# Patient Record
Sex: FEMALE | Race: BLACK OR AFRICAN AMERICAN | Employment: FULL TIME | URBAN - METROPOLITAN AREA
[De-identification: names, ages, dates, MRNs, and addresses within clinical notes are randomized per-mention and may not be internally consistent; named-entity substitution may affect disease eponyms.]

---

## 2017-04-13 ENCOUNTER — TELEPHONE (OUTPATIENT)
Dept: PRIMARY CARE CLINIC | Age: 47
End: 2017-04-13

## 2017-04-13 DIAGNOSIS — I10 ESSENTIAL HYPERTENSION: ICD-10-CM

## 2017-04-13 RX ORDER — HYDROCHLOROTHIAZIDE 25 MG/1
TABLET ORAL
Qty: 90 TABLET | Refills: 0 | Status: SHIPPED | OUTPATIENT
Start: 2017-04-13 | End: 2017-06-30 | Stop reason: SDUPTHER

## 2017-04-13 RX ORDER — ACETAMINOPHEN 500 MG
500 TABLET ORAL EVERY 6 HOURS PRN
Qty: 120 TABLET | Refills: 0 | Status: SHIPPED | OUTPATIENT
Start: 2017-04-13 | End: 2017-06-30 | Stop reason: SDUPTHER

## 2017-04-13 RX ORDER — LOSARTAN POTASSIUM 50 MG/1
50 TABLET ORAL DAILY
Qty: 90 TABLET | Refills: 0 | Status: SHIPPED | OUTPATIENT
Start: 2017-04-13 | End: 2017-06-30

## 2017-06-30 ENCOUNTER — OFFICE VISIT (OUTPATIENT)
Dept: PRIMARY CARE CLINIC | Age: 47
End: 2017-06-30

## 2017-06-30 VITALS
WEIGHT: 249 LBS | HEIGHT: 61 IN | DIASTOLIC BLOOD PRESSURE: 85 MMHG | BODY MASS INDEX: 47.01 KG/M2 | OXYGEN SATURATION: 97 % | HEART RATE: 60 BPM | TEMPERATURE: 98.3 F | RESPIRATION RATE: 16 BRPM | SYSTOLIC BLOOD PRESSURE: 120 MMHG

## 2017-06-30 DIAGNOSIS — G40.409 MYOCLONIC SEIZURE DISORDER (HCC): ICD-10-CM

## 2017-06-30 DIAGNOSIS — M19.90 ARTHRITIS: ICD-10-CM

## 2017-06-30 DIAGNOSIS — Z01.818 PREOP EXAMINATION: Primary | ICD-10-CM

## 2017-06-30 DIAGNOSIS — I10 ESSENTIAL HYPERTENSION: ICD-10-CM

## 2017-06-30 PROCEDURE — 99243 OFF/OP CNSLTJ NEW/EST LOW 30: CPT | Performed by: INTERNAL MEDICINE

## 2017-06-30 PROCEDURE — 93000 ELECTROCARDIOGRAM COMPLETE: CPT | Performed by: INTERNAL MEDICINE

## 2017-06-30 RX ORDER — AMLODIPINE BESYLATE 10 MG/1
10 TABLET ORAL DAILY
Qty: 90 TABLET | Refills: 5 | Status: SHIPPED | OUTPATIENT
Start: 2017-06-30 | End: 2018-02-01 | Stop reason: SDUPTHER

## 2017-06-30 RX ORDER — LEVETIRACETAM 500 MG/1
500 TABLET ORAL 2 TIMES DAILY
Qty: 60 TABLET | Refills: 5 | Status: SHIPPED | OUTPATIENT
Start: 2017-06-30 | End: 2018-01-04 | Stop reason: SDUPTHER

## 2017-06-30 RX ORDER — HYDROCHLOROTHIAZIDE 25 MG/1
TABLET ORAL
Qty: 90 TABLET | Refills: 5 | Status: SHIPPED | OUTPATIENT
Start: 2017-06-30 | End: 2018-02-01 | Stop reason: SDUPTHER

## 2017-06-30 RX ORDER — ATENOLOL 50 MG/1
50 TABLET ORAL DAILY
Qty: 90 TABLET | Refills: 5 | Status: SHIPPED | OUTPATIENT
Start: 2017-06-30 | End: 2018-02-01 | Stop reason: SDUPTHER

## 2017-06-30 RX ORDER — ACETAMINOPHEN 500 MG
500 TABLET ORAL EVERY 6 HOURS PRN
Qty: 120 TABLET | Refills: 3 | Status: SHIPPED | OUTPATIENT
Start: 2017-06-30 | End: 2018-02-01 | Stop reason: SDUPTHER

## 2017-06-30 ASSESSMENT — PATIENT HEALTH QUESTIONNAIRE - PHQ9
1. LITTLE INTEREST OR PLEASURE IN DOING THINGS: 0
SUM OF ALL RESPONSES TO PHQ9 QUESTIONS 1 & 2: 0
2. FEELING DOWN, DEPRESSED OR HOPELESS: 0
SUM OF ALL RESPONSES TO PHQ QUESTIONS 1-9: 0

## 2017-06-30 ASSESSMENT — ENCOUNTER SYMPTOMS
BLOOD IN STOOL: 0
EYES NEGATIVE: 1
DIARRHEA: 0
ABDOMINAL DISTENTION: 0
SHORTNESS OF BREATH: 0
BACK PAIN: 1
WHEEZING: 0
CHEST TIGHTNESS: 0
ABDOMINAL PAIN: 0
GASTROINTESTINAL NEGATIVE: 1
COUGH: 0
CONSTIPATION: 0

## 2017-12-29 ENCOUNTER — HOSPITAL ENCOUNTER (OUTPATIENT)
Dept: MAMMOGRAPHY | Age: 47
Discharge: HOME | End: 2017-12-29
Attending: INTERNAL MEDICINE | Admitting: INTERNAL MEDICINE

## 2017-12-29 DIAGNOSIS — Z12.31 VISIT FOR SCREENING MAMMOGRAM: ICD-10-CM

## 2018-01-04 DIAGNOSIS — G40.409 MYOCLONIC SEIZURE DISORDER (HCC): ICD-10-CM

## 2018-01-04 RX ORDER — LEVETIRACETAM 500 MG/1
TABLET ORAL
Qty: 180 TABLET | Refills: 5 | Status: SHIPPED | OUTPATIENT
Start: 2018-01-04 | End: 2018-02-01 | Stop reason: SDUPTHER

## 2018-01-19 ENCOUNTER — OFFICE VISIT (OUTPATIENT)
Dept: PRIMARY CARE CLINIC | Age: 48
End: 2018-01-19

## 2018-01-19 VITALS
TEMPERATURE: 101 F | OXYGEN SATURATION: 96 % | HEIGHT: 61 IN | HEART RATE: 67 BPM | BODY MASS INDEX: 49.65 KG/M2 | SYSTOLIC BLOOD PRESSURE: 117 MMHG | DIASTOLIC BLOOD PRESSURE: 75 MMHG | WEIGHT: 263 LBS

## 2018-01-19 DIAGNOSIS — J06.9 URI, ACUTE: Primary | ICD-10-CM

## 2018-01-19 LAB
INFLUENZA A ANTIBODY: NORMAL
INFLUENZA B ANTIBODY: NORMAL
S PYO AG THROAT QL: NORMAL

## 2018-01-19 PROCEDURE — 87804 INFLUENZA ASSAY W/OPTIC: CPT | Performed by: INTERNAL MEDICINE

## 2018-01-19 PROCEDURE — 87880 STREP A ASSAY W/OPTIC: CPT | Performed by: INTERNAL MEDICINE

## 2018-01-19 PROCEDURE — 99213 OFFICE O/P EST LOW 20 MIN: CPT | Performed by: INTERNAL MEDICINE

## 2018-01-19 ASSESSMENT — ENCOUNTER SYMPTOMS
COUGH: 1
CONSTIPATION: 0
ABDOMINAL DISTENTION: 0
EYES NEGATIVE: 1
ABDOMINAL PAIN: 0
CHEST TIGHTNESS: 0
BLOOD IN STOOL: 0
GASTROINTESTINAL NEGATIVE: 1
SORE THROAT: 1
WHEEZING: 0
SHORTNESS OF BREATH: 0
DIARRHEA: 0

## 2018-01-19 NOTE — PROGRESS NOTES
current on medication refills  If taking OTC's check with MD/pharmacy first about interactions    Systolic BP < or equal to 666  Diastolic BP < or equal to 85    Set targets for weight loss 4 lbs per month  Exercise 3-5 times per week  Keep check of weight  Weighting machine    On a scale of 1 to 5 how confident are you in these goals?   4/5       Education materials given

## 2018-01-31 DIAGNOSIS — G40.409 MYOCLONIC SEIZURE DISORDER (HCC): ICD-10-CM

## 2018-01-31 DIAGNOSIS — I10 ESSENTIAL HYPERTENSION: ICD-10-CM

## 2018-01-31 DIAGNOSIS — M19.90 ARTHRITIS: ICD-10-CM

## 2018-02-01 DIAGNOSIS — I10 ESSENTIAL HYPERTENSION: ICD-10-CM

## 2018-02-01 DIAGNOSIS — G40.409 MYOCLONIC SEIZURE DISORDER (HCC): ICD-10-CM

## 2018-02-01 RX ORDER — HYDROCHLOROTHIAZIDE 25 MG/1
TABLET ORAL
Qty: 90 TABLET | Refills: 5 | Status: SHIPPED | OUTPATIENT
Start: 2018-02-01 | End: 2018-05-21 | Stop reason: SDUPTHER

## 2018-02-01 RX ORDER — ACETAMINOPHEN 500 MG
500 TABLET ORAL EVERY 6 HOURS PRN
Qty: 120 TABLET | Refills: 3 | Status: SHIPPED | OUTPATIENT
Start: 2018-02-01 | End: 2019-01-02

## 2018-02-01 RX ORDER — AMLODIPINE BESYLATE 10 MG/1
10 TABLET ORAL DAILY
Qty: 30 TABLET | Refills: 0 | Status: SHIPPED | OUTPATIENT
Start: 2018-02-01 | End: 2018-05-21 | Stop reason: SDUPTHER

## 2018-02-01 RX ORDER — ATENOLOL 50 MG/1
50 TABLET ORAL DAILY
Qty: 30 TABLET | Refills: 0 | Status: SHIPPED | OUTPATIENT
Start: 2018-02-01 | End: 2018-05-21 | Stop reason: SDUPTHER

## 2018-02-01 RX ORDER — AMLODIPINE BESYLATE 10 MG/1
10 TABLET ORAL DAILY
Qty: 90 TABLET | Refills: 5 | Status: SHIPPED | OUTPATIENT
Start: 2018-02-01 | End: 2018-02-01 | Stop reason: SDUPTHER

## 2018-02-01 RX ORDER — LEVETIRACETAM 500 MG/1
TABLET ORAL
Qty: 180 TABLET | Refills: 5 | Status: SHIPPED | OUTPATIENT
Start: 2018-02-01 | End: 2019-01-09 | Stop reason: SDUPTHER

## 2018-02-01 RX ORDER — ATENOLOL 50 MG/1
50 TABLET ORAL DAILY
Qty: 90 TABLET | Refills: 5 | Status: SHIPPED | OUTPATIENT
Start: 2018-02-01 | End: 2018-02-01 | Stop reason: SDUPTHER

## 2018-03-15 ENCOUNTER — HOSPITAL ENCOUNTER (OUTPATIENT)
Dept: OTHER | Age: 48
Discharge: HOME | End: 2018-03-15
Attending: INTERNAL MEDICINE | Admitting: INTERNAL MEDICINE

## 2018-03-15 ENCOUNTER — OFFICE VISIT (OUTPATIENT)
Dept: PRIMARY CARE CLINIC | Age: 48
End: 2018-03-15

## 2018-03-15 ENCOUNTER — TELEPHONE (OUTPATIENT)
Dept: ORTHOPEDIC SURGERY | Age: 48
End: 2018-03-15

## 2018-03-15 VITALS
HEIGHT: 61 IN | RESPIRATION RATE: 16 BRPM | BODY MASS INDEX: 47.39 KG/M2 | OXYGEN SATURATION: 96 % | TEMPERATURE: 98.7 F | SYSTOLIC BLOOD PRESSURE: 128 MMHG | DIASTOLIC BLOOD PRESSURE: 88 MMHG | WEIGHT: 251 LBS | HEART RATE: 60 BPM

## 2018-03-15 DIAGNOSIS — S60.512A INFECTED ABRASION OF LEFT HAND, INITIAL ENCOUNTER: ICD-10-CM

## 2018-03-15 DIAGNOSIS — S60.512A INFECTED ABRASION OF LEFT HAND, INITIAL ENCOUNTER: Primary | ICD-10-CM

## 2018-03-15 DIAGNOSIS — L08.9 INFECTED ABRASION OF LEFT HAND, INITIAL ENCOUNTER: Primary | ICD-10-CM

## 2018-03-15 DIAGNOSIS — L08.9 INFECTED ABRASION OF LEFT HAND, INITIAL ENCOUNTER: ICD-10-CM

## 2018-03-15 LAB
BASOPHILS ABSOLUTE: 0 K/UL (ref 0–0.2)
BASOPHILS RELATIVE PERCENT: 1 %
EOSINOPHILS ABSOLUTE: 0.1 K/UL (ref 0–0.6)
EOSINOPHILS RELATIVE PERCENT: 2.1 %
HCT VFR BLD CALC: 42.4 % (ref 36–48)
HEMOGLOBIN: 14.7 G/DL (ref 12–16)
LYMPHOCYTES ABSOLUTE: 2.1 K/UL (ref 1–5.1)
LYMPHOCYTES RELATIVE PERCENT: 53 %
MCH RBC QN AUTO: 26.5 PG (ref 26–34)
MCHC RBC AUTO-ENTMCNC: 34.6 G/DL (ref 31–36)
MCV RBC AUTO: 76.7 FL (ref 80–100)
MONOCYTES ABSOLUTE: 0.4 K/UL (ref 0–1.3)
MONOCYTES RELATIVE PERCENT: 9.2 %
NEUTROPHILS ABSOLUTE: 1.4 K/UL (ref 1.7–7.7)
NEUTROPHILS RELATIVE PERCENT: 34.7 %
PDW BLD-RTO: 15 % (ref 12.4–15.4)
PLATELET # BLD: 242 K/UL (ref 135–450)
PMV BLD AUTO: 8.2 FL (ref 5–10.5)
RBC # BLD: 5.53 M/UL (ref 4–5.2)
WBC # BLD: 4 K/UL (ref 4–11)

## 2018-03-15 PROCEDURE — 99213 OFFICE O/P EST LOW 20 MIN: CPT | Performed by: INTERNAL MEDICINE

## 2018-03-15 RX ORDER — CEPHALEXIN 500 MG/1
500 CAPSULE ORAL 4 TIMES DAILY
Qty: 40 CAPSULE | Refills: 0 | Status: SHIPPED | OUTPATIENT
Start: 2018-03-15 | End: 2018-03-25

## 2018-03-15 ASSESSMENT — ENCOUNTER SYMPTOMS
RESPIRATORY NEGATIVE: 1
EYE DISCHARGE: 0
EYES NEGATIVE: 1
GASTROINTESTINAL NEGATIVE: 1

## 2018-03-15 NOTE — ASSESSMENT & PLAN NOTE
Infected draining abrasion between the 4 and 5 finger webspace, left hand with early cellulitis at the proximal fourth and fifth fingers.

## 2018-03-15 NOTE — PROGRESS NOTES
Arjun Brantley  YOB: 1970    Date of Service:  3/15/2018    Chief Complaint   Patient presents with    Wound Infection     Lt between 3rd and 4th digit, began as a paper cut last week          Hand Pain    The incident occurred more than 1 week ago. The incident occurred at work. Injury mechanism: paper cut. The pain is present in the left hand. The quality of the pain is described as burning. The pain does not radiate. The pain is at a severity of 5/10. The pain is moderate. The pain has been constant since the incident. Pertinent negatives include no chest pain, muscle weakness, numbness or tingling. The symptoms are aggravated by movement and palpation. Treatments tried: neosporin ointment. The treatment provided no relief (much worse with an open wound and pus).        Allergies   Allergen Reactions    Ibuprofen     Nsaids      Outpatient Prescriptions Marked as Taking for the 3/15/18 encounter (Office Visit) with Francisco Cifuentes MD   Medication Sig Dispense Refill    cephALEXin (KEFLEX) 500 MG capsule Take 1 capsule by mouth 4 times daily for 10 days 40 capsule 0    levETIRAcetam (KEPPRA) 500 MG tablet TAKE 1 TABLET TWICE DAILY 180 tablet 5    hydrochlorothiazide (HYDRODIURIL) 25 MG tablet TAKE 1 TABLET EVERY DAY 90 tablet 5    atenolol (TENORMIN) 50 MG tablet Take 1 tablet by mouth daily 30 tablet 0    amLODIPine (NORVASC) 10 MG tablet Take 1 tablet by mouth daily 30 tablet 0       Immunization History   Administered Date(s) Administered    MMR 07/06/2011    Tdap (Boostrix, Adacel) 06/16/2011, 09/30/2013       Past Medical History:   Diagnosis Date    Carpal tunnel syndrome     Convulsions     Dermatophytosis     Diarrhea     Hypertension     Hypertensive kidney disease with chronic kidney disease stage V (HCC)     Medulloadrenal hyperfunc     Meningococcal encephalitis     Obesity     Other malaise and fatigue      Past Surgical History:   Procedure Laterality Date    APPENDECTOMY      CARPAL TUNNEL RELEASE Bilateral 2005     SECTION      ENDOMETRIAL ABLATION      OVARY REMOVAL      TONSILLECTOMY       Family History   Problem Relation Age of Onset    High Blood Pressure Other      maternal aunt    Hypertension Mother     Hypertension Maternal Grandmother     Diabetes Maternal Grandmother     Breast Cancer Maternal Grandmother        Review of Systems:  Review of Systems   Constitutional: Negative. Negative for activity change and appetite change. HENT: Negative. Eyes: Negative. Negative for discharge. Respiratory: Negative. Cardiovascular: Negative. Negative for chest pain. Gastrointestinal: Negative. Genitourinary: Negative. Negative for difficulty urinating. Musculoskeletal: Negative. Negative for arthralgias. Skin: Negative. Negative for rash. Neurological: Negative. Negative for tingling and numbness. Psychiatric/Behavioral: Negative. Negative for agitation and confusion. The patient is not nervous/anxious. All other systems reviewed and are negative. Vitals:    03/15/18 1455   BP: 128/88   Site: Right Arm   Position: Sitting   Cuff Size: Large Adult   Pulse: 60   Resp: 16   Temp: 98.7 °F (37.1 °C)   TempSrc: Oral   SpO2: 96%   Weight: 251 lb (113.9 kg)   Height: 5' 1\" (1.549 m)     Body mass index is 47.43 kg/m². Wt Readings from Last 3 Encounters:   03/15/18 251 lb (113.9 kg)   18 263 lb (119.3 kg)   17 249 lb (112.9 kg)     BP Readings from Last 3 Encounters:   03/15/18 128/88   18 117/75   17 120/85         Physical Exam   Constitutional: She is oriented to person, place, and time. She appears well-developed and well-nourished. HENT:   Head: Normocephalic and atraumatic. Eyes: Conjunctivae are normal. Pupils are equal, round, and reactive to light. Neck: Normal range of motion. Neck supple. Cardiovascular: Normal rate, regular rhythm and normal heart sounds.

## 2018-03-19 ENCOUNTER — OFFICE VISIT (OUTPATIENT)
Dept: ORTHOPEDIC SURGERY | Age: 48
End: 2018-03-19

## 2018-03-19 VITALS
SYSTOLIC BLOOD PRESSURE: 133 MMHG | DIASTOLIC BLOOD PRESSURE: 89 MMHG | BODY MASS INDEX: 47.2 KG/M2 | WEIGHT: 250 LBS | HEIGHT: 61 IN | HEART RATE: 58 BPM

## 2018-03-19 DIAGNOSIS — L30.9 HAND DERMATITIS: Primary | ICD-10-CM

## 2018-03-19 PROCEDURE — 99243 OFF/OP CNSLTJ NEW/EST LOW 30: CPT | Performed by: ORTHOPAEDIC SURGERY

## 2018-04-03 ENCOUNTER — TELEPHONE (OUTPATIENT)
Dept: PRIMARY CARE CLINIC | Age: 48
End: 2018-04-03

## 2018-04-04 ENCOUNTER — OFFICE VISIT (OUTPATIENT)
Dept: PRIMARY CARE CLINIC | Age: 48
End: 2018-04-04

## 2018-04-04 VITALS
TEMPERATURE: 98.4 F | HEIGHT: 61 IN | HEART RATE: 62 BPM | OXYGEN SATURATION: 97 % | WEIGHT: 258.6 LBS | DIASTOLIC BLOOD PRESSURE: 83 MMHG | SYSTOLIC BLOOD PRESSURE: 124 MMHG | BODY MASS INDEX: 48.82 KG/M2

## 2018-04-04 DIAGNOSIS — B37.2: ICD-10-CM

## 2018-04-04 DIAGNOSIS — B37.2: Primary | ICD-10-CM

## 2018-04-04 PROCEDURE — 99213 OFFICE O/P EST LOW 20 MIN: CPT | Performed by: INTERNAL MEDICINE

## 2018-04-04 RX ORDER — PRENATAL VIT 91/IRON/FOLIC/DHA 28-975-200
COMBINATION PACKAGE (EA) ORAL
COMMUNITY
Start: 2018-04-04 | End: 2019-01-02

## 2018-04-04 RX ORDER — FLUCONAZOLE 150 MG/1
TABLET ORAL
Qty: 6 TABLET | Refills: 0 | Status: SHIPPED | OUTPATIENT
Start: 2018-04-04 | End: 2019-01-09

## 2018-04-04 RX ORDER — FLUCONAZOLE 150 MG/1
TABLET ORAL
Qty: 6 TABLET | Refills: 0 | Status: SHIPPED | OUTPATIENT
Start: 2018-04-04 | End: 2018-04-04 | Stop reason: SDUPTHER

## 2018-04-04 ASSESSMENT — ENCOUNTER SYMPTOMS
GASTROINTESTINAL NEGATIVE: 1
RESPIRATORY NEGATIVE: 1
EYES NEGATIVE: 1

## 2018-05-15 DIAGNOSIS — I10 ESSENTIAL HYPERTENSION: ICD-10-CM

## 2018-05-21 DIAGNOSIS — I10 ESSENTIAL HYPERTENSION: ICD-10-CM

## 2018-05-21 RX ORDER — ATENOLOL 50 MG/1
50 TABLET ORAL DAILY
Qty: 30 TABLET | Refills: 0 | Status: SHIPPED | OUTPATIENT
Start: 2018-05-21 | End: 2018-07-18 | Stop reason: SDUPTHER

## 2018-05-21 RX ORDER — HYDROCHLOROTHIAZIDE 25 MG/1
TABLET ORAL
Qty: 90 TABLET | Refills: 5 | Status: SHIPPED | OUTPATIENT
Start: 2018-05-21 | End: 2018-10-05 | Stop reason: SDUPTHER

## 2018-05-21 RX ORDER — AMLODIPINE BESYLATE 10 MG/1
10 TABLET ORAL DAILY
Qty: 30 TABLET | Refills: 0 | Status: SHIPPED | OUTPATIENT
Start: 2018-05-21 | End: 2018-07-18 | Stop reason: SDUPTHER

## 2018-05-21 RX ORDER — AMLODIPINE BESYLATE 10 MG/1
10 TABLET ORAL DAILY
Qty: 30 TABLET | Refills: 0 | Status: SHIPPED | OUTPATIENT
Start: 2018-05-21 | End: 2018-10-05 | Stop reason: SDUPTHER

## 2018-05-21 RX ORDER — ATENOLOL 50 MG/1
50 TABLET ORAL DAILY
Qty: 30 TABLET | Refills: 0 | Status: SHIPPED | OUTPATIENT
Start: 2018-05-21 | End: 2018-10-05 | Stop reason: SDUPTHER

## 2018-07-18 DIAGNOSIS — I10 ESSENTIAL HYPERTENSION: ICD-10-CM

## 2018-07-18 RX ORDER — ATENOLOL 50 MG/1
TABLET ORAL
Qty: 30 TABLET | Refills: 0 | Status: SHIPPED | OUTPATIENT
Start: 2018-07-18 | End: 2018-08-26 | Stop reason: SDUPTHER

## 2018-07-18 RX ORDER — AMLODIPINE BESYLATE 10 MG/1
TABLET ORAL
Qty: 30 TABLET | Refills: 0 | Status: SHIPPED | OUTPATIENT
Start: 2018-07-18 | End: 2018-08-28 | Stop reason: SDUPTHER

## 2018-08-26 DIAGNOSIS — I10 ESSENTIAL HYPERTENSION: ICD-10-CM

## 2018-08-27 RX ORDER — ATENOLOL 50 MG/1
TABLET ORAL
Qty: 30 TABLET | Refills: 0 | Status: SHIPPED | OUTPATIENT
Start: 2018-08-27 | End: 2018-10-05 | Stop reason: SDUPTHER

## 2018-08-28 DIAGNOSIS — I10 ESSENTIAL HYPERTENSION: ICD-10-CM

## 2018-08-29 RX ORDER — AMLODIPINE BESYLATE 10 MG/1
TABLET ORAL
Qty: 30 TABLET | Refills: 0 | Status: SHIPPED | OUTPATIENT
Start: 2018-08-29 | End: 2018-10-05 | Stop reason: SDUPTHER

## 2018-10-05 DIAGNOSIS — I10 ESSENTIAL HYPERTENSION: ICD-10-CM

## 2018-10-05 RX ORDER — AMLODIPINE BESYLATE 10 MG/1
TABLET ORAL
Qty: 30 TABLET | Refills: 0 | Status: SHIPPED | OUTPATIENT
Start: 2018-10-05 | End: 2018-11-17 | Stop reason: SDUPTHER

## 2018-10-05 RX ORDER — HYDROCHLOROTHIAZIDE 25 MG/1
TABLET ORAL
Qty: 90 TABLET | Refills: 5 | Status: SHIPPED | OUTPATIENT
Start: 2018-10-05 | End: 2018-11-21 | Stop reason: SDUPTHER

## 2018-10-05 RX ORDER — ATENOLOL 50 MG/1
TABLET ORAL
Qty: 30 TABLET | Refills: 0 | Status: SHIPPED | OUTPATIENT
Start: 2018-10-05 | End: 2018-11-17 | Stop reason: SDUPTHER

## 2018-11-17 DIAGNOSIS — I10 ESSENTIAL HYPERTENSION: ICD-10-CM

## 2018-11-19 RX ORDER — AMLODIPINE BESYLATE 10 MG/1
TABLET ORAL
Qty: 30 TABLET | Refills: 0 | Status: SHIPPED | OUTPATIENT
Start: 2018-11-19 | End: 2019-01-02

## 2018-11-19 RX ORDER — ATENOLOL 50 MG/1
TABLET ORAL
Qty: 30 TABLET | Refills: 0 | Status: SHIPPED | OUTPATIENT
Start: 2018-11-19 | End: 2019-01-02

## 2018-11-21 DIAGNOSIS — I10 ESSENTIAL HYPERTENSION: ICD-10-CM

## 2018-11-21 RX ORDER — ATENOLOL 50 MG/1
50 TABLET ORAL DAILY
Qty: 30 TABLET | Refills: 0 | Status: SHIPPED | OUTPATIENT
Start: 2018-11-21 | End: 2019-01-02 | Stop reason: SDUPTHER

## 2018-11-21 RX ORDER — HYDROCHLOROTHIAZIDE 25 MG/1
TABLET ORAL
Qty: 90 TABLET | Refills: 5 | Status: SHIPPED | OUTPATIENT
Start: 2018-11-21 | End: 2019-01-02 | Stop reason: SDUPTHER

## 2018-11-21 RX ORDER — AMLODIPINE BESYLATE 10 MG/1
10 TABLET ORAL DAILY
Qty: 30 TABLET | Refills: 0 | Status: SHIPPED | OUTPATIENT
Start: 2018-11-21 | End: 2019-01-02 | Stop reason: SDUPTHER

## 2018-11-21 NOTE — TELEPHONE ENCOUNTER
From: Alicia Naylor  Sent: 11/17/2018 11:56 AM EST  Subject: Medication Renewal Request    Sunitha Sheridan would like a refill of the following medications:     atenolol (TENORMIN) 50 MG tablet [JAZ SHAH MD]     amLODIPine (NORVASC) 10 MG tablet [JAZ SHAH MD]     hydrochlorothiazide (HYDRODIURIL) 25 MG tablet Magdalene Apodaca MD]    Preferred pharmacy: [address] [place] [address] [phone] - F [phone]

## 2019-01-02 DIAGNOSIS — I10 ESSENTIAL HYPERTENSION: ICD-10-CM

## 2019-01-02 RX ORDER — HYDROCHLOROTHIAZIDE 25 MG/1
TABLET ORAL
Qty: 30 TABLET | Refills: 5 | Status: SHIPPED | OUTPATIENT
Start: 2019-01-02 | End: 2019-01-09 | Stop reason: SDUPTHER

## 2019-01-02 RX ORDER — AMLODIPINE BESYLATE 10 MG/1
10 TABLET ORAL DAILY
Qty: 30 TABLET | Refills: 0 | Status: SHIPPED | OUTPATIENT
Start: 2019-01-02 | End: 2019-01-09 | Stop reason: SDUPTHER

## 2019-01-02 RX ORDER — ATENOLOL 50 MG/1
50 TABLET ORAL DAILY
Qty: 30 TABLET | Refills: 5 | Status: SHIPPED | OUTPATIENT
Start: 2019-01-02 | End: 2019-01-09 | Stop reason: SDUPTHER

## 2019-01-09 ENCOUNTER — OFFICE VISIT (OUTPATIENT)
Dept: PRIMARY CARE CLINIC | Age: 49
End: 2019-01-09
Payer: COMMERCIAL

## 2019-01-09 VITALS
OXYGEN SATURATION: 98 % | WEIGHT: 248 LBS | BODY MASS INDEX: 46.82 KG/M2 | HEIGHT: 61 IN | DIASTOLIC BLOOD PRESSURE: 104 MMHG | TEMPERATURE: 98.8 F | HEART RATE: 68 BPM | SYSTOLIC BLOOD PRESSURE: 151 MMHG

## 2019-01-09 DIAGNOSIS — E55.9 VITAMIN D DEFICIENCY: ICD-10-CM

## 2019-01-09 DIAGNOSIS — R73.9 HYPERGLYCEMIA: ICD-10-CM

## 2019-01-09 DIAGNOSIS — B35.4 TINEA CORPORIS: ICD-10-CM

## 2019-01-09 DIAGNOSIS — R63.8 WEIGHT DISORDER: ICD-10-CM

## 2019-01-09 DIAGNOSIS — N28.9 RENAL INSUFFICIENCY: ICD-10-CM

## 2019-01-09 DIAGNOSIS — N91.2 AMENORRHEA: ICD-10-CM

## 2019-01-09 DIAGNOSIS — Z12.4 ENCOUNTER FOR SCREENING FOR CERVICAL CANCER: ICD-10-CM

## 2019-01-09 DIAGNOSIS — Z23 FLU VACCINE NEED: ICD-10-CM

## 2019-01-09 DIAGNOSIS — Z12.31 ENCOUNTER FOR SCREENING MAMMOGRAM FOR HIGH-RISK PATIENT: ICD-10-CM

## 2019-01-09 DIAGNOSIS — I10 ESSENTIAL HYPERTENSION: ICD-10-CM

## 2019-01-09 DIAGNOSIS — Z11.4 SCREENING FOR HIV (HUMAN IMMUNODEFICIENCY VIRUS): ICD-10-CM

## 2019-01-09 DIAGNOSIS — G40.409 MYOCLONIC SEIZURE DISORDER (HCC): ICD-10-CM

## 2019-01-09 LAB
A/G RATIO: 1.8 (ref 1.1–2.2)
ALBUMIN SERPL-MCNC: 4.6 G/DL (ref 3.4–5)
ALP BLD-CCNC: 71 U/L (ref 40–129)
ALT SERPL-CCNC: 9 U/L (ref 10–40)
ANION GAP SERPL CALCULATED.3IONS-SCNC: 15 MMOL/L (ref 3–16)
AST SERPL-CCNC: 15 U/L (ref 15–37)
BASOPHILS ABSOLUTE: 0 K/UL (ref 0–0.2)
BASOPHILS RELATIVE PERCENT: 0.5 %
BILIRUB SERPL-MCNC: 0.5 MG/DL (ref 0–1)
BILIRUBIN URINE: NEGATIVE
BLOOD, URINE: NEGATIVE
BUN BLDV-MCNC: 17 MG/DL (ref 7–20)
CALCIUM SERPL-MCNC: 9.9 MG/DL (ref 8.3–10.6)
CHLORIDE BLD-SCNC: 99 MMOL/L (ref 99–110)
CHOLESTEROL, TOTAL: 192 MG/DL (ref 0–199)
CLARITY: CLEAR
CO2: 30 MMOL/L (ref 21–32)
COLOR: YELLOW
CREAT SERPL-MCNC: 1.1 MG/DL (ref 0.6–1.1)
EOSINOPHILS ABSOLUTE: 0.1 K/UL (ref 0–0.6)
EOSINOPHILS RELATIVE PERCENT: 3.2 %
GFR AFRICAN AMERICAN: >60
GFR NON-AFRICAN AMERICAN: 53
GLOBULIN: 2.5 G/DL
GLUCOSE BLD-MCNC: 91 MG/DL (ref 70–99)
GLUCOSE URINE: NEGATIVE MG/DL
HCG(URINE) PREGNANCY TEST: NEGATIVE
HCT VFR BLD CALC: 43.3 % (ref 36–48)
HDLC SERPL-MCNC: 58 MG/DL (ref 40–60)
HEMOGLOBIN: 14.6 G/DL (ref 12–16)
KETONES, URINE: NEGATIVE MG/DL
LDL CHOLESTEROL CALCULATED: 118 MG/DL
LEUKOCYTE ESTERASE, URINE: NEGATIVE
LYMPHOCYTES ABSOLUTE: 1.4 K/UL (ref 1–5.1)
LYMPHOCYTES RELATIVE PERCENT: 49.3 %
MCH RBC QN AUTO: 26.6 PG (ref 26–34)
MCHC RBC AUTO-ENTMCNC: 33.8 G/DL (ref 31–36)
MCV RBC AUTO: 78.7 FL (ref 80–100)
MICROSCOPIC EXAMINATION: NORMAL
MONOCYTES ABSOLUTE: 0.2 K/UL (ref 0–1.3)
MONOCYTES RELATIVE PERCENT: 7.1 %
NEUTROPHILS ABSOLUTE: 1.2 K/UL (ref 1.7–7.7)
NEUTROPHILS RELATIVE PERCENT: 39.9 %
NITRITE, URINE: NEGATIVE
PDW BLD-RTO: 14.7 % (ref 12.4–15.4)
PH UA: 7
PLATELET # BLD: 242 K/UL (ref 135–450)
PMV BLD AUTO: 8.1 FL (ref 5–10.5)
POTASSIUM SERPL-SCNC: 3.6 MMOL/L (ref 3.5–5.1)
PROTEIN UA: NEGATIVE MG/DL
RBC # BLD: 5.51 M/UL (ref 4–5.2)
SODIUM BLD-SCNC: 144 MMOL/L (ref 136–145)
SPECIFIC GRAVITY UA: 1.02
TOTAL PROTEIN: 7.1 G/DL (ref 6.4–8.2)
TRIGL SERPL-MCNC: 80 MG/DL (ref 0–150)
TSH SERPL DL<=0.05 MIU/L-ACNC: 1.78 UIU/ML (ref 0.27–4.2)
URINE TYPE: NORMAL
UROBILINOGEN, URINE: 0.2 E.U./DL
VITAMIN D 25-HYDROXY: 35.4 NG/ML
VLDLC SERPL CALC-MCNC: 16 MG/DL
WBC # BLD: 2.9 K/UL (ref 4–11)

## 2019-01-09 PROCEDURE — 90686 IIV4 VACC NO PRSV 0.5 ML IM: CPT | Performed by: INTERNAL MEDICINE

## 2019-01-09 PROCEDURE — 99214 OFFICE O/P EST MOD 30 MIN: CPT | Performed by: INTERNAL MEDICINE

## 2019-01-09 PROCEDURE — 90471 IMMUNIZATION ADMIN: CPT | Performed by: INTERNAL MEDICINE

## 2019-01-09 RX ORDER — CLOTRIMAZOLE AND BETAMETHASONE DIPROPIONATE 10; .64 MG/G; MG/G
CREAM TOPICAL
Qty: 2 G | Refills: 5 | Status: SHIPPED | OUTPATIENT
Start: 2019-01-09 | End: 2019-01-09 | Stop reason: SDUPTHER

## 2019-01-09 RX ORDER — AMLODIPINE BESYLATE 10 MG/1
10 TABLET ORAL DAILY
Qty: 90 TABLET | Refills: 5 | Status: SHIPPED | OUTPATIENT
Start: 2019-01-09 | End: 2019-08-02 | Stop reason: SDUPTHER

## 2019-01-09 RX ORDER — HYDROCHLOROTHIAZIDE 25 MG/1
TABLET ORAL
Qty: 90 TABLET | Refills: 5 | Status: SHIPPED | OUTPATIENT
Start: 2019-01-09 | End: 2019-08-02 | Stop reason: SDUPTHER

## 2019-01-09 RX ORDER — ATENOLOL 50 MG/1
50 TABLET ORAL DAILY
Qty: 90 TABLET | Refills: 5 | Status: SHIPPED | OUTPATIENT
Start: 2019-01-09 | End: 2019-08-02 | Stop reason: SDUPTHER

## 2019-01-09 RX ORDER — AMLODIPINE BESYLATE 10 MG/1
10 TABLET ORAL DAILY
Qty: 90 TABLET | Refills: 5 | Status: SHIPPED | OUTPATIENT
Start: 2019-01-09 | End: 2019-01-09 | Stop reason: SDUPTHER

## 2019-01-09 RX ORDER — ATENOLOL 50 MG/1
50 TABLET ORAL DAILY
Qty: 90 TABLET | Refills: 5 | Status: SHIPPED | OUTPATIENT
Start: 2019-01-09 | End: 2019-01-09 | Stop reason: SDUPTHER

## 2019-01-09 RX ORDER — LEVETIRACETAM 500 MG/1
TABLET ORAL
Qty: 180 TABLET | Refills: 5 | Status: SHIPPED | OUTPATIENT
Start: 2019-01-09 | End: 2019-01-09 | Stop reason: SDUPTHER

## 2019-01-09 RX ORDER — CLOTRIMAZOLE AND BETAMETHASONE DIPROPIONATE 10; .64 MG/G; MG/G
CREAM TOPICAL
Qty: 2 G | Refills: 5 | Status: SHIPPED | OUTPATIENT
Start: 2019-01-09 | End: 2019-11-12

## 2019-01-09 RX ORDER — LEVETIRACETAM 500 MG/1
TABLET ORAL
Qty: 180 TABLET | Refills: 5 | Status: SHIPPED | OUTPATIENT
Start: 2019-01-09 | End: 2020-05-28 | Stop reason: SDUPTHER

## 2019-01-09 RX ORDER — HYDROCHLOROTHIAZIDE 25 MG/1
TABLET ORAL
Qty: 90 TABLET | Refills: 5 | Status: SHIPPED | OUTPATIENT
Start: 2019-01-09 | End: 2019-01-09 | Stop reason: SDUPTHER

## 2019-01-09 ASSESSMENT — ENCOUNTER SYMPTOMS
DIARRHEA: 0
ABDOMINAL DISTENTION: 0
EYES NEGATIVE: 1
ABDOMINAL PAIN: 0
BLOOD IN STOOL: 0
CONSTIPATION: 1
WHEEZING: 0
SHORTNESS OF BREATH: 0
CHEST TIGHTNESS: 0

## 2019-01-09 ASSESSMENT — PATIENT HEALTH QUESTIONNAIRE - PHQ9
1. LITTLE INTEREST OR PLEASURE IN DOING THINGS: 0
SUM OF ALL RESPONSES TO PHQ QUESTIONS 1-9: 0
SUM OF ALL RESPONSES TO PHQ9 QUESTIONS 1 & 2: 0
SUM OF ALL RESPONSES TO PHQ QUESTIONS 1-9: 0
2. FEELING DOWN, DEPRESSED OR HOPELESS: 0

## 2019-01-10 LAB
ESTIMATED AVERAGE GLUCOSE: 119.8 MG/DL
HBA1C MFR BLD: 5.8 %
HIV AG/AB: REACTIVE
HIV ANTIGEN: REACTIVE
HIV-1 ANTIBODY: REACTIVE
HIV-2 AB: ABNORMAL

## 2019-01-11 ENCOUNTER — TELEPHONE (OUTPATIENT)
Dept: PRIMARY CARE CLINIC | Age: 49
End: 2019-01-11

## 2019-01-12 LAB
HIV 1/2 AB DIFF IMMUNOASSAY: NORMAL
HIV INTERPRETATION: NORMAL
HIV-1 ANTIBODY, SUPPLEMENTAL: NEGATIVE
HIV-2 ANTIBODY, SUPPLEMENTAL: NEGATIVE

## 2019-01-21 ENCOUNTER — OFFICE VISIT (OUTPATIENT)
Dept: PRIMARY CARE CLINIC | Age: 49
End: 2019-01-21
Payer: COMMERCIAL

## 2019-01-21 VITALS
RESPIRATION RATE: 14 BRPM | WEIGHT: 245 LBS | DIASTOLIC BLOOD PRESSURE: 91 MMHG | SYSTOLIC BLOOD PRESSURE: 148 MMHG | HEIGHT: 61 IN | OXYGEN SATURATION: 96 % | HEART RATE: 70 BPM | BODY MASS INDEX: 46.26 KG/M2 | TEMPERATURE: 97.6 F

## 2019-01-21 DIAGNOSIS — Z01.419 ENCOUNTER FOR GYNECOLOGICAL EXAMINATION WITHOUT ABNORMAL FINDING: Primary | ICD-10-CM

## 2019-01-21 PROCEDURE — 99396 PREV VISIT EST AGE 40-64: CPT | Performed by: FAMILY MEDICINE

## 2019-01-24 ENCOUNTER — TELEPHONE (OUTPATIENT)
Dept: PRIMARY CARE CLINIC | Age: 49
End: 2019-01-24

## 2019-01-29 ENCOUNTER — OFFICE VISIT (OUTPATIENT)
Dept: PRIMARY CARE CLINIC | Age: 49
End: 2019-01-29
Payer: COMMERCIAL

## 2019-01-29 VITALS
DIASTOLIC BLOOD PRESSURE: 90 MMHG | OXYGEN SATURATION: 99 % | HEIGHT: 61 IN | BODY MASS INDEX: 47.01 KG/M2 | WEIGHT: 249 LBS | SYSTOLIC BLOOD PRESSURE: 140 MMHG | TEMPERATURE: 98.2 F | HEART RATE: 63 BPM

## 2019-01-29 DIAGNOSIS — Z11.4 ENCOUNTER FOR SCREENING FOR HIV: Primary | ICD-10-CM

## 2019-01-29 PROCEDURE — 99213 OFFICE O/P EST LOW 20 MIN: CPT | Performed by: INTERNAL MEDICINE

## 2019-01-29 ASSESSMENT — ENCOUNTER SYMPTOMS
BLOOD IN STOOL: 0
CONSTIPATION: 1
ABDOMINAL DISTENTION: 0
EYES NEGATIVE: 1
CHEST TIGHTNESS: 0
DIARRHEA: 0
SHORTNESS OF BREATH: 0
WHEEZING: 0
ABDOMINAL PAIN: 0

## 2019-03-27 ENCOUNTER — HOSPITAL ENCOUNTER (OUTPATIENT)
Dept: WOMENS IMAGING | Age: 49
Discharge: HOME | End: 2019-03-27
Payer: COMMERCIAL

## 2019-03-27 DIAGNOSIS — Z12.31 ENCOUNTER FOR SCREENING MAMMOGRAM FOR HIGH-RISK PATIENT: ICD-10-CM

## 2019-03-27 PROCEDURE — 77067 SCR MAMMO BI INCL CAD: CPT

## 2019-07-25 ENCOUNTER — HOSPITAL ENCOUNTER (OUTPATIENT)
Age: 49
Discharge: HOME | End: 2019-07-25
Payer: COMMERCIAL

## 2019-07-25 LAB
ALBUMIN SERPL-MCNC: 4.9 G/DL (ref 3.4–5)
ANION GAP SERPL CALCULATED.3IONS-SCNC: 12 MMOL/L (ref 3–16)
BILIRUBIN URINE: NEGATIVE
BLOOD, URINE: NEGATIVE
BUN BLDV-MCNC: 18 MG/DL (ref 7–20)
CALCIUM SERPL-MCNC: 10.3 MG/DL (ref 8.3–10.6)
CHLORIDE BLD-SCNC: 98 MMOL/L (ref 99–110)
CLARITY: CLEAR
CO2: 28 MMOL/L (ref 21–32)
COLOR: YELLOW
CREAT SERPL-MCNC: 1.3 MG/DL (ref 0.6–1.1)
CREATININE URINE: 65.9 MG/DL (ref 28–259)
FERRITIN: 261.9 NG/ML (ref 15–150)
GFR AFRICAN AMERICAN: 53
GFR NON-AFRICAN AMERICAN: 44
GLUCOSE BLD-MCNC: 98 MG/DL (ref 70–99)
GLUCOSE URINE: NEGATIVE MG/DL
HCT VFR BLD CALC: 44.5 % (ref 36–48)
HEMOGLOBIN: 15 G/DL (ref 12–16)
IRON SATURATION: 29 % (ref 15–50)
IRON: 74 UG/DL (ref 37–145)
KETONES, URINE: NEGATIVE MG/DL
LEUKOCYTE ESTERASE, URINE: NEGATIVE
MCH RBC QN AUTO: 26.3 PG (ref 26–34)
MCHC RBC AUTO-ENTMCNC: 33.7 G/DL (ref 31–36)
MCV RBC AUTO: 78 FL (ref 80–100)
MICROSCOPIC EXAMINATION: NORMAL
NITRITE, URINE: NEGATIVE
PARATHYROID HORMONE INTACT: 93.9 PG/ML (ref 14–72)
PDW BLD-RTO: 14.4 % (ref 12.4–15.4)
PH UA: 6 (ref 5–8)
PHOSPHORUS: 3.1 MG/DL (ref 2.5–4.9)
PLATELET # BLD: 248 K/UL (ref 135–450)
PMV BLD AUTO: 8.4 FL (ref 5–10.5)
POTASSIUM SERPL-SCNC: 3.9 MMOL/L (ref 3.5–5.1)
PROTEIN PROTEIN: <4 MG/DL
PROTEIN UA: NEGATIVE MG/DL
RBC # BLD: 5.71 M/UL (ref 4–5.2)
SODIUM BLD-SCNC: 138 MMOL/L (ref 136–145)
SPECIFIC GRAVITY UA: 1.01 (ref 1–1.03)
TOTAL IRON BINDING CAPACITY: 258 UG/DL (ref 260–445)
URINE TYPE: NORMAL
UROBILINOGEN, URINE: 0.2 E.U./DL
VITAMIN D 25-HYDROXY: 31.8 NG/ML
WBC # BLD: 3.7 K/UL (ref 4–11)

## 2019-07-25 PROCEDURE — 83970 ASSAY OF PARATHORMONE: CPT

## 2019-07-25 PROCEDURE — 80069 RENAL FUNCTION PANEL: CPT

## 2019-07-25 PROCEDURE — 83883 ASSAY NEPHELOMETRY NOT SPEC: CPT

## 2019-07-25 PROCEDURE — 83550 IRON BINDING TEST: CPT

## 2019-07-25 PROCEDURE — 84156 ASSAY OF PROTEIN URINE: CPT

## 2019-07-25 PROCEDURE — 82570 ASSAY OF URINE CREATININE: CPT

## 2019-07-25 PROCEDURE — 82728 ASSAY OF FERRITIN: CPT

## 2019-07-25 PROCEDURE — 85027 COMPLETE CBC AUTOMATED: CPT

## 2019-07-25 PROCEDURE — 82306 VITAMIN D 25 HYDROXY: CPT

## 2019-07-25 PROCEDURE — 36415 COLL VENOUS BLD VENIPUNCTURE: CPT

## 2019-07-25 PROCEDURE — 81003 URINALYSIS AUTO W/O SCOPE: CPT

## 2019-07-25 PROCEDURE — 83540 ASSAY OF IRON: CPT

## 2019-07-26 LAB
KAPPA, FREE LIGHT CHAINS, SERUM: 17.41 MG/L (ref 3.3–19.4)
KAPPA/LAMBDA RATIO: 1.21 (ref 0.26–1.65)
KAPPA/LAMBDA TEST COMMENT: NORMAL
LAMBDA, FREE LIGHT CHAINS, SERUM: 14.39 MG/L (ref 5.71–26.3)

## 2019-08-02 ENCOUNTER — HOSPITAL ENCOUNTER (OUTPATIENT)
Dept: ULTRASOUND IMAGING | Age: 49
Discharge: HOME | End: 2019-08-02
Payer: COMMERCIAL

## 2019-08-02 DIAGNOSIS — I10 ESSENTIAL HYPERTENSION: ICD-10-CM

## 2019-08-02 DIAGNOSIS — N18.30 CKD (CHRONIC KIDNEY DISEASE) STAGE 3, GFR 30-59 ML/MIN (HCC): ICD-10-CM

## 2019-08-02 PROCEDURE — 76770 US EXAM ABDO BACK WALL COMP: CPT

## 2019-08-02 RX ORDER — HYDROCHLOROTHIAZIDE 25 MG/1
TABLET ORAL
Qty: 90 TABLET | Refills: 5 | Status: SHIPPED | OUTPATIENT
Start: 2019-08-02 | End: 2019-10-22 | Stop reason: ALTCHOICE

## 2019-08-02 RX ORDER — AMLODIPINE BESYLATE 10 MG/1
10 TABLET ORAL DAILY
Qty: 90 TABLET | Refills: 5 | Status: SHIPPED | OUTPATIENT
Start: 2019-08-02 | End: 2019-10-22 | Stop reason: SDUPTHER

## 2019-08-02 RX ORDER — ATENOLOL 50 MG/1
50 TABLET ORAL DAILY
Qty: 90 TABLET | Refills: 5 | Status: SHIPPED | OUTPATIENT
Start: 2019-08-02 | End: 2019-10-22 | Stop reason: SDUPTHER

## 2019-10-14 ENCOUNTER — HOSPITAL ENCOUNTER (EMERGENCY)
Age: 49
Discharge: HOME | End: 2019-10-14
Attending: EMERGENCY MEDICINE
Payer: COMMERCIAL

## 2019-10-14 ENCOUNTER — NURSE TRIAGE (OUTPATIENT)
Dept: OTHER | Facility: CLINIC | Age: 49
End: 2019-10-14

## 2019-10-14 VITALS
RESPIRATION RATE: 22 BRPM | SYSTOLIC BLOOD PRESSURE: 139 MMHG | BODY MASS INDEX: 47.01 KG/M2 | HEART RATE: 52 BPM | HEIGHT: 61 IN | WEIGHT: 249 LBS | DIASTOLIC BLOOD PRESSURE: 72 MMHG | OXYGEN SATURATION: 98 % | TEMPERATURE: 98.5 F

## 2019-10-14 DIAGNOSIS — H81.11 BENIGN PAROXYSMAL POSITIONAL VERTIGO OF RIGHT EAR: Primary | ICD-10-CM

## 2019-10-14 LAB
ANION GAP SERPL CALCULATED.3IONS-SCNC: 11 MMOL/L (ref 3–16)
BASOPHILS ABSOLUTE: 0 K/UL (ref 0–0.2)
BASOPHILS RELATIVE PERCENT: 0.7 %
BUN BLDV-MCNC: 22 MG/DL (ref 7–20)
CALCIUM SERPL-MCNC: 10.5 MG/DL (ref 8.3–10.6)
CHLORIDE BLD-SCNC: 100 MMOL/L (ref 99–110)
CO2: 26 MMOL/L (ref 21–32)
CREAT SERPL-MCNC: 1.4 MG/DL (ref 0.6–1.1)
EOSINOPHILS ABSOLUTE: 0.1 K/UL (ref 0–0.6)
EOSINOPHILS RELATIVE PERCENT: 2.4 %
GFR AFRICAN AMERICAN: 48
GFR NON-AFRICAN AMERICAN: 40
GLUCOSE BLD-MCNC: 108 MG/DL (ref 70–99)
GONADOTROPIN, CHORIONIC (HCG) QUANT: <5 MIU/ML
HCG QUALITATIVE: NEGATIVE
HCT VFR BLD CALC: 48.5 % (ref 36–48)
HEMOGLOBIN: 15.9 G/DL (ref 12–16)
LYMPHOCYTES ABSOLUTE: 2.5 K/UL (ref 1–5.1)
LYMPHOCYTES RELATIVE PERCENT: 59.1 %
MCH RBC QN AUTO: 25.4 PG (ref 26–34)
MCHC RBC AUTO-ENTMCNC: 32.7 G/DL (ref 31–36)
MCV RBC AUTO: 77.8 FL (ref 80–100)
MONOCYTES ABSOLUTE: 0.3 K/UL (ref 0–1.3)
MONOCYTES RELATIVE PERCENT: 6.5 %
NEUTROPHILS ABSOLUTE: 1.3 K/UL (ref 1.7–7.7)
NEUTROPHILS RELATIVE PERCENT: 31.3 %
PDW BLD-RTO: 15 % (ref 12.4–15.4)
PLATELET # BLD: 238 K/UL (ref 135–450)
PMV BLD AUTO: 8.2 FL (ref 5–10.5)
POTASSIUM SERPL-SCNC: 3.5 MMOL/L (ref 3.5–5.1)
RBC # BLD: 6.23 M/UL (ref 4–5.2)
SODIUM BLD-SCNC: 137 MMOL/L (ref 136–145)
WBC # BLD: 4.3 K/UL (ref 4–11)

## 2019-10-14 PROCEDURE — 99284 EMERGENCY DEPT VISIT MOD MDM: CPT

## 2019-10-14 PROCEDURE — 85025 COMPLETE CBC W/AUTO DIFF WBC: CPT

## 2019-10-14 PROCEDURE — 84703 CHORIONIC GONADOTROPIN ASSAY: CPT

## 2019-10-14 PROCEDURE — 80048 BASIC METABOLIC PNL TOTAL CA: CPT

## 2019-10-14 PROCEDURE — 84702 CHORIONIC GONADOTROPIN TEST: CPT

## 2019-10-14 PROCEDURE — 93005 ELECTROCARDIOGRAM TRACING: CPT | Performed by: EMERGENCY MEDICINE

## 2019-10-14 PROCEDURE — 6370000000 HC RX 637 (ALT 250 FOR IP): Performed by: EMERGENCY MEDICINE

## 2019-10-14 RX ORDER — MECLIZINE HYDROCHLORIDE 25 MG/1
25 TABLET ORAL 3 TIMES DAILY PRN
Qty: 21 TABLET | Refills: 0 | Status: SHIPPED | OUTPATIENT
Start: 2019-10-14 | End: 2019-10-15 | Stop reason: SDUPTHER

## 2019-10-14 RX ORDER — MECLIZINE HCL 12.5 MG/1
25 TABLET ORAL ONCE
Status: COMPLETED | OUTPATIENT
Start: 2019-10-14 | End: 2019-10-14

## 2019-10-14 RX ADMIN — MECLIZINE 25 MG: 12.5 TABLET ORAL at 18:53

## 2019-10-14 ASSESSMENT — PAIN SCALES - GENERAL: PAINLEVEL_OUTOF10: 10

## 2019-10-15 ENCOUNTER — TELEPHONE (OUTPATIENT)
Dept: PRIMARY CARE CLINIC | Age: 49
End: 2019-10-15

## 2019-10-15 LAB
EKG ATRIAL RATE: 52 BPM
EKG DIAGNOSIS: NORMAL
EKG P AXIS: 8 DEGREES
EKG P-R INTERVAL: 164 MS
EKG Q-T INTERVAL: 438 MS
EKG QRS DURATION: 94 MS
EKG QTC CALCULATION (BAZETT): 407 MS
EKG R AXIS: 37 DEGREES
EKG T AXIS: -2 DEGREES
EKG VENTRICULAR RATE: 52 BPM

## 2019-10-15 PROCEDURE — 93010 ELECTROCARDIOGRAM REPORT: CPT | Performed by: INTERNAL MEDICINE

## 2019-10-15 RX ORDER — MECLIZINE HYDROCHLORIDE 25 MG/1
25 TABLET ORAL 3 TIMES DAILY PRN
Qty: 21 TABLET | Refills: 0 | Status: CANCELLED | OUTPATIENT
Start: 2019-10-15 | End: 2019-10-22

## 2019-10-15 RX ORDER — MECLIZINE HYDROCHLORIDE 25 MG/1
25 TABLET ORAL 3 TIMES DAILY PRN
Qty: 21 TABLET | Refills: 3 | Status: SHIPPED | OUTPATIENT
Start: 2019-10-15 | End: 2019-10-22 | Stop reason: SDUPTHER

## 2019-10-17 ENCOUNTER — TELEPHONE (OUTPATIENT)
Dept: PRIMARY CARE CLINIC | Age: 49
End: 2019-10-17

## 2019-10-22 ENCOUNTER — OFFICE VISIT (OUTPATIENT)
Dept: PRIMARY CARE CLINIC | Age: 49
End: 2019-10-22
Payer: COMMERCIAL

## 2019-10-22 VITALS
HEIGHT: 61 IN | SYSTOLIC BLOOD PRESSURE: 115 MMHG | DIASTOLIC BLOOD PRESSURE: 60 MMHG | HEART RATE: 60 BPM | WEIGHT: 249 LBS | BODY MASS INDEX: 47.01 KG/M2

## 2019-10-22 DIAGNOSIS — H53.9 VISION ABNORMALITIES: ICD-10-CM

## 2019-10-22 DIAGNOSIS — I10 ESSENTIAL HYPERTENSION: ICD-10-CM

## 2019-10-22 DIAGNOSIS — Z09 FOLLOW-UP EXAM: Primary | ICD-10-CM

## 2019-10-22 DIAGNOSIS — N28.9 RENAL INSUFFICIENCY: ICD-10-CM

## 2019-10-22 PROCEDURE — 1111F DSCHRG MED/CURRENT MED MERGE: CPT | Performed by: INTERNAL MEDICINE

## 2019-10-22 PROCEDURE — 99214 OFFICE O/P EST MOD 30 MIN: CPT | Performed by: INTERNAL MEDICINE

## 2019-10-22 RX ORDER — AMLODIPINE BESYLATE 10 MG/1
10 TABLET ORAL DAILY
Qty: 90 TABLET | Refills: 5 | Status: SHIPPED | OUTPATIENT
Start: 2019-10-22 | End: 2020-05-28 | Stop reason: SDUPTHER

## 2019-10-22 RX ORDER — MECLIZINE HYDROCHLORIDE 25 MG/1
25 TABLET ORAL 3 TIMES DAILY PRN
Qty: 21 TABLET | Refills: 3 | Status: SHIPPED | OUTPATIENT
Start: 2019-10-22 | End: 2019-10-29

## 2019-10-22 RX ORDER — ATENOLOL 50 MG/1
50 TABLET ORAL DAILY
Qty: 90 TABLET | Refills: 5 | Status: SHIPPED | OUTPATIENT
Start: 2019-10-22 | End: 2020-05-28 | Stop reason: SDUPTHER

## 2019-10-22 ASSESSMENT — ENCOUNTER SYMPTOMS
EYES NEGATIVE: 1
DIARRHEA: 0
SHORTNESS OF BREATH: 0
BLOOD IN STOOL: 0
CHEST TIGHTNESS: 0
ABDOMINAL DISTENTION: 0
WHEEZING: 0
ABDOMINAL PAIN: 0

## 2019-10-23 ENCOUNTER — HOSPITAL ENCOUNTER (OUTPATIENT)
Dept: ULTRASOUND IMAGING | Age: 49
Discharge: HOME | End: 2019-10-23
Payer: COMMERCIAL

## 2019-10-23 DIAGNOSIS — E83.52 HYPERCALCEMIA: ICD-10-CM

## 2019-10-23 DIAGNOSIS — E34.9 ELEVATED PARATHYROID HORMONE: ICD-10-CM

## 2019-10-23 PROCEDURE — 76536 US EXAM OF HEAD AND NECK: CPT

## 2019-11-12 ENCOUNTER — OFFICE VISIT (OUTPATIENT)
Dept: FAMILY MEDICINE CLINIC | Age: 49
End: 2019-11-12
Payer: COMMERCIAL

## 2019-11-12 VITALS
DIASTOLIC BLOOD PRESSURE: 88 MMHG | BODY MASS INDEX: 45.45 KG/M2 | HEIGHT: 62 IN | SYSTOLIC BLOOD PRESSURE: 132 MMHG | OXYGEN SATURATION: 98 % | WEIGHT: 247 LBS | HEART RATE: 60 BPM

## 2019-11-12 DIAGNOSIS — Z00.00 ANNUAL PHYSICAL EXAM: Primary | ICD-10-CM

## 2019-11-12 DIAGNOSIS — R21 RASH: ICD-10-CM

## 2019-11-12 PROCEDURE — 99386 PREV VISIT NEW AGE 40-64: CPT | Performed by: FAMILY MEDICINE

## 2019-11-12 RX ORDER — TRIAMCINOLONE ACETONIDE 5 MG/G
CREAM TOPICAL
Qty: 15 G | Refills: 1 | Status: SHIPPED | OUTPATIENT
Start: 2019-11-12 | End: 2019-12-18

## 2019-11-12 RX ORDER — MECLIZINE HYDROCHLORIDE 25 MG/1
25 TABLET ORAL 3 TIMES DAILY PRN
COMMUNITY
End: 2022-04-16 | Stop reason: SDUPTHER

## 2019-12-17 DIAGNOSIS — R21 RASH: ICD-10-CM

## 2019-12-18 RX ORDER — TRIAMCINOLONE ACETONIDE 5 MG/G
CREAM TOPICAL
Qty: 15 G | Refills: 1 | Status: SHIPPED | OUTPATIENT
Start: 2019-12-18 | End: 2020-08-18

## 2020-02-25 ENCOUNTER — OFFICE VISIT (OUTPATIENT)
Dept: FAMILY MEDICINE CLINIC | Age: 50
End: 2020-02-25
Payer: COMMERCIAL

## 2020-02-25 VITALS
TEMPERATURE: 98.2 F | HEIGHT: 62 IN | OXYGEN SATURATION: 98 % | DIASTOLIC BLOOD PRESSURE: 86 MMHG | HEART RATE: 55 BPM | SYSTOLIC BLOOD PRESSURE: 132 MMHG | RESPIRATION RATE: 16 BRPM | BODY MASS INDEX: 47.11 KG/M2 | WEIGHT: 256 LBS

## 2020-02-25 PROCEDURE — 99243 OFF/OP CNSLTJ NEW/EST LOW 30: CPT | Performed by: FAMILY MEDICINE

## 2020-02-25 ASSESSMENT — PATIENT HEALTH QUESTIONNAIRE - PHQ9
SUM OF ALL RESPONSES TO PHQ QUESTIONS 1-9: 0
SUM OF ALL RESPONSES TO PHQ QUESTIONS 1-9: 0
1. LITTLE INTEREST OR PLEASURE IN DOING THINGS: 0
2. FEELING DOWN, DEPRESSED OR HOPELESS: 0
SUM OF ALL RESPONSES TO PHQ9 QUESTIONS 1 & 2: 0

## 2020-02-25 NOTE — PROGRESS NOTES
[address] Note    Date: 2/25/2020                                               Subjective/Objective:     Chief Complaint   Patient presents with    Pre-op Exam     knee surgery       HPI   Patient is here for preop exam.  Is having R knee surgery on 2/28/2020 with Dr. Hiral Quintero. Patient can walk a good distance without chest pain or shortness of breath. Can climb stairs and do moderate housework. Denies any personal or family history of blood clots or reactions to anesthesia. Patient Active Problem List    Diagnosis Date Noted    Candidiasis of finger web 04/04/2018    Hand dermatitis 03/19/2018    Infected abrasion of left hand 03/15/2018    Contusion of right knee 06/29/2016    Renal insufficiency 06/13/2012    Vitamin D deficiency 06/13/2012    Carpal tunnel syndrome     Hypertension     Obesity        Past Medical History:   Diagnosis Date    Carpal tunnel syndrome     Convulsions     Dermatophytosis     Diarrhea     Hypertension     Hypertensive kidney disease with chronic kidney disease stage V (HCC)     Medulloadrenal hyperfunc     Meningococcal encephalitis     Obesity     Other malaise and fatigue        Current Outpatient Medications   Medication Sig Dispense Refill    triamcinolone (ARISTOCORT) 0.5 % cream APPLY EXTERNALLY TO THE AFFECTED AREA THREE TIMES DAILY 15 g 1    meclizine (ANTIVERT) 25 MG tablet Take 25 mg by mouth 2 times daily      atenolol (TENORMIN) 50 MG tablet Take 1 tablet by mouth daily 90 tablet 5    amLODIPine (NORVASC) 10 MG tablet Take 1 tablet by mouth daily 90 tablet 5    levETIRAcetam (KEPPRA) 500 MG tablet TAKE 1 TABLET TWICE DAILY 180 tablet 5     No current facility-administered medications for this visit.         Allergies   Allergen Reactions    Ibuprofen     Nsaids        Review of Systems   No vomiting, no cough, no dysuria, no headache, no ankle swelling, no LAD, no rash, no seizure    Vitals:  /86 Pulse 55   Temp 98.2 °F (36.8 °C)   Resp 16   Ht 5' 2\" (1.575 m)   Wt 256 lb (116.1 kg)   SpO2 98%   BMI 46.82 kg/m²     Physical Exam   General:  Well-appearing, NAD, alert, non-toxic  HEENT:  Normocephalic, atraumatic. Pupils equal and round. TMs pearly with good landmarks. Moist mucous membranes. Normal dentition  NECK:  Supple, normal range of motion, no LAD, no meningeal signs, no JVD, nontender  CHEST/LUNGS: CTAB, no crackles, no wheeze, no rhonchi. Symmetric rise  CARDIOVASCULAR: RRR,  no murmur, no rub  ABDOMEN: Soft, non-tender, non-distended. No masses  EXTREMETIES: Normal movement of all extremities. No edema. No joint swelling. SKIN:  No rash, no cellulitis, no bruising, no petechiae/purpura/vesicles/pustules/abscess  PSYCH:  A+O x 3; normal affect  NEURO:  GCS 15, CN2-12 grossly intact, no focal motor/sensory deficits, no cerebellar deficits, normal gait, normal speech      Assessment/Plan     80-year-old female here for preop exam.  Vital signs are stable. No sign of heart failure. There is no sign of active infection. She is cleared for the procedure. Discharged in stable condition at 2:20 PM.    1. Pre-op exam         No orders of the defined types were placed in this encounter. No follow-ups on file.     Marizol Klein MD    2/25/2020  2:10 PM

## 2020-05-27 RX ORDER — LEVETIRACETAM 500 MG/1
TABLET ORAL
Qty: 180 TABLET | Refills: 5 | OUTPATIENT
Start: 2020-05-27

## 2020-05-27 RX ORDER — AMLODIPINE BESYLATE 10 MG/1
10 TABLET ORAL DAILY
Qty: 90 TABLET | Refills: 5 | OUTPATIENT
Start: 2020-05-27

## 2020-05-27 RX ORDER — ATENOLOL 50 MG/1
50 TABLET ORAL DAILY
Qty: 90 TABLET | Refills: 5 | OUTPATIENT
Start: 2020-05-27

## 2020-05-28 RX ORDER — AMLODIPINE BESYLATE 10 MG/1
10 TABLET ORAL DAILY
Qty: 90 TABLET | Refills: 1 | Status: SHIPPED | OUTPATIENT
Start: 2020-05-28 | End: 2020-05-29 | Stop reason: SDUPTHER

## 2020-05-28 RX ORDER — ATENOLOL 50 MG/1
50 TABLET ORAL DAILY
Qty: 90 TABLET | Refills: 1 | Status: SHIPPED | OUTPATIENT
Start: 2020-05-28 | End: 2020-05-29 | Stop reason: SDUPTHER

## 2020-05-28 RX ORDER — LEVETIRACETAM 500 MG/1
TABLET ORAL
Qty: 180 TABLET | Refills: 5 | Status: SHIPPED | OUTPATIENT
Start: 2020-05-28 | End: 2020-05-29 | Stop reason: SDUPTHER

## 2020-05-28 NOTE — TELEPHONE ENCOUNTER
[facility] received a call from:    Name of Caller: Ashmedardoana laura Milena    Relationship to patient:self    Organization name: n/a    Best contact number: [phone]    Reason for call: Patient called in stating that her prescriptions were denied so she was trying to figure out why. She said it is because it wasn't sent to her home delivery so she needs the atenolol (TENORMIN) 50 MG tablet, amLODIPine (NORVASC) 10 MG tablet, levETIRAcetam (KEPPRA) 500 MG tablet need to be sent to [facility], [address] [phone] - F [phone]. Please advise.

## 2020-05-29 RX ORDER — AMLODIPINE BESYLATE 10 MG/1
10 TABLET ORAL DAILY
Qty: 90 TABLET | Refills: 1 | Status: SHIPPED | OUTPATIENT
Start: 2020-05-29 | End: 2021-01-22 | Stop reason: SDUPTHER

## 2020-05-29 RX ORDER — ATENOLOL 50 MG/1
50 TABLET ORAL DAILY
Qty: 90 TABLET | Refills: 1 | Status: SHIPPED | OUTPATIENT
Start: 2020-05-29 | End: 2021-01-21 | Stop reason: SDUPTHER

## 2020-05-29 RX ORDER — LEVETIRACETAM 500 MG/1
TABLET ORAL
Qty: 180 TABLET | Refills: 5 | Status: SHIPPED | OUTPATIENT
Start: 2020-05-29 | End: 2021-04-12 | Stop reason: SDUPTHER

## 2020-08-10 ENCOUNTER — NURSE ONLY (OUTPATIENT)
Dept: PRIMARY CARE CLINIC | Age: 50
End: 2020-08-10
Payer: COMMERCIAL

## 2020-08-10 PROCEDURE — 99211 OFF/OP EST MAY X REQ PHY/QHP: CPT | Performed by: NURSE PRACTITIONER

## 2020-08-11 LAB — SARS-COV-2, NAA: NOT DETECTED

## 2020-08-18 ENCOUNTER — OFFICE VISIT (OUTPATIENT)
Dept: FAMILY MEDICINE CLINIC | Age: 50
End: 2020-08-18
Payer: COMMERCIAL

## 2020-08-18 VITALS
BODY MASS INDEX: 48.1 KG/M2 | OXYGEN SATURATION: 98 % | WEIGHT: 263 LBS | TEMPERATURE: 97.3 F | SYSTOLIC BLOOD PRESSURE: 130 MMHG | HEART RATE: 66 BPM | DIASTOLIC BLOOD PRESSURE: 80 MMHG

## 2020-08-18 PROCEDURE — 99213 OFFICE O/P EST LOW 20 MIN: CPT | Performed by: FAMILY MEDICINE

## 2020-08-18 NOTE — PROGRESS NOTES
[address] Note    Date: 8/18/2020                                               Subjective/Objective:     Chief Complaint   Patient presents with    Check-Up     colonoscopy options        HPI   Patient is here for blood pressure check. Currently takes atenolol and amlodipine. Denies side effects of medication. Denies chest pain or shortness of breath. Patient has history of seizure disorder. Takes Keppra. Last seizure was 2015. Sees neurology. Pt is due for colonoscopy, wants to try c-scope. Patient Active Problem List    Diagnosis Date Noted    Candidiasis of finger web 04/04/2018    Hand dermatitis 03/19/2018    Infected abrasion of left hand 03/15/2018    Contusion of right knee 06/29/2016    Renal insufficiency 06/13/2012    Vitamin D deficiency 06/13/2012    Carpal tunnel syndrome     Hypertension     Obesity        Past Medical History:   Diagnosis Date    Carpal tunnel syndrome     Convulsions     Dermatophytosis     Diarrhea     Hypertension     Hypertensive kidney disease with chronic kidney disease stage V (HCC)     Medulloadrenal hyperfunc     Meningococcal encephalitis     Obesity     Other malaise and fatigue        Current Outpatient Medications   Medication Sig Dispense Refill    atenolol (TENORMIN) 50 MG tablet Take 1 tablet by mouth daily 90 tablet 1    amLODIPine (NORVASC) 10 MG tablet Take 1 tablet by mouth daily 90 tablet 1    levETIRAcetam (KEPPRA) 500 MG tablet TAKE 1 TABLET TWICE DAILY 180 tablet 5    meclizine (ANTIVERT) 25 MG tablet Take 25 mg by mouth 2 times daily       No current facility-administered medications for this visit.         Allergies   Allergen Reactions    Ibuprofen     Nsaids        Review of Systems   No vomiting, no HA    Vitals:  /80   Pulse 66   Temp 97.3 °F (36.3 °C)   Wt 263 lb (119.3 kg)   SpO2 98%   BMI 48.10 kg/m²     Physical Exam   General:  Well-appearing, NAD, alert, non-toxic  HEENT: Normocephalic, atraumatic. Pupils equal and round. CHEST/LUNGS: CTAB, no crackles, no wheeze, no rhonchi. Symmetric rise  CARDIOVASCULAR: RRR,  no murmur, no rub  EXTREMETIES: Normal movement of all extremities  SKIN:  No rash, no cellulitis, no bruising, no petechiae/purpura/vesicles/pustules/abscess  PSYCH:  A+O x 3; normal affect  NEURO:  GCS 15, CN2-12 grossly intact, no focal motor/sensory deficits, no cerebellar deficits, normal gait, normal speech      Assessment/Plan     44-year-old female with hypertension. Blood pressure is at goal.  Continue current medications. Follow-up in 6 months for blood pressure check. Patient has seizure disorder. Is currently seizure-free. Continue Keppra. Patient is due for colon cancer screening. Will refer to GI for colonoscopy. Follow-up in 3 to 6 months for annual physical.    Discharged in stable condition at 10:25 AM.    1. Colon cancer screening    - [facility] - Simon Alejandre MD, Gastroenterology, [facility]    2. Hypertension, essential      3. Myoclonic seizure disorder ([facility] [address]           Orders Placed This Encounter   Procedures   Mana Javier MD, Gastroenterology, [facility]     Referral Priority:   Routine     Referral Type:   Eval and Treat     Referral Reason:   Specialty Services Required     Referred to Provider:   Jodi Roberts MD     Requested Specialty:   Gastroenterology     Number of Visits Requested:   1       No follow-ups on file.     Willem Simms MD    8/18/2020  10:50 AM

## 2020-11-11 ENCOUNTER — HOSPITAL ENCOUNTER (OUTPATIENT)
Age: 50
Discharge: HOME | End: 2020-11-11
Payer: COMMERCIAL

## 2020-11-11 ENCOUNTER — TELEPHONE (OUTPATIENT)
Dept: FAMILY MEDICINE CLINIC | Age: 50
End: 2020-11-11

## 2020-11-11 LAB
A/G RATIO: 1.6 (ref 1.1–2.2)
ALBUMIN SERPL-MCNC: 4.2 G/DL (ref 3.4–5)
ALP BLD-CCNC: 78 U/L (ref 40–129)
ALT SERPL-CCNC: 15 U/L (ref 10–40)
ANION GAP SERPL CALCULATED.3IONS-SCNC: 13 MMOL/L (ref 3–16)
AST SERPL-CCNC: 16 U/L (ref 15–37)
BASOPHILS ABSOLUTE: 0 K/UL (ref 0–0.2)
BASOPHILS RELATIVE PERCENT: 0.6 %
BILIRUB SERPL-MCNC: 0.5 MG/DL (ref 0–1)
BILIRUBIN URINE: NEGATIVE
BLOOD, URINE: NEGATIVE
BUN BLDV-MCNC: 25 MG/DL (ref 7–20)
CALCIUM SERPL-MCNC: 9.5 MG/DL (ref 8.3–10.6)
CHLORIDE BLD-SCNC: 103 MMOL/L (ref 99–110)
CHOLESTEROL, FASTING: 199 MG/DL (ref 0–199)
CLARITY: CLEAR
CO2: 27 MMOL/L (ref 21–32)
COLOR: YELLOW
CREAT SERPL-MCNC: 1.1 MG/DL (ref 0.6–1.1)
CREATININE URINE: 64.7 MG/DL (ref 28–259)
EOSINOPHILS ABSOLUTE: 0.1 K/UL (ref 0–0.6)
EOSINOPHILS RELATIVE PERCENT: 2.9 %
GFR AFRICAN AMERICAN: >60
GFR NON-AFRICAN AMERICAN: 53
GLOBULIN: 2.7 G/DL
GLUCOSE BLD-MCNC: 94 MG/DL (ref 70–99)
GLUCOSE URINE: NEGATIVE MG/DL
HCT VFR BLD CALC: 42.5 % (ref 36–48)
HDLC SERPL-MCNC: 66 MG/DL (ref 40–60)
HEMOGLOBIN: 14.6 G/DL (ref 12–16)
KETONES, URINE: NEGATIVE MG/DL
LDL CHOLESTEROL CALCULATED: 115 MG/DL
LEUKOCYTE ESTERASE, URINE: NEGATIVE
LYMPHOCYTES ABSOLUTE: 1.6 K/UL (ref 1–5.1)
LYMPHOCYTES RELATIVE PERCENT: 51.9 %
MCH RBC QN AUTO: 26.8 PG (ref 26–34)
MCHC RBC AUTO-ENTMCNC: 34.3 G/DL (ref 31–36)
MCV RBC AUTO: 78.1 FL (ref 80–100)
MICROSCOPIC EXAMINATION: NORMAL
MONOCYTES ABSOLUTE: 0.3 K/UL (ref 0–1.3)
MONOCYTES RELATIVE PERCENT: 8.1 %
NEUTROPHILS ABSOLUTE: 1.1 K/UL (ref 1.7–7.7)
NEUTROPHILS RELATIVE PERCENT: 36.5 %
NITRITE, URINE: NEGATIVE
PARATHYROID HORMONE INTACT: 103.2 PG/ML (ref 14–72)
PDW BLD-RTO: 15.3 % (ref 12.4–15.4)
PH UA: 6.5 (ref 5–8)
PHOSPHORUS: 3 MG/DL (ref 2.5–4.9)
PLATELET # BLD: 205 K/UL (ref 135–450)
PMV BLD AUTO: 8.1 FL (ref 5–10.5)
POTASSIUM SERPL-SCNC: 4.3 MMOL/L (ref 3.5–5.1)
PROTEIN PROTEIN: <4 MG/DL
PROTEIN UA: NEGATIVE MG/DL
RBC # BLD: 5.44 M/UL (ref 4–5.2)
SODIUM BLD-SCNC: 143 MMOL/L (ref 136–145)
SPECIFIC GRAVITY UA: 1.01 (ref 1–1.03)
TOTAL PROTEIN: 6.9 G/DL (ref 6.4–8.2)
TRIGLYCERIDE, FASTING: 88 MG/DL (ref 0–150)
TSH REFLEX: 1.16 UIU/ML (ref 0.27–4.2)
URINE TYPE: NORMAL
UROBILINOGEN, URINE: 0.2 E.U./DL
VITAMIN D 25-HYDROXY: 31.7 NG/ML
VLDLC SERPL CALC-MCNC: 18 MG/DL
WBC # BLD: 3.1 K/UL (ref 4–11)

## 2020-11-11 PROCEDURE — 80061 LIPID PANEL: CPT

## 2020-11-11 PROCEDURE — 82570 ASSAY OF URINE CREATININE: CPT

## 2020-11-11 PROCEDURE — 80053 COMPREHEN METABOLIC PANEL: CPT

## 2020-11-11 PROCEDURE — 85025 COMPLETE CBC W/AUTO DIFF WBC: CPT

## 2020-11-11 PROCEDURE — 81003 URINALYSIS AUTO W/O SCOPE: CPT

## 2020-11-11 PROCEDURE — 83970 ASSAY OF PARATHORMONE: CPT

## 2020-11-11 PROCEDURE — 82306 VITAMIN D 25 HYDROXY: CPT

## 2020-11-11 PROCEDURE — 84100 ASSAY OF PHOSPHORUS: CPT

## 2020-11-11 PROCEDURE — 84156 ASSAY OF PROTEIN URINE: CPT

## 2020-11-11 PROCEDURE — 84443 ASSAY THYROID STIM HORMONE: CPT

## 2020-11-11 PROCEDURE — 83036 HEMOGLOBIN GLYCOSYLATED A1C: CPT

## 2020-11-11 NOTE — TELEPHONE ENCOUNTER
Pt is concern about results for PTH, Inact she received via [facility].  Pt is asking to be contacted to have result explained to her

## 2020-11-12 LAB
ESTIMATED AVERAGE GLUCOSE: 114 MG/DL
HBA1C MFR BLD: 5.6 %

## 2020-11-17 PROBLEM — M25.561 CHRONIC PAIN OF RIGHT KNEE: Status: ACTIVE | Noted: 2019-03-26

## 2020-11-17 PROBLEM — G89.29 CHRONIC PAIN OF RIGHT KNEE: Status: ACTIVE | Noted: 2019-03-26

## 2020-11-17 PROBLEM — D63.1 ANEMIA IN CHRONIC RENAL DISEASE: Status: ACTIVE | Noted: 2020-01-09

## 2020-11-17 PROBLEM — R80.9 PROTEINURIA: Status: ACTIVE | Noted: 2020-01-09

## 2020-11-17 PROBLEM — N18.9 ANEMIA IN CHRONIC RENAL DISEASE: Status: ACTIVE | Noted: 2020-01-09

## 2020-11-17 NOTE — PROGRESS NOTES
Patient ID: Jim Szymanski is a 48 y.o. female    Chief Complaint: Hyperparathyroidism   Referred by Dr. Delfino Brody MD    HPI:   Jim Szymanski is here for initial evaluation of hyperparathyroidism. PTH 93.9, GFR 53 July 2019   Ca: 9.5 .2, Cr 1.1, GFR > 60, Vit D 31 Nov 2020     Follows with nephrologist for about 5 years. No visit this year since her nephrologist.     She has polyuria, polydipsia,   No issues with memory, myalgias. Never had a fracture. She has OA of both knees, need replacement. Ct scan in 2010 showed 3 mm stone. Mother, maternal aunt and maternal GM had kidney stones   Maternal GM had osteoporosis     Her great GM and her sister had adrenal tumors. No family history of thyroid, parathyroid, pituitary tumors.      Use of tums/HCTZ/lithium/ radiation exposure     The following portions of the patient's history were reviewed and updated as appropriate:     Family History   Problem Relation Age of Onset    High Blood Pressure Other         maternal aunt    Hypertension Mother     Hypertension Maternal Grandmother     Diabetes Maternal Grandmother     Breast Cancer Maternal Grandmother          Social History     Socioeconomic History    Marital status:      Spouse name: Not on file    Number of children: Not on file    Years of education: Not on file    Highest education level: Not on file   Occupational History    Not on file   Social Needs    Financial resource strain: Not on file    Food insecurity     Worry: Not on file     Inability: Not on file    Transportation needs     Medical: Not on file     Non-medical: Not on file   Tobacco Use    Smoking status: Never Smoker    Smokeless tobacco: Never Used   Substance and Sexual Activity    Alcohol use: No    Drug use: No    Sexual activity: Never   Lifestyle    Physical activity     Days per week: Not on file     Minutes per session: Not on file    Stress: Not on file   Relationships    Social connections     Talks on phone: Not on file     Gets together: Not on file     Attends Holiness service: Not on file     Active member of club or organization: Not on file     Attends meetings of clubs or organizations: Not on file     Relationship status: Not on file    Intimate partner violence     Fear of current or ex partner: Not on file     Emotionally abused: Not on file     Physically abused: Not on file     Forced sexual activity: Not on file   Other Topics Concern    Not on file   Social History Narrative    Not on file         Past Medical History:   Diagnosis Date    Carpal tunnel syndrome     Convulsions     Dermatophytosis     Diarrhea     Hypertension     Hypertensive kidney disease with chronic kidney disease stage V ([facility] [address]     Medulloadrenal hyperfunc     Meningococcal encephalitis     Obesity     Other malaise and fatigue          Past Surgical History:   Procedure Laterality Date    APPENDECTOMY      CARPAL TUNNEL RELEASE Bilateral 2005     SECTION      ENDOMETRIAL ABLATION      OVARY REMOVAL      TONSILLECTOMY           Allergies   Allergen Reactions    Ibuprofen     Nsaids          Current Outpatient Medications:     diclofenac sodium (VOLTAREN) 1 % GEL, Apply 4 g topically 2 times daily, Disp: , Rfl:     GLUCOSAMINE-CHONDROITIN PO, Take by mouth, Disp: , Rfl:     atenolol (TENORMIN) 50 MG tablet, Take 1 tablet by mouth daily, Disp: 90 tablet, Rfl: 1    amLODIPine (NORVASC) 10 MG tablet, Take 1 tablet by mouth daily, Disp: 90 tablet, Rfl: 1    levETIRAcetam (KEPPRA) 500 MG tablet, TAKE 1 TABLET TWICE DAILY, Disp: 180 tablet, Rfl: 5    meclizine (ANTIVERT) 25 MG tablet, Take 25 mg by mouth 2 times daily, Disp: , Rfl:     Review of Systems:  Constitutional: Negative for fever, chills, and unexpected weight change. HENT: Negative for congestion, ear pain, rhinorrhea,  sore throat and trouble swallowing.    Eyes: Negative for photophobia, redness, itching. Respiratory: Negative for cough, shortness of breath and sputum. Cardiovascular: Negative for chest pain, palpitations and leg swelling. Gastrointestinal: Negative for nausea, vomiting, abdominal pain, diarrhea, constipation. Endocrine: Negative for cold intolerance, heat intolerance, polydipsia, polyphagia and polyuria. Genitourinary: Negative for dysuria, urgency, frequency, hematuria and flank pain. Musculoskeletal: Negative for myalgias, back pain, arthralgias and neck pain. Skin/Nail: Negative for rash, itching. Normal nails. Neurological: Negative for seizures, weakness, light-headedness, numbness and headaches. Hematological/ Lymph nodes: Negative for adenopathy. Does not bruise/bleed easily. Psychiatric/Behavioral: Negative for suicidal ideas, depression, anxiety, sleep disturbance and decreased concentration. Physical Exam:  BP (!) 167/93 (Site: Right Upper Arm, Position: Sitting, Cuff Size: Large Adult)   Pulse (!) 48   Temp 97.3 °F (36.3 °C) (Temporal)   Ht 5' 1\" (1.549 m)   Wt 270 lb 6.4 oz (122.7 kg)   SpO2 96%   BMI 51.09 kg/m²   Constitutional: Patient is oriented to person, place, and time. Patient appears well-developed and well-nourished. HENT:    Head: Normocephalic and atraumatic. Eyes: Conjunctivae and EOM are normal.     Neck: Normal range of motion. Thyroid palpable. Cardiovascular: Bradycardia,  regular rhythm and normal heart sounds. Pulmonary/Chest: Effort normal and breath sounds normal.   Abdominal: Soft. Bowel sounds are normal.   Musculoskeletal: Normal range of motion. 1 plus LE edema. Neurological: Patient is alert and oriented to person, place, and time. Patient has normal reflexes. Skin: Skin is warm and dry. Psychiatric: Patient has a normal mood and affect.  Patient behavior is normal.     Lab Review:    Hospital Outpatient Visit on 11/11/2020   Component Date Value Ref Range Status    Sodium 11/11/2020 143  136 - 145 Cholesterol, Fasting 11/11/2020 199  0 - 199 mg/dL Final    Triglyceride, Fasting 11/11/2020 88  0 - 150 mg/dL Final    HDL 11/11/2020 66* 40 - 60 mg/dL Final    LDL Calculated 11/11/2020 115* <100 mg/dL Final    VLDL Cholesterol Calculated 11/11/2020 18  Not Established mg/dL Final    Hemoglobin A1C 11/11/2020 5.6  See comment % Final    eAG 11/11/2020 114.0  mg/dL Final    Phosphorus 11/11/2020 3.0  2.5 - 4.9 mg/dL Final    Vit D, 25-Hydroxy 11/11/2020 31.7  >=30 ng/mL Final    Creatinine, Ur 11/11/2020 64.7  28.0 - 259.0 mg/dL Final    Color, UA 11/11/2020 YELLOW  Straw/Yellow Final    Clarity, UA 11/11/2020 Clear  Clear Final    Glucose, Ur 11/11/2020 Negative  Negative mg/dL Final    Bilirubin Urine 11/11/2020 Negative  Negative Final    Ketones, Urine 11/11/2020 Negative  Negative mg/dL Final    Specific [place], UA 11/11/2020 1.015  1.005 - 1.030 Final    Blood, Urine 11/11/2020 Negative  Negative Final    pH, UA 11/11/2020 6.5  5.0 - 8.0 Final    Protein, UA 11/11/2020 Negative  Negative mg/dL Final    Urobilinogen, Urine 11/11/2020 0.2  <2.0 E.U./dL Final    Nitrite, Urine 11/11/2020 Negative  Negative Final    Leukocyte Esterase, Urine 11/11/2020 Negative  Negative Final    Microscopic Examination 11/11/2020 Not Indicated   Final    Urine Type 11/11/2020 Voided   Final    PTH 11/11/2020 103.2* 14.0 - 72.0 pg/mL Final    Protein, Ur 11/11/2020 <4.00  <12 mg/dL Final   Nurse Only on 08/10/2020   Component Date Value Ref Range Status    SARS-CoV-2, ARMIDA 08/10/2020 NOT DETECTED  NOT DETECTED Final          Assessment and plan:     Cecile Melendrez was seen today for consultation. Diagnoses and all orders for this visit:    Hyperparathyroidism ([address]  -     US HEAD NECK SOFT TISSUE THYROID; Future  -     DEXA BONE DENSITY AXIAL SKELETON; Future  -     DEXA bone density peripheral; Future  -     Calcium, 24 HR Urine; Future  -     Creatinine, 24 HR Urine;  Future    Postmenopausal  -     US HEAD NECK SOFT TISSUE THYROID; Future  -     DEXA BONE DENSITY AXIAL SKELETON; Future  -     DEXA bone density peripheral; Future  -     Calcium, 24 HR Urine; Future  -     Creatinine, 24 HR Urine; Future    Left thyroid nodule  -     US HEAD NECK SOFT TISSUE THYROID; Future    Vitamin D deficiency        1: Normocalcemic hyparathyroidism     Likely due to chronic kidney disease     To make sure there is no primary parathyroid disease will do 24 hour urine Calcium and DXA scan     Postmenopausal: Will do DXA scan       2: Vit D def   31.7 Nov 2020   Add OTC Vit D 1,000 units a day     3: Left thyroid nodule   Us done in Oct 2019 : images reviewed   7x 5 x 7 mm  Left inferior hypoechoic nodule with echogenic foi   Right inferior aspect of thyroid shoed cystic / hypoechoic nodule     Will repeat US neck     4: HTN and bradycardia   As per pcp   She has CKD and HTN. Parathyroid disease can cause both although unlikely in her case.      RTC in 9 months if all labs are normal          Electronically signed by Jessi Muñiz MD on 11/18/2020 at 8:54 AM

## 2020-11-18 ENCOUNTER — OFFICE VISIT (OUTPATIENT)
Dept: ENDOCRINOLOGY | Age: 50
End: 2020-11-18
Payer: COMMERCIAL

## 2020-11-18 VITALS
TEMPERATURE: 97.3 F | HEIGHT: 61 IN | WEIGHT: 270.4 LBS | DIASTOLIC BLOOD PRESSURE: 95 MMHG | OXYGEN SATURATION: 96 % | BODY MASS INDEX: 51.05 KG/M2 | SYSTOLIC BLOOD PRESSURE: 154 MMHG | HEART RATE: 48 BPM

## 2020-11-18 PROCEDURE — 99244 OFF/OP CNSLTJ NEW/EST MOD 40: CPT | Performed by: INTERNAL MEDICINE

## 2020-11-23 ENCOUNTER — HOSPITAL ENCOUNTER (OUTPATIENT)
Dept: GENERAL RADIOLOGY | Age: 50
Discharge: HOME | End: 2020-11-23
Payer: COMMERCIAL

## 2020-11-23 ENCOUNTER — HOSPITAL ENCOUNTER (OUTPATIENT)
Dept: ULTRASOUND IMAGING | Age: 50
Discharge: HOME | End: 2020-11-23
Payer: COMMERCIAL

## 2020-11-23 PROCEDURE — 77080 DXA BONE DENSITY AXIAL: CPT

## 2020-11-23 PROCEDURE — 76536 US EXAM OF HEAD AND NECK: CPT

## 2020-11-23 NOTE — RESULT ENCOUNTER NOTE
Please inform DZA scan shows mild osteopenia   No meds needed at this time   Thanks,  Aureliano Warner MD

## 2020-11-25 ENCOUNTER — APPOINTMENT (OUTPATIENT)
Dept: GENERAL RADIOLOGY | Age: 50
End: 2020-11-25
Payer: COMMERCIAL

## 2020-11-25 ENCOUNTER — APPOINTMENT (OUTPATIENT)
Dept: CT IMAGING | Age: 50
End: 2020-11-25
Payer: COMMERCIAL

## 2020-11-25 ENCOUNTER — HOSPITAL ENCOUNTER (EMERGENCY)
Age: 50
Discharge: HOME | End: 2020-11-25
Attending: EMERGENCY MEDICINE
Payer: COMMERCIAL

## 2020-11-25 ENCOUNTER — NURSE TRIAGE (OUTPATIENT)
Dept: OTHER | Facility: CLINIC | Age: 50
End: 2020-11-25

## 2020-11-25 VITALS
DIASTOLIC BLOOD PRESSURE: 66 MMHG | BODY MASS INDEX: 49.28 KG/M2 | SYSTOLIC BLOOD PRESSURE: 122 MMHG | RESPIRATION RATE: 18 BRPM | OXYGEN SATURATION: 94 % | HEART RATE: 76 BPM | HEIGHT: 61 IN | TEMPERATURE: 100.2 F | WEIGHT: 261 LBS

## 2020-11-25 LAB
ANION GAP SERPL CALCULATED.3IONS-SCNC: 11 MMOL/L (ref 3–16)
BASOPHILS ABSOLUTE: 0 K/UL (ref 0–0.2)
BASOPHILS RELATIVE PERCENT: 1 %
BUN BLDV-MCNC: 14 MG/DL (ref 7–20)
CALCIUM SERPL-MCNC: 9.1 MG/DL (ref 8.3–10.6)
CHLORIDE BLD-SCNC: 101 MMOL/L (ref 99–110)
CO2: 25 MMOL/L (ref 21–32)
CREAT SERPL-MCNC: 1.4 MG/DL (ref 0.6–1.1)
EKG ATRIAL RATE: 85 BPM
EKG DIAGNOSIS: NORMAL
EKG P AXIS: 38 DEGREES
EKG P-R INTERVAL: 130 MS
EKG Q-T INTERVAL: 370 MS
EKG QRS DURATION: 92 MS
EKG QTC CALCULATION (BAZETT): 440 MS
EKG R AXIS: 44 DEGREES
EKG T AXIS: 4 DEGREES
EKG VENTRICULAR RATE: 85 BPM
EOSINOPHILS ABSOLUTE: 0 K/UL (ref 0–0.6)
EOSINOPHILS RELATIVE PERCENT: 0.1 %
GFR AFRICAN AMERICAN: 48
GFR NON-AFRICAN AMERICAN: 40
GLUCOSE BLD-MCNC: 103 MG/DL (ref 70–99)
HCG QUALITATIVE: NEGATIVE
HCT VFR BLD CALC: 43.7 % (ref 36–48)
HEMOGLOBIN: 14.9 G/DL (ref 12–16)
LACTIC ACID, SEPSIS: 1.1 MMOL/L (ref 0.4–1.9)
LYMPHOCYTES ABSOLUTE: 0.9 K/UL (ref 1–5.1)
LYMPHOCYTES RELATIVE PERCENT: 41.3 %
MCH RBC QN AUTO: 26.6 PG (ref 26–34)
MCHC RBC AUTO-ENTMCNC: 34.1 G/DL (ref 31–36)
MCV RBC AUTO: 78.1 FL (ref 80–100)
MONOCYTES ABSOLUTE: 0.2 K/UL (ref 0–1.3)
MONOCYTES RELATIVE PERCENT: 8.9 %
NEUTROPHILS ABSOLUTE: 1 K/UL (ref 1.7–7.7)
NEUTROPHILS RELATIVE PERCENT: 48.7 %
PDW BLD-RTO: 14.6 % (ref 12.4–15.4)
PLATELET # BLD: 154 K/UL (ref 135–450)
PMV BLD AUTO: 7.9 FL (ref 5–10.5)
POTASSIUM SERPL-SCNC: 4.4 MMOL/L (ref 3.5–5.1)
RBC # BLD: 5.6 M/UL (ref 4–5.2)
SODIUM BLD-SCNC: 137 MMOL/L (ref 136–145)
TROPONIN: <0.01 NG/ML
WBC # BLD: 2.1 K/UL (ref 4–11)

## 2020-11-25 PROCEDURE — 80048 BASIC METABOLIC PNL TOTAL CA: CPT

## 2020-11-25 PROCEDURE — 84484 ASSAY OF TROPONIN QUANT: CPT

## 2020-11-25 PROCEDURE — 85025 COMPLETE CBC W/AUTO DIFF WBC: CPT

## 2020-11-25 PROCEDURE — U0003 INFECTIOUS AGENT DETECTION BY NUCLEIC ACID (DNA OR RNA); SEVERE ACUTE RESPIRATORY SYNDROME CORONAVIRUS 2 (SARS-COV-2) (CORONAVIRUS DISEASE [COVID-19]), AMPLIFIED PROBE TECHNIQUE, MAKING USE OF HIGH THROUGHPUT TECHNOLOGIES AS DESCRIBED BY CMS-2020-01-R: HCPCS

## 2020-11-25 PROCEDURE — 6370000000 HC RX 637 (ALT 250 FOR IP): Performed by: EMERGENCY MEDICINE

## 2020-11-25 PROCEDURE — 93010 ELECTROCARDIOGRAM REPORT: CPT | Performed by: INTERNAL MEDICINE

## 2020-11-25 PROCEDURE — 6360000004 HC RX CONTRAST MEDICATION: Performed by: PHYSICIAN ASSISTANT

## 2020-11-25 PROCEDURE — 93005 ELECTROCARDIOGRAM TRACING: CPT | Performed by: EMERGENCY MEDICINE

## 2020-11-25 PROCEDURE — U0002 COVID-19 LAB TEST NON-CDC: HCPCS

## 2020-11-25 PROCEDURE — 84703 CHORIONIC GONADOTROPIN ASSAY: CPT

## 2020-11-25 PROCEDURE — 71260 CT THORAX DX C+: CPT

## 2020-11-25 PROCEDURE — 99283 EMERGENCY DEPT VISIT LOW MDM: CPT

## 2020-11-25 PROCEDURE — 87040 BLOOD CULTURE FOR BACTERIA: CPT

## 2020-11-25 PROCEDURE — 2580000003 HC RX 258: Performed by: PHYSICIAN ASSISTANT

## 2020-11-25 PROCEDURE — 83605 ASSAY OF LACTIC ACID: CPT

## 2020-11-25 PROCEDURE — 71045 X-RAY EXAM CHEST 1 VIEW: CPT

## 2020-11-25 RX ORDER — ASPIRIN 81 MG/1
81 TABLET ORAL DAILY
Qty: 14 TABLET | Refills: 0 | Status: SHIPPED | OUTPATIENT
Start: 2020-11-25 | End: 2021-02-12 | Stop reason: ALTCHOICE

## 2020-11-25 RX ORDER — 0.9 % SODIUM CHLORIDE 0.9 %
500 INTRAVENOUS SOLUTION INTRAVENOUS ONCE
Status: COMPLETED | OUTPATIENT
Start: 2020-11-25 | End: 2020-11-25

## 2020-11-25 RX ORDER — AZITHROMYCIN 250 MG/1
TABLET, FILM COATED ORAL
Qty: 6 TABLET | Refills: 0 | Status: SHIPPED | OUTPATIENT
Start: 2020-11-25 | End: 2020-12-05

## 2020-11-25 RX ORDER — ACETAMINOPHEN 500 MG
1000 TABLET ORAL ONCE
Status: COMPLETED | OUTPATIENT
Start: 2020-11-25 | End: 2020-11-25

## 2020-11-25 RX ADMIN — SODIUM CHLORIDE 500 ML: 9 INJECTION, SOLUTION INTRAVENOUS at 16:12

## 2020-11-25 RX ADMIN — ACETAMINOPHEN 1000 MG: 500 TABLET, FILM COATED ORAL at 14:34

## 2020-11-25 RX ADMIN — IOPAMIDOL 75 ML: 755 INJECTION, SOLUTION INTRAVENOUS at 16:37

## 2020-11-25 ASSESSMENT — PAIN DESCRIPTION - DESCRIPTORS: DESCRIPTORS: ACHING

## 2020-11-25 ASSESSMENT — PAIN DESCRIPTION - PAIN TYPE: TYPE: ACUTE PAIN

## 2020-11-25 ASSESSMENT — PAIN SCALES - GENERAL
PAINLEVEL_OUTOF10: 8
PAINLEVEL_OUTOF10: 8

## 2020-11-25 NOTE — TELEPHONE ENCOUNTER
Call received through Covid-19 hot line. [facility] with concern for [facility], onset of symptoms 11/19/20, see triage assessment below. Care Advice provided; patient acknowledged understanding of care advice and is in agreement with plan. Patient has no further questions; instructed to call back for any new or worsening symptoms. Patient states she will be going to [facility] ER. Reason for Disposition   SEVERE or constant chest pain or pressure (Exception: mild central chest pain, present only when coughing)    Answer Assessment - Initial Assessment Questions  1. COVID-19 DIAGNOSIS: \"Who made your Coronavirus (COVID-19) diagnosis? \" \"Was it confirmed by a positive lab test?\" If not diagnosed by a HCP, ask \"Are there lots of cases (community spread) where you live? \" (See public health department website, if unsure)      Patient has not been tested    2. COVID-19 EXPOSURE: \"Was there any known exposure to COVID before the symptoms began? \" CDC Definition of close contact: within 6 feet (2 meters) for a total of 15 minutes or more over a 24-hour period. NA    3. ONSET: \"When did the COVID-19 symptoms start?\"       11/19/20    4. WORST SYMPTOM: \"What is your worst symptom? \" (e.g., cough, fever, shortness of breath, muscle aches)      Chest tightness and headache 7/10    5. COUGH: \"Do you have a cough? \" If so, ask: \"How bad is the cough? \"       Denies    6. FEVER: \"Do you have a fever? \" If so, ask: \"What is your temperature, how was it measured, and when did it start? \"      No recorded temp but reports chills    7. RESPIRATORY STATUS: \"Describe your breathing? \" (e.g., shortness of breath, wheezing, unable to speak)      Shallow, quick, and states feels SOB    8. BETTER-SAME-WORSE: [place] you getting better, staying the same or getting worse compared to yesterday? \"  If getting worse, ask, \"In what way? \"     Worse - body aches, chest tightness and headache    9.  HIGH RISK DISEASE: \"Do you have any chronic medical problems? \" (e.g., asthma, heart or lung disease, weak immune system, obesity, etc.)     Denies high risk conditions    10. PREGNANCY: \"Is there any chance you are pregnant? \" \"When was your last menstrual period? \"        NA    11. OTHER SYMPTOMS: \"Do you have any other symptoms? \"  (e.g., chills, fatigue, headache, loss of smell or taste, muscle pain, sore throat; new loss of smell or taste especially support the diagnosis of COVID-19)       Chills, fatigue, headache, muscle pain 6//10, chest tightness    Protocols used: CORONAVIRUS (COVID-19) [facility]    Attention Provider: Thank you for allowing me to participate in the care of your patient. Please do not respond through this encounter as the response is not directed to a shared pool.

## 2020-11-25 NOTE — ED PROVIDER NOTES
eye: No discharge. Left eye: No discharge. Neck:      Musculoskeletal: Normal range of motion. Cardiovascular:      Rate and Rhythm: Normal rate and regular rhythm. Heart sounds: No murmur. No friction rub. No gallop. Pulmonary:      Effort: Pulmonary effort is normal. No respiratory distress. Breath sounds: No stridor. Rales present. No wheezing or rhonchi. Comments: Crackles in the left midlung field. No retractions or accessory muscle use. Mild conversational dyspnea noted. Abdominal:      General: Bowel sounds are normal. There is no distension. Palpations: Abdomen is soft. There is no mass. Tenderness: There is no abdominal tenderness. There is no guarding or rebound. Hernia: No hernia is present. Musculoskeletal: Normal range of motion. General: No swelling. Skin:     General: Skin is warm and dry. Findings: No erythema or rash. Neurological:      Mental Status: She is alert and oriented to person, place, and time. Cranial Nerves: No cranial nerve deficit.    Psychiatric:         Behavior: Behavior normal.         DIAGNOSTIC RESULTS   LABS:    Labs Reviewed   BASIC METABOLIC PANEL - Abnormal; Notable for the following components:       Result Value    Glucose 103 (*)     CREATININE 1.4 (*)     GFR Non- 40 (*)     GFR  48 (*)     All other components within normal limits    Narrative:     Performed at:  [facility] [address], [place], [address]   Phone [phone]   CBC WITH AUTO DIFFERENTIAL - Abnormal; Notable for the following components:    WBC 2.1 (*)     RBC 5.60 (*)     MCV 78.1 (*)     Neutrophils Absolute 1.0 (*)     Lymphocytes Absolute 0.9 (*)     All other components within normal limits    Narrative:     Performed at:  [facility] [address], [place], [address]   Phone [phone]   TROPONIN    Narrative: Performed at:  [facility]  [address] [facility],  [place], [address]   Phone [phone]   HCG, SERUM, QUALITATIVE    Narrative:     Performed at:  [facility]  [address],  [place], [address]   Phone [phone]   LACTATE, SEPSIS    Narrative:     Performed at:  [facility]  [address] [facility],  [place], [address]   Phone [phone]   LACTATE, SEPSIS   COVID-19       All other labs were within normal range or not returned as of this dictation. EKG: All EKG's are interpreted by the Emergency Department Physician in the absence of a cardiologist.  Please see their note for interpretation of EKG. RADIOLOGY:   Non-plain film images such as CT, Ultrasound and MRI are read by the radiologist. Plain radiographic images are visualized and preliminarily interpreted by the ED Provider with the below findings:        Interpretation per the Radiologist below, if available at the time of this note:    CT CHEST PULMONARY EMBOLISM W CONTRAST   Final Result   1. Nonspecific patchy nodular ground-glass opacities concerning for an   atypical viral pneumonia. XR CHEST PORTABLE   Final Result   Apparent faint, ill-defined ground-glass opacities overlying the lower lungs   bilateral, which may simply be related to overlying breast shadows. I cannot   exclude atypical/viral pneumonia lower lungs. Us Head Neck Soft Tissue Thyroid    Result Date: 11/23/2020  EXAMINATION: THYROID ULTRASOUND 11/23/2020 COMPARISON: Thyroid ultrasound October 23, 2019 HISTORY: ORDERING SYSTEM PROVIDED HISTORY: Hyperparathyroidism [facility]) TECHNOLOGIST PROVIDED HISTORY: Reason for Exam: f/u nodule Acuity: Unknown Type of Exam: Subsequent/Follow-up FINDINGS: Right thyroid lobe:  4 x 2.6 x 1.4 cm Left thyroid lobe:  4 x 1.4 x 1.3 cm Isthmus:  5 mm Thyroid Gland:  Thyroid gland demonstrates normal echotexture and vascularity.   Single nodule is again seen within the left thyroid, described in detail below, unchanged compared to prior. A 1.5 cm nodular heterogeneous hypoechoic area is again seen inferior to the left thyroid which is unchanged in size in appearance compared to prior. NODULE: Left 1 Size: 9 x 7 x 5 mm Location: Inferior left thyroid 1. Composition:  Solid (2) 2. Echogenicity:  Hypoechoic (2) 3. Shape: Wider-than-tall (0) 4. Margins:  Smooth (0) 5. Echogenic foci:  Punctate echogenic foci (3) ACR TI-RADS total points:  7 ACR TI-RADS risk category: TR4 Prior biopsy: No. Significant growth:  No. Change in features:  No. Change in ACR TI-RADS risk category:  No. Cervical lymphadenopathy: No abnormal lymph nodes in the imaged portions of the neck. Stable appearance of the 1.5 cm nodule inferior to the left thyroid which may represent parathyroid adenoma. Stable appearance of the single nodule within the inferior left thyroid. No new or enlarging nodules identified. NODULE 1:  ACR TI-RADS TR4: Recommend: Follow-up ultrasound in 1 year. ACR TI-RADS recommendations: TR5 (>= 7 points):  FNA if >= 1 cm; follow-up if 0.5-0.9 cm in 1, 2, 3, 4, and 5 years TR4 (4-6 points):  FNA if >= 1.5 cm; follow-up if 1.0-1.4 cm in 1, 2, 3, and 5 years TR3 (3 points):  FNA if >= 2.5 cm; follow-up if 1.5-2.4 cm in 1, 3, and 5 years TR2 (2 points):  No FNA or follow-up TR1 (0 points):  No FNA or follow-up ACR TI-RADS recommends that no more than two nodules with the highest ACR TI-RADS point total should be biopsied and no more than four nodules should be followed. Dexa Bone Density Axial Skeleton    Result Date: 11/23/2020  EXAMINATION: BONE DENSITOMETRY 11/23/2020 11:05 am TECHNIQUE: A bone density DEXA scan was performed of the lumbar spine, bilateral hips and left forearm on a [employer] system.  COMPARISON: None HISTORY: ORDERING SYSTEM PROVIDED HISTORY: Hyperparathyroidism [facility]) TECHNOLOGIST PROVIDED HISTORY: Is the patient pregnant?->No FINDINGS: LEFT FOREARM: The BMD of the middle third of the radius of the distal forearm equals 0.72 g/cm2. The T- and Z-scores are 0.5 and 1.2 standard deviations from the mean. This is within the normal range by WHO criteria. LUMBAR SPINE: The bone mineral density in the lumbar spine including the L1-L4 levels is measured at 0.95 g/cm2, which corresponds to a T-score of -0.9 and a Z-score of -1.0. This is within the normal range by WHO criteria. LEFT HIP: The bone mineral density in the total hip is measured at 0.99 g/cm2 corresponding to a T-score of 0.4 and a Z-score of 0.1. This is within the normal range by WHO criteria. The bone mineral density of the femoral neck is measured at 0.87 g/cm2 corresponding to a T-score of 0.3 and a Z-score of 0.1. This is within the normal range by WHO criteria. RIGHT HIP: The bone mineral density in the total hip is measured at 0.93 g/cm2 corresponding to a T-score of -0.1 and a Z-score of 0.3. This is within the normal range by WHO criteria. The bone mineral density of the femoral neck is measured at 0.74 g/cm2 corresponding to a T-score of -1.0 and a Z-score of 1.0. This is within the normal range by WHO criteria. Normal by WHO criteria. Xr Chest Portable    Result Date: 11/25/2020  EXAMINATION: ONE XRAY VIEW OF THE CHEST 11/25/2020 2:33 pm COMPARISON: Chest 07/17/2007 HISTORY: From EPIC:  \"Mild central chest pain. ..coughing\" FINDINGS: The cardiomediastinal and hilar silhouettes appear unremarkable. Apparent faint, ill-defined ground-glass opacities overlying the lower lungs bilateral, which may simply be related to overlying breast shadows. The lungs appear otherwise clear. No pleural effusion evident. No pneumothorax is seen. No acute osseous abnormality is identified. Apparent faint, ill-defined ground-glass opacities overlying the lower lungs bilateral, which may simply be related to overlying breast shadows.   I cannot exclude atypical/viral pneumonia lower lungs. PROCEDURES   Unless otherwise noted below, none     Procedures    CRITICAL CARE TIME   N/A    CONSULTS:  None      EMERGENCY DEPARTMENT COURSE and DIFFERENTIAL DIAGNOSIS/MDM:   Vitals:    Vitals:    11/25/20 1515 11/25/20 1530 11/25/20 1600 11/25/20 1615   BP:  122/73 122/66    Pulse: 76 76 73 76   Resp: 17 19 18 18   Temp:       TempSrc:       SpO2: 98% 97% 90% 94%   Weight:       Height:           Patient was given the following medications:  Medications   acetaminophen (TYLENOL) tablet 1,000 mg (1,000 mg Oral Given 11/25/20 1434)   0.9 % sodium chloride bolus (0 mLs Intravenous Stopped 11/25/20 1817)   iopamidol (ISOVUE-370) 76 % injection 75 mL (75 mLs Intravenous Given 11/25/20 1637)         Patient presented with a 6-day history of chest tightness, shortness of breath, tactile fevers and body aches. She has history of renal insufficiency and her creatinine is only 1.4. She has evidence of groundglass opacities concerning for atypical viral pneumonia. No pulmonary embolus. Ambulating she maintains an oxygen saturation of 94% or greater. She is nontoxic in appearance. She states she has NSAIDs listed as a allergy due to her renal insufficiency. She is educated to take 81 mg aspirin for the next 2 weeks. Believe benefits outweigh risk as she has no true contraindication in her allergy and only minimal renal insufficiency. Also educated to buy a pulse oximeter to observe this at home and make sure her pulse ox remains greater than 90% at rest and does not drop below 88% on ambulation. She will be placed on antibiotics given that we do not have COVID-19 testing back at this time but she is educated this most likely is not bacterial most likely related to COVID-19. Do not believe she requires admission at this time. She will return here for any worsening of symptoms or problems at home. FINAL IMPRESSION      1. Pneumonia due to organism    2.  Suspected COVID-19 virus infection DISPOSITION/PLAN   DISPOSITION Decision To Discharge 11/25/2020 05:25:55 PM      PATIENT REFERREDTO:  [facility] Emergency Department  [address]  [phone]    As needed      DISCHARGE MEDICATIONS:  Discharge Medication List as of 11/25/2020  6:07 PM      START taking these medications    Details   azithromycin (ZITHROMAX) 250 MG tablet 2 TABS DAY 1 THEN 1 TAB DAYS 2-5, Disp-6 tablet,R-0Print      aspirin EC 81 MG EC tablet Take 1 tablet by mouth daily, Disp-14 tablet,R-0Print             DISCONTINUED MEDICATIONS:  Discharge Medication List as of 11/25/2020  6:07 PM                 (Please note that portions of this note were completed with a voice recognition program.  Efforts were made to edit the dictations but occasionally words are mis-transcribed.)    Rosemary Perez PA-C (electronically signed)           Rosemary Perez PA-C  11/25/20 [id number]

## 2020-11-25 NOTE — ED NOTES
PIV removed. Pt verbalizes understanding of discharge instructions, denies need for wheelchair, ambulated to exit with steady gait and belongings in tow.       Juany Bravo RN  24/31/62 9853

## 2020-11-26 LAB — SARS-COV-2, PCR: DETECTED

## 2020-11-26 NOTE — ED PROVIDER NOTES
I independently performed a history and physical on Zoey Huston. All diagnostic, treatment, and disposition decisions were made by myself in conjunction with the advanced practice provider. Briefly, this is a 48 y.o. female here for cough and shortness of breath. Associated with central achy and heavy chest pain. Onset about 6 days ago. Patient works as a . .    On exam, Patient afebrile and nontoxic. No distress. Heart RRR. Lungs with crackled left mid lung, otherwise clear. No increased WOB or hypoxia. Abdomen soft, nondistended, nontender to palpation in all quadrants. EKG  EKG was reviewed by emergency department physician in the absence of a cardiologist    Narrow complex sinus rhythm, rate 85, normal axis, normal MO and QRS intervals, normal Qtc, no ST elevations or depressions, TWI III and aVF, impression NSR with nonspecific t wave morphology, no STEMI, no significant morphology change from comparison 10/14/2019      Screenings            MDM    Patient with low grade temp however afebrile and nontoxic. No distress, respirations unlabored and she is without hypoxia. EKG no STEMI, troponin normal in setting of 6 days of symptoms and low suspicion for ACS. Imaging without pulmonary embolism, however findings concerning for atypical/viral pneumonia. Lab workup is reassuring, patient not septic. My clinical concern for COVID19 is high and testing obtained. Patient able to ambulate in the ED without hypoxia or distress, felt safe for discharge to self care with close PCP follow up and strict return precautions. Patient is agreeable with plan. Please note at time of completion of this documentation patient's COVID swab has returned positive.      Patient Referrals:  [facility] Emergency Department  [address] [facility]  [phone]    As needed      Discharge Medications:  Discharge Medication List as of 11/25/2020  6:07 PM      START taking these

## 2020-11-27 ENCOUNTER — CARE COORDINATION (OUTPATIENT)
Dept: CARE COORDINATION | Age: 50
End: 2020-11-27

## 2020-11-27 NOTE — CARE COORDINATION
Patient contacted regarding [id number] diagnosis\". Discussed COVID-19 related testing which was available at this time. Test results were positive. Patient informed of results, if available? Yes    Care Transition Nurse/ Ambulatory Care Manager contacted the patient by telephone to perform post discharge assessment. Call within 2 business days of discharge: Yes. Verified name and  with patient as identifiers. Provided introduction to self, and explanation of the CTN/ACM role, and reason for call due to risk factors for infection and/or exposure to COVID-19. Symptoms reviewed with patient who verbalized the following symptoms: no new symptoms and no worsening symptoms. Due to no new or worsening symptoms encounter was not routed to provider for escalation. Discussed follow-up appointments. If no appointment was previously scheduled, appointment scheduling offered: [facility] follow up appointment(s):   Future Appointments   Date Time Provider Raj Jenkins   2021  8:00 AM Severiano Jaeger, MD [facility] follow up appointment(s):     Non-face-to-face services provided:  Obtained and reviewed discharge summary and/or continuity of care documents     Advance Care Planning:   Does patient have an Advance Directive:  decision maker updated. Patient has following risk factors of: pneumonia. CTN/ACM reviewed discharge instructions, medical action plan and red flags such as increased shortness of breath, increasing fever and signs of decompensation with patient who verbalized understanding. Discussed exposure protocols and quarantine with CDC Guidelines What to do if you are sick with coronavirus disease .  Patient was given an opportunity for questions and concerns. The patient agrees to contact the [facility] exposure line [phone], local health department  and PCP office for questions related to their healthcare.  CTN/ACM provided contact information for future needs.    Reviewed and educated patient on any new and changed medications related to discharge diagnosis     Patient/family/caregiver given information for GetWell Loop and agrees to enroll yes  Patient's preferred e-mail:    Patient's preferred phone number:   Based on Loop alert triggers, patient will be contacted by nurse care manager for worsening symptoms. Pt will be further monitored by COVID Loop Team based on severity of symptoms and risk factors. [email or web address]

## 2020-11-29 LAB — BLOOD CULTURE, ROUTINE: NORMAL

## 2020-11-30 ENCOUNTER — VIRTUAL VISIT (OUTPATIENT)
Dept: FAMILY MEDICINE CLINIC | Age: 50
End: 2020-11-30
Payer: COMMERCIAL

## 2020-11-30 ENCOUNTER — TELEPHONE (OUTPATIENT)
Dept: FAMILY MEDICINE CLINIC | Age: 50
End: 2020-11-30

## 2020-11-30 PROCEDURE — 99213 OFFICE O/P EST LOW 20 MIN: CPT | Performed by: FAMILY MEDICINE

## 2020-11-30 NOTE — PROGRESS NOTES
[address] Note    Date: 11/30/2020                                               Subjective/Objective:     Chief Complaint   Patient presents with    Chest Pain       HPI   Pt here for ED f/u. Was seen in ED on 11/25/2020 after having chest heaviness for 6 days. Troponin was negative but she had PNA on CT scan, no PE. Was treated with Azithromycin. Pt ultimately tested positive for COVID. Since leaving ED, pt is fatigued and continues to cough. Denies fever. No SOB, but has trouble going up stairs due to fatigue.          Patient Active Problem List    Diagnosis Date Noted    Anemia in chronic renal disease 01/09/2020    Proteinuria 01/09/2020    Chronic pain of right knee 03/26/2019    Candidiasis of finger web 04/04/2018    Hand dermatitis 03/19/2018    Infected abrasion of left hand 03/15/2018    Primary localized osteoarthrosis of the knee 10/04/2016    Tear of PCL (posterior cruciate ligament) of knee 10/04/2016    Contusion of right knee 06/29/2016    Family history of cancer 06/21/2013    Renal insufficiency 06/13/2012    Vitamin D deficiency 06/13/2012    Carpal tunnel syndrome     Hypertension     Obesity     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[address] 04/19/2007    Anemia, iron deficiency 04/19/2007    Chronic kidney disease, stage 3, mod decreased GFR 04/19/2007    Hypertension, essential, benign 04/19/2007    Overweight and obesity 04/19/2007    Renal osteodystrophy 04/19/2007       Past Medical History:   Diagnosis Date    Carpal tunnel syndrome     Convulsions     Dermatophytosis     Diarrhea     Hypertension     Hypertensive kidney disease with chronic kidney disease stage V (HCC)     Medulloadrenal hyperfunc     Meningococcal encephalitis     Obesity     Other malaise and fatigue        Current Outpatient Medications   Medication Sig Dispense Refill    azithromycin (ZITHROMAX) 250 MG tablet 2 TABS DAY 1 THEN 1 TAB DAYS 2-5 6 tablet 0    aspirin EC 81 MG EC 11/30/2020  6:31 PM

## 2020-12-02 DIAGNOSIS — E21.3 HYPERPARATHYROIDISM (HCC): ICD-10-CM

## 2020-12-02 DIAGNOSIS — Z78.0 POSTMENOPAUSAL: ICD-10-CM

## 2020-12-02 LAB
24HR URINE VOLUME (ML): 1000 ML
CALCIUM 24 HOUR URINE: 111 MG/24 HR (ref 42–353)
CREATININE 24 HOUR URINE: 2.1 G/24HR (ref 0.6–1.5)

## 2021-01-21 ENCOUNTER — TELEPHONE (OUTPATIENT)
Dept: FAMILY MEDICINE CLINIC | Age: 51
End: 2021-01-21

## 2021-01-21 DIAGNOSIS — I10 ESSENTIAL HYPERTENSION: ICD-10-CM

## 2021-01-21 RX ORDER — ATENOLOL 50 MG/1
50 TABLET ORAL DAILY
Qty: 90 TABLET | Refills: 1 | Status: SHIPPED | OUTPATIENT
Start: 2021-01-21 | End: 2021-01-22 | Stop reason: SDUPTHER

## 2021-01-21 NOTE — TELEPHONE ENCOUNTER
----- Message from [address] [facility] sent at 1/21/2021 11:31 AM EST -----  Subject: Refill Request    QUESTIONS  Name of Medication? atenolol (TENORMIN) 50 MG tablet  Patient-reported dosage and instructions? 50MG. Once a day  How many days do you have left? 0  Preferred Pharmacy? [id number]  Pharmacy phone number (if available)? [phone]  ---------------------------------------------------------------------------  --------------    Name of Medication? amLODIPine (NORVASC) 10 MG tablet  Patient-reported dosage and instructions? 10MG. Once a day  How many days do you have left? 0  Preferred Pharmacy? [id number]  Pharmacy phone number (if available)? [phone]  ---------------------------------------------------------------------------  --------------  Dominic REYNOLDS  What is the best way for the office to contact you? OK to leave message on   voicemail  Preferred Call Back Phone Number?  [phone]

## 2021-01-21 NOTE — TELEPHONE ENCOUNTER
lov 11/30/20  lrf 90 1 5/29/20  Lab Results   Component Value Date     11/25/2020    K 4.4 11/25/2020     11/25/2020    CO2 25 11/25/2020    BUN 14 11/25/2020    CREATININE 1.4 (H) 11/25/2020    GLUCOSE 103 (H) 11/25/2020    CALCIUM 9.1 11/25/2020    PROT 6.9 11/11/2020    LABALBU 4.2 11/11/2020    BILITOT 0.5 11/11/2020    ALKPHOS 78 11/11/2020    AST 16 11/11/2020    ALT 15 11/11/2020    LABGLOM 40 (A) 11/25/2020    GFRAA 48 (A) 11/25/2020    AGRATIO 1.6 11/11/2020    GLOB 2.7 11/11/2020

## 2021-01-21 NOTE — TELEPHONE ENCOUNTER
Medication and Quantity requested:     amLODIPine (NORVASC) 10 MG tablet  #90     Last Visit  11/30/20    Pharmacy and phone number updated in EPIC:  [facility]    2nd request for medication today

## 2021-01-21 NOTE — TELEPHONE ENCOUNTER
Medication:   Requested Prescriptions     Pending Prescriptions Disp Refills    atenolol (TENORMIN) 50 MG tablet 90 tablet 1     Sig: Take 1 tablet by mouth daily       Last Filled:      Patient Phone Number: [phone] (home)     Last appt: 11/30/2020   Next appt: 1/21/2021    Lab Results   Component Value Date     11/25/2020    K 4.4 11/25/2020     11/25/2020    CO2 25 11/25/2020    BUN 14 11/25/2020    CREATININE 1.4 (H) 11/25/2020    GLUCOSE 103 (H) 11/25/2020    CALCIUM 9.1 11/25/2020    PROT 6.9 11/11/2020    LABALBU 4.2 11/11/2020    BILITOT 0.5 11/11/2020    ALKPHOS 78 11/11/2020    AST 16 11/11/2020    ALT 15 11/11/2020    LABGLOM 40 (A) 11/25/2020    GFRAA 48 (A) 11/25/2020    AGRATIO 1.6 11/11/2020    GLOB 2.7 11/11/2020

## 2021-01-21 NOTE — TELEPHONE ENCOUNTER
----- Message from [address] [facility] sent at 1/21/2021 11:33 AM EST -----  Subject: Message to Provider    QUESTIONS  Information for Provider? Patient requesting a return to work certificate   from covid. She has a letter from the Health Department but her work needs   a certificate so she can return to work. She would like a call back   regarding this.   ---------------------------------------------------------------------------  --------------  CALL BACK INFO  What is the best way for the office to contact you? OK to leave message on   voicemail  Preferred Call Back Phone Number? [phone]  ---------------------------------------------------------------------------  --------------  SCRIPT ANSWERS  Relationship to Patient?  Self

## 2021-01-21 NOTE — TELEPHONE ENCOUNTER
----- Message from [facility] sent at 1/21/2021 11:33 AM EST -----  Subject: Message to Provider    QUESTIONS  Information for Provider? Patient requesting a return to work certificate   from covid. She has a letter from the Health Department but her work needs   a certificate so she can return to work. She would like a call back   regarding this.   ---------------------------------------------------------------------------  --------------  CALL BACK INFO  What is the best way for the office to contact you? OK to leave message on   voicemail  Preferred Call Back Phone Number? [phone]  ---------------------------------------------------------------------------  --------------  SCRIPT ANSWERS  Relationship to Patient?  Self

## 2021-01-21 NOTE — TELEPHONE ENCOUNTER
Medication:   Requested Prescriptions     Pending Prescriptions Disp Refills    amLODIPine (NORVASC) 10 MG tablet 90 tablet 1     Sig: Take 1 tablet by mouth daily       Last Filled:      Patient Phone Number: [phone] (home)     Last appt: 11/30/2020   Next appt: Visit date not found    Lab Results   Component Value Date     11/25/2020    K 4.4 11/25/2020     11/25/2020    CO2 25 11/25/2020    BUN 14 11/25/2020    CREATININE 1.4 (H) 11/25/2020    GLUCOSE 103 (H) 11/25/2020    CALCIUM 9.1 11/25/2020    PROT 6.9 11/11/2020    LABALBU 4.2 11/11/2020    BILITOT 0.5 11/11/2020    ALKPHOS 78 11/11/2020    AST 16 11/11/2020    ALT 15 11/11/2020    LABGLOM 40 (A) 11/25/2020    GFRAA 48 (A) 11/25/2020    AGRATIO 1.6 11/11/2020    GLOB 2.7 11/11/2020

## 2021-01-22 ENCOUNTER — TELEPHONE (OUTPATIENT)
Dept: FAMILY MEDICINE CLINIC | Age: 51
End: 2021-01-22

## 2021-01-22 ENCOUNTER — OFFICE VISIT (OUTPATIENT)
Dept: FAMILY MEDICINE CLINIC | Age: 51
End: 2021-01-22
Payer: COMMERCIAL

## 2021-01-22 VITALS
HEART RATE: 84 BPM | OXYGEN SATURATION: 98 % | SYSTOLIC BLOOD PRESSURE: 120 MMHG | DIASTOLIC BLOOD PRESSURE: 84 MMHG | WEIGHT: 255 LBS | TEMPERATURE: 97 F | BODY MASS INDEX: 48.18 KG/M2

## 2021-01-22 DIAGNOSIS — U07.1 COVID-19: Primary | ICD-10-CM

## 2021-01-22 PROCEDURE — 99213 OFFICE O/P EST LOW 20 MIN: CPT | Performed by: FAMILY MEDICINE

## 2021-01-22 RX ORDER — AMLODIPINE BESYLATE 10 MG/1
10 TABLET ORAL DAILY
Qty: 90 TABLET | Refills: 1 | Status: SHIPPED | OUTPATIENT
Start: 2021-01-22 | End: 2021-02-01 | Stop reason: SDUPTHER

## 2021-01-22 RX ORDER — ATENOLOL 50 MG/1
50 TABLET ORAL DAILY
Qty: 90 TABLET | Refills: 1 | Status: SHIPPED | OUTPATIENT
Start: 2021-01-22 | End: 2021-02-01 | Stop reason: SDUPTHER

## 2021-01-22 ASSESSMENT — PATIENT HEALTH QUESTIONNAIRE - PHQ9
SUM OF ALL RESPONSES TO PHQ QUESTIONS 1-9: 2
2. FEELING DOWN, DEPRESSED OR HOPELESS: 1
SUM OF ALL RESPONSES TO PHQ QUESTIONS 1-9: 2
SUM OF ALL RESPONSES TO PHQ QUESTIONS 1-9: 2

## 2021-01-22 NOTE — PATIENT INSTRUCTIONS
Patient Education        Learning About COVID-19 and Flu Symptoms  How can you tell COVID-19 from the flu? COVID-19 and the flu have similar symptoms. The two can be hard to tell apart. The only way to know for sure which illness you have is to be tested. Since the symptoms are so alike, it makes sense to act as if you have COVID-19 until your test results come back. This means staying home and limiting contact with people in your home. You'll need to wash your hands often and disinfect surfaces that you touch. And be sure to wear a mask or face covering when you're around other people. This is also good advice if you think you have the flu. COVID-19 and the flu have these symptoms in common:  · Fever or chills  · Cough  · Shortness of breath  · Fatigue (tiredness)  · Sore throat  · Runny or stuffy nose  · Muscle pain or body aches  · Headache  · Vomiting and diarrhea (more common in children than adults)  COVID-19 has another symptom that also may occur:  · New loss of taste or smell  COVID-19 symptoms may appear from 2 to 14 days after infection. Flu symptoms usually appear 1 to 4 days after infection. Why should you get a flu shot during the COVID-19 pandemic? It's important to get your yearly flu vaccine. Both the flu and COVID-19 are expected to be active during flu season. You can get sick with both infections at once. And having both may make you more sick than getting just one. The flu vaccine won't protect you from COVID-19. But it can help prevent the flu or reduce its symptoms. If fewer people get very ill with the flu, this will help free up medical resources that are needed for COVID-19 patients. Where can you learn more? Go to https://seedtagalicia.[employer]. org and sign in to your [employer] account. Enter C123 in the Stopango box to learn more about \"Learning About COVID-19 and Flu Symptoms. \"     If you do not have an account, please click on the \"Sign Up Now\" link. Current as of: December 18, 2020               Content Version: 12.7  © 1859-4170 Healthwise, Incorporated. Care instructions adapted under license by [address] If you have questions about a medical condition or this instruction, always ask your healthcare professional. [address] any warranty or liability for your use of this information.

## 2021-01-22 NOTE — PROGRESS NOTES
[address] Note    Date: 1/22/2021                                               Subjective/Objective:     Chief Complaint   Patient presents with    Forms       HPI   Patient is here for follow-up on COVID-19. Was diagnosed in November, also had Covid pneumonia. Is currently symptom-free. Needs forms completed to return to work.          Patient Active Problem List    Diagnosis Date Noted    Anemia in chronic renal disease 01/09/2020    Proteinuria 01/09/2020    Chronic pain of right knee 03/26/2019    Candidiasis of finger web 04/04/2018    Hand dermatitis 03/19/2018    Infected abrasion of left hand 03/15/2018    Primary localized osteoarthrosis of the knee 10/04/2016    Tear of PCL (posterior cruciate ligament) of knee 10/04/2016    Contusion of right knee 06/29/2016    Family history of cancer 06/21/2013    Renal insufficiency 06/13/2012    Vitamin D deficiency 06/13/2012    Carpal tunnel syndrome     Hypertension     Obesity     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[facility] [address] 04/19/2007    Anemia, iron deficiency 04/19/2007    Chronic kidney disease, stage 3, mod decreased GFR 04/19/2007    Hypertension, essential, benign 04/19/2007    Overweight and obesity 04/19/2007    Renal osteodystrophy 04/19/2007       Past Medical History:   Diagnosis Date    Carpal tunnel syndrome     Convulsions     Dermatophytosis     Diarrhea     Hypertension     Hypertensive kidney disease with chronic kidney disease stage V (HCC)     Medulloadrenal hyperfunc     Meningococcal encephalitis     Obesity     Other malaise and fatigue        Current Outpatient Medications   Medication Sig Dispense Refill    atenolol (TENORMIN) 50 MG tablet Take 1 tablet by mouth daily 90 tablet 1    aspirin EC 81 MG EC tablet Take 1 tablet by mouth daily 14 tablet 0    diclofenac sodium (VOLTAREN) 1 % GEL Apply 4 g topically 2 times daily      GLUCOSAMINE-CHONDROITIN PO Take by mouth  amLODIPine (NORVASC) 10 MG tablet Take 1 tablet by mouth daily 90 tablet 1    levETIRAcetam (KEPPRA) 500 MG tablet TAKE 1 TABLET TWICE DAILY 180 tablet 5    meclizine (ANTIVERT) 25 MG tablet Take 25 mg by mouth 2 times daily       No current facility-administered medications for this visit. Allergies   Allergen Reactions    Ibuprofen     Nsaids        Review of Systems   No fever, no cough, sore throat, no headache, no loss of taste or smell, no diarrhea    Vitals:  /84   Pulse 84   Temp 97 °F (36.1 °C)   Wt 255 lb (115.7 kg)   SpO2 98%   BMI 48.18 kg/m²     Physical Exam   General:  Well-appearing, NAD, alert, non-toxic  HEENT:  Normocephalic, atraumatic. CHEST/LUNGS: No dyspnea  EXTREMETIES: Normal movement of all extremities. No edema. No joint swelling. SKIN:  No rash, no cellulitis, no bruising, no petechiae/purpura/vesicles/pustules/abscess  PSYCH:  A+O x 3; normal affect  NEURO:  GCS 15, CN2-12 grossly intact, no focal motor/sensory deficits, normal gait, normal speech      Assessment/Plan     51-year-old female with recent COVID-19. Is currently asymptomatic for over a month. Is cleared to return to work. Forms were completed in the office. Follow-up in 1 to 2 months for annual physical.    Discharged in stable condition at 1:25 PM.    1. COVID-19         No orders of the defined types were placed in this encounter. No follow-ups on file.     Ludy Bellamy MD    1/22/2021  2:40 PM

## 2021-01-22 NOTE — TELEPHONE ENCOUNTER
----- Message from [facility] sent at 1/21/2021 11:33 AM EST -----  Subject: Message to Provider    QUESTIONS  Information for Provider? Patient requesting a return to work certificate   from covid. She has a letter from the Health Department but her work needs   a certificate so she can return to work. She would like a call back   regarding this.   ---------------------------------------------------------------------------  --------------  CALL BACK INFO  What is the best way for the office to contact you? OK to leave message on   voicemail  Preferred Call Back Phone Number? [phone]  ---------------------------------------------------------------------------  --------------  SCRIPT ANSWERS  Relationship to Patient?  Self

## 2021-01-22 NOTE — TELEPHONE ENCOUNTER
Pt has a form that needs to be filled out to return to work from when she had covid. . Can she give this to you to fill out with out an apt?

## 2021-02-01 DIAGNOSIS — I10 ESSENTIAL HYPERTENSION: ICD-10-CM

## 2021-02-01 RX ORDER — ATENOLOL 50 MG/1
50 TABLET ORAL DAILY
Qty: 90 TABLET | Refills: 1 | Status: SHIPPED | OUTPATIENT
Start: 2021-02-01 | End: 2021-10-26

## 2021-02-01 RX ORDER — AMLODIPINE BESYLATE 10 MG/1
10 TABLET ORAL DAILY
Qty: 90 TABLET | Refills: 1 | Status: SHIPPED | OUTPATIENT
Start: 2021-02-01 | End: 2021-08-02

## 2021-02-01 NOTE — TELEPHONE ENCOUNTER
Medication and Quantity requested: atenolol (TENORMIN) 50 MG tablet    And    amLODIPine (NORVASC) 10 MG tablet        Last Visit  1/22/21    [facility]arm[facility] and phone number updated in EPIC:  [facility]    [facility]       [facility] states [facility] not [facility] request [facility]

## 2021-02-08 ENCOUNTER — TELEPHONE (OUTPATIENT)
Dept: BARIATRICS/WEIGHT MGMT | Age: 51
End: 2021-02-08

## 2021-02-12 ENCOUNTER — OFFICE VISIT (OUTPATIENT)
Dept: BARIATRICS/WEIGHT MGMT | Age: 51
End: 2021-02-12

## 2021-02-12 VITALS
SYSTOLIC BLOOD PRESSURE: 138 MMHG | BODY MASS INDEX: 45 KG/M2 | HEART RATE: 60 BPM | WEIGHT: 254 LBS | TEMPERATURE: 96.5 F | HEIGHT: 63 IN | DIASTOLIC BLOOD PRESSURE: 89 MMHG

## 2021-02-12 DIAGNOSIS — E66.01 MORBID OBESITY WITH BMI OF 45.0-49.9, ADULT (HCC): ICD-10-CM

## 2021-02-12 PROCEDURE — 99999 PR OFFICE/OUTPT VISIT,PROCEDURE ONLY: CPT

## 2021-02-12 NOTE — PROGRESS NOTES
Deven Aviles is a 48 y.o. female with a date of birth of 1970. Vitals:    02/12/21 0856   BP: 138/89   Pulse: 60   Temp: 96.5 °F (35.8 °C)   Weight: 254 lb (115.2 kg)   Height: 5' 2.5\" (1.588 m)   BMI: Body mass index is 45.72 kg/m². Obesity Classification: Class III    Weight History: Wt Readings from Last 3 Encounters:   02/12/21 254 lb (115.2 kg)   01/22/21 255 lb (115.7 kg)   11/25/20 261 lb (118.4 kg)       Patient's lowest adult weight was 180 lb at age 44. Patient's highest adult weight was 271 lb at age 55. Patient has participated in the following weight loss programs: LF, Keto, Cabbage Soup and IF. Patient has not participated in meal replacement/liquid diets. Patient has not participated in weight loss medications. Patient is not lactose intolerant. Patient does not have Advent/cultural food concerns. Patient does not have food allergies. 24 hour recall/food frequency chart:  Gets up ~530a (fast 6p-10a OR 7p-11a)  Breakfast: no.  Snack: no.   Lunch: yes. 10-11a - steak or chicken fajita OR stuffed chicken breast w/ mozzarella & spinach OR porkloin  Snack: no.   Dinner: yes. oikos or carbmaster yogurt w/ fruit  Snack: no.   Drinks throughout the day: water   Do you drink alcohol? no.     Patient does meet the criteria for binge eating disorder. Patient does not have grazing. Patient does not have night eating. Patient does have a history of emotional eating or eating out of boredom. Goals  Weight: 175 lb  Health Improvement: knee replacement / BP     Assessment  Nutritional Needs: RMR=(9.99 x 115) + (6.25 x 159) - (4.92 x 50 y.o.) -161 = 1736 kcal x 1.4 (sedentary activity factor)= 2430 kcal - 1000 (for 2 lb weight loss/week)= 1430 kcal.    Plan  Plan/Recommendations: Start 1200 barbra LCMP  Optifast:  possbily  Diet Medications:  not interested  Diet & Exercise: 1st choice  *Pt needs a knee replacement, goal for BMI <40. PES Statement:  Overweight/Obesity related to unbalanced intake vs energy expenditure as evidenced by BMI. Body mass index is 45.72 kg/m². Will follow up as necessary.     Felicia Melgar

## 2021-04-09 ENCOUNTER — TELEPHONE (OUTPATIENT)
Dept: ORTHOPEDIC SURGERY | Age: 51
End: 2021-04-09

## 2021-04-09 NOTE — TELEPHONE ENCOUNTER
General Question     Subject: Patient advised she missed her appointment due to being advised that records were not received. Stated that she has been waiting since march. She would like to know if these can be requested by the office from Dr. Juliann Wren at [facility].  Please advise  Patient : Randell Agudelo  Contact Number: [phone]

## 2021-04-12 ENCOUNTER — TELEPHONE (OUTPATIENT)
Dept: ORTHOPEDIC SURGERY | Age: 51
End: 2021-04-12

## 2021-04-12 DIAGNOSIS — G40.409 MYOCLONIC SEIZURE DISORDER (HCC): ICD-10-CM

## 2021-04-12 RX ORDER — LEVETIRACETAM 500 MG/1
TABLET ORAL
Qty: 180 TABLET | Refills: 3 | Status: SHIPPED | OUTPATIENT
Start: 2021-04-12 | End: 2022-04-30 | Stop reason: SDUPTHER

## 2021-04-15 ENCOUNTER — OFFICE VISIT (OUTPATIENT)
Dept: ORTHOPEDIC SURGERY | Age: 51
End: 2021-04-15
Payer: COMMERCIAL

## 2021-04-15 VITALS — HEIGHT: 61 IN | BODY MASS INDEX: 49.09 KG/M2 | WEIGHT: 260 LBS

## 2021-04-15 DIAGNOSIS — M17.0 PRIMARY OSTEOARTHRITIS OF BOTH KNEES: Primary | ICD-10-CM

## 2021-04-15 DIAGNOSIS — M17.12 PRIMARY OSTEOARTHRITIS OF LEFT KNEE: ICD-10-CM

## 2021-04-15 PROCEDURE — 99203 OFFICE O/P NEW LOW 30 MIN: CPT | Performed by: ORTHOPAEDIC SURGERY

## 2021-04-15 PROCEDURE — 20610 DRAIN/INJ JOINT/BURSA W/O US: CPT | Performed by: ORTHOPAEDIC SURGERY

## 2021-04-15 NOTE — LETTER
[address] [facility]  Surgery Scheduling Form:  Critical access hospital    DEMOGRAPHICS:                                                                                                                Patient Name:  Mary Cook  Patient :  1970   Patient SS#:      Patient Phone:  [phone] (home)                            Patient Address:  [place] [address]    PCP:  Fabi Tom MD  Insurance:  Payor: Dereck Fountain / Plan: Claudeen January HMO / Product Type: *No Product type* /   Insurance ID Number:    DIAGNOSIS & PROCEDURE:                                                                                              Diagnosis:     Right arthritis knee     Operation:  Right Total Knee Replacement robotic assisted  Location:  [facility]  Surgeon:  Amy Ortiz MD    SCHEDULING INFORMATION:                                                                                         .  Surgeon's Scheduling Instruction:  After     Requested Date:  21 OR Time:  10:00am Patient Arrival Time:  8:00am  OR TIME REQUIRED  :  90 MIN  Anesthesia:  Choice  Equipment: gianna Roque                COVID:  Rapid Test  Mini C-Arm:  No   Standard C-Arm:  No  Status:  same day admit  PAT Required:  Yes  Comments: NO femoral nerve block   ALLERGIES:Ibuprofen and Nsaids                    Gretchen Waldron MD      21 4:41 PM  BILLING INFORMATION:                                                                                                     Procedure:       CPT Code Modifier    With Jenny Parra, [address]                                                H&P AT:       PCP  XX                      [facility]   1970     PHYSICIANS ORDERS    HEIGHT:  Ht Readings from Last 1 Encounters:   04/15/21 5' 1\" (1.549 m)               WEIGHT:  Wt Readings from Last 1 Encounters:   04/15/21 260 lb (117.9 kg) ALLERGIES:Ibuprofen and Nsaids                           SURG                                      __________________________________________________________________  PRE-OP ORDERS:  ? CBC WITH DIFFERENTIAL                                                ? TYPE AND SCREEN                                                            ? HgB A1C                                                                               ? EKG                                                                                        ? NASAL CULUTRE MRSA  ? UAR/if positive repeat UAR on admission  ? BMP           ALBUMIN AND PREALBUMIN           VITAMIN D LEVELS  ? COAG PROFILE  ? SED RATE  ? PT/OT EVAL AND TEACHING  ? INSTRUCT PT TO STOP ALL NSAIDS, ASPIRIN, BLOOD THINNERS 7 DAYS PRIOR SURGERY  DAY OF SURGERY  ? CEFAZOLIN 2 GM IVPB; IF PATIENT WEIGHS > 80 KG AND SERUM CREATININE <2.5 mg/dl, GIVE 2 GM DOSE WITHIN 1 HOUR OF INCISION. ? IF THE PRE-OP NASAL CULTURE FOR MRSA WAS POSITIVE:   REPEAT NASAL SWAB ON ADMISSION AND ADMINISTER VANCOMYCIN 15 mg x kg, REDUCE THE DOSE OF VANCOMYCIN  MG IVPB IF PT < 55 KG OR SERUM CREATININE > 2mg/dl; also to get Cefazolin 2 GM or wt based  ? All patients will receive preop Cefazolin 2 GM or wt based   ? APPLY KNEE HIGH ANTI-EMBOLIC AND PNEUMO-BOOTS TO UNOPERATIVE  LEG  ? CELEBREX  200 MG  ORALLY  DAY OF SURGERY  ?  ROXICODONE 10MG  ORALLY DAY OF SURGERY  OTHER ORDERS:_______________________________________________________PHYSICIAN SIGNATURE: __   6/1/21                                                                                                       4:41 PM  ________________________DATE:                                                                                                        Supplemental Confidentiality & Payment Agreement & Supplemental HIPAA Notice for Shared Medical Appointments        During shared medical appointments, you will hear about other participants health issues and personal information. As a matter of trust, it is your duty to keep everything you hear confidential.  Nothing that identifies a participant in any way (including job, ethnicity, Jew, etc.) can be shared outside of this group setting. I have read and agree to the following statements:        · I agree to meet with a group of patients and my doctor. I understand that I have the choice to be seen by my physician in this group or individually and that I may leave the group visit anytime I feel uncomfortable. · I understand that discussions will occur regarding individual identifiable health information during the shared medical appointment and I will maintain the confidentiality of [facility] health information or other information heard during the appointment. · I am committed to maintaining this confidentiality even if I am no longer participating in shared medical appointments. · I understand that the information I share with the physician and staff will be heard by the other participants and there is a possibility that the information disclosed in the shared medical appointment may be re-disclosed by other participants. I have been notified of this potential disclosure and I voluntarily agree to participate in the shared medical appointment. · I know that I dont have to share any personal information with the group or health care providers unless, I choose to do so. · Like any doctors appointment, I agree to be responsible for the bill and / or co-payment associated with this physician appointment. Shared Medical Appointment              THIS SUPPLEMENTAL NOTICE SUPPLEMENTS AND DOES NOT REPLACE THE [facility] CONSENT, PATIENT RESPONSIBILITY AND NOTICE OF PRIVACY PRACTICE.         Mary Cook    1970        Patients Signature: ____________________________________________________         DATE: ____/____/___      Uziel Fonseca / Support Persons Signature (if applicable) _________________________     DATE: __/__/__    **Each person will be asked to sign this commitment before each Shared Medical Appointment. Thank You ! SURGERY SCHEDULING TIMES                                                                                                                                                      Bridger Hansen                                                                                       1970                                                                           SURGERY DATE: ___/___/___                                                                      SURGERY TIME:  ___:___ AM/PM                                                                      ARRIVAL TIME:   ___:___  AM/PM                                                                                                                        **PLEASE REPORT TO THE                                                       INFORMATION DESK IN THE MAIN LOBBY                                                          OF THE HOSPITAL.         [facility]                           JET INFO     PT NAME:  Bridger Hansen              Patient Phone:  [phone] ([place])                                           1970    PCP:  PCP:  Leonel Boeck, MD                APPT DATE:  ___/___/___      DATE:  21        H&P __________    LABS: _________                      NEEDED:  __________    EKG:   _________                     NEEDED:  __________      JET DATE:   _______/_______      SURG DATE:   ________/________

## 2021-04-15 NOTE — PROGRESS NOTES
[facility]  [facility]    Chief Complaint: Bilateral knee pain, worse on the left     HPI:  Kaushal Louise is a 48 y.o. who is here for evaluation of bilateral knee pain. She has had worsening knee pain for least 4 years. Her right knee has been problematic knee until recently. She does have a history of 2 arthroscopic right knee procedures last 1 done in February 2020. She has also had steroid injections and viscosupplementation in the right knee. She also wears an  brace for her right knee. Pain is currently 1/10 on the right. She does report 10/10 pain on the left knee. Pain occurs on a daily basis and is worse with any type of activity. She has not had any treatment today for the left knee. She is not able to take NSAIDs due to chronic kidney disease. She occasionally takes Tylenol and also uses Voltaren gel. She denies any hip or groin pain or back pain. There is no history of injury or surgery.     Patient Active Problem List   Diagnosis    Carpal tunnel syndrome    Hypertension    Obesity    Renal insufficiency    Vitamin D deficiency    Contusion of right knee    Infected abrasion of left hand    Hand dermatitis    Candidiasis of finger web    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[facility] [address]    Anemia in chronic renal disease    Anemia, iron deficiency    Chronic kidney disease, stage 3, mod decreased GFR    Chronic pain of right knee    Family history of cancer    Hypertension, essential, benign    Overweight and obesity    Primary localized osteoarthrosis of the knee    Proteinuria    Renal osteodystrophy    Tear of PCL (posterior cruciate ligament) of knee    Hypertension, essential    Morbid obesity with BMI of 45.0-49.9, adult (HCC)       ROS:  Constitutional: denies fever, chills, weight loss  MSK: denies pain in other joints, muscle aches  Neurological: denies numbness, tingling, weakness    Exam:  Height 5' 1\" (1.549 m), weight 260 lb (117.9 kg).    Appearance: sitting in exam room chair, appears to be in no acute distress, awake and alert  Resp: unlabored breathing on room air  Skin: warm, dry and intact with out erythema or significant increased temperature  Neuro: grossly intact both lower extremities. Intact sensation to light touch. Motor exam 4+ to 5/5 in all major motor groups. Right knee: Examination reveals that knee range of motion is 0 to 120 degrees. There is varus deformity, positive crepitus, positive joint line tenderness, positive antalgic gait. Neurologically, plantar flexion and dorsiflexion is intact. Pulses palpable distally. 5/5 strength. Left knee: Examination reveals that knee range of motion is 0 to 120 degrees. There is varus deformity, positive crepitus, positive joint line tenderness, positive antalgic gait. Neurologically, plantar flexion and dorsiflexion is intact. Pulses palpable distally. 5/5 strength. Imaging:  3 views of bilateral knees were performed and interpreted today. Both knees are significant for tricompartmental degenerative changes with near bone-on-bone osteoarthritis of the medial compartment. Assessment:  Bilateral knee osteoarthritis, more symptomatic on the left    Plan:  We discussed the diagnosis and treatment options. She is unable to take NSAIDs. Her right knee pain is currently controlled. She has no treatment to date for the left knee. I recommended a steroid injection and this was performed as described below. I also recommended viscosupplementation shortly after the steroid injection to also help with pain. We will send for prior authorization to her insurance company. She will also continue use of the Voltaren gel. She will follow-up for viscosupplementation if approved. PROCEDURE NOTE:  After verbal consent was obtained, the patient's left knee was prepped with betadine. Skin was anesthetized with ethyl chloride.   The knee was then injected under sterile technique with 2mL of 0.25% marcaine and 1 mL of 40 mg/mL Kenalog. A bandage was applied. The patient tolerated the procedure well and there were no complications. Total time spent on today's encounter was at least 32 minutes. This time included reviewing prior notes, radiographs, and lab results when available, reviewing history obtained by medical assistant, performing history and physical exam, reviewing tests/radiographs with the patient, counseling the patient, ordering medications or tests, documentation in the electronic health record, and coordination of care. This dictation was done with [employer] dictation and may contain mechanical errors related to translation.

## 2021-05-25 ENCOUNTER — TELEPHONE (OUTPATIENT)
Dept: ORTHOPEDIC SURGERY | Age: 51
End: 2021-05-25

## 2021-05-25 DIAGNOSIS — M17.0 PRIMARY OSTEOARTHRITIS OF BOTH KNEES: Primary | ICD-10-CM

## 2021-05-25 NOTE — TELEPHONE ENCOUNTER
General Question     Subject: PATIENT CALLING REGARDING INJECTIONS  Patient and /or Facility Request: OTHER  Contact Number: [phone]

## 2021-05-25 NOTE — TELEPHONE ENCOUNTER
Called and informed patient as soon as we hear from insurance we will call her with the authorization.

## 2021-06-01 ENCOUNTER — PREP FOR PROCEDURE (OUTPATIENT)
Dept: ORTHOPEDIC SURGERY | Age: 51
End: 2021-06-01

## 2021-06-01 ENCOUNTER — TELEPHONE (OUTPATIENT)
Dept: ORTHOPEDIC SURGERY | Age: 51
End: 2021-06-01

## 2021-06-01 DIAGNOSIS — M17.11 PRIMARY OSTEOARTHRITIS OF RIGHT KNEE: Primary | ICD-10-CM

## 2021-06-01 DIAGNOSIS — M17.0 PRIMARY OSTEOARTHRITIS OF BOTH KNEES: Primary | ICD-10-CM

## 2021-06-01 NOTE — TELEPHONE ENCOUNTER
06/01/2021  EUFLEXXA  (SERIES OF 3)   BILATERAL KNEES. DENIED    NO COVERAGE - CASH BASED PROGRAM. PER LEADERSHIP.  NOT A COVERED BENEFIT FOR THIS SELF FUNDED BCBS OF [facility], PER NOTES FOR THIS GROUP .  AP

## 2021-06-01 NOTE — TELEPHONE ENCOUNTER
Steroid injections every 3 months (next shot possible in mid July) and NSAIDs since cannot do visco. If those don't help, then we can talk about TKA.

## 2021-06-07 ENCOUNTER — TELEPHONE (OUTPATIENT)
Dept: ORTHOPEDIC SURGERY | Age: 51
End: 2021-06-07

## 2021-06-07 NOTE — TELEPHONE ENCOUNTER
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      Patient Cinthya Reno

## 2021-06-07 NOTE — TELEPHONE ENCOUNTER
PATIENT CALLED STATES THAT SHE IS SCHEDULED TO ATTEND SOME CLASS BEFORE SURGERY. SHE IS UNSURE WHEN.  PLEASE CONTACT TO [facility] [phone]

## 2021-06-14 ENCOUNTER — HOSPITAL ENCOUNTER (OUTPATIENT)
Dept: CT IMAGING | Age: 51
Discharge: HOME | End: 2021-06-14
Payer: COMMERCIAL

## 2021-06-14 DIAGNOSIS — M17.11 PRIMARY OSTEOARTHRITIS OF RIGHT KNEE: ICD-10-CM

## 2021-06-14 PROCEDURE — 73700 CT LOWER EXTREMITY W/O DYE: CPT

## 2021-06-15 ENCOUNTER — TELEPHONE (OUTPATIENT)
Dept: ORTHOPEDIC SURGERY | Age: 51
End: 2021-06-15

## 2021-06-15 NOTE — TELEPHONE ENCOUNTER
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

## 2021-06-18 ENCOUNTER — HOSPITAL ENCOUNTER (OUTPATIENT)
Dept: PHYSICAL THERAPY | Age: 51
Setting detail: THERAPIES SERIES
Discharge: HOME | End: 2021-06-18
Payer: COMMERCIAL

## 2021-06-18 PROCEDURE — 97110 THERAPEUTIC EXERCISES: CPT

## 2021-06-18 PROCEDURE — 97161 PT EVAL LOW COMPLEX 20 MIN: CPT

## 2021-06-18 NOTE — FLOWSHEET NOTE
[address] [facility]  Phone: [phone]   Fax: [phone]    Physical Therapy Daily Treatment Note  Date:  2021    Patient Name:  Bridger Hansen    :  1970  MRN: [id number]  Medical/Treatment Diagnosis Information:  Diagnosis: M17.11 (ICD-10-CM) - Primary osteoarthritis of right knee / PREHAB - sx: 21  Treatment Diagnosis: decreased RLE strength, knee pain, antalgic gait, knee instability  Insurance/Certification information:  PT Insurance Information: [address] [place] [address] - $25 copay - visits PMN  Physician Information:  Referring Practitioner: Tracy Tierney  Plan of care signed (Y/N): []  Yes [x]  No     Date of Patient follow up with Physician: TBD      Progress Report: [x]  Yes  []  No     Date Range for reporting period:  Beginnin21  Ending: TBD    Progress report due (10 Rx/or 30 days whichever is less): visit #2 POST-OP RE-EVAL (09)    Recertification due (POC duration/ or 90 days whichever is less): visit #2 POST-OP RE-EVAL (21)    Visit # Insurance Allowable Auth required?  Date Range   1 tbd [x]  Yes  []  No tbd     Latex Allergy:  [x]NO      []YES  Preferred Language for Healthcare:   [x]English       []other:    Functional Scale:        Date assessed:  LEFS: raw score = 20/80; dysfunction = 75%  21    Pain level:  4-10/10     SUBJECTIVE:  See eval    OBJECTIVE: See eval     Functional Testing  Sx Date 21 Prehab Date 21 Post-op Re-Eval Date 21 4 week F/U date TBD 8 week F/U date TBD D/C Date TBD       TUG (sec) 8              30 second sit to stand (reps) 10           6 minute walk (m) 449.58                Balance:     Narrowed SOFIA (sec) 10           Semi Tandem (sec) 10                       Tandem (sec) 10                          SLS (sec) 3                  Knee AROM L R L R L R L R L R   Flexion 119 122                   Extension hyper 1 0                       Knee Ext: L R L R L R L R L R MMT (of 5) 4+ 4                   Knee Ext (#) 31.8 29.6                       Hip Abd:  L R L R L R L R L R   MMT (of 5) 4+ 4                   Hip Abd (#) 32.9 24.3                       LEFS (raw) 20                 RESTRICTIONS/PRECAUTIONS: HX OF FALLS     Exercises/Interventions:     Therapeutic Exercises (76972) Resistance / level Sets/sec Reps Notes   QS   Discussed in HEP    GS   Discussed in HEP    Heel Slides   Discussed in HEP    Ankle pUmps   Discussed in HEP    SLR flex   Discussed in HEP    Heel prop   Discussed in HEP                         Therapeutic Activities (21863)                                          Neuromuscular Re-ed (01272)                                                 Manual Intervention (25570)                                                   Modalities:     Pt. Education:  6/18 - Patient was thoroughly educated on this date regarding prehabilitation goals, importance of PT sessions in improving overall ROM, strength and stability prior to surgery, and how prehabilitation will facilitate improved post-operative outcomes. The patient was educated on and instructed in [facility] as listed. The patient was given a detailed handout for exercises to initiate in the hospital post-operatively as well as at home. The discharge plan from the hospital was reviewed with the patient; specifically, to reduce length of hospital stay and to minimize time before reinitiating outpatient physical therapy after surgery. Education regarding early mobility post-operatively in the hospital and emphasis on working with both physical therapy and nursing staff to achieve ambulation goal of 150 feet was provided. The patient was highly encouraged to attend joint class in hospital prior to surgery for further instructions on pre and post-surgical care. Also, the patient was educated on precautions after hip replacement to avoid, specifically, hip extension and repetitive hip flexion.  It is in my medical opinion that this patient is clear from all physical barriers prior to consideration for surgery, activity modifications prior to and post operatively have been discussed with this patient as well as discharge planning and is cleared for surgery from physical therapy perspective.  -patient educated on diagnosis, prognosis and expectations for rehab  -all patient questions were answered    Home Exercise Program:  6/18 - The following exercises were discussed and added to the patient's home exercise program. (glute set, quad set, ankle pumps, heel prop, heel slide, SLR flexion). Additionally, the patient was educated on appropriate frequency, intensity and duration. Handout provided. Therapeutic Exercise and NMR EXR  [x] (53709) Provided verbal/tactile cueing for activities related to strengthening, flexibility, endurance, ROM for improvements in  [x] LE / Lumbar: LE, proximal hip, and core control with self care, mobility, lifting, ambulation. [] UE / Cervical: cervical, postural, scapular, scapulothoracic and UE control with self care, reaching, carrying, lifting, house/yardwork, driving, computer work.  [] (65540) Provided verbal/tactile cueing for activities related to improving balance, coordination, kinesthetic sense, posture, motor skill, proprioception to assist with   [] LE / lumbar: LE, proximal hip, and core control in self care, mobility, lifting, ambulation and eccentric single leg control. [] UE / cervical: cervical, scapular, scapulothoracic and UE control with self care, reaching, carrying, lifting, house/yardwork, driving, computer work.   [] (26544) Therapist is in constant attendance of 2 or more patients providing skilled therapy interventions, but not providing any significant amount of measurable one-on-one time to either patient, for improvements in  [] LE / lumbar: LE, proximal hip, and core control in self care, mobility, lifting, ambulation and eccentric single leg control.    [] UE / cervical: cervical, scapular, scapulothoracic and UE control with self care, reaching, carrying, lifting, house/yardwork, driving, computer work. NMR and Therapeutic Activities:    [] (27907 or 65665) Provided verbal/tactile cueing for activities related to improving balance, coordination, kinesthetic sense, posture, motor skill, proprioception and motor activation to allow for proper function of   [] LE: / Lumbar core, proximal hip and LE with self care and ADLs  [] UE / Cervical: cervical, postural, scapular, scapulothoracic and UE control with self care, carrying, lifting, driving, computer work.   [] (83961) Gait Re-education- Provided training and instruction to the patient for proper LE, core and proximal hip recruitment and positioning and eccentric body weight control with ambulation re-education including up and down stairs     Home Management Training / Self Care:  [] (25381) Provided self-care/home management training related to activities of daily living and compensatory training, and/or use of adaptive equipment for improvement with: ADLs and compensatory training, meal preparation, safety procedures and instruction in use of adaptive equipment, including bathing, grooming, dressing, personal hygiene, basic household cleaning and chores.      Home Exercise Program:    [x] (20036) Reviewed/Progressed HEP activities related to strengthening, flexibility, endurance, ROM of   [x] LE / Lumbar: core, proximal hip and LE for functional self-care, mobility, lifting and ambulation/stair navigation   [] UE / Cervical: cervical, postural, scapular, scapulothoracic and UE control with self care, reaching, carrying, lifting, house/yardwork, driving, computer work  [] (81505)Reviewed/Progressed HEP activities related to improving balance, coordination, kinesthetic sense, posture, motor skill, proprioception of   [] LE: core, proximal hip and LE for self care, mobility, lifting, and ambulation/stair navigation    [] UE / Cervical: cervical, postural,  scapular, scapulothoracic and UE control with self care, reaching, carrying, lifting, house/yardwork, driving, computer work    Manual Treatments:  PROM / STM / Oscillations-Mobs:  G-I, II, III, IV (PA's, Inf., Post.)  [] (76770) Provided manual therapy to mobilize LE, proximal hip and/or LS spine soft tissue/joints for the purpose of modulating pain, promoting relaxation,  increasing ROM, reducing/eliminating soft tissue swelling/inflammation/restriction, improving soft tissue extensibility and allowing for proper ROM for normal function with   [] LE / lumbar: self care, mobility, lifting and ambulation. [] UE / Cervical: self care, reaching, carrying, lifting, house/yardwork, driving, computer work. Modalities:  [] (85778) Vasopneumatic compression: Utilized vasopneumatic compression to decrease edema / swelling for the purpose of improving mobility and quad tone / recruitment which will allow for increased overall function including but not limited to self-care, transfers, ambulation, and ascending / descending stairs. Charges:  Timed Code Treatment Minutes: 8   Total Treatment Minutes: 50     [x] EVAL - LOW (56521)   [] EVAL - MOD (44761)  [] EVAL - HIGH (23525)  [] RE-EVAL (66181)  [x] VB(64221) x 1       [] Ionto  [] NMR (02728) x       [] Vaso  [] Manual (19973) x       [] Ultrasound  [] TA x        [] Mech Traction (52177)  [] Aquatic Therapy x      [] ES (un) (78661):   [] Home Management Training x  [] ES(attended) (46300)   [] Dry Needling 1-2 muscles (12527):  [] Dry Needling 3+ muscles (322380)  [] Group:      [] Other:     GOALS:  Patient stated goal: The patient will be able to walk >500ft without increase in symptoms. []? Progressing: []? Met: []? Not Met: []? Adjusted     Therapist goals for Patient:   Short Term Goals: To be achieved in: 2 weeks post-op  1. Independent in HEP and progression per patient tolerance without increase in symptoms.    []? Progressing: []? Met: []? Not Met: []? Adjusted  2. Patient will have a decrease in pain to <5/10 to facilitate improvement in movement of the BLE, and ADLs as indicated by Functional Deficits. []? Progressing: []? Met: []? Not Met: []? Adjusted     Long Term Goals: To be achieved in: 12 weeks post-op  1. Disability index score of 35% or less for the LEFS to assist with reaching prior level of function. []? Progressing: []? Met: []? Not Met: []? Adjusted  2. Patient will demonstrate increased knee AROM from 0 -120 allow for proper joint functioning as indicated by patients Functional Deficits. []? Progressing: []? Met: []? Not Met: []? Adjusted  3. Patient will demonstrate an increase in Strength to at least 4+/5 with R knee flexion and extension in order perform proper functional activities. []? Progressing: []? Met: []? Not Met: []? Adjusted  4. Patient will return to functional activities including walking in grocery strore without increased symptoms or restriction. []? Progressing: []? Met: []? Not Met: []? Adjusted  5. Patient will be able to walk >2 blocks without increase in pain <3/10. []? Progressing: []? Met: []? Not Met: []? Adjusted           Overall Progression Towards Functional goals/ Treatment Progress Update:  [] Patient is progressing as expected towards functional goals listed. [] Progression is slowed due to complexities/Impairments listed. [] Progression has been slowed due to co-morbidities.   [x] Plan just implemented, too soon to assess goals progression <30days   [] Goals require adjustment due to lack of progress  [] Patient is not progressing as expected and requires additional follow up with physician  [] Other    Persisting Functional Limitations/Impairments:  [x]Sleeping [x]Sitting               [x]Standing [x]Transfers        [x]Walking [x]Kneeling               [x]Stairs [x]Squatting / bending   [x]ADLs []Reaching  []Lifting  [x]Housework  [x]Driving []Job related tasks  []Sports/Recreation []Other:      ASSESSMENT:  See eval  Treatment/Activity Tolerance:  [x] Patient able to complete tx  [] Patient limited by fatigue  [] Patient limited by pain  [] Patient limited by other medical complications  [] Other:     Prognosis: [x] Good [] Fair  [] Poor    Patient Requires Follow-up: [x] Yes  [] No    Plan for next treatment session: RE-EVAL POST-OP    PLAN: See eval. PT 2-3x / week for 12 weeks. [] Continue per plan of care [] Alter current plan (see comments)  [x] Plan of care initiated [] Hold pending MD visit [] Discharge    Electronically signed by: Dee Uribe, PT, dpt, omt-c    Therapist was present, directed the patient's care, made skilled judgement, and was responsible for assessment and treatment of the patient. Edison Oliver, SPT    Note: If patient does not return for scheduled/ recommended follow up visits, this note will serve as a discharge from care along with most recent update on progress.

## 2021-06-18 NOTE — PLAN OF CARE
Dajuanjenniferjosephinevianey, [facility], [address]  Phone: [phone]   Fax: [phone]                                                       Physical Therapy Certification    Dear Referring Practitioner: Suzanne Moyer,    We had the pleasure of evaluating the following patient for physical therapy services at [address] [place] [address]. A summary of our findings can be found in the initial assessment below. This includes our plan of care. If you have any questions or concerns regarding these findings, please do not hesitate to contact me at the office phone number checked above. Thank you for the referral.       Physician Signature:_______________________________Date:__________________  By signing above (or electronic signature), therapists plan is approved by physician      Patient: Suad Earl   : 1970   MRN: [id number]  Referring Physician: Referring Practitioner: Suzanne Moyer      Evaluation Date: 2021      Medical Diagnosis Information:  Diagnosis: M17.11 (ICD-10-CM) - Primary osteoarthritis of right knee / PREHAB - sx: 21   Treatment Diagnosis: decreased RLE strength, knee pain, antalgic gait, knee instability                                         Insurance information: PT Insurance Information: [employer] [place] - $25 copay - visits PMN     Precautions/ Contra-indications: HX OF FALLS   Latex Allergy:  [x]NO      []YES  Preferred Language for Healthcare:   [x]English       []Other:    C-SSRS Triggered by Intake questionnaire (Past 2 wk assessment ):   [x] No, Questionnaire did not trigger screening.   [] Yes, Patient intake triggered C-SSRS Screening     [] Completed, no further action required. [] Completed, PCP notified via Epic    SUBJECTIVE: Patient stated complaint: Patient will be undergoing a TKA on 21.  The patient tried to get euflexxa injection but it was denied. The patient has been dealing with knee pain for several months, and reports repeated falls as her knee is weak and painful. 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 The patient lives with her , who works third shift. She has few steps to enter her home with bathroom on first floor and bedroom on 2nd. The patient reports her pain on average is a 4/10 and can increase to 10/10 at times, leaving her mostly house bound at this time. She will take trips into the community, but they are typically planned. At the store she uses cart to lean on to assist with ambulation. The patient comments difficulty with most ADLs and iADLs because of pain. The patient is very limited in participation. Her goal for therapy is to walk and exercise again (hopefully without pain). The patient has access to aquatic program at [facility], and hopes to use aquatics to prepare her for surgery, and will then continue with PT at home after surgery, then resume outpatient therapy. Relevant Medical History: OA, HTN, stare seizures, renal insufficiency  Functional Outcome: LEFS: raw score = 20; dysfunction = 75%    Pain Scale: 3-4/10  Easing factors: NWB    Provocative factors: stairs, walking far distances, squatting    Type: [x]Constant   []Intermittent  []Radiating []Localized []other:     Numbness/Tingling: n/a    Occupation/School:      Living Status/Prior Level of Function:Prior to this injury / incident, pt was and is independent with ADLs and IADLs    Patient will be at home with  after surgery,  works 3rd shift, but they are currently setting up arrangements. The patient has 1 step to enter house an a restroom on 1st floor and bedroom is the 2nd, but patient will remain on the 1st floor. The patient has fallen in the past at the grocery store and going up steps. Patient does not use ambulatory aids for ADLs, but does lean on cart when at the grocery. Patient is mainly housebound, but is up moving around and not completely sedentary. Ht: 61 inches  Weight: 265 lb    OBJECTIVE:  Functional Testing  Sx Date 7/20/21 Prehab Date 6/18/21 Post-op Re-Eval Date 8/5/21 4 week F/U date TBD 8 week F/U date TBD D/C Date TBD      TUG (sec) 8         30 second sit to stand (reps) 10       6 minute walk (m) 449.58           Balance:    Narrowed SOFIA (sec) 10       Semi Tandem (sec) 10               Tandem (sec) 10                 SLS (sec) 3            Knee AROM L R L R L R L R L R   Flexion 119 122           Extension hyper 1 0              Knee Ext: L R L R L R L R L R   MMT (of 5) 4+ 4           Knee Ext (#) 31.8 29.6              Hip Abd:  L R L R L R L R L R   MMT (of 5) 4+ 4           Hip Abd (#) 32.9 24.3              LEFS (raw) 20         Gait: (include devices/WB status): decreased stride B, lack of hip extension B; gait antalgic, compensated trendelenburg       PROM   - SEE KNEE ROM ABOVE AROM  - SEE KNEE ROM ABOVE    L R L R   Hip Flexion       Hip Abduction       Hip ER       Hip IR       Knee Flexion       Knee Extension       Dorsiflexion        Plantarflexion        Inversion        Eversion            Strength (0-5) / Myotomes Left Right   Hip Flexion - supine     Hip Flexion - seated (L1-2)     Hip Abduction     Hip Adduction     Hip ER     Hip IR     Quads (L2-4)     Hamstrings 4+ 4   Ankle Dorsiflexion (L4-5)     Ankle Plantarflexion (S1-2)     Ankle Inversion     Ankle Eversion (S1-2)     Great Toe Extension (L5)            Joint mobility: Knee    [x]Normal    []Hypo   []Hyper    Balance: See Functional Chart above                       [x] Patient history, allergies, meds reviewed. Medical chart reviewed. See intake form. Review Of Systems (ROS):  [x]Performed Review of systems (Integumentary, CardioPulmonary, Neurological) by intake and observation. Intake form has been scanned into medical record.  Patient has been instructed to contact their primary care physician regarding ROS issues if not already being addressed at this time.       Co-morbidities/Complexities (which will affect course of rehabilitation):   []None        []Hx of COVID   Arthritic conditions   []Rheumatoid arthritis (M05.9)  [x]Osteoarthritis (M19.91)  []Gout   Cardiovascular conditions   [x]Hypertension (I10)  []Hyperlipidemia (E78.5)  []Angina pectoris (I20)  []Atherosclerosis (I70)  []Pacemaker  []Hx of CABG/stent/  cardiac surgeries   Musculoskeletal conditions   []Disc pathology   []Congenital spine pathologies   []Osteoporosis (M81.8)  []Osteopenia (M85.8)  []Scoliosis       Endocrine conditions   []Hypothyroid (E03.9)  []Hyperthyroid Gastrointestinal conditions   []Constipation (W33.24)   Metabolic conditions   []Morbid obesity (E66.01)  []Diabetes type 1(E10.65) or 2 (E11.65)   []Neuropathy (G60.9)     Cardio/Pulmonary conditions   []Asthma (J45)  []Coughing   []COPD (J44.9)  []CHF  []A-fib   Psychological Disorders  []Anxiety (F41.9)  []Depression (F32.9)   []Other:   Developmental Disorders  []Autism (F84.0)  []CP (G80)  []Down Syndrome (Q90.9)  []Developmental delay     Neurological conditions  []Prior Stroke (I69.30)  []Parkinson's (G20)  []Encephalopathy (G93.40)  []MS (G35)  []Post-polio (G14)  []SCI  []TBI  []ALS Other conditions  []Fibromyalgia (M79.7)  []Vertigo  []Syncope  []Kidney Failure  []Cancer      []currently undergoing                treatment  []Pregnancy  []Incontinence   Prior surgeries  []involved limb  []previous spinal surgery  [] section birth  []hysterectomy  []bowel / bladder surgery  []other relevant surgeries   [x]Other:     Renal Insufficiency, stare seizures         Barriers to/and or personal factors that will affect rehab potential:              []Age  []Sex    []Smoker              [x]Motivation                   [x]Co-Morbidities              []Cognitive Function, education/learning barriers              []Environmental, home barriers []profession/work barriers  []past PT/medical experience  [x]other: multiple falls, BMI  Justification: minimal negative impact on potential; motivation will positively influence    Falls Risk Assessment (30 days): despite history of multiple falls (because of knee pain)  [x] Falls Risk assessed and no intervention required. Patient was educated that she will need RW after surgery (provided through hospital). [] Falls Risk assessed and Patient requires intervention due to being higher risk   TUG score (>12s at risk):     [] Falls education provided, including     ASSESSMENT: Patient is a 49 y/o female who will be undergoing a R TKA on 7/20/21. The patient currently has decreased strength on the RLE in extension and flexion, but has AROM that is WNL. The patient has antalgic ambulation, marked by decreased stride length B, hip extension in terminal stance, compensated trendelenburg gait. The patient will benefit from skilled PT following R TKA in order to regain ROM, increase strength, reduce pain, and increase tolerance to functional activities to resume PLOF. The patient was instructed that due to insurance, cost of therapy, and her available access to [employer], that she should continue with prehab exercises and utilize aquatic pool for reduced bodyweight environment to best prepare for her surgery, then she can resume dryland therapy in OP setting. The patient was agreeable to this plan.     Functional Impairments:     []Noted lumbar/proximal hip/LE joint hypomobility   []Decreased LE functional ROM   [x]Decreased core/proximal hip strength and neuromuscular control   [x]Decreased LE functional strength   []Reduced balance/proprioceptive control   []other:      Functional Activity Limitations (from functional questionnaire and intake)   [x]Reduced ability to tolerate prolonged functional positions   []Reduced ability or difficulty with changes of positions or transfers between and measures addressing any of the following: body structures and functions (impairments), activity limitations, and/or participation restrictions;:  [] a total of 1-2 or more elements   [] a total of 3 or more elements   [x] a total of 4 or more elements   [x] A clinical presentation with:  [x] stable and/or uncomplicated characteristics   [] evolving clinical presentation with changing characteristics  [] unstable and unpredictable characteristics;   [x] Clinical decision making of [x] Low, [] moderate, [] high complexity using standardized patient assessment instrument and/or measurable assessment of functional outcome. [x] EVAL (LOW) 42584 (typically 15 minutes face-to-face)  [] EVAL (MOD) 19082 (typically 30 minutes face-to-face)  [] EVAL (HIGH) 65247 (typically 45 minutes face-to-face)  [] RE-EVAL     PLAN:   Frequency/Duration:  2-3 days per week for 12 Weeks:  Interventions:  [x]  Therapeutic exercise including: strength training, ROM, for Lower extremity and core   [x]  NMR activation and proprioception for LE, Glutes and Core   [x]  Manual therapy as indicated for LE, Hip and spine to include: Dry Needling/IASTM, STM, PROM, Gr I-IV mobilizations, manipulation. [x] Modalities as needed that may include: thermal agents, E-stim, Biofeedback, US, iontophoresis as indicated  [x] Patient education on joint protection, postural re-education, activity modification, progression of HEP. HEP instruction: The following exercises were performed and added to the patient's home exercise program. (glute set, quad set, ankle pumps, heel prop, heel slide, SLR flexion). Additionally, the patient was educated on appropriate frequency, intensity and duration. GOALS:  Patient stated goal: The patient will be able to walk >500ft without increase in symptoms. [] Progressing: [] Met: [] Not Met: [] Adjusted    Therapist goals for Patient:   Short Term Goals: To be achieved in: 2 weeks post-op  1.  Independent in HEP and progression per patient tolerance without increase in symptoms. [] Progressing: [] Met: [] Not Met: [] Adjusted  2. Patient will have a decrease in pain to <5/10 to facilitate improvement in movement of the BLE, and ADLs as indicated by Functional Deficits. [] Progressing: [] Met: [] Not Met: [] Adjusted    Long Term Goals: To be achieved in: 12 weeks post-op  1. Disability index score of 35% or less for the LEFS to assist with reaching prior level of function. [] Progressing: [] Met: [] Not Met: [] Adjusted  2. Patient will demonstrate increased knee AROM from 0 -120 allow for proper joint functioning as indicated by patients Functional Deficits. [] Progressing: [] Met: [] Not Met: [] Adjusted  3. Patient will demonstrate an increase in Strength to at least 4+/5 with R knee flexion and extension in order perform proper functional activities. [] Progressing: [] Met: [] Not Met: [] Adjusted  4. Patient will return to functional activities including walking in grocery strore without increased symptoms or restriction. [] Progressing: [] Met: [] Not Met: [] Adjusted  5. Patient will be able to walk >2 blocks without increase in pain <3/10. [] Progressing: [] Met: [] Not Met: [] Adjusted     Electronically signed by:  Ting Mills, PT, DPT, OMT-C         Therapist was present, directed the patient's care, made skilled judgement, and was responsible for assessment and treatment of the patient.       Arelis Montanez, SPT

## 2021-06-18 NOTE — TELEPHONE ENCOUNTER
INPATIENT DENIED.   AUTHORIZATION IS FOR OBSERVATION      Auth: # [id number]    Date: 7/20/2021 thru 9/17/2021  Type of SX:  Observation ONLY  Location: [facility]  CPT: 38870   DX Code: M17.11  SX area: Rt knee  Insurance: [employer]

## 2021-06-23 ENCOUNTER — PREP FOR PROCEDURE (OUTPATIENT)
Dept: ORTHOPEDICS UNIT | Age: 51
End: 2021-06-23

## 2021-06-23 RX ORDER — OXYCODONE HYDROCHLORIDE 10 MG/1
10 TABLET ORAL ONCE
Status: CANCELLED | OUTPATIENT
Start: 2021-06-23 | End: 2021-06-23

## 2021-06-28 ENCOUNTER — HOSPITAL ENCOUNTER (OUTPATIENT)
Dept: PREADMISSION TESTING | Age: 51
Discharge: HOME | End: 2021-07-02
Payer: COMMERCIAL

## 2021-06-28 DIAGNOSIS — M17.0 PRIMARY OSTEOARTHRITIS OF BOTH KNEES: ICD-10-CM

## 2021-06-28 LAB
ABO/RH: NORMAL
ALBUMIN SERPL-MCNC: 4.6 G/DL (ref 3.4–5)
ANION GAP SERPL CALCULATED.3IONS-SCNC: 11 MMOL/L (ref 3–16)
ANTIBODY SCREEN: NORMAL
APTT: 36.2 SEC (ref 24.2–36.2)
BASOPHILS ABSOLUTE: 0 K/UL (ref 0–0.2)
BASOPHILS RELATIVE PERCENT: 0.5 %
BILIRUBIN URINE: NEGATIVE
BLOOD, URINE: NEGATIVE
BUN BLDV-MCNC: 13 MG/DL (ref 7–20)
CALCIUM SERPL-MCNC: 9.4 MG/DL (ref 8.3–10.6)
CHLORIDE BLD-SCNC: 103 MMOL/L (ref 99–110)
CLARITY: CLEAR
CO2: 26 MMOL/L (ref 21–32)
COLOR: YELLOW
CREAT SERPL-MCNC: 1.1 MG/DL (ref 0.6–1.1)
EKG ATRIAL RATE: 49 BPM
EKG DIAGNOSIS: NORMAL
EKG P AXIS: 57 DEGREES
EKG P-R INTERVAL: 160 MS
EKG Q-T INTERVAL: 476 MS
EKG QRS DURATION: 100 MS
EKG QTC CALCULATION (BAZETT): 429 MS
EKG R AXIS: 55 DEGREES
EKG T AXIS: 30 DEGREES
EKG VENTRICULAR RATE: 49 BPM
EOSINOPHILS ABSOLUTE: 0.1 K/UL (ref 0–0.6)
EOSINOPHILS RELATIVE PERCENT: 2.5 %
GFR AFRICAN AMERICAN: >60
GFR NON-AFRICAN AMERICAN: 52
GLUCOSE BLD-MCNC: 76 MG/DL (ref 70–99)
GLUCOSE URINE: NEGATIVE MG/DL
HCT VFR BLD CALC: 43.6 % (ref 36–48)
HEMOGLOBIN: 15 G/DL (ref 12–16)
INR BLD: 1.08 (ref 0.86–1.14)
KETONES, URINE: NEGATIVE MG/DL
LEUKOCYTE ESTERASE, URINE: NEGATIVE
LYMPHOCYTES ABSOLUTE: 1.7 K/UL (ref 1–5.1)
LYMPHOCYTES RELATIVE PERCENT: 54.3 %
MCH RBC QN AUTO: 26.5 PG (ref 26–34)
MCHC RBC AUTO-ENTMCNC: 34.4 G/DL (ref 31–36)
MCV RBC AUTO: 77 FL (ref 80–100)
MICROSCOPIC EXAMINATION: NORMAL
MONOCYTES ABSOLUTE: 0.2 K/UL (ref 0–1.3)
MONOCYTES RELATIVE PERCENT: 6.4 %
MRSA SCREEN RT-PCR: NORMAL
NEUTROPHILS ABSOLUTE: 1.1 K/UL (ref 1.7–7.7)
NEUTROPHILS RELATIVE PERCENT: 36.3 %
NITRITE, URINE: NEGATIVE
PDW BLD-RTO: 15 % (ref 12.4–15.4)
PH UA: 7 (ref 5–8)
PLATELET # BLD: 195 K/UL (ref 135–450)
PMV BLD AUTO: 8.3 FL (ref 5–10.5)
POTASSIUM SERPL-SCNC: 4 MMOL/L (ref 3.5–5.1)
PREALBUMIN: 32.7 MG/DL (ref 20–40)
PROTEIN UA: NEGATIVE MG/DL
PROTHROMBIN TIME: 12.5 SEC (ref 10–13.2)
RBC # BLD: 5.66 M/UL (ref 4–5.2)
SEDIMENTATION RATE, ERYTHROCYTE: 0 MM/HR (ref 0–30)
SODIUM BLD-SCNC: 140 MMOL/L (ref 136–145)
SPECIFIC GRAVITY UA: 1.02 (ref 1–1.03)
URINE REFLEX TO CULTURE: NORMAL
URINE TYPE: NORMAL
UROBILINOGEN, URINE: 0.2 E.U./DL
VITAMIN D 25-HYDROXY: 32.4 NG/ML
WBC # BLD: 3.1 K/UL (ref 4–11)

## 2021-06-28 PROCEDURE — 80048 BASIC METABOLIC PNL TOTAL CA: CPT

## 2021-06-28 PROCEDURE — 84134 ASSAY OF PREALBUMIN: CPT

## 2021-06-28 PROCEDURE — 87641 MR-STAPH DNA AMP PROBE: CPT

## 2021-06-28 PROCEDURE — 86900 BLOOD TYPING SEROLOGIC ABO: CPT

## 2021-06-28 PROCEDURE — 81003 URINALYSIS AUTO W/O SCOPE: CPT

## 2021-06-28 PROCEDURE — 85025 COMPLETE CBC W/AUTO DIFF WBC: CPT

## 2021-06-28 PROCEDURE — 86901 BLOOD TYPING SEROLOGIC RH(D): CPT

## 2021-06-28 PROCEDURE — 85610 PROTHROMBIN TIME: CPT

## 2021-06-28 PROCEDURE — 93005 ELECTROCARDIOGRAM TRACING: CPT

## 2021-06-28 PROCEDURE — 82040 ASSAY OF SERUM ALBUMIN: CPT

## 2021-06-28 PROCEDURE — 85730 THROMBOPLASTIN TIME PARTIAL: CPT

## 2021-06-28 PROCEDURE — 86850 RBC ANTIBODY SCREEN: CPT

## 2021-06-28 PROCEDURE — 93010 ELECTROCARDIOGRAM REPORT: CPT | Performed by: INTERNAL MEDICINE

## 2021-06-28 PROCEDURE — 85652 RBC SED RATE AUTOMATED: CPT

## 2021-06-28 PROCEDURE — 82306 VITAMIN D 25 HYDROXY: CPT

## 2021-06-28 PROCEDURE — 83036 HEMOGLOBIN GLYCOSYLATED A1C: CPT

## 2021-06-28 NOTE — PROGRESS NOTES
Patient attended JET class on 6/28/21. Patient verified surgery for Total knee replacement and received patient information and educational JET folder including education handouts on covid-19 restrictions, ERAS, hand hygiene and preventing constipation. Interviews completed by PT, OT,  and PAT. Labs and Tests completed as ordered/necessary after jet presentation. Patient given handout instructions on Pre-operative Showering techniques and the use of anti-septic 3 days before surgery. Anti-septic bottle given to patient to take home. Patient states no further questions or concerns. Patient provided orthopedic office and nurse navigator contact information. Patient provided with home health care agency list and skilled nursing facility list.    DOS: 7/20/21  Dr Pratima Berger: home with  citlali and mother tristen and [facility] ;if applicable. Per Patient Will see/Saw PCP on 6/29/21.    Electronically signed by Sena Baird RN on 6/28/2021 at 2:40 PM

## 2021-06-29 ENCOUNTER — OFFICE VISIT (OUTPATIENT)
Dept: FAMILY MEDICINE CLINIC | Age: 51
End: 2021-06-29
Payer: COMMERCIAL

## 2021-06-29 VITALS
TEMPERATURE: 97.9 F | BODY MASS INDEX: 52.67 KG/M2 | HEART RATE: 52 BPM | RESPIRATION RATE: 17 BRPM | OXYGEN SATURATION: 98 % | SYSTOLIC BLOOD PRESSURE: 130 MMHG | WEIGHT: 279 LBS | DIASTOLIC BLOOD PRESSURE: 84 MMHG | HEIGHT: 61 IN

## 2021-06-29 DIAGNOSIS — Z01.818 PRE-OP EXAM: Primary | ICD-10-CM

## 2021-06-29 LAB
ESTIMATED AVERAGE GLUCOSE: 116.9 MG/DL
HBA1C MFR BLD: 5.7 %

## 2021-06-29 PROCEDURE — 99243 OFF/OP CNSLTJ NEW/EST LOW 30: CPT | Performed by: FAMILY MEDICINE

## 2021-06-29 SDOH — ECONOMIC STABILITY: FOOD INSECURITY: WITHIN THE PAST 12 MONTHS, THE FOOD YOU BOUGHT JUST DIDN'T LAST AND YOU DIDN'T HAVE MONEY TO GET MORE.: NEVER TRUE

## 2021-06-29 SDOH — ECONOMIC STABILITY: FOOD INSECURITY: WITHIN THE PAST 12 MONTHS, YOU WORRIED THAT YOUR FOOD WOULD RUN OUT BEFORE YOU GOT MONEY TO BUY MORE.: NEVER TRUE

## 2021-06-29 ASSESSMENT — SOCIAL DETERMINANTS OF HEALTH (SDOH): HOW HARD IS IT FOR YOU TO PAY FOR THE VERY BASICS LIKE FOOD, HOUSING, MEDICAL CARE, AND HEATING?: NOT HARD AT ALL

## 2021-06-29 NOTE — PROGRESS NOTES
[address] Note    Date: 6/29/2021                                               Subjective/Objective:   No chief complaint on file. HPI   Patient is here for preop exam.  Is having knee replacement surgery    Patient walk a distance without chest pain or shortness of breath. Is on limited by her knee pain. Climb stairs do moderate housework. Denies any personal or family history of blood clots or reactions anesthesia.          Patient Active Problem List    Diagnosis Date Noted    Hypertension, essential     Morbid obesity with BMI of 45.0-49.9, adult ([address]     Anemia in chronic renal disease 01/09/2020    Proteinuria 01/09/2020    Chronic pain of right knee 03/26/2019    Candidiasis of finger web 04/04/2018    Hand dermatitis 03/19/2018    Infected abrasion of left hand 03/15/2018    Primary localized osteoarthrosis of the knee 10/04/2016    Tear of PCL (posterior cruciate ligament) of knee 10/04/2016    Contusion of right knee 06/29/2016    Family history of cancer 06/21/2013    Renal insufficiency 06/13/2012    Vitamin D deficiency 06/13/2012    Carpal tunnel syndrome     Hypertension     Obesity     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[address] 04/19/2007    Anemia, iron deficiency 04/19/2007    Chronic kidney disease, stage 3, mod decreased GFR ([address] 04/19/2007    Hypertension, essential, benign 04/19/2007    Overweight and obesity 04/19/2007    Renal osteodystrophy 04/19/2007       Past Medical History:   Diagnosis Date    Arthritis     Carpal tunnel syndrome     Convulsions     Dermatophytosis     Diarrhea     Hypertension     Hypertension, essential     Hypertensive kidney disease with chronic kidney disease stage V ([address]     Medulloadrenal hyperfunc     Meningococcal encephalitis     Morbid obesity with BMI of 45.0-49.9, adult (HCC)     Obesity     Other malaise and fatigue        Current Outpatient Medications   Medication Sig Dispense Refill    levETIRAcetam (KEPPRA) 500 MG tablet TAKE 1 TABLET TWICE DAILY 180 tablet 3    amLODIPine (NORVASC) 10 MG tablet Take 1 tablet by mouth daily 90 tablet 1    atenolol (TENORMIN) 50 MG tablet Take 1 tablet by mouth daily 90 tablet 1    diclofenac sodium (VOLTAREN) 1 % GEL Apply 4 g topically 2 times daily      GLUCOSAMINE-CHONDROITIN PO Take by mouth      meclizine (ANTIVERT) 25 MG tablet Take 25 mg by mouth 2 times daily       No current facility-administered medications for this visit. Allergies   Allergen Reactions    Ibuprofen       Patient has renal insuffiencey.          Nsaids       Patient has renal insuffiencey.             Review of Systems   No fever, no cough, no vomiting, no dysuria, no headache, no seizure, no ankle swelling, no rash, no bruise, no LAD      Vitals:  /84   Pulse 52   Temp 97.9 °F (36.6 °C)   Resp 17   Ht 5' 1\" (1.549 m)   Wt 279 lb (126.6 kg)   SpO2 98%   BMI 52.72 kg/m²     Physical Exam   General:  Well-appearing, NAD, alert, non-toxic  HEENT:  Normocephalic, atraumatic. Pupils equal and round. TMs pearly with good landmarks. Moist mucous membranes. Normal dentition  NECK:  Supple, normal range of motion, no LAD, no meningeal signs, no JVD, nontender  CHEST/LUNGS: CTAB, no crackles, no wheeze, no rhonchi. Symmetric rise  CARDIOVASCULAR: RRR,  no murmur, no rub  ABDOMEN: Soft, non-tender, non-distended. No masses  EXTREMETIES: Normal movement of all extremities. No edema. No joint swelling. SKIN:  No rash, no cellulitis, no bruising, no petechiae/purpura/vesicles/pustules/abscess  PSYCH:  A+O x 3; normal affect  NEURO:  GCS 15, CN2-12 grossly intact, no focal motor/sensory deficits, no cerebellar deficits, normal gait, normal speech      Assessment/Plan     35-year-old female here for preop exam.  Vital signs are stable. No sign of active infection. No sign of heart failure. She is cleared for the procedure.   Patient advised to take her blood pressure medicines as prescribed. Discharged in stable condition at 3:16 PM.    1. Pre-op exam         No orders of the defined types were placed in this encounter. No follow-ups on file.     Elana Canas MD, MD    6/29/2021  3:12 PM

## 2021-07-01 NOTE — PROGRESS NOTES
Ortho vitals risk assessment details sent to Dr Jannette Babcock and John Israel MA.  Electronically signed by Juana Michelle RN on 7/1/2021 at 1:56 PM

## 2021-07-12 ENCOUNTER — OFFICE VISIT (OUTPATIENT)
Dept: ORTHOPEDIC SURGERY | Age: 51
End: 2021-07-12
Payer: COMMERCIAL

## 2021-07-12 VITALS — BODY MASS INDEX: 52.67 KG/M2 | WEIGHT: 279 LBS | RESPIRATION RATE: 18 BRPM | HEIGHT: 61 IN

## 2021-07-12 DIAGNOSIS — M17.11 PRIMARY OSTEOARTHRITIS OF RIGHT KNEE: Primary | ICD-10-CM

## 2021-07-12 PROCEDURE — 99214 OFFICE O/P EST MOD 30 MIN: CPT | Performed by: ORTHOPAEDIC SURGERY

## 2021-07-12 NOTE — PROGRESS NOTES
[facility]  [facility]    Chief Complaint: Bilateral knee pain    HPI:  Jim Szymanski is a 46 y.o. who is here in follow-up of bilateral knee pain. She has had worsening knee pain for least 4 years. Her right knee has been problematic knee until recently. She does have a history of 2 arthroscopic right knee procedures last one done in February 2020. She has also had steroid injections and viscosupplementation in the right knee. She also wears an  brace for her right knee. Pain is currently 1/10 on the right. She does report 10/10 pain on the left knee. Pain occurs on a daily basis and is worse with any type of activity. She has had bilateral knee injections in the recent past that were not helpful. We tried to do viscosupplementation but it was denied by insurance. She is not able to take NSAIDs due to chronic kidney disease. She occasionally takes Tylenol and also uses Voltaren gel. She denies any hip or groin pain or back pain. Patient Active Problem List   Diagnosis    Carpal tunnel syndrome    Hypertension    Obesity    Renal insufficiency    Vitamin D deficiency    Contusion of right knee    Infected abrasion of left hand    Hand dermatitis    Candidiasis of finger web    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[facility]    Anemia in chronic renal disease    Anemia, iron deficiency    Chronic kidney disease, stage 3, mod decreased GFR (HCC)    Chronic pain of right knee    Family history of cancer    Hypertension, essential, benign    Overweight and obesity    Primary localized osteoarthrosis of the knee    Proteinuria    Renal osteodystrophy    Tear of PCL (posterior cruciate ligament) of knee    Hypertension, essential    Morbid obesity with BMI of 45.0-49.9, adult ([facility])       ROS:  Constitutional: denies fever, chills, weight loss  MSK: denies pain in other joints, muscle aches  Neurological: denies numbness, tingling, weakness    Exam:  Resp.  rate 18, height 5' 1\" (1.549 m), weight 279 lb (126.6 kg). Appearance: sitting in exam room chair, appears to be in no acute distress, awake and alert  Resp: unlabored breathing on room air  Skin: warm, dry and intact with out erythema or significant increased temperature  Neuro: grossly intact both lower extremities. Intact sensation to light touch. Motor exam 4+ to 5/5 in all major motor groups. Right knee: Examination reveals that knee range of motion is 0 to 120 degrees. There is varus deformity, positive crepitus, positive joint line tenderness, positive antalgic gait. Neurologically, plantar flexion and dorsiflexion is intact. Pulses palpable distally. 5/5 strength. Left knee: Examination reveals that knee range of motion is 0 to 120 degrees. There is varus deformity, positive crepitus, positive joint line tenderness, positive antalgic gait. Neurologically, plantar flexion and dorsiflexion is intact. Pulses palpable distally. 5/5 strength. Imaging:  Prior bilateral knee radiographs were reviewed. Both knees are significant for tricompartmental degenerative changes with near bone-on-bone osteoarthritis of the medial compartment. Assessment:  Bilateral knee osteoarthritis,    Plan:  We discussed the diagnosis and treatment options. She is unable to take NSAIDs. She did not have much relief with bilateral knee steroid injections. Viscosupplementation was denied by her insurance provider. We discussed right total knee arthroplasty. The operative procedure, alternatives, and risks were discussed in detail with the patient. The risks include but are not limited to: Infection, vessel injury, nerve injury, DVT, pulmonary embolism, implant loosening, need for revision surgery, loss of motion, continued pain. Despite these risks the patient would like to proceed. All questions have been answered and no guarantees have been made.   The patient is unable to do further physical therapy due to disabling pain.  I discussed with the patient the diagnosis in detail and answered all the questions. The patient verbalized understanding of the plan as it has been described above and is in agreement. Plan for right total knee arthroplasty. We did discuss her BMI of 52. We discussed that she is at increased risk of complications including infection with her BMI over 40. She accepts these risks and wishes to proceed. Total time spent on today's encounter was at least 32 minutes. This time included reviewing prior notes, radiographs, and lab results when available, reviewing history obtained by medical assistant, performing history and physical exam, reviewing tests/radiographs with the patient, counseling the patient, ordering medications or tests, documentation in the electronic health record, and coordination of care. This dictation was done with Dragon dictation and may contain mechanical errors related to translation.

## 2021-07-13 NOTE — CARE COORDINATION
DATE OF JET PHONE INTERVIEW: 7-13-21      HISTORY OF JOINT REPLACEMENT(S):  No      DC TRANSPORTATION:  Galindo Landa  Ph:  [phone]      STEPS INTO HOME:  2 steps    STEPS TO BATHROOM/BEDROOM:  Will stay on first floor      DME NEEDS:      [address]?  Nury Trujillo and Mother Latoya Dominguez will be home. LENGTH OF STAY HAS BEEN DISCUSSED WITH THE PT, APPROPRIATE TO HIS/ HER PROCEDURE. PT HAS BEEN INFORMED THAT THEY WILL BE DISCHARGED WHEN THE PHYSICIAN DEEMS THEM MEDICALLY READY. MOST PATIENTS CAN EXPECT  TO BE IN THE HOSPITAL ONE NIGHT AS AN OBSERVATION ONLY, OR 1-2 DAYS AS AN ADMISSION FOR THOSE WITH MEDICAL HEALTH  ISSUES/COMPLICATIONS. CHOICES FOR HHC, DME VENDORS AND SKILLED/ REHAB FACILITIES PROVIDED TO PT DURING THIS INTERVIEW. [facility]  [facility]    SNF/REHAB CHOICES (S): n/a      MEDS TO BEDS FROM [employer] RETAIL:  Yes      Contact information for case management provided to pt. Will follow with therapies and social service. Brigid Stiles, .  Administrative Assist, Case Management  [phone]  Electronically signed by Brigid Stiles on 7/13/2021 at 12:05 PM

## 2021-07-13 NOTE — PROGRESS NOTES
Notification sent to dr Solitario Hung and Jill Prabhakar regarding bmi.     Electronically signed by Ramesh Pruitt RN on 7/13/2021 at 4:14 PM

## 2021-07-13 NOTE — DISCHARGE INSTR - COC
[facility]) Continuity of Care Form    Patient Name:  Dom Douglass  : 1970    MRN:  [id number]    Admit date:  (Not on file)  Discharge date:  2021    Code Status Order: full  Advance Directives: No    Admitting Physician: Tammy Culver MD  PCP: Saint Provost, MD    Discharging Nurse: [facility] Unit/Room#: [address] Unit Phone Number: [phone]    Emergency Contact:        Past Surgical History:  Past Surgical History:   Procedure Laterality Date    APPENDECTOMY      CARPAL TUNNEL RELEASE Bilateral 2005     SECTION      ENDOMETRIAL ABLATION      OVARY REMOVAL      TONSILLECTOMY         Immunization History:   Immunization History   Administered Date(s) Administered    Influenza, Quadv, IM, PF (6 mo and older Fluzone, Flulaval, Fluarix, and 3 yrs and older Afluria) 2019    MMR 2011    Tdap (Boostrix, Adacel) 2011, 2013       Active Problems:  Active Problems:    * No active hospital problems. *  Resolved Problems:    * No resolved hospital problems. *      Isolation/Infection:       Nurse Assessment:  Last Vital Signs: There were no vitals taken for this visit. Last documented pain score (0-10 scale):    Last Weight:   Wt Readings from Last 1 Encounters:   21 279 lb (126.6 kg)     Mental Status:  oriented and alert     IV Access:  - None    Nursing Mobility/ADLs:  Walking   Assisted  Transfer  Assisted  Bathing  Assisted  Dressing  Assisted  Toileting  [facility]   whole    Wound Care Documentation and Therapy:  Keep glued Prineo dressing clean and intact. DO NOT remove. Prineo is waterproof for showering. Doctor will evaluated dressing for removal at office visit 2 weeks after surgery        Elimination:  Urinary Catheter: None   Colostomy/Ileostomy: No  Continence:   · Bowel:  Yes  · Bladder: Yes  Date of Last BM: 2021    Intake/Output Summary (Last 24 hours)   No intake or output data in the 24 hours ending 07/13/21 6015  Safety Concerns: At Risk for Falls    Impairments/Disabilities:      None    Nutrition Therapy:  Current Nutrition Therapy: general  Routes of Feeding: Oral  Liquids: No Restrictions  Daily Fluid Restriction: no  Last Modified Barium Swallow with Video (Video Swallowing Test): not done    Treatments at the Time of Hospital Discharge:   Respiratory Treatments:   Oxygen Therapy:  is not on home oxygen therapy. Ventilator:    - No ventilator support    Lab orders for discharge:        Rehab Therapies: Physical Therapy, Occupational Therapy and nursing care  Weight Bearing Status/Restrictions: No weight bearing restirctions  Other Medical Equipment (for information only, NOT a DME order):  Rolling walker  Other Treatments: Eliquis bid for 14 total days after discharge from hospital for DVT prophylaxis , bilateral SYDNIE hose for 2 weeks after surgery    Patient's personal belongings (please select all that are sent with patient):  None    RN SIGNATURE:  Electronically signed by Darrius Glass RN on 7/20/21 at 3:23 PM EDT    PHYSICIAN SECTION    Prognosis: Good    Condition at Discharge: Stable    Rehab Potential (if transferring to Rehab): Good    Physician Certification: I certify the above orders, information, and transfer of Jenna Mijares is necessary for the continuing treatment of the diagnosis listed and that he requires [address] for less 30 days.      Update Admission H&P: No change in H&P    PHYSICIAN SIGNATURE:  Electronically signed by YARIEL Kirkland CNP on 7/20/21 at 4:14 PM EDT/ Dr Robi Conroy Status Date: 7/20/21    Olga Readmission Risk Assessment Score:    Discharging to Facility/ Agency   · Name:  [facility]    · Address: [address] [place] [address]., [address] [place] [address], [place] [address]  · Phone: [phone]  · Fax: [phone]     /Social Worker signature:Electronically signed by Dee Eldridge on 7/21/2021 at 9:50 AM           Followup with Dr Noe Flores 2 weeks after surgery in office   [address] [place] [address], [address], [address] [place] [address],   [address] [place] [address]  Activity:   Elevate your leg if swelling occurs in your ankle. Use elastic wraps/hose until swelling decreases.  Continue the exercise program as prescribed by physical therapists.  Take frequent walks.  Use walker, crutches, or cane with weight bearing instructions as indicated by the physical therapists.  Take rest periods often. Elevate leg during rest period. Wound Care:   Cover the wound with a sterile gauze dressing and change daily as long as there is drainage.  Do not scrub wound. Pat it dry with a soft towel.  Dont apply any lotions or creams to your wound.  Check the incision every day for redness, swelling, or increase in drainage. Diet:   You can resume your normal diet. There are no limits on your diet due to your surgery.  Pain pills and activity changes may lead to constipation. To prevent this, use prune juice or bran cereals liberally. You may need to use a laxative such as Dulcolax, Senokot, or Milk of Magnesia.  Drink plenty of fluids. Medications:   Take pain pills if needed to maintain comfort.  Never drive while taking pain medicine.  Avoid over the counter medications until checking with your doctor.  Resume previous medications as instructed by your doctor. You will be on aspirin or Eliquis  for 14 days only. Stay off other anti-inflammatory medications (except Celebrex)  Call Your Doctor If:  Melisa Grimaldo You have increased pain not controlled by medications.  Excessive swelling in your ankle.  You develop numbness, tingling, or decreased movement.  You have a fever greater than 100 degrees for a day or over 101 degrees at any one time.    Your wound becomes more reddened, starts draining, or opens.  If you fall. You have any questions about your recovery. Inform your family doctor/dentist or any other doctor who cares for you in the future that you have a joint replacement. You may need antibiotics for dental or surgical procedures if there is any evidence of infection present. ? If you have required the use of insulin to control your blood sugar after surgery, follow up with your family doctor. ? Call your surgeons office to schedule your appointment to be seen after surgery. ? Make your appointment to continue physical therapy per doctors orders. ?  Smoking cessation assistance can be obtained from your family doctor or by calling [place] @ [phone]    _______________________________   _____   _______________________  ____                Patient Signature              Date      Witness                               Date

## 2021-07-15 ENCOUNTER — TELEPHONE (OUTPATIENT)
Dept: ORTHOPEDIC SURGERY | Age: 51
End: 2021-07-15

## 2021-07-15 DIAGNOSIS — M17.11 PRIMARY OSTEOARTHRITIS OF RIGHT KNEE: Primary | ICD-10-CM

## 2021-07-15 RX ORDER — OXYCODONE HYDROCHLORIDE 5 MG/1
5-10 TABLET ORAL EVERY 6 HOURS PRN
Qty: 40 TABLET | Refills: 0 | Status: ON HOLD | OUTPATIENT
Start: 2021-07-15 | End: 2021-07-21

## 2021-07-15 NOTE — TELEPHONE ENCOUNTER
/ In the next few weeks you may receive a Press Ganey survey regarding your most recent clinic visit with us.  Please take a few moments to accurately evaluate your visit.  We strive for excellence and value your feedback.       If we need to contact you regarding any test results, we will make 2 attempts to reach you at the number you have listed during your office visit today.  If we are unable to reach you, a letter with your results and any further instructions will be mailed to you home.    Please do not hesitate to call our office with any questions or concerns at Dept: [phone].

## 2021-07-19 ENCOUNTER — ANESTHESIA EVENT (OUTPATIENT)
Dept: OPERATING ROOM | Age: 51
End: 2021-07-19
Payer: COMMERCIAL

## 2021-07-19 NOTE — PROGRESS NOTES
C-Difficile admission screening and protocol:     * Admitted with diarrhea? n     *Prior history of C-Diff. In last 3 months?n     *Antibiotic use in the past 6-8 weeks?n     *Prior hospitalization or nursing home in the last month?n      [facility]. .call before you fall  [address] time________725        Surgery time____________    Take the following medications with a sip of water:    Do not eat or drink anything after 12:00 midnight prior to your surgery. This includes water chewing gum, mints and ice chips. You may brush your teeth and gargle the morning of your surgery, but do not swallow the water     Please see your family doctor/pediatrician for a history and physical and/or concerning medications. Bring any test results/reports from your physicians office. If you are under the care of a heart doctor or specialist doctor, please be aware that you may be asked to them for clearance    You may be asked to stop blood thinners such as Coumadin, Plavix, Fragmin, Lovenox, etc., or any anti-inflammatories such as:  Aspirin, Ibuprofen, Advil, Naproxen prior to your surgery. We also ask that you stop any OTC medications such as fish oil, vitamin E, glucosamine, garlic, Multivitamins, COQ 10, etc.    We ask that you do not smoke 24 hours prior to surgery  We ask that you do not  drink any alcoholic beverages 24 hours prior to surgery     You must make arrangements for a responsible adult to take you home after your surgery. For your safety you will not be allowed to leave alone or drive yourself home. Your surgery will be cancelled if you do not have a ride home. Also for your safety, it is strongly suggested that someone stay with you the first 24 hours after your surgery. A parent or legal guardian must accompany a child scheduled for surgery and plan to stay at the hospital until the child is discharged.     Please do not bring other children with you. For your comfort, please wear simple loose fitting clothing to the hospital.  Please do not bring valuables. Do not wear any make-up or nail polish on your fingers or toes      For your safety, please do not wear any jewelry or body piercing's on the day of surgery. All jewelry must be removed. If you have dentures, they will be removed before going to operating room. For your convenience, we will provide you with a container. If you wear contact lenses or glasses, they will be removed, please bring a case for them. If you have a living will and a durable power of  for healthcare, please bring in a copy. As part of our patient safety program to minimize surgical site infections, we ask you to do the following:    · Please notify your surgeon if you develop any illness between         now and the  day of your surgery. · This includes a cough, cold, fever, sore throat, nausea,         or vomiting, and diarrhea, etc.  ·  Please notify your surgeon if you experience dizziness, shortness         of breath or blurred vision between now and the time of your surgery. Do not shave your operative site 96 hours prior to surgery. For face and neck surgery, men may use an electric razor 48 hours   prior to surgery. You may shower the night before surgery or the morning of   your surgery with an antibacterial soap. You will need to bring a photo ID and insurance card    [facility] has an onsite pharmacy, would you like to utilize our pharmacy     If you will be staying overnight and use a C-pap machine, please bring   your C-pap to hospital     Our goal is to provide you with excellent care, therefore, visitors will be limited to two(2) in the room at a time so that we may focus on providing this care for you. Please contact pre-admission testing if you have any further questions.                  [facility] phone number:  [phone] PAT fax number: [phone]  Please note these are generalized instructions for all surgical cases, you may be provided with more specific instructions according to your surgery. Preoperative Screening for Elective Surgery/Invasive Procedures While COVID-19 present in the community     Have you tested positive or have been told to self-isolate for COVID-19 like symptoms within the past 28 days? n   Do you currently have any of the following symptoms? n  o Fever >100.0 F or 99.9 F in immunocompromised patients?n  o New onset cough, shortness of breath or difficulty breathing?n  o New onset sore throat, myalgia (muscle aches and pains), headache, loss of taste/smell or diarrhea?n   Have you had a potential exposure to COVID-19 within the past 14 days by:  o Close contact with a confirmed case?n  o Close contact with a healthcare worker,  or essential infrastructure worker (grocery store, [employer], gas station, public utilities or transportation)? y  o Do you reside in a congregate setting such as; skilled nursing facility, adult home, correctional facility, homeless shelter or other institutional setting?n  o Have you had recent travel to a known COVID-19 hotspot?n    Indicate if the patient has a positive screen by answering yes to one or more of the above questions. Patients who test positive or screen positive prior to surgery or on the day of surgery should be evaluated in conjunction with the surgeon/proceduralist/anesthesiologist to determine the urgency of the procedure.

## 2021-07-20 ENCOUNTER — ANESTHESIA (OUTPATIENT)
Dept: OPERATING ROOM | Age: 51
End: 2021-07-20
Payer: COMMERCIAL

## 2021-07-20 ENCOUNTER — HOSPITAL ENCOUNTER (OUTPATIENT)
Age: 51
Setting detail: OBSERVATION
Discharge: HOME | End: 2021-07-21
Attending: ORTHOPAEDIC SURGERY | Admitting: ORTHOPAEDIC SURGERY
Payer: COMMERCIAL

## 2021-07-20 ENCOUNTER — TELEPHONE (OUTPATIENT)
Dept: ORTHOPEDIC SURGERY | Age: 51
End: 2021-07-20

## 2021-07-20 ENCOUNTER — APPOINTMENT (OUTPATIENT)
Dept: GENERAL RADIOLOGY | Age: 51
End: 2021-07-20
Attending: ORTHOPAEDIC SURGERY
Payer: COMMERCIAL

## 2021-07-20 VITALS
DIASTOLIC BLOOD PRESSURE: 53 MMHG | SYSTOLIC BLOOD PRESSURE: 89 MMHG | RESPIRATION RATE: 2 BRPM | TEMPERATURE: 96.6 F | OXYGEN SATURATION: 94 %

## 2021-07-20 DIAGNOSIS — M17.11 PRIMARY OSTEOARTHRITIS OF RIGHT KNEE: ICD-10-CM

## 2021-07-20 DIAGNOSIS — M17.11 ARTHRITIS OF RIGHT KNEE: ICD-10-CM

## 2021-07-20 LAB
ABO/RH: NORMAL
ANTIBODY SCREEN: NORMAL
GLUCOSE BLD-MCNC: 167 MG/DL (ref 70–99)
GLUCOSE BLD-MCNC: 92 MG/DL (ref 70–99)
PERFORMED ON: ABNORMAL
PERFORMED ON: NORMAL
PREGNANCY, URINE: NEGATIVE
SARS-COV-2, NAAT: NOT DETECTED

## 2021-07-20 PROCEDURE — 96365 THER/PROPH/DIAG IV INF INIT: CPT

## 2021-07-20 PROCEDURE — 94150 VITAL CAPACITY TEST: CPT

## 2021-07-20 PROCEDURE — 2700000000 HC OXYGEN THERAPY PER DAY

## 2021-07-20 PROCEDURE — 27447 TOTAL KNEE ARTHROPLASTY: CPT | Performed by: PHYSICIAN ASSISTANT

## 2021-07-20 PROCEDURE — 2580000003 HC RX 258: Performed by: ANESTHESIOLOGY

## 2021-07-20 PROCEDURE — 7100000001 HC PACU RECOVERY - ADDTL 15 MIN: Performed by: ORTHOPAEDIC SURGERY

## 2021-07-20 PROCEDURE — 6360000002 HC RX W HCPCS: Performed by: ANESTHESIOLOGY

## 2021-07-20 PROCEDURE — 3600000005 HC SURGERY LEVEL 5 BASE: Performed by: ORTHOPAEDIC SURGERY

## 2021-07-20 PROCEDURE — 94761 N-INVAS EAR/PLS OXIMETRY MLT: CPT

## 2021-07-20 PROCEDURE — 96366 THER/PROPH/DIAG IV INF ADDON: CPT

## 2021-07-20 PROCEDURE — G0378 HOSPITAL OBSERVATION PER HR: HCPCS

## 2021-07-20 PROCEDURE — 6370000000 HC RX 637 (ALT 250 FOR IP): Performed by: ORTHOPAEDIC SURGERY

## 2021-07-20 PROCEDURE — 64447 NJX AA&/STRD FEMORAL NRV IMG: CPT | Performed by: ANESTHESIOLOGY

## 2021-07-20 PROCEDURE — 3700000001 HC ADD 15 MINUTES (ANESTHESIA): Performed by: ORTHOPAEDIC SURGERY

## 2021-07-20 PROCEDURE — 86901 BLOOD TYPING SEROLOGIC RH(D): CPT

## 2021-07-20 PROCEDURE — 6370000000 HC RX 637 (ALT 250 FOR IP)

## 2021-07-20 PROCEDURE — 2500000003 HC RX 250 WO HCPCS

## 2021-07-20 PROCEDURE — 20985 CPTR-ASST DIR MS PX: CPT | Performed by: ORTHOPAEDIC SURGERY

## 2021-07-20 PROCEDURE — 86900 BLOOD TYPING SEROLOGIC ABO: CPT

## 2021-07-20 PROCEDURE — 84703 CHORIONIC GONADOTROPIN ASSAY: CPT

## 2021-07-20 PROCEDURE — 88311 DECALCIFY TISSUE: CPT

## 2021-07-20 PROCEDURE — 6360000002 HC RX W HCPCS: Performed by: ORTHOPAEDIC SURGERY

## 2021-07-20 PROCEDURE — 97162 PT EVAL MOD COMPLEX 30 MIN: CPT

## 2021-07-20 PROCEDURE — C1713 ANCHOR/SCREW BN/BN,TIS/BN: HCPCS | Performed by: ORTHOPAEDIC SURGERY

## 2021-07-20 PROCEDURE — 97530 THERAPEUTIC ACTIVITIES: CPT

## 2021-07-20 PROCEDURE — 6360000002 HC RX W HCPCS

## 2021-07-20 PROCEDURE — 2720000010 HC SURG SUPPLY STERILE: Performed by: ORTHOPAEDIC SURGERY

## 2021-07-20 PROCEDURE — C1776 JOINT DEVICE (IMPLANTABLE): HCPCS | Performed by: ORTHOPAEDIC SURGERY

## 2021-07-20 PROCEDURE — 3700000000 HC ANESTHESIA ATTENDED CARE: Performed by: ORTHOPAEDIC SURGERY

## 2021-07-20 PROCEDURE — 87635 SARS-COV-2 COVID-19 AMP PRB: CPT

## 2021-07-20 PROCEDURE — 97166 OT EVAL MOD COMPLEX 45 MIN: CPT

## 2021-07-20 PROCEDURE — 3600000015 HC SURGERY LEVEL 5 ADDTL 15MIN: Performed by: ORTHOPAEDIC SURGERY

## 2021-07-20 PROCEDURE — 27447 TOTAL KNEE ARTHROPLASTY: CPT | Performed by: ORTHOPAEDIC SURGERY

## 2021-07-20 PROCEDURE — 2580000003 HC RX 258: Performed by: ORTHOPAEDIC SURGERY

## 2021-07-20 PROCEDURE — C9290 INJ, BUPIVACAINE LIPOSOME: HCPCS | Performed by: ORTHOPAEDIC SURGERY

## 2021-07-20 PROCEDURE — 7100000000 HC PACU RECOVERY - FIRST 15 MIN: Performed by: ORTHOPAEDIC SURGERY

## 2021-07-20 PROCEDURE — 73560 X-RAY EXAM OF KNEE 1 OR 2: CPT

## 2021-07-20 PROCEDURE — 86850 RBC ANTIBODY SCREEN: CPT

## 2021-07-20 PROCEDURE — 2709999900 HC NON-CHARGEABLE SUPPLY: Performed by: ORTHOPAEDIC SURGERY

## 2021-07-20 PROCEDURE — 64999 UNLISTED PX NERVOUS SYSTEM: CPT | Performed by: ANESTHESIOLOGY

## 2021-07-20 PROCEDURE — 88305 TISSUE EXAM BY PATHOLOGIST: CPT

## 2021-07-20 PROCEDURE — 97535 SELF CARE MNGMENT TRAINING: CPT

## 2021-07-20 DEVICE — COMPONENT PAT DIA32MM THK10MM SUPERIOR/INFERIOR KNEE: Type: IMPLANTABLE DEVICE | Site: KNEE | Status: FUNCTIONAL

## 2021-07-20 DEVICE — BASEPLATE TIB SZ 4 AP46MM ML70MM KNEE TRITANIUM 4 CRUCFRM: Type: IMPLANTABLE DEVICE | Site: KNEE | Status: FUNCTIONAL

## 2021-07-20 DEVICE — INSERT TIB CS 4 10 MM ARTC POST KNEE BEAR TECHNOLOGY X3: Type: IMPLANTABLE DEVICE | Site: KNEE | Status: FUNCTIONAL

## 2021-07-20 DEVICE — IMPLANTABLE DEVICE: Type: IMPLANTABLE DEVICE | Site: KNEE | Status: FUNCTIONAL

## 2021-07-20 RX ORDER — FENTANYL CITRATE 50 UG/ML
25 INJECTION, SOLUTION INTRAMUSCULAR; INTRAVENOUS EVERY 5 MIN PRN
Status: DISCONTINUED | OUTPATIENT
Start: 2021-07-20 | End: 2021-07-20 | Stop reason: HOSPADM

## 2021-07-20 RX ORDER — ACETAMINOPHEN 325 MG/1
650 TABLET ORAL EVERY 6 HOURS
Status: DISCONTINUED | OUTPATIENT
Start: 2021-07-20 | End: 2021-07-21 | Stop reason: HOSPADM

## 2021-07-20 RX ORDER — FENTANYL CITRATE 50 UG/ML
INJECTION, SOLUTION INTRAMUSCULAR; INTRAVENOUS PRN
Status: DISCONTINUED | OUTPATIENT
Start: 2021-07-20 | End: 2021-07-20 | Stop reason: SDUPTHER

## 2021-07-20 RX ORDER — DEXTROSE MONOHYDRATE 50 MG/ML
100 INJECTION, SOLUTION INTRAVENOUS PRN
Status: DISCONTINUED | OUTPATIENT
Start: 2021-07-20 | End: 2021-07-21 | Stop reason: HOSPADM

## 2021-07-20 RX ORDER — SODIUM CHLORIDE 0.9 % (FLUSH) 0.9 %
5-40 SYRINGE (ML) INJECTION EVERY 12 HOURS SCHEDULED
Status: DISCONTINUED | OUTPATIENT
Start: 2021-07-20 | End: 2021-07-20 | Stop reason: HOSPADM

## 2021-07-20 RX ORDER — SODIUM CHLORIDE 9 MG/ML
INJECTION, SOLUTION INTRAVENOUS CONTINUOUS
Status: DISCONTINUED | OUTPATIENT
Start: 2021-07-20 | End: 2021-07-20

## 2021-07-20 RX ORDER — SENNA AND DOCUSATE SODIUM 50; 8.6 MG/1; MG/1
1 TABLET, FILM COATED ORAL 2 TIMES DAILY
Status: DISCONTINUED | OUTPATIENT
Start: 2021-07-20 | End: 2021-07-21 | Stop reason: HOSPADM

## 2021-07-20 RX ORDER — PREGABALIN 75 MG/1
75 CAPSULE ORAL 2 TIMES DAILY
Status: DISCONTINUED | OUTPATIENT
Start: 2021-07-20 | End: 2021-07-21 | Stop reason: HOSPADM

## 2021-07-20 RX ORDER — ALBUTEROL SULFATE 90 UG/1
AEROSOL, METERED RESPIRATORY (INHALATION) PRN
Status: DISCONTINUED | OUTPATIENT
Start: 2021-07-20 | End: 2021-07-20 | Stop reason: SDUPTHER

## 2021-07-20 RX ORDER — MORPHINE SULFATE 2 MG/ML
2 INJECTION, SOLUTION INTRAMUSCULAR; INTRAVENOUS
Status: DISCONTINUED | OUTPATIENT
Start: 2021-07-20 | End: 2021-07-21 | Stop reason: HOSPADM

## 2021-07-20 RX ORDER — INSULIN GLARGINE 100 [IU]/ML
0.25 INJECTION, SOLUTION SUBCUTANEOUS NIGHTLY
Status: DISCONTINUED | OUTPATIENT
Start: 2021-07-20 | End: 2021-07-20

## 2021-07-20 RX ORDER — ATENOLOL 50 MG/1
50 TABLET ORAL DAILY
Status: DISCONTINUED | OUTPATIENT
Start: 2021-07-21 | End: 2021-07-21 | Stop reason: HOSPADM

## 2021-07-20 RX ORDER — DEXTROSE MONOHYDRATE 25 G/50ML
12.5 INJECTION, SOLUTION INTRAVENOUS PRN
Status: DISCONTINUED | OUTPATIENT
Start: 2021-07-20 | End: 2021-07-21 | Stop reason: HOSPADM

## 2021-07-20 RX ORDER — SODIUM CHLORIDE 0.9 % (FLUSH) 0.9 %
10 SYRINGE (ML) INJECTION EVERY 12 HOURS SCHEDULED
Status: DISCONTINUED | OUTPATIENT
Start: 2021-07-20 | End: 2021-07-21 | Stop reason: HOSPADM

## 2021-07-20 RX ORDER — MAGNESIUM HYDROXIDE 1200 MG/15ML
LIQUID ORAL CONTINUOUS PRN
Status: COMPLETED | OUTPATIENT
Start: 2021-07-20 | End: 2021-07-20

## 2021-07-20 RX ORDER — ONDANSETRON 4 MG/1
4 TABLET, ORALLY DISINTEGRATING ORAL EVERY 8 HOURS PRN
Status: DISCONTINUED | OUTPATIENT
Start: 2021-07-20 | End: 2021-07-21 | Stop reason: HOSPADM

## 2021-07-20 RX ORDER — DEXAMETHASONE SODIUM PHOSPHATE 4 MG/ML
INJECTION, SOLUTION INTRA-ARTICULAR; INTRALESIONAL; INTRAMUSCULAR; INTRAVENOUS; SOFT TISSUE PRN
Status: DISCONTINUED | OUTPATIENT
Start: 2021-07-20 | End: 2021-07-20 | Stop reason: SDUPTHER

## 2021-07-20 RX ORDER — LIDOCAINE HYDROCHLORIDE 20 MG/ML
INJECTION, SOLUTION EPIDURAL; INFILTRATION; INTRACAUDAL; PERINEURAL PRN
Status: DISCONTINUED | OUTPATIENT
Start: 2021-07-20 | End: 2021-07-20 | Stop reason: SDUPTHER

## 2021-07-20 RX ORDER — SODIUM CHLORIDE 450 MG/100ML
INJECTION, SOLUTION INTRAVENOUS CONTINUOUS
Status: DISCONTINUED | OUTPATIENT
Start: 2021-07-20 | End: 2021-07-21 | Stop reason: HOSPADM

## 2021-07-20 RX ORDER — NICOTINE POLACRILEX 4 MG
15 LOZENGE BUCCAL PRN
Status: DISCONTINUED | OUTPATIENT
Start: 2021-07-20 | End: 2021-07-21 | Stop reason: HOSPADM

## 2021-07-20 RX ORDER — OXYCODONE HYDROCHLORIDE 10 MG/1
10 TABLET ORAL EVERY 4 HOURS PRN
Status: DISCONTINUED | OUTPATIENT
Start: 2021-07-20 | End: 2021-07-21 | Stop reason: HOSPADM

## 2021-07-20 RX ORDER — LEVETIRACETAM 500 MG/1
500 TABLET ORAL 2 TIMES DAILY
Status: DISCONTINUED | OUTPATIENT
Start: 2021-07-20 | End: 2021-07-21 | Stop reason: HOSPADM

## 2021-07-20 RX ORDER — ONDANSETRON 2 MG/ML
INJECTION INTRAMUSCULAR; INTRAVENOUS PRN
Status: DISCONTINUED | OUTPATIENT
Start: 2021-07-20 | End: 2021-07-20 | Stop reason: SDUPTHER

## 2021-07-20 RX ORDER — MORPHINE SULFATE 4 MG/ML
4 INJECTION, SOLUTION INTRAMUSCULAR; INTRAVENOUS
Status: DISCONTINUED | OUTPATIENT
Start: 2021-07-20 | End: 2021-07-21 | Stop reason: HOSPADM

## 2021-07-20 RX ORDER — SODIUM CHLORIDE 0.9 % (FLUSH) 0.9 %
5-40 SYRINGE (ML) INJECTION PRN
Status: DISCONTINUED | OUTPATIENT
Start: 2021-07-20 | End: 2021-07-20 | Stop reason: HOSPADM

## 2021-07-20 RX ORDER — SODIUM CHLORIDE 9 MG/ML
25 INJECTION, SOLUTION INTRAVENOUS PRN
Status: DISCONTINUED | OUTPATIENT
Start: 2021-07-20 | End: 2021-07-21 | Stop reason: HOSPADM

## 2021-07-20 RX ORDER — AMLODIPINE BESYLATE 10 MG/1
10 TABLET ORAL DAILY
Status: DISCONTINUED | OUTPATIENT
Start: 2021-07-21 | End: 2021-07-21 | Stop reason: HOSPADM

## 2021-07-20 RX ORDER — MIDAZOLAM HYDROCHLORIDE 1 MG/ML
INJECTION INTRAMUSCULAR; INTRAVENOUS PRN
Status: DISCONTINUED | OUTPATIENT
Start: 2021-07-20 | End: 2021-07-20 | Stop reason: SDUPTHER

## 2021-07-20 RX ORDER — SUCCINYLCHOLINE/SOD CL,ISO/PF 200MG/10ML
SYRINGE (ML) INTRAVENOUS PRN
Status: DISCONTINUED | OUTPATIENT
Start: 2021-07-20 | End: 2021-07-20 | Stop reason: SDUPTHER

## 2021-07-20 RX ORDER — LABETALOL HYDROCHLORIDE 5 MG/ML
5 INJECTION, SOLUTION INTRAVENOUS EVERY 10 MIN PRN
Status: DISCONTINUED | OUTPATIENT
Start: 2021-07-20 | End: 2021-07-20 | Stop reason: HOSPADM

## 2021-07-20 RX ORDER — ROPIVACAINE HYDROCHLORIDE 5 MG/ML
INJECTION, SOLUTION EPIDURAL; INFILTRATION; PERINEURAL
Status: COMPLETED | OUTPATIENT
Start: 2021-07-20 | End: 2021-07-20

## 2021-07-20 RX ORDER — SODIUM CHLORIDE 9 MG/ML
25 INJECTION, SOLUTION INTRAVENOUS PRN
Status: DISCONTINUED | OUTPATIENT
Start: 2021-07-20 | End: 2021-07-20 | Stop reason: HOSPADM

## 2021-07-20 RX ORDER — PROMETHAZINE HYDROCHLORIDE 25 MG/ML
6.25 INJECTION, SOLUTION INTRAMUSCULAR; INTRAVENOUS
Status: DISCONTINUED | OUTPATIENT
Start: 2021-07-20 | End: 2021-07-20 | Stop reason: HOSPADM

## 2021-07-20 RX ORDER — PHENYLEPHRINE HCL IN 0.9% NACL 1 MG/10 ML
SYRINGE (ML) INTRAVENOUS PRN
Status: DISCONTINUED | OUTPATIENT
Start: 2021-07-20 | End: 2021-07-20 | Stop reason: SDUPTHER

## 2021-07-20 RX ORDER — GLYCOPYRROLATE 0.2 MG/ML
INJECTION INTRAMUSCULAR; INTRAVENOUS PRN
Status: DISCONTINUED | OUTPATIENT
Start: 2021-07-20 | End: 2021-07-20 | Stop reason: SDUPTHER

## 2021-07-20 RX ORDER — SODIUM CHLORIDE 0.9 % (FLUSH) 0.9 %
10 SYRINGE (ML) INJECTION PRN
Status: DISCONTINUED | OUTPATIENT
Start: 2021-07-20 | End: 2021-07-21 | Stop reason: HOSPADM

## 2021-07-20 RX ORDER — OXYCODONE HYDROCHLORIDE 5 MG/1
5 TABLET ORAL EVERY 4 HOURS PRN
Status: DISCONTINUED | OUTPATIENT
Start: 2021-07-20 | End: 2021-07-21 | Stop reason: HOSPADM

## 2021-07-20 RX ORDER — OXYCODONE HYDROCHLORIDE 10 MG/1
10 TABLET ORAL ONCE
Status: COMPLETED | OUTPATIENT
Start: 2021-07-20 | End: 2021-07-20

## 2021-07-20 RX ORDER — PROPOFOL 10 MG/ML
INJECTION, EMULSION INTRAVENOUS PRN
Status: DISCONTINUED | OUTPATIENT
Start: 2021-07-20 | End: 2021-07-20 | Stop reason: SDUPTHER

## 2021-07-20 RX ORDER — ONDANSETRON 2 MG/ML
4 INJECTION INTRAMUSCULAR; INTRAVENOUS EVERY 6 HOURS PRN
Status: DISCONTINUED | OUTPATIENT
Start: 2021-07-20 | End: 2021-07-21 | Stop reason: HOSPADM

## 2021-07-20 RX ADMIN — Medication 3000 MG: at 10:19

## 2021-07-20 RX ADMIN — OXYCODONE HYDROCHLORIDE 10 MG: 10 TABLET ORAL at 08:52

## 2021-07-20 RX ADMIN — ROPIVACAINE HYDROCHLORIDE 20 ML: 5 INJECTION, SOLUTION EPIDURAL; INFILTRATION; PERINEURAL at 09:05

## 2021-07-20 RX ADMIN — HYDROMORPHONE HYDROCHLORIDE 0.25 MG: 1 INJECTION, SOLUTION INTRAMUSCULAR; INTRAVENOUS; SUBCUTANEOUS at 11:15

## 2021-07-20 RX ADMIN — SODIUM CHLORIDE: 9 INJECTION, SOLUTION INTRAVENOUS at 11:15

## 2021-07-20 RX ADMIN — Medication 160 MG: at 10:26

## 2021-07-20 RX ADMIN — ACETAMINOPHEN 650 MG: 325 TABLET ORAL at 20:21

## 2021-07-20 RX ADMIN — Medication 9 PUFF: at 10:52

## 2021-07-20 RX ADMIN — OXYCODONE HYDROCHLORIDE 10 MG: 10 TABLET ORAL at 16:29

## 2021-07-20 RX ADMIN — PROPOFOL 200 MG: 10 INJECTION, EMULSION INTRAVENOUS at 10:26

## 2021-07-20 RX ADMIN — FENTANYL CITRATE 50 MCG: 50 INJECTION INTRAMUSCULAR; INTRAVENOUS at 10:26

## 2021-07-20 RX ADMIN — HYDROMORPHONE HYDROCHLORIDE 0.5 MG: 1 INJECTION, SOLUTION INTRAMUSCULAR; INTRAVENOUS; SUBCUTANEOUS at 12:26

## 2021-07-20 RX ADMIN — Medication 100 MCG: at 10:55

## 2021-07-20 RX ADMIN — ACETAMINOPHEN 650 MG: 325 TABLET ORAL at 14:56

## 2021-07-20 RX ADMIN — OXYCODONE HYDROCHLORIDE 10 MG: 10 TABLET ORAL at 20:21

## 2021-07-20 RX ADMIN — PREGABALIN 75 MG: 75 CAPSULE ORAL at 20:21

## 2021-07-20 RX ADMIN — Medication 100 MCG: at 10:36

## 2021-07-20 RX ADMIN — GLYCOPYRROLATE 0.2 MG: 0.2 INJECTION, SOLUTION INTRAMUSCULAR; INTRAVENOUS at 10:25

## 2021-07-20 RX ADMIN — DEXAMETHASONE SODIUM PHOSPHATE 10 MG: 4 INJECTION, SOLUTION INTRAMUSCULAR; INTRAVENOUS at 10:33

## 2021-07-20 RX ADMIN — HYDROMORPHONE HYDROCHLORIDE 0.5 MG: 1 INJECTION, SOLUTION INTRAMUSCULAR; INTRAVENOUS; SUBCUTANEOUS at 10:43

## 2021-07-20 RX ADMIN — HYDROMORPHONE HYDROCHLORIDE 0.25 MG: 1 INJECTION, SOLUTION INTRAMUSCULAR; INTRAVENOUS; SUBCUTANEOUS at 10:47

## 2021-07-20 RX ADMIN — LEVETIRACETAM 500 MG: 500 TABLET ORAL at 20:21

## 2021-07-20 RX ADMIN — MIDAZOLAM 2 MG: 1 INJECTION INTRAMUSCULAR; INTRAVENOUS at 08:48

## 2021-07-20 RX ADMIN — SODIUM CHLORIDE: 4.5 INJECTION, SOLUTION INTRAVENOUS at 13:35

## 2021-07-20 RX ADMIN — ONDANSETRON 4 MG: 2 INJECTION INTRAMUSCULAR; INTRAVENOUS at 11:23

## 2021-07-20 RX ADMIN — SODIUM CHLORIDE: 9 INJECTION, SOLUTION INTRAVENOUS at 08:52

## 2021-07-20 RX ADMIN — DOCUSATE SODIUM 50 MG AND SENNOSIDES 8.6 MG 1 TABLET: 8.6; 5 TABLET, FILM COATED ORAL at 20:21

## 2021-07-20 RX ADMIN — ROPIVACAINE HYDROCHLORIDE 20 ML: 5 INJECTION, SOLUTION EPIDURAL; INFILTRATION; PERINEURAL at 09:06

## 2021-07-20 RX ADMIN — LIDOCAINE HYDROCHLORIDE 100 MG: 20 INJECTION, SOLUTION EPIDURAL; INFILTRATION; INTRACAUDAL; PERINEURAL at 10:26

## 2021-07-20 RX ADMIN — CEFAZOLIN SODIUM 3000 MG: 10 INJECTION, POWDER, FOR SOLUTION INTRAVENOUS at 17:46

## 2021-07-20 RX ADMIN — FENTANYL CITRATE 50 MCG: 50 INJECTION INTRAMUSCULAR; INTRAVENOUS at 08:48

## 2021-07-20 ASSESSMENT — PAIN DESCRIPTION - FREQUENCY
FREQUENCY: CONTINUOUS

## 2021-07-20 ASSESSMENT — PULMONARY FUNCTION TESTS
PIF_VALUE: 24
PIF_VALUE: 14
PIF_VALUE: 17
PIF_VALUE: 4
PIF_VALUE: 24
PIF_VALUE: 23
PIF_VALUE: 24
PIF_VALUE: 36
PIF_VALUE: 17
PIF_VALUE: 26
PIF_VALUE: 33
PIF_VALUE: 27
PIF_VALUE: 21
PIF_VALUE: 17
PIF_VALUE: 24
PIF_VALUE: 24
PIF_VALUE: 18
PIF_VALUE: 4
PIF_VALUE: 21
PIF_VALUE: 41
PIF_VALUE: 17
PIF_VALUE: 35
PIF_VALUE: 24
PIF_VALUE: 23
PIF_VALUE: 20
PIF_VALUE: 6
PIF_VALUE: 23
PIF_VALUE: 15
PIF_VALUE: 14
PIF_VALUE: 20
PIF_VALUE: 26
PIF_VALUE: 23
PIF_VALUE: 20
PIF_VALUE: 27
PIF_VALUE: 26
PIF_VALUE: 17
PIF_VALUE: 24
PIF_VALUE: 34
PIF_VALUE: 5
PIF_VALUE: 41
PIF_VALUE: 13
PIF_VALUE: 24
PIF_VALUE: 3
PIF_VALUE: 26
PIF_VALUE: 19
PIF_VALUE: 24
PIF_VALUE: 39
PIF_VALUE: 24
PIF_VALUE: 1
PIF_VALUE: 8
PIF_VALUE: 0
PIF_VALUE: 17
PIF_VALUE: 8
PIF_VALUE: 18
PIF_VALUE: 14
PIF_VALUE: 24
PIF_VALUE: 16
PIF_VALUE: 17
PIF_VALUE: 17
PIF_VALUE: 75
PIF_VALUE: 17
PIF_VALUE: 17
PIF_VALUE: 23
PIF_VALUE: 17
PIF_VALUE: 21
PIF_VALUE: 26
PIF_VALUE: 3
PIF_VALUE: 23
PIF_VALUE: 1
PIF_VALUE: 25
PIF_VALUE: 24
PIF_VALUE: 27
PIF_VALUE: 17
PIF_VALUE: 1
PIF_VALUE: 17
PIF_VALUE: 24
PIF_VALUE: 23
PIF_VALUE: 21
PIF_VALUE: 20
PIF_VALUE: 24
PIF_VALUE: 27
PIF_VALUE: 24
PIF_VALUE: 1
PIF_VALUE: 25
PIF_VALUE: 22
PIF_VALUE: 7
PIF_VALUE: 28
PIF_VALUE: 39
PIF_VALUE: 18
PIF_VALUE: 24

## 2021-07-20 ASSESSMENT — PAIN SCALES - GENERAL
PAINLEVEL_OUTOF10: 7
PAINLEVEL_OUTOF10: 0
PAINLEVEL_OUTOF10: 9
PAINLEVEL_OUTOF10: 5
PAINLEVEL_OUTOF10: 5
PAINLEVEL_OUTOF10: 7
PAINLEVEL_OUTOF10: 0
PAINLEVEL_OUTOF10: 1

## 2021-07-20 ASSESSMENT — PAIN DESCRIPTION - ORIENTATION
ORIENTATION: RIGHT

## 2021-07-20 ASSESSMENT — PAIN DESCRIPTION - ONSET
ONSET: ON-GOING

## 2021-07-20 ASSESSMENT — PAIN - FUNCTIONAL ASSESSMENT
PAIN_FUNCTIONAL_ASSESSMENT: PREVENTS OR INTERFERES SOME ACTIVE ACTIVITIES AND ADLS
PAIN_FUNCTIONAL_ASSESSMENT: PREVENTS OR INTERFERES SOME ACTIVE ACTIVITIES AND ADLS
PAIN_FUNCTIONAL_ASSESSMENT: 0-10
PAIN_FUNCTIONAL_ASSESSMENT: PREVENTS OR INTERFERES SOME ACTIVE ACTIVITIES AND ADLS

## 2021-07-20 ASSESSMENT — PAIN DESCRIPTION - PROGRESSION
CLINICAL_PROGRESSION: NOT CHANGED
CLINICAL_PROGRESSION: GRADUALLY WORSENING
CLINICAL_PROGRESSION: NOT CHANGED
CLINICAL_PROGRESSION: NOT CHANGED

## 2021-07-20 ASSESSMENT — PAIN DESCRIPTION - DESCRIPTORS
DESCRIPTORS: ACHING;BURNING
DESCRIPTORS: PRESSURE;CONSTANT
DESCRIPTORS: ACHING
DESCRIPTORS: PRESSURE;THROBBING
DESCRIPTORS: ACHING;PRESSURE
DESCRIPTORS: PRESSURE;CONSTANT
DESCRIPTORS: PRESSURE

## 2021-07-20 ASSESSMENT — PAIN DESCRIPTION - LOCATION
LOCATION: KNEE

## 2021-07-20 ASSESSMENT — PAIN DESCRIPTION - PAIN TYPE
TYPE: SURGICAL PAIN

## 2021-07-20 NOTE — CARE COORDINATION
7/20 spoke with patient and confirmed JET Class plan to return home with [facility]  and the support of her spouse. made referral to [address] [place] [address] with [facility]--she will pull orders from [facility].   Electronically signed by Octavia Suh on 7/20/2021 at 1:59 PM  #[phone]

## 2021-07-20 NOTE — ANESTHESIA PRE PROCEDURE
TONSILLECTOMY       Social History     Tobacco Use    Smoking status: Never Smoker    Smokeless tobacco: Never Used   Vaping Use    Vaping Use: Never used   Substance Use Topics    Alcohol use: No    Drug use: No     Medications  No current facility-administered medications on file prior to encounter.      Current Outpatient Medications on File Prior to Encounter   Medication Sig Dispense Refill    levETIRAcetam (KEPPRA) 500 MG tablet TAKE 1 TABLET TWICE DAILY 180 tablet 3    amLODIPine (NORVASC) 10 MG tablet Take 1 tablet by mouth daily 90 tablet 1    atenolol (TENORMIN) 50 MG tablet Take 1 tablet by mouth daily 90 tablet 1    GLUCOSAMINE-CHONDROITIN PO Take by mouth      meclizine (ANTIVERT) 25 MG tablet Take 25 mg by mouth 3 times daily as needed       diclofenac sodium (VOLTAREN) 1 % GEL Apply 4 g topically 2 times daily       Current Facility-Administered Medications   Medication Dose Route Frequency Provider Last Rate Last Admin    0.9 % sodium chloride infusion   Intravenous Continuous Nik Leger MD        sodium chloride flush 0.9 % injection 5-40 mL  5-40 mL Intravenous 2 times per day Nik Leger MD        sodium chloride flush 0.9 % injection 5-40 mL  5-40 mL Intravenous PRN Nik Leger MD        0.9 % sodium chloride infusion  25 mL Intravenous PRN Nik Leger MD        oxyCODONE HCl (OXY-IR) immediate release tablet 10 mg  10 mg Oral Once Avtar Rider MD        ceFAZolin (ANCEF) 3000 mg in dextrose 5 % 100 mL IVPB  3,000 mg Intravenous Once Avtar Rider MD         Vital Signs (Current)   Vitals:    21   BP: (!) 131/90   Pulse: 51   Resp: 16   Temp: 97.1 °F (36.2 °C)   SpO2: 97%     Vital Signs Statistics (for past 48 hrs)     Temp  Av.1 °F (36.2 °C)  Min: 97.1 °F (36.2 °C)   Min taken time: 21  Max: 97.1 °F (36.2 °C)   Max taken time: 21  Pulse  Av  Min: 46   Min taken time: 21  Max: 46   Max taken time: 21 0804  Resp  Av  Min: 12   Min taken time: 21 0804  Max: 12   Max taken time: 21 0804  BP  Min: 131/90   Min taken time: 21 0804  Max: 131/90   Max taken time: 21 0804  SpO2  Av %  Min: 97 %   Min taken time: 21 0804  Max: 97 %   Max taken time: 21 0804    BP Readings from Last 3 Encounters:   21 (!) 131/90   21 130/84   21 138/89     BMI  Body mass index is 52.49 kg/m². Estimated body mass index is 52.49 kg/m² as calculated from the following:    Height as of this encounter: 5' 1\" (1.549 m). Weight as of this encounter: 277 lb 12.5 oz (126 kg). CBC   Lab Results   Component Value Date    WBC 3.1 2021    RBC 5.66 2021    HGB 15.0 2021    HCT 43.6 2021    MCV 77.0 2021    RDW 15.0 2021     2021     CMP    Lab Results   Component Value Date     2021    K 4.0 2021     2021    CO2 26 2021    BUN 13 2021    CREATININE 1.1 2021    GFRAA >60 2021    GFRAA >60 2012    AGRATIO 1.6 2020    LABGLOM 52 2021    GLUCOSE 76 2021    PROT 6.9 2020    PROT 6.7 2012    CALCIUM 9.4 2021    BILITOT 0.5 2020    ALKPHOS 78 2020    AST 16 2020    ALT 15 2020     BMP    Lab Results   Component Value Date     2021    K 4.0 2021     2021    CO2 26 2021    BUN 13 2021    CREATININE 1.1 2021    CALCIUM 9.4 2021    GFRAA >60 2021    GFRAA >60 2012    LABGLOM 52 2021    GLUCOSE 76 2021     POCGlucose  No results for input(s): GLUCOSE in the last 72 hours.    Coags    Lab Results   Component Value Date    PROTIME 12.5 2021    INR 1.08 2021    APTT 36.2      HCG (If Applicable)   Lab Results   Component Value Date    PREGTESTUR Negative 2019      ABGs No results found for: PHART, PO2ART, TKN5FHK, VSJ4OAS, BEART, U3BDVXAV   Type & Screen (If Applicable)  No results found for: LABABO, LABRH                         BMI: Wt Readings from Last 3 Encounters:       NPO Status: 8 hours                          Anesthesia Evaluation  Patient summary reviewed no history of anesthetic complications:   Airway: Mallampati: III  TM distance: >3 FB   Neck ROM: full   Dental: normal exam         Pulmonary:Negative Pulmonary ROS and normal exam                               Cardiovascular:  Exercise tolerance: good (>4 METS),   (+) hypertension:,         Rhythm: regular  Rate: normal           Beta Blocker:  Dose within 24 Hrs         Neuro/Psych:   (+) seizures:, neuromuscular disease:,             GI/Hepatic/Renal:   (+) renal disease: CRI, morbid obesity          Endo/Other: Negative Endo/Other ROS       (-) blood dyscrasia               Abdominal:   (+) obese,           Vascular: negative vascular ROS. Other Findings:             Anesthesia Plan      general and regional     ASA 3     (Patient declined spinal)  Induction: intravenous. MIPS: Postoperative opioids intended and Prophylactic antiemetics administered. Anesthetic plan and risks discussed with patient and spouse. Plan discussed with CRNA. This pre-anesthesia assessment may be used as a history and physical.    DOS STAFF ADDENDUM:    Pt seen and examined, chart reviewed (including anesthesia, drug and allergy history). No interval changes to history and physical examination. Anesthetic plan, risks, benefits, alternatives, and personnel involved discussed with patient. Questions and concerns addressed. Patient(family) verbalized an understanding and agrees to proceed.       Michelle Jennings MD  July 20, 2021  8:23 AM

## 2021-07-20 NOTE — PROGRESS NOTES
Physical Therapy    Facility/Department: [address] ORTHOPEDICS  Initial Assessment/Treatment Session    NAME: Jenna Mijares  : 1970  MRN: [id number]    Date of Service: 2021    Discharge Recommendations:  Patient would benefit from continued therapy after discharge, Continue to assess pending progress, Home with Home health PT, S Level 3, 24 hour supervision or assist   PT Equipment Recommendations  Equipment Needed: Yes  Mobility Devices: Dala Suresh: Rolling    Assessment   Body structures, Functions, Activity limitations: Decreased functional mobility ; Decreased ROM; Decreased strength;Decreased safe awareness;Decreased balance; Increased pain  Assessment: Pt is a 46 y.o. F. admitted  for scheduled R TKA. She presents very lethargic (just came from surgery), but requesting to use the restroom. She demonstrated minimal RLE AROM d/t lethargy, knee PROM ~ 5-85. She required Min-Mod A x 2 to transfer to stedy, and would benefit from continued therapy to gradually progress transfers, ambulation, OOB activity. She is intent upon returning home, but will need to demonstrate the ability to complete mobility tasks with walker support, Min A for less. Anticipate return home with assist from spouse, home PT level 3, but will continue to assess pending pt progress. Pt will likely require RW. Jenna Mijares scored a / on the AM-PAC short mobility form. If patient discharges prior to next session this note will serve as a discharge summary. Please see below for the latest assessment towards goals. Specific instructions for Next Treatment: Progress OOB activity; Ambulate when able  Prognosis: Good  Decision Making: Medium Complexity  History: See below  Exam: Strength; ROM; Balance; Transfers  Clinical Presentation: Evolving  PT Education: Goals; General Safety;Gait Training;Orientation;PT Role;Plan of Care;Family Education; Functional Mobility Training; Injury Prevention;Precautions; Equipment;Transfer Training; Adaptive Device Training  Barriers to Learning: Pain; Fatigue  REQUIRES PT FOLLOW UP: Yes  Activity Tolerance  Activity Tolerance: Patient limited by pain  Activity Tolerance: Limited d/t lethargy       Patient Diagnosis(es): The encounter diagnosis was Arthritis of right knee. has a past medical history of Arthritis, Carpal tunnel syndrome, Convulsions, COVID-19, Dermatophytosis, Diarrhea, Hypertension, Hypertension, essential, Hypertensive kidney disease with chronic kidney disease stage V ([facility] [address] Medulloadrenal hyperfunc, Meningococcal encephalitis, Morbid obesity with BMI of 45.0-49.9, adult ([facility] [address] Obesity, Other malaise and fatigue, and Seizure disorder ([facility] [address] has a past surgical history that includes Appendectomy;  section; Tonsillectomy; Endometrial ablation; Carpal tunnel release (Bilateral, ); and Ovary removal (). Restrictions  Restrictions/Precautions  Restrictions/Precautions: Fall Risk, Weight Bearing  Lower Extremity Weight Bearing Restrictions  Right Lower Extremity Weight Bearing: Weight Bearing As Tolerated     Vision/Hearing  Vision: Within Functional Limits  Hearing: Within functional limits       Subjective  General  Chart Reviewed: Yes  Patient assessed for rehabilitation services?: Yes  Additional Pertinent Hx: Pt is a 46 y.o. F. admitted  for scheduled R TKA. PMH includes HTN, CKD, seizure disorder, morbid obesity. Response To Previous Treatment: Not applicable  Referring Practitioner: Dr. Taj Posey  Referral Date : 21  Diagnosis: R TKA  Follows Commands: Impaired  Other (Comment): Able to follow simple commands with extra cueing  Subjective  Subjective: Pt lethargic after surgery, but wants to use the restroom.   PT, OT to assist.    Orientation  Orientation  Overall Orientation Status: Within Functional Limits     Social/Functional History  Social/Functional History  Lives With: Spouse  Type of Home: House  Home Layout: Two level, Able to Live on Main level with bedroom/bathroom  Home Access: Stairs to enter without rails  Entrance Stairs - Number of Steps: 1 + 1  Bathroom Shower/Tub:  (Half bath on main level)  Bathroom Toilet: Standard  Bathroom Equipment: 3-in-1 commode  ADL Assistance: Independent  Homemaking Assistance: Independent  Ambulation Assistance: Independent  Transfer Assistance: Independent  Active : Yes  Occupation: Full time employment  Additional Comments: No recent falls    Objective    PROM RLE (degrees)  RLE General PROM: R Knee PROM ~ 5 - 85. AROM RLE (degrees)  RLE General AROM: Minimal AROM of R hip, knee, or ankle at this time (recently returned from surgery, very lethargic). AROM LLE (degrees)  LLE AROM : WFL  LLE General AROM: Hip Flex, Knee Flex/Ext WFL  AROM RUE (degrees)  RUE AROM : WFL  AROM LUE (degrees)  LUE AROM : WFL     Strength RLE  Strength RLE: Exception  Comment: Hip Flex, Knee Flex/Ext grossly 1+/5 (limited d/t pain, lethargy)  Strength LLE  Strength LLE: WFL  Comment: hip Flex, Knee Flex/Ext, and Ankle Pf[facility]  Strength RUE  Strength RUE: WFL  Strength LUE  Strength LUE: WFL     Tone RLE  RLE Tone: Normotonic  Tone LLE  LLE Tone: Normotonic  Motor Control  Gross Motor?: WFL     Bed mobility  Supine to Sit: Moderate assistance;2 Person assistance (HOB elevated)  Sit to Supine: Maximum assistance; Moderate assistance;2 Person assistance  Scooting: Moderate assistance     Transfers  Sit to Stand: Minimal Assistance; Moderate Assistance;2 Person Assistance (To stedy)  Stand to sit: Moderate Assistance;Minimal Assistance;2 Person Assistance (From stedy)  Comment: Pt required total assist x 2 using stedy to transfer from bed < > commode.      Ambulation  Ambulation?: No  Stairs/Curb  Stairs?: No     Balance  Comments: Pt able to maintain sitting balance on commode with SBA,      Plan   Plan  Times per week: 2-3x  Specific instructions for Next Treatment: Progress OOB activity; Ambulate when able  Current Treatment Recommendations: Strengthening, ROM, Endurance Training, Balance Training, Functional Mobility Training, Transfer Training, Gait Training, Stair training, Positioning, Equipment Evaluation, Education, & procurement, Modalities, Patient/Caregiver Education & Training, Safety Education & Training, Manual Therapy - Soft Tissue Mobilization, Neuromuscular Re-education, Home Exercise Program  Safety Devices  Type of devices: All fall risk precautions in place, Call light within reach, Left in bed, Nurse notified, Gait belt, Bed alarm in place    AM-PAC Score  AM-PAC Inpatient Mobility Raw Score : 9 (07/20/21 1333)  AM-PAC Inpatient T-Scale Score : 30.55 (07/20/21 1333)  Mobility Inpatient CMS 0-100% Score: 81.38 (07/20/21 1333)  Mobility Inpatient CMS G-Code Modifier : CM (07/20/21 1333)          Goals  Short term goals  Time Frame for Short term goals: In 2-3 days pt will perform  Short term goal 1: Bed mobility Min A  Short term goal 2: Transfers Min A  Short term goal 3: Ambulation 8' with walker, Min A  Short term goal 4: Ascend/Descend curb step with Min A  Long term goals  Time Frame for Long term goals : LTG = STG  Patient Goals   Patient goals : To return directly home       Therapy Time   Individual Concurrent Group Co-treatment   Time In       1305   Time Out       1330   Minutes       25   Timed Code Treatment Minutes: 10 Minutes   Moderate Complexity Evaluation: 15 min.      Capo Owen PT    Electronically signed by Capo Owen PT [id number] on 7/20/2021 at 1:35 PM

## 2021-07-20 NOTE — OP NOTE
Patient: Rodo Ortiz  YOB: 1970  MRN: [id number]    DATE OF PROCEDURE: 7/20/2021      PREOPERATIVE DIAGNOSIS:  Tricompartmental degenerative osteoarthritis of the right knee. POSTOPERATIVE DIAGNOSIS:  Tricompartmental degenerative osteoarthritis of the right knee. OPERATION PERFORMED:  Robotic-assisted right total knee arthroplasty. SURGEON:  Ynes Paul MD     ASSISTANT:  ELIZABETH Arias    EBL: 100 mL     IMPLANTS:  [place] [employer] CR cementless femur size 4  [place] [employer]lon cementless tibia size 4  10mm CS polyethylene  32mm cementless asymmetric patella     INDICATIONS:  The patient is a 46 y.o. female who presents for right knee replacement. She had been treated conservatively with anti-inflammatories, physical therapy and intra-articular cortisone injections; none of these gave any significant relief. We discussed total knee arthroplasty. The operative procedure, alternatives, and risks were discussed in detail with the patient. The risks include but are not limited to: Infection, vessel injury, nerve injury, DVT, pulmonary embolism, implant loosening, need for revision surgery, loss of motion, continued pain. Informed consent for surgery was signed by the patient. OPERATIVE PROCEDURE:  The patient was seen in the preoperative holding area where the site of surgery was marked and informed consent was confirmed. The patient was brought back to the operating room by OR personnel. General anesthesia was administered. The patient was positioned supine on the operating table. The right lower extremity was then prepped and draped in a standard and sterile fashion. A final and formal timeout was then performed which confirmed the correct patient, correct position, and correct site of surgery. IV antibiotics were administered within 1 hour of the skin incision. An anterior midline incision was made over the knee.  Skin and subcutaneous tissue were divided down to the extensor mechanism. A medial parapatellar arthrotomy was performed. The knee was fully exposed and then two distal femoral pins and two proximal tibial pins were placed with robotic aerials. Using a third robotic aerial, the knee was registered with the robot. The implant was virtually manipulated to balance the flexion and extension gaps. Once this was done, the robotic cutting arm was brought in and in the order of posterior femoral condyles, anterior femoral condylar cut, anterior femoral chamfer cuts, tibia cut, posterior femoral chamfer cuts, and then distal femoral condylar cuts were made. All bony fragments were removed. The knee was reconstructed to accept a #4 tibial baseplate, a #4 femoral cruciate-retaining femur, and a 10-mm polyethylene liner. The knee came to full extension, excellent flexion, and good overall stability. The patella tracked within the trochlea of the femur. All trial implants were then removed. The ends of the bones were irrigated. The #4 tibial tray and the #4 [place] [employer] cruciate-retaining femoral component were then placed. A trial reduction with the 10-mm poly was performed. Then the real 10-mm polyethylene liner was placed. The knee came to full extension, excellent flexion, and good overall stability. The patella was flipped, measured, and cut to accept a 32-mm asymmetric patella. Once this was done then, the 32-mm asymmetric cementless patella was placed. The patella tracked well. The wound was irrigated out. Hemostasis was obtained. The wound was injected with Exparel. It was also bathed with aqueous iodine. The wound then was closed in layers. The patient tolerated the procedure well. A dressing was applied, and the patient was brought to the recovery room in good condition. ELIZABETH Gale was essential in patient positioning, surgical assistance during the arthroplasty, and in wound closure.     Due to the patient's morbid obesity (BMI 52), this procedure took an additional 30 minutes of time. This additional time was needed for patient positioning, performance of the arthroplasty, and wound closure.     Pastor Amelia MD  7/20/2021

## 2021-07-20 NOTE — PLAN OF CARE
Problem: Cardiac:  Goal: Hemodynamic stability will improve  Description: Hemodynamic stability will improve  7/20/2021 1931 by Braden Posey RN  Outcome: Ongoing  Note: Will monitor labs. 7/20/2021 1443 by César Camarena RN  Outcome: Ongoing  Note: Patients hemodynamic stability improving. Will continue to monitor. Problem: Discharge Planning:  Goal: Discharged to appropriate level of care  Description: Discharged to appropriate level of care  7/20/2021 1931 by Braden Posey RN  Outcome: Ongoing  Note: Will assist with d/c planning.  7/20/2021 1443 by César Camarena RN  Outcome: Ongoing  Note: Discharge planning ongoing. Will continue to monitor. Problem: Mobility - Impaired:  Goal: Mobility will improve  Description: Mobility will improve  7/20/2021 1931 by Braden Posey RN  Outcome: Ongoing  Note: Will encourage mobility. 7/20/2021 1443 by César Camarena RN  Outcome: Ongoing  Note: Patients mobility will improve. Will continue to monitor. Problem: Infection - Surgical Site:  Goal: Will show no infection signs and symptoms  Description: Will show no infection signs and symptoms  7/20/2021 1931 by Braden Posey RN  Outcome: Ongoing  Note: Will monitor for infection. 7/20/2021 1443 by César Camarena RN  Outcome: Ongoing  Note: Patient showing no infection signs and symptoms. Will continue to monitor. Problem: Pain - Acute:  Goal: Pain level will decrease  Description: Pain level will decrease  7/20/2021 1931 by Braden Posey RN  Outcome: Ongoing  Note: Pt assessed for pain. Pt in pain and assessed with 0-10 pain rating scale. Pt given prescribed analgesic for pain. (See eMar) Pt satisfied with pain relief thus far. Will reassess and continue to monitor. 7/20/2021 1443 by César Camarena RN  Outcome: Ongoing  Note: Pt assessed for pain. Pt denies any pain at this time. Will continue to monitor pt and assess for pain throughout rest of shift.         Problem: Pain:  Goal: Pain level will decrease  Description: Pain level will decrease  7/20/2021 1931 by Chinyere Solis RN  Outcome: Ongoing  Note: Pt assessed for pain. Pt in pain and assessed with 0-10 pain rating scale. Pt given prescribed analgesic for pain. (See eMar) Pt satisfied with pain relief thus far. Will reassess and continue to monitor. 7/20/2021 1443 by Jori Turk RN  Outcome: Ongoing  Note: Pt assessed for pain. Pt denies any pain at this time. Will continue to monitor pt and assess for pain throughout rest of shift. Goal: Control of acute pain  Description: Control of acute pain  7/20/2021 1931 by Chinyere Solis RN  Outcome: Ongoing  7/20/2021 1443 by Jori Turk RN  Outcome: Ongoing  Note: Pt assessed for pain. Pt denies any pain at this time. Will continue to monitor pt and assess for pain throughout rest of shift. Goal: Control of chronic pain  Description: Control of chronic pain  7/20/2021 1931 by Chinyere Solis RN  Outcome: Ongoing  7/20/2021 1443 by Jori Turk RN  Outcome: Ongoing  Note: Pt assessed for pain. Pt denies any pain at this time. Will continue to monitor pt and assess for pain throughout rest of shift. Problem: Skin Integrity:  Goal: Will show no infection signs and symptoms  Description: Will show no infection signs and symptoms  7/20/2021 1931 by Chinyere Solis RN  Outcome: Ongoing  Note: Will monitor for infection. 7/20/2021 1443 by Jori Turk RN  Outcome: Ongoing  Note: Patient will show no infection signs and symptoms. Will continue to monitor. Goal: Absence of new skin breakdown  Description: Absence of new skin breakdown  7/20/2021 1931 by Chinyere Solis RN  Outcome: Ongoing  7/20/2021 1443 by Jori Turk RN  Outcome: Ongoing  Note: Patient free from new skin breakdown. Will continue to monitor.

## 2021-07-20 NOTE — ANESTHESIA PROCEDURE NOTES
Peripheral Block    Patient location during procedure: pre-op  Start time: 7/20/2021 8:49 AM  End time: 7/20/2021 8:52 AM  Staffing  Performed: anesthesiologist   Anesthesiologist: Feliberto Vasquez MD  Preanesthetic Checklist  Completed: patient identified, IV checked, site marked, risks and benefits discussed, surgical consent, monitors and equipment checked, pre-op evaluation, timeout performed, anesthesia consent given, oxygen available and patient being monitored  Peripheral Block  Patient position: sitting  Prep: DuraPrep  Patient monitoring: continuous pulse ox and IV access  Block type: Femoral  Laterality: right  Injection technique: single-shot  Guidance: ultrasound guided  Adductor canal  Provider prep: sterile gloves and mask  Needle  Needle type: pencil-tip   Needle gauge: 20 G  Needle length: 8 cm  Needle localization: ultrasound guidance  Needle insertion depth: 7 cm  Assessment  Injection assessment: negative aspiration for heme, no paresthesia on injection and local visualized surrounding nerve on ultrasound  Paresthesia pain: none  Slow fractionated injection: yes  Hemodynamics: stable  Medications Administered  Ropivacaine (NAROPIN) injection 0.5%, 20 mL  Reason for block: post-op pain management and at surgeon's request

## 2021-07-20 NOTE — PLAN OF CARE
Problem: Cardiac:  Goal: Hemodynamic stability will improve  Description: Hemodynamic stability will improve  Outcome: Ongoing  Note: Patients hemodynamic stability improving. Will continue to monitor. Problem: Discharge Planning:  Goal: Discharged to appropriate level of care  Description: Discharged to appropriate level of care  Outcome: Ongoing  Note: Discharge planning ongoing. Will continue to monitor. Problem: Mobility - Impaired:  Goal: Mobility will improve  Description: Mobility will improve  Outcome: Ongoing  Note: Patients mobility will improve. Will continue to monitor. Problem: Infection - Surgical Site:  Goal: Will show no infection signs and symptoms  Description: Will show no infection signs and symptoms  Outcome: Ongoing  Note: Patient showing no infection signs and symptoms. Will continue to monitor. Problem: Pain - Acute:  Goal: Pain level will decrease  Description: Pain level will decrease  Outcome: Ongoing  Note: Pt assessed for pain. Pt denies any pain at this time. Will continue to monitor pt and assess for pain throughout rest of shift. Problem: Pain:  Goal: Pain level will decrease  Description: Pain level will decrease  Outcome: Ongoing  Note: Pt assessed for pain. Pt denies any pain at this time. Will continue to monitor pt and assess for pain throughout rest of shift. Goal: Control of acute pain  Description: Control of acute pain  Outcome: Ongoing  Note: Pt assessed for pain. Pt denies any pain at this time. Will continue to monitor pt and assess for pain throughout rest of shift. Goal: Control of chronic pain  Description: Control of chronic pain  Outcome: Ongoing  Note: Pt assessed for pain. Pt denies any pain at this time. Will continue to monitor pt and assess for pain throughout rest of shift.       Problem: Skin Integrity:  Goal: Will show no infection signs and symptoms  Description: Will show no infection signs and symptoms  Outcome: Ongoing  Note: Patient will show no infection signs and symptoms. Will continue to monitor. Goal: Absence of new skin breakdown  Description: Absence of new skin breakdown  Outcome: Ongoing  Note: Patient free from new skin breakdown. Will continue to monitor.

## 2021-07-20 NOTE — ANESTHESIA PROCEDURE NOTES
Peripheral Block    Patient location during procedure: pre-op  Start time: 7/20/2021 8:54 AM  End time: 7/20/2021 8:57 AM  Staffing  Performed: anesthesiologist   Anesthesiologist: Too Malloy MD  Preanesthetic Checklist  Completed: patient identified, IV checked, site marked, risks and benefits discussed, surgical consent, monitors and equipment checked, pre-op evaluation, timeout performed, anesthesia consent given, oxygen available and patient being monitored  Peripheral Block  Patient position: sitting  Prep: DuraPrep  Patient monitoring: continuous pulse ox and IV access  Block type: iPacks  Laterality: right  Injection technique: single-shot  Guidance: ultrasound guided  Provider prep: sterile gloves and mask  Needle  Needle type: combined needle/nerve stimulator   Needle gauge: 20 G  Needle length: 8 cm  Needle localization: ultrasound guidance  Needle insertion depth: 8 cm  Assessment  Injection assessment: negative aspiration for heme, no paresthesia on injection and local visualized surrounding nerve on ultrasound  Paresthesia pain: none  Slow fractionated injection: yes  Hemodynamics: stable  Medications Administered  Ropivacaine (NAROPIN) injection 0.5%, 20 mL  Reason for block: post-op pain management

## 2021-07-20 NOTE — PROGRESS NOTES
Met with patient and family( citlali and mother osmin) at bedside, patient is drowsy but oriented x4, having moderate pain described as \"pressure\", transferred to bathroom by pancho petit. discussed role of nurse navigator. Reviewed reasons to call with questions or concerns, importance of TEDS, Incentive spirometer, pain medication, and physical and occupational therapy. 2/4 bed rails up, bed in lowest position, fall precautions in place, call light within reach. Pulses present bilaterally +2 pedal, no drainage or odor noted at surgical dressing right knee. ace Dressing clean, dry, and intact. Ice in place. Lew and scds on LLE. Neurovascular checks performed and WNLs, patient denies numbness or tingling. DC Plan: home with  citlali and [facility]. citlali to transport patient. DME needs:needs rolling walker and shower chair, agreeable to [employer] and janessa informed.       Allan Rogel  Orthopedic Nurse Navigator  Phone number: [phone]    Future Appointments   Date Time Provider Raj Jenkins   8/5/2021 10:00 AM Berto Gaston MD [facility]   8/5/2021 12:15 PM Sonali Read PT FKALI PT [facility]   8/18/2021  8:00 AM Trista Hines MD [facility]     Electronically signed by Janes العراقي RN on 7/20/2021 at 1:40 PM

## 2021-07-20 NOTE — PROGRESS NOTES
Occupational Therapy   Occupational Therapy Initial Assessment  Date: 2021   Patient Name: Kristin Ley  MRN: [id number]     : 1970    Date of Service: 2021    Discharge Recommendations:  2-3 sessions per week, Patient would benefit from continued therapy after discharge, 24 hour supervision or assist  OT Equipment Recommendations  Equipment Needed: Yes  Mobility Devices: Halley Pati; ADL Assistive Devices  Walker: Rolling  ADL Assistive Devices: Shower Chair with back    Kristin Ley scored a 16/24 on the -[facility] ADL Inpatient form. It is recommended that the patient have 2-3 sessions per week of Occupational Therapy at d/c to increase the patient's independence. At this time, this patient demonstrates the endurance and safety to discharge home with [address] [place] [address] (home vs OP services) and a follow up treatment frequency of 2-3x/wk. Please see assessment section for further patient specific details. If patient discharges prior to next session this note will serve as a discharge summary. Please see below for the latest assessment towards goals. Assessment   Performance deficits / Impairments: Decreased functional mobility ; Decreased endurance;Decreased ADL status; Decreased safe awareness;Decreased high-level IADLs;Decreased ROM; Decreased balance  Assessment: 54yo female admitted  for elective R TKA. PMH: HTN, seizures. PTA, pt lives with spouse and reports independence with ADL, IADL, fxl mobility, and driving. Today, pt functioning below basedline d/t above deficits, most limited by lethargy and R knee pain. Pt requires Mod/Max A x2 bed mobility, Min/Mod x2 tx from EOB/stedy, and Max A toileting. Need further assessment on BUE ROM/strength and cognition d/t lethargy this session. Anticipate Mod A LB and set up UB ADL based on balance, endurance, cognition, strength, and PLOF.  Cont acute OT to address above deficits and anticipate as pt pain and alertness improve that will be safe for d/c home woth spouse for 24 hr sup and OT 2-3x/week with RW and shower chair with back  Treatment Diagnosis: impaired ADL and fxl mobility  Prognosis: Good  Decision Making: Medium Complexity  OT Education: OT Role;Plan of Care;ADL Adaptive Strategies;Transfer Training  REQUIRES OT FOLLOW UP: Yes  Activity Tolerance  Activity Tolerance: Patient limited by pain;Treatment limited secondary to decreased cognition  Activity Tolerance: limited by pain and lethargy  Safety Devices  Safety Devices in place: Yes  Type of devices: Left in bed;Nurse notified;Call light within reach; Bed alarm in place;Gait belt;Patient at risk for falls         Patient Diagnosis(es): The encounter diagnosis was Arthritis of right knee. has a past medical history of Arthritis, Carpal tunnel syndrome, Convulsions, COVID-19, Dermatophytosis, Diarrhea, Hypertension, Hypertension, essential, Hypertensive kidney disease with chronic kidney disease stage V ([facility] [address] Medulloadrenal hyperfunc, Meningococcal encephalitis, Morbid obesity with BMI of 45.0-49.9, adult ([facility] [facility] Obesity, Other malaise and fatigue, and Seizure disorder ([facility] [address] [facility] has a past surgical history that includes Appendectomy;  section; Tonsillectomy; Endometrial ablation; Carpal tunnel release (Bilateral, ); and Ovary removal (). Treatment Diagnosis: impaired ADL and fxl mobility    Restrictions  Restrictions/Precautions  Restrictions/Precautions: Fall Risk, Weight Bearing  Lower Extremity Weight Bearing Restrictions  Right Lower Extremity Weight Bearing: Weight Bearing As Tolerated    Subjective   General  Chart Reviewed: Yes  Patient assessed for rehabilitation services?: Yes  Additional Pertinent Hx: 54yo female admitted  for elective R TKA.  PMH: HTN, seizures  Family / Caregiver Present: Yes  Referring Practitioner: Mariela Connell MD  Diagnosis: R TKA  Subjective  Subjective: Pt seated at EOB with RN and PT present upon arrival. Pt unable to give value on R knee pain, however, significant pain with transfers. Agreeable to OT eval  General Comment  Comments: RN ok to see    Social/Functional History  Social/Functional History  Lives With: Spouse  Type of Home: House  Home Layout: Two level, Able to Live on Main level with bedroom/bathroom, Laundry in basement, 1/2 bath on main level, Bed/Bath upstairs  Home Access: Stairs to enter without rails, Level entry  Entrance Stairs - Number of Steps: 1 + 1 + 1  Bathroom Shower/Tub: Doors, Walk-in shower (Half bath on main level)  Bathroom Toilet: Standard  Bathroom Equipment: 3-in-1 commode  ADL Assistance: Independent  Homemaking Assistance: Independent  Ambulation Assistance: Independent  Transfer Assistance: Independent  Active : Yes  Occupation: Full time employment  Additional Comments: No recent falls       Objective   Vision: Within Functional Limits  Hearing: Within functional limits    Orientation  Overall Orientation Status:  (YON)     Balance  Sitting Balance: Contact guard assistance  Standing Balance: Minimal assistance (within stedy for tx PRN)  Functional Mobility  Assist Level: Dependent/Total  Functional Mobility Comments: stedy d/y lethargy  Toilet Transfers  Toilet - Technique: Ambulating  Toilet Transfer: 2 Person assistance;Minimal assistance; Moderate assistance  Toilet Transfers Comments: stedy  ADL  Toileting: Moderate assistance (Assist steadying and pericare)  Additional Comments: Anticipate Mod A LB and set up UB ADL based on balance, endurance, cognition, strength, and PLOF  Tone RUE  RUE Tone: Normotonic  Tone LUE  LUE Tone: Normotonic  Coordination  Movements Are Fluid And Coordinated: Yes     Bed mobility  Sit to Supine: Maximum assistance; Moderate assistance;2 Person assistance  Scooting: Moderate assistance  Transfers  Sit to stand: Moderate assistance;Minimal assistance;2 Person assistance  Stand to sit: Minimal assistance; Moderate assistance;2 Person assistance     Cognition  Overall Cognitive Status:  (YON)        Sensation  Overall Sensation Status:  (YON)        LUE AROM (degrees)  LUE General AROM: YON  RUE AROM (degrees)  RUE General AROM: YON  LUE Strength  LUE Strength Comment: YON  RUE Strength  RUE Strength Comment: YON                Plan   Plan  Times per week: 3-5  Times per day: Daily  Current Treatment Recommendations: Strengthening, Functional Mobility Training, Patient/Caregiver Education & Training, Positioning, ROM, Endurance Training, Pain Management, Balance Training, Safety Education & Training, Self-Care / ADL    AM-PAC Score        AM-[facility] Inpatient Daily Activity Raw Score: 16 (07/20/21 1407)  AM-PAC Inpatient ADL T-Scale Score : 35.96 (07/20/21 1407)  ADL Inpatient CMS 0-100% Score: 53.32 (07/20/21 1407)  ADL Inpatient CMS G-Code Modifier : CK (07/20/21 1407)    Goals  Short term goals  Time Frame for Short term goals: prior to d/c  Short term goal 1: toileting SBA  Short term goal 2: ADL tx SBA  Short term goal 3: LB dressing (pants SBA, jose daniel hose Mod A)  Short term goal 4: UB dressing set up  Short term goal 5:  Tolerate 3 min fxl standing task SUP  Patient Goals   Patient goals : none stated       Therapy Time   Individual Concurrent Group Co-treatment   Time In 3492         Time Out 1330         Minutes 25         Timed Code Treatment Minutes: 10 Minutes (15 eval, 10 ADL)       DEBRA Lee, OTR/L

## 2021-07-20 NOTE — PROGRESS NOTES
Educated patient on purpose of 4 eyes skin assessment and asked patient if allowed to perform, patient verbalized understanding and agreed to assessment. 4 Eyes Skin Assessment     The patient is being assess for  Post-Op Surgical    I agree that 2 RN's have performed a thorough Head to Toe Skin Assessment on the patient. ALL assessment sites listed below have been assessed. Areas assessed by both nurses: faviola and estephanie  [x]   Head, Face, and Ears   [x]   Shoulders, Back, and Chest  [x]   Arms, Elbows, and Hands   [x]   Coccyx, Sacrum, and IschIum  [x]   Legs, Feet, and Heels        Does the Patient have Skin Breakdown?   No  (only surgical incision to right knee)        Steve Prevention initiated:  NA   Wound Care Orders initiated:  NA      WOC nurse consulted for Pressure Injury (Stage 3,4, Unstageable, DTI, NWPT, and Complex wounds), New and Established Ostomies:  NA      Nurse 1 eSignature: Electronically signed by Sybil Whalen RN on 7/20/21 at 6:20 PM EDT    **SHARE this note so that the co-signing nurse is able to place an eSignature**    Nurse 2 eSignature: Electronically signed by Hayden Vasquez RN on 7/20/21 at 1:38 PM EDT

## 2021-07-20 NOTE — ANESTHESIA POSTPROCEDURE EVALUATION
[facility] Department of Anesthesiology  Post-Anesthesia Note       Name:  Steph Lemons                                  Age:  46 y.o. MRN:  [id number]     Last Vitals & Oxygen Saturation: /81   Pulse 55   Temp 97.5 °F (36.4 °C) (Oral)   Resp 12   Ht 5' 1\" (1.549 m)   Wt 277 lb 12.5 oz (126 kg)   SpO2 93%   BMI 52.49 kg/m²   Patient Vitals for the past 4 hrs:   BP Temp Temp src Pulse Resp SpO2   07/20/21 1332      93 %   07/20/21 1306 134/81 97.5 °F (36.4 °C) Oral 55 12 92 %   07/20/21 1241    53     07/20/21 1234  97 °F (36.1 °C) Temporal 54 12 90 %   07/20/21 1232 (!) 127/95   56 11 91 %   07/20/21 1226    53 13 93 %   07/20/21 1217 (!) 143/101   52 15 94 %   07/20/21 1213    54     07/20/21 1212 (!) 132/93   57 15 94 %   07/20/21 1207 (!) 145/100   64 15 97 %   07/20/21 1202 (!) 143/86   55 16 97 %   07/20/21 1201    52 16 97 %   07/20/21 1200    62 18 99 %   07/20/21 1159    52 14 96 %   07/20/21 1158  97 °F (36.1 °C) Temporal 53 13 97 %       Level of consciousness:  Awake, alert    Respiratory: Respirations easy, no distress. Stable. Cardiovascular: Hemodynamically stable. Hydration: Adequate. PONV: Adequately managed. Post-op pain: Adequately controlled. Post-op assessment: Tolerated anesthetic well without complication. Complications:  None. Nerve block intact. Moving foot. No issues.     Jed Mistry MD  July 20, 2021   1:45 PM

## 2021-07-20 NOTE — CARE COORDINATION
Nikolai/[place] received referral from Ortho Jhonatan RN for [employer]. Received PT notes and DME Orders. Per Destiny Israel RN, patient would like to purchase a Shower Chair from [employer]. Will verify patient's insurance and follow up with patient to deliver the ordered item(s) prior to discharge.     Thank you for the referral.  Electronically signed by Gail Carrera on 7/20/2021 at 4:51 PM  Cell ph# [phone]

## 2021-07-21 VITALS
TEMPERATURE: 97.6 F | BODY MASS INDEX: 53.94 KG/M2 | HEART RATE: 64 BPM | DIASTOLIC BLOOD PRESSURE: 93 MMHG | SYSTOLIC BLOOD PRESSURE: 165 MMHG | HEIGHT: 61 IN | RESPIRATION RATE: 16 BRPM | OXYGEN SATURATION: 93 % | WEIGHT: 285.72 LBS

## 2021-07-21 LAB
ANION GAP SERPL CALCULATED.3IONS-SCNC: 14 MMOL/L (ref 3–16)
BUN BLDV-MCNC: 13 MG/DL (ref 7–20)
CALCIUM SERPL-MCNC: 8.8 MG/DL (ref 8.3–10.6)
CHLORIDE BLD-SCNC: 103 MMOL/L (ref 99–110)
CO2: 23 MMOL/L (ref 21–32)
CREAT SERPL-MCNC: 1.2 MG/DL (ref 0.6–1.1)
GFR AFRICAN AMERICAN: 57
GFR NON-AFRICAN AMERICAN: 47
GLUCOSE BLD-MCNC: 121 MG/DL (ref 70–99)
GLUCOSE BLD-MCNC: 128 MG/DL (ref 70–99)
HCT VFR BLD CALC: 39.7 % (ref 36–48)
HEMOGLOBIN: 13.7 G/DL (ref 12–16)
MCH RBC QN AUTO: 26.5 PG (ref 26–34)
MCHC RBC AUTO-ENTMCNC: 34.6 G/DL (ref 31–36)
MCV RBC AUTO: 76.7 FL (ref 80–100)
PDW BLD-RTO: 14.7 % (ref 12.4–15.4)
PERFORMED ON: ABNORMAL
PLATELET # BLD: 178 K/UL (ref 135–450)
PMV BLD AUTO: 8.3 FL (ref 5–10.5)
POTASSIUM SERPL-SCNC: 4.1 MMOL/L (ref 3.5–5.1)
RBC # BLD: 5.18 M/UL (ref 4–5.2)
SODIUM BLD-SCNC: 140 MMOL/L (ref 136–145)
WBC # BLD: 8.3 K/UL (ref 4–11)

## 2021-07-21 PROCEDURE — 97535 SELF CARE MNGMENT TRAINING: CPT

## 2021-07-21 PROCEDURE — 97530 THERAPEUTIC ACTIVITIES: CPT

## 2021-07-21 PROCEDURE — 96366 THER/PROPH/DIAG IV INF ADDON: CPT

## 2021-07-21 PROCEDURE — G0378 HOSPITAL OBSERVATION PER HR: HCPCS

## 2021-07-21 PROCEDURE — 6360000002 HC RX W HCPCS: Performed by: ORTHOPAEDIC SURGERY

## 2021-07-21 PROCEDURE — 6370000000 HC RX 637 (ALT 250 FOR IP): Performed by: ORTHOPAEDIC SURGERY

## 2021-07-21 PROCEDURE — 97116 GAIT TRAINING THERAPY: CPT

## 2021-07-21 PROCEDURE — 85027 COMPLETE CBC AUTOMATED: CPT

## 2021-07-21 PROCEDURE — 94761 N-INVAS EAR/PLS OXIMETRY MLT: CPT

## 2021-07-21 PROCEDURE — 2580000003 HC RX 258: Performed by: ORTHOPAEDIC SURGERY

## 2021-07-21 PROCEDURE — 36415 COLL VENOUS BLD VENIPUNCTURE: CPT

## 2021-07-21 PROCEDURE — 80048 BASIC METABOLIC PNL TOTAL CA: CPT

## 2021-07-21 PROCEDURE — 97110 THERAPEUTIC EXERCISES: CPT

## 2021-07-21 RX ORDER — CEFADROXIL 500 MG/1
500 CAPSULE ORAL 2 TIMES DAILY
Qty: 14 CAPSULE | Refills: 0 | Status: SHIPPED | OUTPATIENT
Start: 2021-07-21 | End: 2021-07-28

## 2021-07-21 RX ORDER — OXYCODONE HYDROCHLORIDE 5 MG/1
5-10 TABLET ORAL EVERY 6 HOURS PRN
Qty: 40 TABLET | Refills: 0
Start: 2021-07-21 | End: 2021-08-03 | Stop reason: SDUPTHER

## 2021-07-21 RX ADMIN — AMLODIPINE BESYLATE 10 MG: 10 TABLET ORAL at 09:32

## 2021-07-21 RX ADMIN — CEFAZOLIN SODIUM 3000 MG: 10 INJECTION, POWDER, FOR SOLUTION INTRAVENOUS at 01:30

## 2021-07-21 RX ADMIN — ACETAMINOPHEN 650 MG: 325 TABLET ORAL at 09:31

## 2021-07-21 RX ADMIN — PREGABALIN 75 MG: 75 CAPSULE ORAL at 09:32

## 2021-07-21 RX ADMIN — ACETAMINOPHEN 650 MG: 325 TABLET ORAL at 01:29

## 2021-07-21 RX ADMIN — ATENOLOL 50 MG: 50 TABLET ORAL at 09:32

## 2021-07-21 RX ADMIN — OXYCODONE HYDROCHLORIDE 10 MG: 10 TABLET ORAL at 06:21

## 2021-07-21 RX ADMIN — LEVETIRACETAM 500 MG: 500 TABLET ORAL at 09:31

## 2021-07-21 RX ADMIN — APIXABAN 2.5 MG: 2.5 TABLET, FILM COATED ORAL at 09:31

## 2021-07-21 RX ADMIN — OXYCODONE HYDROCHLORIDE 10 MG: 10 TABLET ORAL at 10:19

## 2021-07-21 RX ADMIN — DOCUSATE SODIUM 50 MG AND SENNOSIDES 8.6 MG 1 TABLET: 8.6; 5 TABLET, FILM COATED ORAL at 09:32

## 2021-07-21 ASSESSMENT — PAIN DESCRIPTION - DESCRIPTORS
DESCRIPTORS: PRESSURE;ACHING
DESCRIPTORS: ACHING
DESCRIPTORS: PRESSURE;ACHING
DESCRIPTORS: ACHING
DESCRIPTORS: ACHING

## 2021-07-21 ASSESSMENT — PAIN - FUNCTIONAL ASSESSMENT
PAIN_FUNCTIONAL_ASSESSMENT: PREVENTS OR INTERFERES SOME ACTIVE ACTIVITIES AND ADLS
PAIN_FUNCTIONAL_ASSESSMENT: ACTIVITIES ARE NOT PREVENTED
PAIN_FUNCTIONAL_ASSESSMENT: ACTIVITIES ARE NOT PREVENTED
PAIN_FUNCTIONAL_ASSESSMENT: PREVENTS OR INTERFERES SOME ACTIVE ACTIVITIES AND ADLS
PAIN_FUNCTIONAL_ASSESSMENT: ACTIVITIES ARE NOT PREVENTED

## 2021-07-21 ASSESSMENT — PAIN DESCRIPTION - LOCATION
LOCATION: KNEE

## 2021-07-21 ASSESSMENT — PAIN SCALES - GENERAL
PAINLEVEL_OUTOF10: 6
PAINLEVEL_OUTOF10: 0
PAINLEVEL_OUTOF10: 7
PAINLEVEL_OUTOF10: 3
PAINLEVEL_OUTOF10: 9
PAINLEVEL_OUTOF10: 8

## 2021-07-21 ASSESSMENT — PAIN DESCRIPTION - PROGRESSION
CLINICAL_PROGRESSION: NOT CHANGED

## 2021-07-21 ASSESSMENT — PAIN DESCRIPTION - ORIENTATION
ORIENTATION: RIGHT

## 2021-07-21 ASSESSMENT — PAIN DESCRIPTION - ONSET
ONSET: ON-GOING

## 2021-07-21 ASSESSMENT — PAIN DESCRIPTION - FREQUENCY
FREQUENCY: CONTINUOUS

## 2021-07-21 ASSESSMENT — PAIN DESCRIPTION - PAIN TYPE
TYPE: SURGICAL PAIN

## 2021-07-21 NOTE — PLAN OF CARE
Problem: Cardiac:  Goal: Hemodynamic stability will improve  Description: Hemodynamic stability will improve  Outcome: Ongoing  Note: Hemodynamic stability will improve. Problem: Discharge Planning:  Goal: Discharged to appropriate level of care  Description: Discharged to appropriate level of care  Outcome: Ongoing  Note: Pt will be discharged to appropriate level of care. Pt plans to go home with home care. Problem: Mobility - Impaired:  Goal: Mobility will improve  Description: Mobility will improve  Outcome: Ongoing  Note: Pt mobility is increasing. Pt continues with therapy. Pt is SBA x1 with walker. Will monitor. Problem: Infection - Surgical Site:  Goal: Will show no infection signs and symptoms  Description: Will show no infection signs and symptoms  Outcome: Ongoing  Note: Pt is free of signs and symptoms of infection. Incision and dressing are clean, dry and intact. Vital signs stable. Will monitor. Problem: Pain - Acute:  Goal: Pain level will decrease  Description: Pain level will decrease  Outcome: Ongoing  Note: Pt assessed for pain. Pt in pain and assessed with 0-10 pain rating scale. Pt given prescribed analgesic for pain. (See eMar) Pt satisfied with pain relief thus far. Will reassess and continue to monitor. Problem: Pain:  Goal: Pain level will decrease  Description: Pain level will decrease  Outcome: Ongoing  Note: Pt assessed for pain. Pt in pain and assessed with 0-10 pain rating scale. Pt given prescribed analgesic for pain. (See eMar) Pt satisfied with pain relief thus far. Will reassess and continue to monitor. Problem: Pain:  Goal: Control of acute pain  Description: Control of acute pain  Outcome: Ongoing  Note: Pt assessed for pain. Pt in pain and assessed with 0-10 pain rating scale. Pt given prescribed analgesic for pain. (See eMar) Pt satisfied with pain relief thus far. Will reassess and continue to monitor.       Problem: Pain:  Goal: Control of chronic pain  Description: Control of chronic pain  Outcome: Ongoing  Note: Pt assessed for pain. Pt in pain and assessed with 0-10 pain rating scale. Pt given prescribed analgesic for pain. (See eMar) Pt satisfied with pain relief thus far. Will reassess and continue to monitor. Problem: Skin Integrity:  Goal: Will show no infection signs and symptoms  Description: Will show no infection signs and symptoms  Outcome: Ongoing  Note: Pt is free of signs and symptoms of infection. Incision and dressing are clean, dry and intact. Vital signs stable. Will monitor. Problem: Skin Integrity:  Goal: Absence of new skin breakdown  Description: Absence of new skin breakdown  Outcome: Ongoing  Note: Pt assessed for skin break down. Skin was warm and dry to touch. Pt can regulate head of bed without assistance. Pt can turn self independently. Will continue to monitor and assess.

## 2021-07-21 NOTE — PROGRESS NOTES
Occupational Therapy  Facility/Department: [address] ORTHOPEDICS  Daily Treatment Note  NAME: Bryan Patel  : 1970  MRN: [id number]    Date of Service: 2021    Discharge Recommendations:  2-3 sessions per week, Patient would benefit from continued therapy after discharge, 24 hour supervision or assist  OT Equipment Recommendations  Walker: Rolling  ADL Assistive Devices: Shower Chair with back    Bryan Patel scored a 18/24 on the AM-PAC ADL Inpatient form. Current research shows that an AM-PAC score of 18 or greater is typically associated with a discharge to the patient's home setting. Based on the patient's AM-PAC score, and their current ADL deficits, it is recommended that the patient have 2-3 sessions per week of Occupational Therapy at d/c to increase the patient's independence. At this time, this patient demonstrates the endurance and safety to discharge home with [address] [place] [address] (home vs OP services) and a follow up treatment frequency of 2-3x/wk. Please see assessment section for further patient specific details. If patient discharges prior to next session this note will serve as a discharge summary. Please see below for the latest assessment towards goals. Assessment   Performance deficits / Impairments: Decreased functional mobility ; Decreased endurance;Decreased ADL status; Decreased safe awareness;Decreased high-level IADLs;Decreased ROM; Decreased balance  Assessment: 52yo female admitted  for elective R TKA. PMH: HTN, seizures. PTA, pt lives with spouse and reports independence with ADL, IADL, fxl mobility, and driving.  sesion: pt functioning below basedline d/t above deficits, most limited by R knee pain. Pt improves today with Mason bed mobility with leg , CGA fo transfers and fxl mobility with RW, set up Ub dressing, CGA pants, and Max A jose daniel hose/socks. Pt educated on LB dressing technique, safe transfers, ice management, and jose daniel hose wear/management.  Cont acute OT to address above deficits and safe for d/c home with spouse for 24 hr sup and OT 2-3x/week with RW and shower chair with back  Treatment Diagnosis: impaired ADL and fxl mobility  Prognosis: Good  OT Education: OT Role;Plan of Care;ADL Adaptive Strategies;Transfer Training  Patient Education: LB dressing technique, transfers, ice management, and jose daniel hose wear/management  REQUIRES OT FOLLOW UP: Yes  Activity Tolerance  Activity Tolerance: Patient limited by pain  Safety Devices  Safety Devices in place: Yes  Type of devices: Nurse notified;Call light within reach;Gait belt;Patient at risk for falls; Chair alarm in place; Left in chair       Patient Diagnosis(es): Diagnoses of Arthritis of right knee and Primary osteoarthritis of right knee were pertinent to this visit. has a past medical history of Arthritis, Carpal tunnel syndrome, Convulsions, COVID-19, Dermatophytosis, Diarrhea, Hypertension, Hypertension, essential, Hypertensive kidney disease with chronic kidney disease stage V ([facility] [address] Medulloadrenal hyperfunc, Meningococcal encephalitis, Morbid obesity with BMI of 45.0-49.9, adult ([facility] [address] Obesity, Other malaise and fatigue, and Seizure disorder ([facility] [address] has a past surgical history that includes Appendectomy;  section; Tonsillectomy; Endometrial ablation; Carpal tunnel release (Bilateral, ); Ovary removal (); and Total knee arthroplasty (Right, 2021). Restrictions  Restrictions/Precautions  Restrictions/Precautions: Fall Risk, Weight Bearing  Lower Extremity Weight Bearing Restrictions  Right Lower Extremity Weight Bearing: Weight Bearing As Tolerated    Subjective   General  Chart Reviewed: Yes  Patient assessed for rehabilitation services?: Yes  Additional Pertinent Hx: 54yo female admitted  for elective R TKA.  PMH: HTN, seizures  Family / Caregiver Present: Yes  Referring Practitioner: Mariela Connell MD  Diagnosis: R TKA  Subjective  Subjective: Pt resting in bed upon arrival and agreeable to OT tx. Pt reports 7/10 pain in RLE knee at rest.  General Comment  Comments: RN ok to see      Orientation  Orientation  Overall Orientation Status: Within Normal Limits (YON)  Objective    ADL  Grooming: Stand by assistance;Contact guard assistance (sink side oral care)  UE Dressing: Setup  LE Dressing: Maximum assistance;Contact guard assistance (CGA pants, Max A jose daniel hose and socks (instrcuted SO on donning jose daniel hose and management))        Balance  Sitting Balance: Stand by assistance  Standing Balance: Contact guard assistance  Standing Balance  Time: ~30sec  Activity: managing pants up with Unilateral support on RW  Functional Mobility  Functional - Mobility Device: Rolling Walker  Activity:  (EOB>around foot of bed>recliner)  Assist Level: Contact guard assistance  Functional Mobility Comments: slower, steady gait. No LOB or unstadiness  Toilet Transfers  Toilet Transfers Comments: declines need- has toilet safety frame at home  Bed mobility  Sit to Supine: Minimal assistance (gait belt as leg )  Scooting: Stand by assistance  Comment: HOB elevated- plans to sleep in recliner/couch at home  Transfers  Sit to stand: Contact guard assistance  Stand to sit: Contact guard assistance  Transfer Comments: EOB>RW>recliner. min cues for hand placement.                        Cognition  Overall Cognitive Status: WNL                                      Plan   Plan  Times per week: 3-5  Times per day: Daily  Current Treatment Recommendations: Strengthening, Functional Mobility Training, Patient/Caregiver Education & Training, Positioning, ROM, Endurance Training, Pain Management, Balance Training, Safety Education & Training, Self-Care / ADL    AM-PAC Score        AM-PAC Inpatient Daily Activity Raw Score: 18 (07/21/21 1121)  AM-PAC Inpatient ADL T-Scale Score : 38.66 (07/21/21 1121)  ADL Inpatient CMS 0-100% Score: 46.65 (07/21/21 1121)  ADL Inpatient CMS G-Code Modifier : CK (07/21/21 1121)    Goals  Short term goals  Time Frame for Short term goals: prior to d/c. 7/21 continue/progressing  Short term goal 1: toileting SBA  Short term goal 2: ADL tx SBA  Short term goal 3: LB dressing (pants SBA, jose daniel hose Mod A)  Short term goal 4: UB dressing set up  Short term goal 5:  Tolerate 3 min fxl standing task SUP  Patient Goals   Patient goals : none stated       Therapy Time   Individual Concurrent Group Co-treatment   Time In 0747         Time Out 1120         Minutes 45         Timed Code Treatment Minutes: 45 Minutes (45 ADL)       Josefina Graves, DEBRA, OTR/L

## 2021-07-21 NOTE — PROGRESS NOTES
Huddle performed this morning including Nurse navigator Ivis Rausch, Physical therapist Corrin Apley, and Occupational therapist tahir. Discussed plan of care, discharge plan, and dme needs if applicable for orthopedic total joint patient.

## 2021-07-21 NOTE — PROGRESS NOTES
[facility] ([facility]) Orthopaedic Surgery  Progress Note    Nona Nelson is a 46 y.o. who is POD#1 s/p R TKA. She is lying in bed this morning. She reports pain in her right knee that is currently controlled. She ate breakfast without issue. She is not having any trouble urinating. Patient Active Problem List   Diagnosis    Carpal tunnel syndrome    Hypertension    Obesity    Renal insufficiency    Vitamin D deficiency    Contusion of right knee    Infected abrasion of left hand    Hand dermatitis    Candidiasis of finger web    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[address]    Anemia in chronic renal disease    Anemia, iron deficiency    Chronic kidney disease, stage 3, mod decreased GFR (HCC)    Chronic pain of right knee    Family history of cancer    Hypertension, essential, benign    Overweight and obesity    Primary localized osteoarthrosis of the knee    Proteinuria    Renal osteodystrophy    Tear of PCL (posterior cruciate ligament) of knee    Hypertension, essential    Morbid obesity with BMI of 45.0-49.9, adult (HCC)    Arthritis of right knee       Exam:  Blood pressure (!) 165/93, pulse 64, temperature 97.6 °F (36.4 °C), temperature source Oral, resp. rate 16, height 5' 1\" (1.549 m), weight 285 lb 11.5 oz (129.6 kg), SpO2 93 %. Appearance: lying in hospital bed, appears to be in no acute distress, awake and alert  Resp: unlabored breathing on room air  Skin: warm, dry and intact with out erythema or significant increased temperature  Neuro: grossly intact both lower extremities. Intact sensation to light touch. Motor exam 4+ to 5/5 in all major motor groups. RLE: Prineo dressing in place. There is expected swelling about the knee. She is wearing compression stocking. Brisk capillary refill in the toes. She demonstrates active ankle plantarflexion and dorsiflexion. Sensation intact light touch throughout the extremity.     Labs:  Hgb 13.7  Hct 39.7    Assessment:  Right total knee arthroplasty    Plan:   Activity as tolerated  Weight-bear as tolerated right lower extremity  Pain control  PT/OT  Complete perioperative antibiotics  DVT prophylaxis: Eliquis 2.5 mg twice daily for 2 weeks postoperatively  Disposition: Likely home today    Rosemary West MD  7/21/2021

## 2021-07-21 NOTE — PROGRESS NOTES
CLINICAL PHARMACY NOTE: MEDS TO BEDS    Total # of Prescriptions Filled: 3   The following medications were delivered to the patient:  Current Discharge Medication List      START taking these medications    Details   cefadroxil (DURICEF) 500 MG capsule Take 1 capsule by mouth 2 times daily for 7 days  Qty: 14 capsule, Refills: 0         ·   · Oxycodone 5MG  · Eliquis 2.5MG    Additional Documentation:

## 2021-07-21 NOTE — PROGRESS NOTES
Dressing to R leg removed, prineo remains in place. Incision and neurovascular checks WDL. Lew hose applied. Patient tolerated well. Denies any further needs at this time.     Electronically signed by Frank Thomas RN on 7/21/2021 at 4:02 AM

## 2021-07-21 NOTE — CARE COORDINATION
[facility]    DC order noted, all docs needed have been faxed to [facility] for home care services.           SN PT OT      Derrick Jimenez  Work mobile: [phone]  [facility] office: [phone]

## 2021-07-21 NOTE — PROGRESS NOTES
Physical Therapy    Facility/Department: [address] ORTHOPEDICS  Daily Treatment / Discharge      NAME: Mer Menendez  : 1970  MRN: [id number]    Date of Service: 2021    Discharge Recommendations:  24 hour supervision or assist, S Level Dick-Grade-Allee 18 scored a 18/24 on the AM-PAC short mobility form. Current research shows that an AM-PAC score of 18 or greater is typically associated with a discharge to the patient's home setting. Based on the patient's AM-PAC score and their current functional mobility deficits, it is recommended that the patient have 2-3 sessions per week of Physical Therapy at d/c to increase the patient's independence. At this time, this patient demonstrates the endurance and safety to discharge home with home therapy services and a follow up treatment frequency of 2-3x/wk. Please see assessment section for further patient specific details. PT Equipment Recommendations  Equipment Needed: Yes  Mobility Devices: Amber Serene: Rolling  Other: HILARY HEIGHT    Assessment   Body structures, Functions, Activity limitations: Decreased functional mobility ; Decreased ROM; Decreased strength; Increased pain  Assessment: Pt is a 46 y.o. F. admitted 2021 for scheduled R TKA via Dr. Vahid Champagne. Prior status: lived with  and independent in all functional mobility including working as a teacher, driving, and ambulation without device. Status 2021: Bed mobility CGA and cues. Transfers: SBA-CGA and cues. Safe but guarded technique. Gait RW 76' with SBA with Good tolerance and quality despite high pain. Patient appears appropriate for DC to home with initial 24 hour supervision/assist and home PT level 1. Pt requires a HILARY HEIGHT RW to promote safe and quality mobility. Prognosis: Good  Decision Making: Medium Complexity  History: as noted  Exam: Strength; ROM; Balance; Transfers  Clinical Presentation: Evolving  PT Education: Goals; General Safety;Gait Training;Orientation;PT Role;Plan of Care;Family Education; Functional Mobility Training; Injury Prevention;Precautions; Equipment;Transfer Training; Adaptive Device Training  Patient Education: IMportance of positioning to maximize knee extension and flesion ROM. Barriers to Learning: Pain; Fatigue  REQUIRES PT FOLLOW UP: No (transition to next level of care.)  Activity Tolerance  Activity Tolerance: Patient limited by pain       Patient Diagnosis(es): Diagnoses of Arthritis of right knee and Primary osteoarthritis of right knee were pertinent to this visit. has a past medical history of Arthritis, Carpal tunnel syndrome, Convulsions, COVID-19, Dermatophytosis, Diarrhea, Hypertension, Hypertension, essential, Hypertensive kidney disease with chronic kidney disease stage V ([facility] [address] Medulloadrenal hyperfunc, Meningococcal encephalitis, Morbid obesity with BMI of 45.0-49.9, adult ([facility] [address] Obesity, Other malaise and fatigue, and Seizure disorder ([facility] [address] has a past surgical history that includes Appendectomy;  section; Tonsillectomy; Endometrial ablation; Carpal tunnel release (Bilateral, ); Ovary removal (); and Total knee arthroplasty (Right, 2021). Restrictions  Restrictions/Precautions  Restrictions/Precautions: Fall Risk, Weight Bearing  Lower Extremity Weight Bearing Restrictions  Right Lower Extremity Weight Bearing: Weight Bearing As Tolerated        Subjective  General  Chart Reviewed: Yes  Patient assessed for rehabilitation services?: Yes  Additional Pertinent Hx: Pt is a 46 y.o. F. admitted  for scheduled R TKA. PMH includes HTN, CKD, seizure disorder, morbid obesity. Response To Previous Treatment: Patient with no complaints from previous session. Family / Caregiver Present: Yes (Mom. 2nd family member arrives)  Referring Practitioner: Dr. Porter Se  Subjective  Subjective: Patient in bed. Awake. Agreeable to therapy.  Reports R knee and proximal thigh pain at 8-9/10 with mobility and exs. Pain Screening  Patient Currently in Pain: Yes       Orientation  Orientation  Overall Orientation Status: Within Functional Limits     Social/Functional History  Social/Functional History  Lives With: Spouse  Type of Home: House  Home Layout: Two level, Able to Live on Main level with bedroom/bathroom, Laundry in basement, 1/2 bath on main level, Bed/Bath upstairs  Home Access: Stairs to enter without rails, Level entry  Entrance Stairs - Number of Steps: 1 + 1 + 1  Bathroom Shower/Tub: Doors, Walk-in shower (Half bath on main level)  Bathroom Toilet: Standard  Bathroom Equipment: 3-in-1 commode  ADL Assistance: Independent  Homemaking Assistance: Independent  Ambulation Assistance: Independent  Transfer Assistance: Independent  Active : Yes  Occupation: Full time employment  Additional Comments: No recent falls     Cognition   Cognition  Overall Cognitive Status: WNL    Objective  Bed mobility  Supine to Sit: Contact guard assistance (HOB up, 1 rail. Use of looped belt as leg . Extra time)  Sit to Supine: Unable to assess (returned to room via RN)  Transfers  Sit to Stand: Contact guard assistance;Stand by assistance (cues for technique. Slow, effortful)  Stand to sit: Contact guard assistance;Stand by assistance (cues for technique. Slow, effortful)  Comment: Car transfer demonstrated to patient/mom. They verb understanding. Ambulation  Ambulation?: Yes  Ambulation 1  Surface: level tile  Device: Rolling Walker  Assistance: Stand by assistance;Contact guard assistance  Quality of Gait: Discontinuous, step to pattern. Progresses to step through pattern with cues. Cues to stay within wh walker base. Steady. Gait Deviations: Decreased step height;Decreased step length  Distance: 75'  Stairs/Curb  Stairs?: Yes  Stairs  Curbs: 6\"  Device: Rolling walker  Assistance: Contact guard assistance  Comment: cues for technique. Performs step forward and backward.   Balance  Posture: Good  Sitting - Static: Good  Sitting - Dynamic: Good  Standing - Static: Good (@ RW)  Standing - Dynamic: Good (at RW)  Exercises  Quad Sets: x 5 B  Gluteal Sets: x 5 B  Hip Abduction: Adductor Sets x 5 B  Knee Active Range of Motion: PROM 10-60/65. Guarded  Ankle Pumps: x 10     Plan   Plan  Times per week: 2-3x  Specific instructions for Next Treatment: Progress OOB activity; Ambulate when able  Current Treatment Recommendations: Strengthening, ROM, Endurance Training, Balance Training, Functional Mobility Training, Transfer Training, Gait Training, Stair training, Positioning, Equipment Evaluation, Education, & procurement, Modalities, Patient/Caregiver Education & Training, Safety Education & Training, Manual Therapy - Soft Tissue Mobilization, Neuromuscular Re-education, Home Exercise Program  Safety Devices  Type of devices: Gait belt (returned to room by RN)    AM-PAC Score  AM-PAC Inpatient Mobility Raw Score : 18 (07/21/21 1323)  AM-PAC Inpatient T-Scale Score : 43.63 (07/21/21 1323)  Mobility Inpatient CMS 0-100% Score: 46.58 (07/21/21 1323)  Mobility Inpatient CMS G-Code Modifier : CK (07/21/21 1323)    Goals  Short term goals  Time Frame for Short term goals: In 2-3 days pt will perform. 7-: all goals met and exceeded  Short term goal 1: Bed mobility Min A  Short term goal 2: Transfers Min A  Short term goal 3: Ambulation 8' with walker, Min A  Short term goal 4: Ascend/Descend curb step with Min A  Long term goals  Time Frame for Long term goals : LTG = STG  Patient Goals   Patient goals :  To return directly home       Therapy Time   Individual Concurrent Group Co-treatment   Time In 0852         Time Out 0950         Minutes 58               Gt 15; TA 25 TE 18  Niki Smith, PT    Electronically signed by Gilford Legato, [address] [place] [address] Drive [phone])  on 7/21/2021 at 5:20 PM

## 2021-07-21 NOTE — PROGRESS NOTES
Patient A&O in the bed. Patient tolerating PO intake well. PM medications given without complications. Patient denies nausea or vomiting. Patient c/o pain, PRN pain medication given - see MAR. Patient weaned off nascal cannula O2 - pt now on room air maintaining O2 sats between 95-97%. Patient denies SOB. Dressing to R leg is clean, dry, intact. Neuro checks WDL, skin cool to touch, pedal pulses present. Call light within reach, able to make needs known, fall precautions in place. Will check on patient Q2 hours.     Electronically signed by Fuad Bautista RN on 7/20/2021 at 10:38 PM

## 2021-07-21 NOTE — CARE COORDINATION
[facility] delivered requested 2-Wheeled Walker Luis Manuel to patient and reviewed insurance coverage and equipment set up with patient. Patient declined purchase of Shower Chair at this time due to shower is upstairs. Patient is aware of locations where a SC can be purchased after d/c to home. PT present and aware.     Thank you for the referral.  Electronically signed by Leon Erazo on 7/21/2021 at 9:33 AM Cell ph# [phone]

## 2021-07-21 NOTE — PROGRESS NOTES
Clinical Pharmacy Note  Medication Counseling    Reviewed new medications started during hospital admission: apixaban, oxycodone. Indications and side effects were emphasized during counseling. All medication-related questions addressed. Patient verbalized understanding of education. Should the patient express any additional questions or concerns regarding their medications, please do not hesitate to contact the pharmacy department.     Lynnette Diaz [facility], PharmD 7/21/2021 10:20 AM

## 2021-07-21 NOTE — PROGRESS NOTES
Data- discharge order received, patient verbalized agreement to discharge, disposition to previous residence, needs noted for HHC/DME and informed Dr Nixon Drake. Action- discharge instructions prepared and given to patient and , patient and  verbalized understanding. Medication information packet given r/t NEW and/or CHANGED prescriptions emphasizing name/purpose/side effects, pt verbalized understanding. Discharge instruction summary: Diet- general, Activity- wbat, Primary Care Physician as followsAlan Shell -[phone]. f/u appointment with orthopedic office noted below, immunizations reviewed and discussed with patient, prescription medications to be filled by retail pharmacy and then delivered. Inpatient surgical procedure precautions reviewed: . Neurovascular check performed and patient is WNLs, denies numbness/tingling in extremties. Incision site  prineo glue dressing assessed and is  clean,dry, and intact, no signs of redness, drainage, or odor noted. patient's bedside RN lorenzo notified of patient completing discharge instructions and iv removal. Nurse Navigator and Orthopedic Office contact information on discharge instructions and provided to patient. Response- IV removed. Medications delivered to patient via meds to bed program. Disposition is home with [facility] (DME delivered to patient by Salome Hartman with [employer]), to be transported with family.      Future Appointments   Date Time Provider Raj Jenkins   8/5/2021 10:00 AM Hilda Lauren MD [facility]   8/5/2021 12:15 PM Sosa Rodriguez, MADONNA FKALI PT [facility]   8/18/2021  8:00 AM Sarah Clark MD [facility]           Electronically signed by Loyda De La Cruz RN on 7/21/2021 at 11:53 AM

## 2021-07-22 ENCOUNTER — TELEPHONE (OUTPATIENT)
Dept: ORTHOPEDIC SURGERY | Age: 51
End: 2021-07-22

## 2021-07-22 ENCOUNTER — CARE COORDINATION (OUTPATIENT)
Dept: CASE MANAGEMENT | Age: 51
End: 2021-07-22

## 2021-07-23 ENCOUNTER — CARE COORDINATION (OUTPATIENT)
Dept: CASE MANAGEMENT | Age: 51
End: 2021-07-23

## 2021-07-23 RX ORDER — ACETAMINOPHEN 500 MG
500 TABLET ORAL EVERY 6 HOURS PRN
COMMUNITY
End: 2022-03-29

## 2021-07-23 NOTE — CARE COORDINATION
[facility] Transitions Initial Follow Up Call    Call within 2 business days of discharge: Yes    Patient: Iam Garcia Patient : 1970   MRN: [id number]  Reason for Admission: Arthritis of right knee  Discharge Date: 21 RARS: No data recorded    Last Discharge [facility]       Complaint Diagnosis Description Type Department Provider    21  Arthritis of right knee . .. Admission (Discharged) MD Gifty         Transitions of Care Initial Call    Was this an external facility discharge? No Discharge Facility: N/A    Challenges to be reviewed by the provider   Additional needs identified to be addressed with provider: No               Method of communication with provider : none        Was this a readmission? No  Patient stated reason for admission: Scheduled procedure    Care Transition Nurse (CTN) contacted the patient by telephone to perform post hospital discharge assessment. Verified name and  with patient as identifiers. Provided introduction to self, and explanation of the CTN role. CTN reviewed discharge instructions, medical action plan and red flags with patient who verbalized understanding. Patient given an opportunity to ask questions and does not have any further questions or concerns at this time. Were discharge instructions available to patient? Yes. Reviewed appropriate site of care based on symptoms and resources available to patient including: PCP, Specialist and Home health. The patient agrees to contact the PCP office for questions related to their healthcare. Medication reconciliation was performed with patient, who verbalizes understanding of administration of home medications. Advised obtaining a 90-day supply of all daily and as-needed medications. Discussed COVID vaccination status: Yes. Patricia Cornejo states she has NOT received the COVID vaccine. CTN provided contact information.        Non-face-to-face services provided:  Obtained and reviewed discharge summary and/or continuity of care documents  Assessment and support for treatment adherence and medication management-medication list reviewed & 1111f completed. Care Transitions 24 Hour Call    Do you have any ongoing symptoms?: No  Do you have a copy of your discharge instructions?: Yes  Do you have all of your prescriptions and are they filled?: Yes  Have you been contacted by a [address]?: No  Have you scheduled your follow up appointment?: Yes  How are you going to get to your appointment?: Car - family or friend to transport  Were you discharged with any Home Care or Post Acute Services: Yes  Post Acute Services: Home Health (Comment: [facility])  Do you feel like you have everything you need to keep you well at home?: Yes  Care Transitions Interventions       2nd attempt at [facility] discharge phone call. Ronak Dee states she is doing \"okay\". She states [facility] is active. Ronak Dee confirmed her post op appointment with Cherrie Avalos. She states a family member will provide her transportation. She states her PCP told her a hospital follow up appointment would not be needed as long as she follows up the the surgeon. Ronak Dee states her B/P yesterday was 140/91. She states she is using a walker for ambulation. Ronak Dee states she is wearing the SYDNIE hose. She states she is moving about her home. Ronak Dee states her dressing is dry & intact. She states she is tolerating food & fluids - denies any N/V. Ronak Dee rates her pain at 3/10. She is alternating her pain medication with Extra strength tylenol as directed per the physician. She feels this is effective. She states she has not had a bowel movement - she is passing flatus and will be starting Miralax today. Ronak Dee states she is urinating without difficulty. She denies any fever. She states she is using ice to the surgical site and elevating her leg as much as possible. She denies any needs at this time.  Encouraged Ronak Dee to reach out to [facility] or her surgeon with any non life threatening medical issues or concerns over the weekend.     Follow Up  Future Appointments   Date Time Provider Raj Jenkins   8/5/2021 10:00 AM Suresh Cordoba MD [facility]   8/5/2021 12:15 PM Arturo Garcia, MADONNA MHFZ PT [facility]   8/18/2021  8:00 AM Andreea Salomon MD [facility]       Ally Sullivan RN

## 2021-07-30 ENCOUNTER — CARE COORDINATION (OUTPATIENT)
Dept: CASE MANAGEMENT | Age: 51
End: 2021-07-30

## 2021-07-30 NOTE — DISCHARGE SUMMARY
Physician Discharge Summary     Patient ID:  Beata Manjarrez  [id number]  27 y.o.  1970    Admit date: 7/20/2021    Discharge date and time: 7/21/2021 11:45 AM     Admitting Physician: Amelia Hahn MD     Discharge Physician: Marifer Hinojosa    Admission Diagnoses: Arthritis of right knee [M17.11]    Discharge Diagnoses: right knee OA    Admission Condition: good    Discharged Condition: good    Indication for Admission: Failed conservative treatment as outpatient for joint pain including PT and pain meds. This patient was then electively scheduled for total joint replacement surgery    Surgical procedure: right TKA    Consults: PT OT SS    This patient had no postoperative complications. They has PT and OT for ADL's . IV and PO pain med for pain control and was eventually DC in stable condition    Treatments: analgesia,  therapies: PT OT,  and surgery      Disposition: home    Patient Instructions:   [unfilled]  Activity: activity as tolerated  Diet: regular diet  Wound Care: keep wound clean and dry    Follow-up with MANISHA Cabral in 2 weeks.     Signed:  YARIEL Christensen CNP  7/30/2021  2:49 PM

## 2021-07-30 NOTE — CARE COORDINATION
[facility] Transitions Follow Up Call    2021    Patient: Johanny Suarez  Patient : 1970   MRN: [id number]  Reason for Admission: arthritis of right knee  Discharge Date: 21 RARS: No data recorded    Care Transitions Follow Up Call    Needs to be reviewed by the provider   Additional needs identified to be addressed with provider: No               Method of communication with provider : none      Care Transition Nurse (CTN) contacted the patient by telephone to follow up after admission on 2021. Verified name and  with patient as identifiers. CTN provided contact information for future needs. Care Transitions Subsequent and Final Call    Subsequent and Final Calls  Do you have any ongoing symptoms?: No  Have your medications changed?: No  Do you have any questions related to your medications?: No  Do you currently have any active services?: Yes  Are you currently active with any services?: Home Health  Do you have any needs or concerns that I can assist you with?: No  Identified Barriers: None  Care Transitions Interventions  Other Interventions:         Grant Felix states she is doing \"fine\". She states she can see \"some progress\". She states [facility] remains active. Grant Felix states her B/P today = 130/82. She states she is using the walker for ambulation - her goal is to try to get up and walk every hour. Grant Felix states she continues to wear the SYDNIE hose daily. She states her incision looks \"good\". Grant Felix states she is tolerating food & fluids - denies N/V. She states she recently completed a therapy session and rates her pain at 8/10 after therapy. She is taking pain medication as directed per her surgeon and feels it is effective. Grant Felix states her bowels have moved one time - she took a stool softener today - encouraged using the stool softener & miralax daily. Grant Felix verbalized understanding. She states she is urinating without difficulty.  Grant Felix denies any fever. She states she is using ice to the surgical site. Loan Garrett denies any needs at this time. Encouraged her to reach out to Carlos Hernandez or her surgeon with any non life threatening medical issues or concerns over the weekend. She verbalized understanding.     Follow Up  Future Appointments   Date Time Provider Raj Jenkins   8/5/2021 10:00 AM Jaswant Wolf MD [facility]   8/5/2021 12:15 PM Xavier Lr PT FZ PT [facility]   8/18/2021  8:00 AM Candelario Rg MD [facility] [place]       Alma Edmondson, RN

## 2021-08-01 DIAGNOSIS — I10 ESSENTIAL HYPERTENSION: ICD-10-CM

## 2021-08-02 RX ORDER — AMLODIPINE BESYLATE 10 MG/1
TABLET ORAL
Qty: 90 TABLET | Refills: 1 | Status: SHIPPED | OUTPATIENT
Start: 2021-08-02 | End: 2022-02-04

## 2021-08-02 NOTE — TELEPHONE ENCOUNTER
Medication:   Requested Prescriptions     Pending Prescriptions Disp Refills    amLODIPine (NORVASC) 10 MG tablet [Pharmacy Med Name: AMLODIPINE BESYLATE 10 MG TAB] 90 tablet 1     Sig: TAKE 1 TABLET BY MOUTH EVERY DAY       Last Filled:  2/1/21 #90, 1 RF     Patient Phone Number: [phone] (home) [phone] (work)    Last appt: 6/29/2021 pre op   Next appt: Visit date not found    Lab Results   Component Value Date     07/21/2021    K 4.1 07/21/2021     07/21/2021    CO2 23 07/21/2021    BUN 13 07/21/2021    CREATININE 1.2 (H) 07/21/2021    GLUCOSE 128 (H) 07/21/2021    CALCIUM 8.8 07/21/2021    PROT 6.9 11/11/2020    LABALBU 4.6 06/28/2021    BILITOT 0.5 11/11/2020    ALKPHOS 78 11/11/2020    AST 16 11/11/2020    ALT 15 11/11/2020    LABGLOM 47 (A) 07/21/2021    GFRAA 57 (A) 07/21/2021    AGRATIO 1.6 11/11/2020    GLOB 2.7 11/11/2020

## 2021-08-03 ENCOUNTER — TELEPHONE (OUTPATIENT)
Dept: ORTHOPEDIC SURGERY | Age: 51
End: 2021-08-03

## 2021-08-03 DIAGNOSIS — M17.11 PRIMARY OSTEOARTHRITIS OF RIGHT KNEE: ICD-10-CM

## 2021-08-03 RX ORDER — OXYCODONE HYDROCHLORIDE 5 MG/1
5-10 TABLET ORAL EVERY 6 HOURS PRN
Qty: 40 TABLET | Refills: 0 | Status: SHIPPED | OUTPATIENT
Start: 2021-08-03 | End: 2021-08-08

## 2021-08-03 NOTE — TELEPHONE ENCOUNTER
Prescription Refill     Medication Name:  OXYCODONE HCL 5 MG  Pharmacy: [facility] [address]  Patient Contact Number:  [phone]    PATIENT HAS 2 PILLS LEFT. SHE HAS NOT  [facility] [address] ON 7/20/21.

## 2021-08-05 ENCOUNTER — HOSPITAL ENCOUNTER (OUTPATIENT)
Dept: PHYSICAL THERAPY | Age: 51
Setting detail: THERAPIES SERIES
Discharge: HOME | End: 2021-08-05
Payer: COMMERCIAL

## 2021-08-05 ENCOUNTER — OFFICE VISIT (OUTPATIENT)
Dept: ORTHOPEDIC SURGERY | Age: 51
End: 2021-08-05

## 2021-08-05 VITALS — WEIGHT: 270 LBS | BODY MASS INDEX: 50.98 KG/M2 | HEIGHT: 61 IN

## 2021-08-05 DIAGNOSIS — Z96.651 STATUS POST TOTAL KNEE REPLACEMENT, RIGHT: Primary | ICD-10-CM

## 2021-08-05 PROCEDURE — 97110 THERAPEUTIC EXERCISES: CPT

## 2021-08-05 PROCEDURE — 99024 POSTOP FOLLOW-UP VISIT: CPT | Performed by: PHYSICIAN ASSISTANT

## 2021-08-05 PROCEDURE — 97164 PT RE-EVAL EST PLAN CARE: CPT

## 2021-08-05 RX ORDER — CYCLOBENZAPRINE HCL 10 MG
10 TABLET ORAL NIGHTLY PRN
Qty: 30 TABLET | Refills: 0 | Status: SHIPPED | OUTPATIENT
Start: 2021-08-05 | End: 2021-08-15

## 2021-08-05 NOTE — PROGRESS NOTES
This dictation was done with Dragon dictation and may contain mechanical errors related to translation. I have today reviewed with Erika Reyna the clinically relevant, past medical history, medications, allergies, family history, social history, and Review Of Systems form the patients most recent history form & I have documented any details relevant to today's presenting complaints in my history below. Ms. Terell Lamb self-reported past medical history, medications, allergies, family history, social history, and Review Of Systems form has been scanned into the chart under the \"Media\" tab. Subjective:  Erika Reyna is a 46 y.o. who is here in follow-up for her right total knee replacement which was done on 7/20/2021. She is doing extremely well her incisions healing nicely no signs of infection. She is already 0-90 she is doing therapy at home and is going to outpatient therapy starting later today in [place].   She was sent for x-rays including a standing AP lateral sunrise view      Patient Active Problem List   Diagnosis    Carpal tunnel syndrome    Hypertension    Obesity    Renal insufficiency    Vitamin D deficiency    Contusion of right knee    Infected abrasion of left hand    Hand dermatitis    Candidiasis of finger web    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HCC)    Anemia in chronic renal disease    Anemia, iron deficiency    Chronic kidney disease, stage 3, mod decreased GFR (HCC)    Chronic pain of right knee    Family history of cancer    Hypertension, essential, benign    Overweight and obesity    Primary localized osteoarthrosis of the knee    Proteinuria    Renal osteodystrophy    Tear of PCL (posterior cruciate ligament) of knee    Hypertension, essential    Morbid obesity with BMI of 45.0-49.9, adult ([facility] [address]    Arthritis of right knee           Current Outpatient Medications on File Prior to Visit   Medication Sig Dispense Refill    oxyCODONE (ROXICODONE) 5 MG immediate release tablet Take 1-2 tablets by mouth every 6 hours as needed for Pain for up to 5 days. 40 tablet 0    amLODIPine (NORVASC) 10 MG tablet TAKE 1 TABLET BY MOUTH EVERY DAY 90 tablet 1    acetaminophen (TYLENOL) 500 MG tablet Take 500 mg by mouth every 6 hours as needed for Pain      levETIRAcetam (KEPPRA) 500 MG tablet TAKE 1 TABLET TWICE DAILY 180 tablet 3    atenolol (TENORMIN) 50 MG tablet Take 1 tablet by mouth daily 90 tablet 1    diclofenac sodium (VOLTAREN) 1 % GEL Apply 4 g topically 2 times daily       GLUCOSAMINE-CHONDROITIN PO Take by mouth       meclizine (ANTIVERT) 25 MG tablet Take 25 mg by mouth 3 times daily as needed        No current facility-administered medications on file prior to visit. Objective:   Height 5' 1\" (1.549 m), weight 270 lb (122.5 kg). On examination this pleasant 45-year-old female no acute distress she is alert oriented x3 she is using a walker has good range of motion status post knee replacement is neurologically intact to the foot with good dorsiflexion plantarflexion strength. Neuro exam grossly intact both lower extremities. Intact sensation to light touch. Motor exam 4+ to 5/5 in all major motor groups. Negative Voss's sign. Skin is warm, dry and intact with out erythema or significant increased temperature around the knee joint(s). There are no cutaneous lesions or lymphadenopathy present. X-RAYS:  Xray three views of the right total knee arthroplasty reveals satisfactory alignment of the prosthesis . No signs of significant polyethylene wear or failure. No progressive radiolucencies,fractures, tumors or dislocations. Assessment:  Stable right total knee replacement    Plan:  During today's visit, there was approximately 20 minutes of face-to-face discussion in regards to the patient's current condition and treatment options. More than 50 % of the time was counseling and coordination of care as indicated above.   Talked about post

## 2021-08-05 NOTE — PLAN OF CARE
[facility]  Phone: [phone]   Fax: [phone]  Physical Therapy Re-Certification Plan of Care    Dear Cecilia Brown MD,    We had the pleasure of treating the following patient for physical therapy services at [facility]. A summary of our findings can be found in the updated assessment below. This includes our plan of care. If you have any questions or concerns regarding these findings, please do not hesitate to contact me at the office phone number checked above. Thank you for the referral.     Physician Signature:________________________________Date:__________________  By signing above (or electronic signature), therapist's plan is approved by physician      Functional Outcome:   LEFS: raw score = 15/80; dysfunction = 81%    Functional Testing  Sx Date 7/20/21 Prehab Date 6/18/21 Post-op Re-Eval Date 8/5/21 4 week F/U date TBD 8 week F/U date TBD       TUG (sec) 8     20       30 second sit to stand (reps) 10  8       6 minute walk (m) 449.58   264           Balance:     Narrowed SOFIA (sec) 10  10       Semi Tandem (sec) 10    10                 Tandem (sec) 10     10                SLS (sec) 3  0           Knee AROM L R L R L R L R   Flexion 119 122  119  87           Extension hyper 1 0  0  -19               Knee Ext: L R L R L R L R   MMT (of 5) 4+ 4  4+  unable           Knee Ext (#) 31.8 29.6                   Hip Abd:  L R L R L R L R   MMT (of 5) 4+ 4   unable            Hip Abd (#) 32.9 24.3                   LEFS (raw) 20  15           Ambulation: Pt ambulating with RW, decreased TKE on R at mid stance, decreased knee flexion during swing phase. On one account during 6 minute walk test, pt did demonstrate slight R knee buckling which she was able to self correct with RW usage.      Overall Response to Treatment:   [x]Patient is responding well to treatment and improvement is noted with regards  to goals   []Patient should continue to improve in reasonable time if they continue HEP   []Patient has plateaued and is no longer responding to skilled PT intervention    []Patient is getting worse and would benefit from return to referring MD   []Patient unable to adhere to initial POC   []Other: -    Date range of Visits: 1  Total Visits:     Recommendation:    [x]Continue PT 2x / wk for 6 weeks. []Hold PT, pending MD visit        Physical Therapy Daily Treatment Note  Date:  2021    Patient Name:  Wendy Lo    :  1970  MRN: [id number]  Medical/Treatment Diagnosis Information:  Diagnosis: M17.11 (ICD-10-CM) - Primary osteoarthritis of right knee / PREHAB - sx: 21  Treatment Diagnosis: decreased RLE strength, knee pain, antalgic gait, knee instability  Insurance/Certification information:  PT Insurance Information: [address] [place] [address] - $25 copay - visits PMN  Physician Information:  Referring Practitioner: Willie Rahman MD  Plan of care signed (Y/N): []  Yes [x]  No     Date of Patient follow up with Physician: TBD      Progress Report: [x]  Yes  []  No     Date Range for reporting period:  Beginnin21  Ending: TBD    Progress report due (10 Rx/or 30 days whichever is less): visit #13     Recertification due (POC duration/ or 90 days whichever is less): visit #16      Visit # Insurance Allowable Auth required? Date Range   2 60 [x]  Yes  []  No Awaiting auth     Latex Allergy:  [x]NO      []YES  Preferred Language for Healthcare:   [x]English       []other:    Functional Scale:        Date assessed:  LEFS: raw score = 20/80; dysfunction = 75%  21  LEFS: raw score = 15/80; dysfunction = 81%      Pain level:  4-10/10     SUBJECTIVE:  Pt reports she has been doing ok, saw MD prior to coming into PT and everything was going well from his end. Pain still really bad, taking medication still on regularly scheduled basis.  Unsure on ROM measures from home health PT visits, but was working with home health and stated \"things were going well\" with ROM. Pt reports she did a little bit at home with her [facility] however with either SPC or RW her knee tends to buckle a lot making her fearful of falling. OBJECTIVE:      Functional Testing  Sx Date 7/20/21 Prehab Date 6/18/21 Post-op Re-Eval Date 8/5/21 4 week F/U date TBD 8 week F/U date TBD       TUG (sec) 8     20       30 second sit to stand (reps) 10  8       6 minute walk (m) 449.58   264           Balance:     Narrowed SOFIA (sec) 10  10       Semi Tandem (sec) 10    10                 Tandem (sec) 10     10                SLS (sec) 3  0           Knee AROM L R L R L R L R   Flexion 119 122  119  87           Extension hyper 1 0  0  -19               Knee Ext: L R L R L R L R   MMT (of 5) 4+ 4  4+  unable           Knee Ext (#) 31.8 29.6                   Hip Abd:  L R L R L R L R   MMT (of 5) 4+ 4   unable            Hip Abd (#) 32.9 24.3                   LEFS (raw) 20  15           RESTRICTIONS/PRECAUTIONS: HX OF FALLS     Exercises/Interventions:     Therapeutic Exercises (77178) Resistance / level Sets/sec Reps Notes   QS   Discussed in HEP    GS   Discussed in HEP    Heel Slides   Discussed in HEP    Ankle pUmps   Discussed in HEP    SLR flex   Discussed in HEP    Heel prop   Discussed in HEP                         Therapeutic Activities (82424)                                          Neuromuscular Re-ed (43935)                                                 Manual Intervention (60614)                                                   Modalities: [facility] for quad engagement (discussed with pt)    Pt. Education:  6/18 - Patient was thoroughly educated on this date regarding prehabilitation goals, importance of PT sessions in improving overall ROM, strength and stability prior to surgery, and how prehabilitation will facilitate improved post-operative outcomes.  The patient was educated on and instructed in HEP as listed. The patient was given a detailed handout for exercises to initiate in the hospital post-operatively as well as at home. The discharge plan from the hospital was reviewed with the patient; specifically, to reduce length of hospital stay and to minimize time before reinitiating outpatient physical therapy after surgery. Education regarding early mobility post-operatively in the hospital and emphasis on working with both physical therapy and nursing staff to achieve ambulation goal of 150 feet was provided. The patient was highly encouraged to attend joint class in hospital prior to surgery for further instructions on pre and post-surgical care. Also, the patient was educated on precautions after hip replacement to avoid, specifically, hip extension and repetitive hip flexion. It is in my medical opinion that this patient is clear from all physical barriers prior to consideration for surgery, activity modifications prior to and post operatively have been discussed with this patient as well as discharge planning and is cleared for surgery from physical therapy perspective.  -patient educated on diagnosis, prognosis and expectations for rehab  -all patient questions were answered    Home Exercise Program:  Access Code: [facility]  URL: [email or web address]  Date: 08/05/2021  Prepared by: Mike Sanchez    Exercises  Long Sitting Calf Stretch with Strap - 3 x daily - 7 x weekly - 4 reps - 20 hold  Seated Gastroc Stretch with Strap - 3 x daily - 7 x weekly - 4 reps - 20 hold  Seated Hamstring Stretch - 3 x daily - 7 x weekly - 4 reps - 20 hold  8/5: HEP was also reviewed with pt from home health PT and encouraged to continue, additions of interventions above. 6/18 - The following exercises were discussed and added to the patient's home exercise program. (glute set, quad set, ankle pumps, heel prop, heel slide, SLR flexion).  Additionally, the patient was educated on appropriate frequency, intensity and duration. Handout provided. Therapeutic Exercise and NMR EXR  [x] (76259) Provided verbal/tactile cueing for activities related to strengthening, flexibility, endurance, ROM for improvements in  [x] LE / Lumbar: LE, proximal hip, and core control with self care, mobility, lifting, ambulation. [] UE / Cervical: cervical, postural, scapular, scapulothoracic and UE control with self care, reaching, carrying, lifting, house/yardwork, driving, computer work.  [] (36968) Provided verbal/tactile cueing for activities related to improving balance, coordination, kinesthetic sense, posture, motor skill, proprioception to assist with   [] LE / lumbar: LE, proximal hip, and core control in self care, mobility, lifting, ambulation and eccentric single leg control. [] UE / cervical: cervical, scapular, scapulothoracic and UE control with self care, reaching, carrying, lifting, house/yardwork, driving, computer work.   [] (37275) Therapist is in constant attendance of 2 or more patients providing skilled therapy interventions, but not providing any significant amount of measurable one-on-one time to either patient, for improvements in  [] LE / lumbar: LE, proximal hip, and core control in self care, mobility, lifting, ambulation and eccentric single leg control. [] UE / cervical: cervical, scapular, scapulothoracic and UE control with self care, reaching, carrying, lifting, house/yardwork, driving, computer work.      NMR and Therapeutic Activities:    [] (05316 or 64917) Provided verbal/tactile cueing for activities related to improving balance, coordination, kinesthetic sense, posture, motor skill, proprioception and motor activation to allow for proper function of   [] LE: / Lumbar core, proximal hip and LE with self care and ADLs  [] UE / Cervical: cervical, postural, scapular, scapulothoracic and UE control with self care, carrying, lifting, driving, computer work.   [] (04210) Gait Re-education- Provided training and instruction to the patient for proper LE, core and proximal hip recruitment and positioning and eccentric body weight control with ambulation re-education including up and down stairs     Home Management Training / Self Care:  [] (24924) Provided self-care/home management training related to activities of daily living and compensatory training, and/or use of adaptive equipment for improvement with: ADLs and compensatory training, meal preparation, safety procedures and instruction in use of adaptive equipment, including bathing, grooming, dressing, personal hygiene, basic household cleaning and chores.      Home Exercise Program:    [x] (12983) Reviewed/Progressed HEP activities related to strengthening, flexibility, endurance, ROM of   [x] LE / Lumbar: core, proximal hip and LE for functional self-care, mobility, lifting and ambulation/stair navigation   [] UE / Cervical: cervical, postural, scapular, scapulothoracic and UE control with self care, reaching, carrying, lifting, house/yardwork, driving, computer work  [] (20187)Reviewed/Progressed HEP activities related to improving balance, coordination, kinesthetic sense, posture, motor skill, proprioception of   [] LE: core, proximal hip and LE for self care, mobility, lifting, and ambulation/stair navigation    [] UE / Cervical: cervical, postural,  scapular, scapulothoracic and UE control with self care, reaching, carrying, lifting, house/yardwork, driving, computer work    Manual Treatments:  PROM / STM / Oscillations-Mobs:  G-I, II, III, IV (PA's, Inf., Post.)  [] (41307) Provided manual therapy to mobilize LE, proximal hip and/or LS spine soft tissue/joints for the purpose of modulating pain, promoting relaxation,  increasing ROM, reducing/eliminating soft tissue swelling/inflammation/restriction, improving soft tissue extensibility and allowing for proper ROM for normal function with   [] LE / lumbar: self care, mobility, []? Met: []? Not Met: []? Adjusted  3. Patient will demonstrate an increase in Strength to at least 4+/5 with R knee flexion and extension in order perform proper functional activities. []? Progressing: []? Met: []? Not Met: []? Adjusted  4. Patient will return to functional activities including walking in grocery strore without increased symptoms or restriction. []? Progressing: []? Met: []? Not Met: []? Adjusted  5. Patient will be able to walk >2 blocks without increase in pain <3/10. []? Progressing: []? Met: []? Not Met: []? Adjusted           Overall Progression Towards Functional goals/ Treatment Progress Update:  [] Patient is progressing as expected towards functional goals listed. [] Progression is slowed due to complexities/Impairments listed. [] Progression has been slowed due to co-morbidities. [x] Plan just implemented, too soon to assess goals progression <30days   [] Goals require adjustment due to lack of progress  [] Patient is not progressing as expected and requires additional follow up with physician  [] Other    Persisting Functional Limitations/Impairments:  [x]Sleeping [x]Sitting               [x]Standing [x]Transfers        [x]Walking [x]Kneeling               [x]Stairs [x]Squatting / bending   [x]ADLs []Reaching  []Lifting  [x]Housework  [x]Driving []Job related tasks  []Sports/Recreation []Other:      ASSESSMENT:  Re evaluation today post operatively. Pt overall doing well post op, significant decreased ROM with pain limiting knee ROM and significant loss of quad function and engagement contributing to limitations with ambulation with SPC due to knee buckling. Pt overall doing well with functional mobility with minimal limitations with general functional mobility. Pt continues to need contralateral LE for transfer assistance to/from supine due to quad weakness. Pt will benefit from Pt to work on progressing LE strength, muscle control and activation for LTG achievements. Treatment/Activity Tolerance:  [x] Patient able to complete tx  [] Patient limited by fatigue  [] Patient limited by pain  [] Patient limited by other medical complications  [] Other:     Prognosis: [x] Good [] Fair  [] Poor    Patient Requires Follow-up: [x] Yes  [] No    Plan for next treatment session: knee ROM emphasis, quad strength and engagement/control as able. PLAN: See eval. PT 2-3x / week for 12 weeks. [x] Continue per plan of care [] Alter current plan (see comments)  [] Plan of care initiated [] Hold pending MD visit [] Discharge    Electronically signed by: Shannon Beavers PT, DPT, OMT-C    Note: If patient does not return for scheduled/ recommended follow up visits, this note will serve as a discharge from care along with most recent update on progress.

## 2021-08-06 ENCOUNTER — CARE COORDINATION (OUTPATIENT)
Dept: CASE MANAGEMENT | Age: 51
End: 2021-08-06

## 2021-08-06 NOTE — CARE COORDINATION
she was doing well. She stated that her out pt rehab appt went well on 8/5/21. She stated that home health therapy had ended on 8/4/21 and felt this was appropriate as she was progressing. Pt also stated that she was prescribed Flexeril 10mg at her appt on 8/5 and that it helped her sleep better and made the pain more manageable. Pt stated that pain level was a \"2 or 3.\" Patient denied any worsening symptoms. Denied fever, chills, N/V and any difficulty breathing at this time. Advised pt to immediately report any worsening symptoms to the PCP. Patient verbalized understanding and agreed. Meron Mariano LPN, [facility]  PH: [phone]                Care Transitions Subsequent and Final Call    Subsequent and Final Calls  Do you have any ongoing symptoms?: No  Have your medications changed?: Yes  Do you have any questions related to your medications?: No  Are you currently active with any services?: Home Health  Care Transitions Interventions  Other Interventions:            Follow Up  Future Appointments   Date Time Provider Raj Jenkins   8/7/2021 11:15 AM Sonali Read PT [facility]   8/11/2021 11:30 AM MADONNA LayneFKALI PT [facility]   8/18/2021  8:00 AM MD Bee Watt   8/26/2021  1:15 PM ELIZABETH Linda LPN

## 2021-08-07 ENCOUNTER — HOSPITAL ENCOUNTER (OUTPATIENT)
Dept: PHYSICAL THERAPY | Age: 51
Setting detail: THERAPIES SERIES
Discharge: HOME | End: 2021-08-07
Payer: COMMERCIAL

## 2021-08-07 PROCEDURE — 97112 NEUROMUSCULAR REEDUCATION: CPT

## 2021-08-07 PROCEDURE — 97110 THERAPEUTIC EXERCISES: CPT

## 2021-08-07 NOTE — FLOWSHEET NOTE
[address] [facility] Physical Therapy  Phone: [phone]   Fax: [phone]    Physical Therapy Daily Treatment Note  Date:  2021    Patient Name:  Dennis Martinez    :  1970  MRN: [id number]  Medical/Treatment Diagnosis Information:  Diagnosis: M17.11 (ICD-10-CM) - Primary osteoarthritis of right knee / PREHAB - sx: 21  Treatment Diagnosis: decreased RLE strength, knee pain, antalgic gait, knee instability  Insurance/Certification information:  PT Insurance Information: [address] [place] [address] - $25 copay - visits PMN  Physician Information:  Referring Practitioner: Marcin Mead MD  Plan of care signed (Y/N): []  Yes [x]  No     Date of Patient follow up with Physician: TBD      Progress Report: [x]  Yes  []  No     Date Range for reporting period:  Beginnin21  Ending: TBD    Progress report due (10 Rx/or 30 days whichever is less): visit #92     Recertification due (POC duration/ or 90 days whichever is less): visit #16      Visit # Insurance Allowable Auth required? Date Range   3 60 []  Yes  [x]  No AIM REPORTS NO AUTH NEEDED ON 21 CORESPONDENCE      Latex Allergy:  [x]NO      []YES  Preferred Language for Healthcare:   [x]English       []other:    Functional Scale:        Date assessed:  LEFS: raw score = 20/80; dysfunction = 75%  21  LEFS: raw score = 15/80; dysfunction = 81%  8/5    Pain level:  2/10 pain; 7/10 stiffness      SUBJECTIVE:  Pt reports she has been doing ok since her evaluation, no big changes with her symptoms. Currently her pain is not terrible but the knee is just really stiff.  Pt reports HEP going well without issues    OBJECTIVE:      Functional Testing  Sx Date 21 Prehab Date 21 Post-op Re-Eval Date 21 4 week F/U date TBD 8 week F/U date TBD       TUG (sec) 8     20       30 second sit to stand (reps) 10  8       6 minute walk (m) 449.58   264           Balance:     Narrowed SOFIA (sec) 10  10     Semi Tandem (sec) 10    10                 Tandem (sec) 10     10                SLS (sec) 3  0           Knee AROM L R L R L R L R   Flexion 119 122  119  87           Extension hyper 1 0  0  -19               Knee Ext: L R L R L R L R   MMT (of 5) 4+ 4  4+  unable           Knee Ext (#) 31.8 29.6                   Hip Abd:  L R L R L R L R   MMT (of 5) 4+ 4   unable            Hip Abd (#) 32.9 24.3                   LEFS (raw) 20  15             8/7: AROM knee ext with quad set lacking 6 from neutral; Knee flexion AAROM heel slide and overpressure 97 degrees with soft end feel however significant pain. RESTRICTIONS/PRECAUTIONS: HX OF FALLS     Exercises/Interventions:     Therapeutic Exercises (05822) Resistance / level Sets/sec Reps Notes   Towel gastroc stretch  20\" holds 4    Heel slides with slide board in supine  5\" holds 10    SAQ  1 10 Post estim   LAQ  1 10 Post estim                                      Therapeutic Activities (93680)                                          Neuromuscular Re-ed (60551)       Quad set with Ghanaian estim   See below                                       Manual Intervention ([address] [facility])       Patellar mobs    3 min Inferior/sup/inf/lat   AAROM knee flexion with heel slide  5\" holds 5                                  Modalities: 8/7: NMR with Ghanaian estim to R quads in long sitting with quad set. Pt instructed with verbal and tactile cueing for quad engagement B quads to facilitate activation. 10/20 on /off cycle x 10 mins. Pt. Education:  6/18 - Patient was thoroughly educated on this date regarding prehabilitation goals, importance of PT sessions in improving overall ROM, strength and stability prior to surgery, and how prehabilitation will facilitate improved post-operative outcomes. The patient was educated on and instructed in HEP as listed. The patient was given a detailed handout for exercises to initiate in the hospital post-operatively as well as at home.  The discharge plan from the hospital was reviewed with the patient; specifically, to reduce length of hospital stay and to minimize time before reinitiating outpatient physical therapy after surgery. Education regarding early mobility post-operatively in the hospital and emphasis on working with both physical therapy and nursing staff to achieve ambulation goal of 150 feet was provided. The patient was highly encouraged to attend joint class in hospital prior to surgery for further instructions on pre and post-surgical care. Also, the patient was educated on precautions after hip replacement to avoid, specifically, hip extension and repetitive hip flexion. It is in my medical opinion that this patient is clear from all physical barriers prior to consideration for surgery, activity modifications prior to and post operatively have been discussed with this patient as well as discharge planning and is cleared for surgery from physical therapy perspective.  -patient educated on diagnosis, prognosis and expectations for rehab  -all patient questions were answered    Home Exercise Program:  Access Code: [facility]  URL: [email or web address]  Date: 08/05/2021  Prepared by: Pamela Avery    Exercises  Long Sitting Calf Stretch with Strap - 3 x daily - 7 x weekly - 4 reps - 20 hold  Seated Gastroc Stretch with Strap - 3 x daily - 7 x weekly - 4 reps - 20 hold  Seated Hamstring Stretch - 3 x daily - 7 x weekly - 4 reps - 20 hold  8/5: HEP was also reviewed with pt from home health PT and encouraged to continue, additions of interventions above. 6/18 - The following exercises were discussed and added to the patient's home exercise program. (glute set, quad set, ankle pumps, heel prop, heel slide, SLR flexion). Additionally, the patient was educated on appropriate frequency, intensity and duration. Handout provided.     Therapeutic Exercise and NMR EXR  [x] (99427) Provided verbal/tactile cueing for activities related to strengthening, flexibility, endurance, ROM for improvements in  [x] LE / Lumbar: LE, proximal hip, and core control with self care, mobility, lifting, ambulation. [] UE / Cervical: cervical, postural, scapular, scapulothoracic and UE control with self care, reaching, carrying, lifting, house/yardwork, driving, computer work.  [] (31719) Provided verbal/tactile cueing for activities related to improving balance, coordination, kinesthetic sense, posture, motor skill, proprioception to assist with   [] LE / lumbar: LE, proximal hip, and core control in self care, mobility, lifting, ambulation and eccentric single leg control. [] UE / cervical: cervical, scapular, scapulothoracic and UE control with self care, reaching, carrying, lifting, house/yardwork, driving, computer work.   [] (41350) Therapist is in constant attendance of 2 or more patients providing skilled therapy interventions, but not providing any significant amount of measurable one-on-one time to either patient, for improvements in  [] LE / lumbar: LE, proximal hip, and core control in self care, mobility, lifting, ambulation and eccentric single leg control. [] UE / cervical: cervical, scapular, scapulothoracic and UE control with self care, reaching, carrying, lifting, house/yardwork, driving, computer work.      NMR and Therapeutic Activities:    [x] (36237 or 21212) Provided verbal/tactile cueing for activities related to improving balance, coordination, kinesthetic sense, posture, motor skill, proprioception and motor activation to allow for proper function of   [x] LE: / Lumbar core, proximal hip and LE with self care and ADLs  [] UE / Cervical: cervical, postural, scapular, scapulothoracic and UE control with self care, carrying, lifting, driving, computer work.   [] (34598) Gait Re-education- Provided training and instruction to the patient for proper LE, core and proximal hip recruitment and positioning and eccentric body weight control with ambulation re-education including up and down stairs     Home Management Training / Self Care:  [] (09287) Provided self-care/home management training related to activities of daily living and compensatory training, and/or use of adaptive equipment for improvement with: ADLs and compensatory training, meal preparation, safety procedures and instruction in use of adaptive equipment, including bathing, grooming, dressing, personal hygiene, basic household cleaning and chores. Home Exercise Program:    [x] (18914) Reviewed/Progressed HEP activities related to strengthening, flexibility, endurance, ROM of   [x] LE / Lumbar: core, proximal hip and LE for functional self-care, mobility, lifting and ambulation/stair navigation   [] UE / Cervical: cervical, postural, scapular, scapulothoracic and UE control with self care, reaching, carrying, lifting, house/yardwork, driving, computer work  [] (44186)Reviewed/Progressed HEP activities related to improving balance, coordination, kinesthetic sense, posture, motor skill, proprioception of   [] LE: core, proximal hip and LE for self care, mobility, lifting, and ambulation/stair navigation    [] UE / Cervical: cervical, postural,  scapular, scapulothoracic and UE control with self care, reaching, carrying, lifting, house/yardwork, driving, computer work    Manual Treatments:  PROM / STM / Oscillations-Mobs:  G-I, II, III, IV (PA's, Inf., Post.)  [] (99859) Provided manual therapy to mobilize LE, proximal hip and/or LS spine soft tissue/joints for the purpose of modulating pain, promoting relaxation,  increasing ROM, reducing/eliminating soft tissue swelling/inflammation/restriction, improving soft tissue extensibility and allowing for proper ROM for normal function with   [] LE / lumbar: self care, mobility, lifting and ambulation. [] UE / Cervical: self care, reaching, carrying, lifting, house/yardwork, driving, computer work.      Modalities:  [] (34797) Vasopneumatic compression: Utilized vasopneumatic compression to decrease edema / swelling for the purpose of improving mobility and quad tone / recruitment which will allow for increased overall function including but not limited to self-care, transfers, ambulation, and ascending / descending stairs. Charges:  Timed Code Treatment Minutes: 49   Total Treatment Minutes: 49     [] EVAL - LOW (61507)   [] EVAL - MOD (27376)  [] EVAL - HIGH (70149)  [] RE-EVAL (74309)  [x] CI(32825) x 2       [] Ionto  [x] NMR (21274) x  1     [] Vaso  [] Manual (99772) x       [] Ultrasound  [] TA x        [] Mech Traction (16357)  [] Aquatic Therapy x      [] ES (un) (51463):   [] Home Management Training x  [] ES(attended) (12351)   [] Dry Needling 1-2 muscles (67221):  [] Dry Needling 3+ muscles (659908)  [] Group:      [] Other:     GOALS:  Patient stated goal: The patient will be able to walk >500ft without increase in symptoms. []? Progressing: []? Met: []? Not Met: []? Adjusted     Therapist goals for Patient:   Short Term Goals: To be achieved in: 2 weeks post-op  1. Independent in HEP and progression per patient tolerance without increase in symptoms. []? Progressing: []? Met: []? Not Met: []? Adjusted  2. Patient will have a decrease in pain to <5/10 to facilitate improvement in movement of the BLE, and ADLs as indicated by Functional Deficits. []? Progressing: []? Met: []? Not Met: []? Adjusted     Long Term Goals: To be achieved in: 12 weeks post-op  1. Disability index score of 35% or less for the LEFS to assist with reaching prior level of function. []? Progressing: []? Met: []? Not Met: []? Adjusted  2. Patient will demonstrate increased knee AROM from 0 -120 allow for proper joint functioning as indicated by patients Functional Deficits. []? Progressing: []? Met: []? Not Met: []? Adjusted  3.  Patient will demonstrate an increase in Strength to at least 4+/5 with R knee flexion and extension in order perform proper functional activities. []? Progressing: []? Met: []? Not Met: []? Adjusted  4. Patient will return to functional activities including walking in grocery strore without increased symptoms or restriction. []? Progressing: []? Met: []? Not Met: []? Adjusted  5. Patient will be able to walk >2 blocks without increase in pain <3/10. []? Progressing: []? Met: []? Not Met: []? Adjusted           Overall Progression Towards Functional goals/ Treatment Progress Update:  [] Patient is progressing as expected towards functional goals listed. [] Progression is slowed due to complexities/Impairments listed. [] Progression has been slowed due to co-morbidities. [x] Plan just implemented, too soon to assess goals progression <30days   [] Goals require adjustment due to lack of progress  [] Patient is not progressing as expected and requires additional follow up with physician  [] Other    Persisting Functional Limitations/Impairments:  [x]Sleeping [x]Sitting               [x]Standing [x]Transfers        [x]Walking [x]Kneeling               [x]Stairs [x]Squatting / bending   [x]ADLs []Reaching  []Lifting  [x]Housework  [x]Driving []Job related tasks  []Sports/Recreation []Other:      ASSESSMENT:  Introduction today with estim and progression with AROM interventions with focus on quad engagement. Pt was able to progress ROM today with manual overpressure however with significant pain at end range but with soft end feel. Pt continues to struggle with quad activation and general engagement and NMR estim applied today to work on improving muscle contraction which was tolerated well and pt was able to move through greater ranges with SAQ and LAQ post estim. Pt with reports of increase in pain with activity and moving slowly through interventions limiting progressions. Pt will benefit from continuation of PT to work on progressing quad engagement and activity as well as knee ROM.        Treatment/Activity Tolerance:  [x] Patient able to complete tx  [] Patient limited by fatigue  [] Patient limited by pain  [] Patient limited by other medical complications  [] Other:     Prognosis: [x] Good [] Fair  [] Poor    Patient Requires Follow-up: [x] Yes  [] No    Plan for next treatment session: knee ROM emphasis, quad strength and engagement/control as able. PLAN: See eval. PT 2-3x / week for 12 weeks. [x] Continue per plan of care [] Alter current plan (see comments)  [] Plan of care initiated [] Hold pending MD visit [] Discharge    Electronically signed by: Dileep Carcamo, PT, DPT, OMT-C    Note: If patient does not return for scheduled/ recommended follow up visits, this note will serve as a discharge from care along with most recent update on progress.

## 2021-08-11 ENCOUNTER — HOSPITAL ENCOUNTER (OUTPATIENT)
Dept: PHYSICAL THERAPY | Age: 51
Setting detail: THERAPIES SERIES
Discharge: HOME | End: 2021-08-11
Payer: COMMERCIAL

## 2021-08-11 PROCEDURE — 97110 THERAPEUTIC EXERCISES: CPT

## 2021-08-11 PROCEDURE — 97140 MANUAL THERAPY 1/> REGIONS: CPT

## 2021-08-11 PROCEDURE — 97112 NEUROMUSCULAR REEDUCATION: CPT

## 2021-08-11 NOTE — FLOWSHEET NOTE
[address] [facility] Physical Therapy  Phone: [phone]   Fax: [phone]    Physical Therapy Daily Treatment Note  Date:  2021    Patient Name:  Paige Spatz    :  1970  MRN: [id number]  Medical/Treatment Diagnosis Information:  Diagnosis: M17.11 (ICD-10-CM) - Primary osteoarthritis of right knee / PREHAB - sx: 21  Treatment Diagnosis: decreased RLE strength, knee pain, antalgic gait, knee instability  Insurance/Certification information:  PT Insurance Information: [address] [place] [address] - $25 copay - visits PMN  Physician Information:  Referring Practitioner: Marija Subramanian MD  Plan of care signed (Y/N): [x]  Yes []  No     Date of Patient follow up with Physician: TBD      Progress Report: []  Yes  [x]  No     Date Range for reporting period:  Beginnin21  Ending: TBD    Progress report due (10 Rx/or 30 days whichever is less): visit #97      Recertification due (POC duration/ or 90 days whichever is less): visit #16      Visit # Insurance Allowable Auth required? Date Range   4 60 []  Yes  [x]  No AIM REPORTS NO AUTH NEEDED ON 21 CORESPONDENCE      Latex Allergy:  [x]NO      []YES  Preferred Language for Healthcare:   [x]English       []other:    Functional Scale:        Date assessed:  LEFS: raw score = 20/80; dysfunction = 75%  21  LEFS: raw score = 15/80; dysfunction = 81%  8/5    Pain level:  2/10 pain; 7/10 stiffness      SUBJECTIVE:  Pt reports she has been trying to use SPC more frequently to improve quad activation and her overall gait. She reports stiffness more so than pain. She has been compliant with HEP. She used tylenol today before therapy, so pain is low.      OBJECTIVE:      Functional Testing  Sx Date 21 Prehab Date 21 Post-op Re-Eval Date 21 4 week F/U date TBD 8 week F/U date TBD       TUG (sec) 8     20       30 second sit to stand (reps) 10  8       6 minute walk (m) 449.58   264         Balance:     Narrowed SOFIA (sec) 10  10       Semi Tandem (sec) 10    10                 Tandem (sec) 10     10                SLS (sec) 3  0           Knee AROM L R L R L R L R   Flexion 119 122  119  87           Extension hyper 1 0  0  -19               Knee Ext: L R L R L R L R   MMT (of 5) 4+ 4  4+  unable           Knee Ext (#) 31.8 29.6                   Hip Abd:  L R L R L R L R   MMT (of 5) 4+ 4   unable            Hip Abd (#) 32.9 24.3                   LEFS (raw) 20  15         8/11 - 95deg AAROM R knee flexion; lack 3 extension AROM; soft end feel for both; however significant pain. 8/7: AROM knee ext with quad set lacking 6 from neutral; Knee flexion AAROM heel slide and overpressure 97 degrees with soft end feel however significant pain. RESTRICTIONS/PRECAUTIONS: HX OF FALLS     Exercises/Interventions:     Therapeutic Exercises (01322) 8' Resistance / level Sets/sec Reps Notes   Towel gastroc stretch     Heel slides with slide board in supine     SAQ  LAQ  Standing HR  2 10 8/11 - added   IB Gastroc  30'' 2 8/11 - added                        Therapeutic Activities (56405)       R Bike (horseshoe pattern) NA  Seat 6 showing  2 min 8/11 - added; TE this date                               Neuromuscular Re-ed (53409) - 12' w/ setup       Quad set with Montenegrin estim    8/11 - continued   LAQ w/ Montenegrin estim  10x 3 reps per 8/11   SAQ w/ Montenegrin estim  10x 3 reps per 8/11                        Manual Intervention (09752) - 18'       Patellar mobs    1.5min total Inferior/sup/inf/lat   AAROM knee flexion with heel slide     PROM Knee flexion   5 min total; 10'' holds 8/11 - added; intermittent rest breaks   PROM Knee extension  5'' holds 8 8/11 - added   SKTC R  5'' Holds 8x 8/11   R Piriformis S  10'' 3x 8/11     Modalities: 8/11: NMR with Montenegrin estim to R quads: LAQ, SAQ, quad sets. Pt instructed with verbal and tactile cueing for quad engagement B quads to facilitate activation.  10/10 on /off cycle x 10 mins. Pt. Education:  6/18 - Patient was thoroughly educated on this date regarding prehabilitation goals, importance of PT sessions in improving overall ROM, strength and stability prior to surgery, and how prehabilitation will facilitate improved post-operative outcomes. The patient was educated on and instructed in HEP as listed. The patient was given a detailed handout for exercises to initiate in the hospital post-operatively as well as at home. The discharge plan from the hospital was reviewed with the patient; specifically, to reduce length of hospital stay and to minimize time before reinitiating outpatient physical therapy after surgery. Education regarding early mobility post-operatively in the hospital and emphasis on working with both physical therapy and nursing staff to achieve ambulation goal of 150 feet was provided. The patient was highly encouraged to attend joint class in hospital prior to surgery for further instructions on pre and post-surgical care. Also, the patient was educated on precautions after hip replacement to avoid, specifically, hip extension and repetitive hip flexion. It is in my medical opinion that this patient is clear from all physical barriers prior to consideration for surgery, activity modifications prior to and post operatively have been discussed with this patient as well as discharge planning and is cleared for surgery from physical therapy perspective.  -patient educated on diagnosis, prognosis and expectations for rehab  -all patient questions were answered    Home Exercise Program:  Access Code: [facility]  URL: [email or web address]  Date: 08/05/2021  Prepared by: Son Cohen    Exercises  Long Sitting Calf Stretch with Strap - 3 x daily - 7 x weekly - 4 reps - 20 hold  Seated Gastroc Stretch with Strap - 3 x daily - 7 x weekly - 4 reps - 20 hold  Seated Hamstring Stretch - 3 x daily - 7 x weekly - 4 reps - 20 hold  8/5: HEP was also reviewed with pt from home health PT and encouraged to continue, additions of interventions above. 6/18 - The following exercises were discussed and added to the patient's home exercise program. (glute set, quad set, ankle pumps, heel prop, heel slide, SLR flexion). Additionally, the patient was educated on appropriate frequency, intensity and duration. Handout provided. Therapeutic Exercise and NMR EXR  [x] (78514) Provided verbal/tactile cueing for activities related to strengthening, flexibility, endurance, ROM for improvements in  [x] LE / Lumbar: LE, proximal hip, and core control with self care, mobility, lifting, ambulation. [] UE / Cervical: cervical, postural, scapular, scapulothoracic and UE control with self care, reaching, carrying, lifting, house/yardwork, driving, computer work. [x] (21163) Provided verbal/tactile cueing for activities related to improving balance, coordination, kinesthetic sense, posture, motor skill, proprioception to assist with   [x] LE / lumbar: LE, proximal hip, and core control in self care, mobility, lifting, ambulation and eccentric single leg control. [] UE / cervical: cervical, scapular, scapulothoracic and UE control with self care, reaching, carrying, lifting, house/yardwork, driving, computer work.   [] (19850) Therapist is in constant attendance of 2 or more patients providing skilled therapy interventions, but not providing any significant amount of measurable one-on-one time to either patient, for improvements in  [] LE / lumbar: LE, proximal hip, and core control in self care, mobility, lifting, ambulation and eccentric single leg control. [] UE / cervical: cervical, scapular, scapulothoracic and UE control with self care, reaching, carrying, lifting, house/yardwork, driving, computer work.      NMR and Therapeutic Activities:    [x] (76855 or 13586) Provided verbal/tactile cueing for activities related to improving balance, coordination, kinesthetic sense, posture, motor skill, proprioception and motor activation to allow for proper function of   [x] LE: / Lumbar core, proximal hip and LE with self care and ADLs  [] UE / Cervical: cervical, postural, scapular, scapulothoracic and UE control with self care, carrying, lifting, driving, computer work.   [] (62006) Gait Re-education- Provided training and instruction to the patient for proper LE, core and proximal hip recruitment and positioning and eccentric body weight control with ambulation re-education including up and down stairs     Home Management Training / Self Care:  [] (83577) Provided self-care/home management training related to activities of daily living and compensatory training, and/or use of adaptive equipment for improvement with: ADLs and compensatory training, meal preparation, safety procedures and instruction in use of adaptive equipment, including bathing, grooming, dressing, personal hygiene, basic household cleaning and chores.      Home Exercise Program:    [] (39996) Reviewed/Progressed HEP activities related to strengthening, flexibility, endurance, ROM of   [] LE / Lumbar: core, proximal hip and LE for functional self-care, mobility, lifting and ambulation/stair navigation   [] UE / Cervical: cervical, postural, scapular, scapulothoracic and UE control with self care, reaching, carrying, lifting, house/yardwork, driving, computer work  [] (42968)Reviewed/Progressed HEP activities related to improving balance, coordination, kinesthetic sense, posture, motor skill, proprioception of   [] LE: core, proximal hip and LE for self care, mobility, lifting, and ambulation/stair navigation    [] UE / Cervical: cervical, postural,  scapular, scapulothoracic and UE control with self care, reaching, carrying, lifting, house/yardwork, driving, computer work    Manual Treatments:  PROM / STM / Oscillations-Mobs:  G-I, II, III, IV (PA's, Inf., Post.)  [x] (46963) Provided manual therapy to mobilize LE, proximal hip and/or LS spine soft tissue/joints for the purpose of modulating pain, promoting relaxation,  increasing ROM, reducing/eliminating soft tissue swelling/inflammation/restriction, improving soft tissue extensibility and allowing for proper ROM for normal function with   [x] LE / lumbar: self care, mobility, lifting and ambulation. [] UE / Cervical: self care, reaching, carrying, lifting, house/yardwork, driving, computer work. Modalities:  [] (03030) Vasopneumatic compression: Utilized vasopneumatic compression to decrease edema / swelling for the purpose of improving mobility and quad tone / recruitment which will allow for increased overall function including but not limited to self-care, transfers, ambulation, and ascending / descending stairs. Charges:  Timed Code Treatment Minutes: 38   Total Treatment Minutes: 38     [] EVAL - LOW (57374)   [] EVAL - MOD (42756)  [] EVAL - HIGH (71160)  [] RE-EVAL (82140)  [x] RT(69747) x 1       [] Ionto  [x] NMR (17387) x 1     [] Vaso  [x] Manual (04370) x 1       [] Ultrasound  [] TA x        [] Mech Traction (49154)  [] Aquatic Therapy x      [] ES (un) (74932):   [] Home Management Training x  [] ES(attended) (45983)   [] Dry Needling 1-2 muscles (47261):  [] Dry Needling 3+ muscles (918441)  [] Group:      [] Other:     GOALS:  Patient stated goal: The patient will be able to walk >500ft without increase in symptoms. []? Progressing: []? Met: []? Not Met: []? Adjusted     Therapist goals for Patient:   Short Term Goals: To be achieved in: 2 weeks post-op  1. Independent in HEP and progression per patient tolerance without increase in symptoms. []? Progressing: []? Met: []? Not Met: []? Adjusted  2. Patient will have a decrease in pain to <5/10 to facilitate improvement in movement of the BLE, and ADLs as indicated by Functional Deficits. []? Progressing: []? Met: []? Not Met: []? Adjusted     Long Term Goals: To be achieved in: 12 weeks post-op  1. Disability index score of 35% or less for the LEFS to assist with reaching prior level of function. []? Progressing: []? Met: []? Not Met: []? Adjusted  2. Patient will demonstrate increased knee AROM from 0 -120 allow for proper joint functioning as indicated by patients Functional Deficits. []? Progressing: []? Met: []? Not Met: []? Adjusted  3. Patient will demonstrate an increase in Strength to at least 4+/5 with R knee flexion and extension in order perform proper functional activities. []? Progressing: []? Met: []? Not Met: []? Adjusted  4. Patient will return to functional activities including walking in grocery strore without increased symptoms or restriction. []? Progressing: []? Met: []? Not Met: []? Adjusted  5. Patient will be able to walk >2 blocks without increase in pain <3/10. []? Progressing: []? Met: []? Not Met: []? Adjusted           Overall Progression Towards Functional goals/ Treatment Progress Update:  [] Patient is progressing as expected towards functional goals listed. [] Progression is slowed due to complexities/Impairments listed. [] Progression has been slowed due to co-morbidities. [x] Plan just implemented, too soon to assess goals progression <30days   [] Goals require adjustment due to lack of progress  [] Patient is not progressing as expected and requires additional follow up with physician  [] Other    Persisting Functional Limitations/Impairments:  [x]Sleeping [x]Sitting               [x]Standing [x]Transfers        [x]Walking [x]Kneeling               [x]Stairs [x]Squatting / bending   [x]ADLs []Reaching  []Lifting  [x]Housework  [x]Driving []Job related tasks  []Sports/Recreation []Other:      ASSESSMENT:  Continuation today with estim and progression with AROM interventions with focus on quad engagement. Pt was able to progress ROM today with manual overpressure however with significant pain at end range but with soft end feel.  Pt with reports of increase in pain with activity and Pt will benefit from continuation of PT to work on progressing quad engagement and activity as well as knee ROM. PT encouraged use of RW after session as quad is likely fatigued after TE and stim, and to avoid giveouts/buckling. Treatment/Activity Tolerance:  [x] Patient able to complete tx  [] Patient limited by fatigue  [] Patient limited by pain  [] Patient limited by other medical complications  [] Other:     Prognosis: [x] Good [] Fair  [] Poor    Patient Requires Follow-up: [x] Yes  [] No    Plan for next treatment session: knee ROM emphasis, quad strength and engagement/control as able. PLAN: See eval. PT 2-3x / week for 12 weeks. [x] Continue per plan of care [] Alter current plan (see comments)  [] Plan of care initiated [] Hold pending MD visit [] Discharge    Electronically signed by: Aurora Prasad, PT, DPT, OMT-C    Note: If patient does not return for scheduled/ recommended follow up visits, this note will serve as a discharge from care along with most recent update on progress.

## 2021-08-13 ENCOUNTER — HOSPITAL ENCOUNTER (OUTPATIENT)
Dept: PHYSICAL THERAPY | Age: 51
Setting detail: THERAPIES SERIES
Discharge: HOME | End: 2021-08-13
Payer: COMMERCIAL

## 2021-08-13 PROCEDURE — 97140 MANUAL THERAPY 1/> REGIONS: CPT | Performed by: SPECIALIST/TECHNOLOGIST

## 2021-08-13 PROCEDURE — 97112 NEUROMUSCULAR REEDUCATION: CPT | Performed by: SPECIALIST/TECHNOLOGIST

## 2021-08-13 PROCEDURE — 97110 THERAPEUTIC EXERCISES: CPT | Performed by: SPECIALIST/TECHNOLOGIST

## 2021-08-13 NOTE — FLOWSHEET NOTE
[address] [facility] Physical Therapy  Phone: [phone]   Fax: [phone]    Physical Therapy Daily Treatment Note  Date:  2021    Patient Name:  Kalyani Mcgovern    :  1970  MRN: [id number]  Medical/Treatment Diagnosis Information:  Diagnosis: M17.11 (ICD-10-CM) - Primary osteoarthritis of right knee / PREHAB - sx: 21  Treatment Diagnosis: decreased RLE strength, knee pain, antalgic gait, knee instability  Insurance/Certification information:  PT Insurance Information: [address] [place] [address] - $25 copay - visits PMN  Physician Information:  Referring Practitioner: Samir Becerra MD  Plan of care signed (Y/N): [x]  Yes []  No     Date of Patient follow up with Physician: TBD      Progress Report: []  Yes  [x]  No     Date Range for reporting period:  Beginnin21  Ending: TBD    Progress report due (10 Rx/or 30 days whichever is less): visit #09      Recertification due (POC duration/ or 90 days whichever is less): visit #16      Visit # Insurance Allowable Auth required? Date Range   5 60 []  Yes  [x]  No AIM REPORTS NO AUTH NEEDED ON 21 CORESPONDENCE      Latex Allergy:  [x]NO      []YES  Preferred Language for Healthcare:   [x]English       []other:    Functional Scale:        Date assessed:  LEFS: raw score = 20/80; dysfunction = 75%  21  LEFS: raw score = 15/80; dysfunction = 81%  8/5    Pain level:  1/10 pain; 5/10 stiffness      SUBJECTIVE:  Pt feels more stiff today related to am and last night. She used tylenol today before therapy, so pain is low.      OBJECTIVE:      Functional Testing  Sx Date 21 Prehab Date 21 Post-op Re-Eval Date 21 4 week F/U date TBD 8 week F/U date TBD       TUG (sec) 8     20       30 second sit to stand (reps) 10  8       6 minute walk (m) 449.58   264           Balance:     Narrowed SOFIA (sec) 10  10       Semi Tandem (sec) 10    10                 Tandem (sec) 10     10                SLS (sec) 3  0           Knee AROM L R L R L R L R   Flexion 119 122  119  87           Extension hyper 1 0  0  -19               Knee Ext: L R L R L R L R   MMT (of 5) 4+ 4  4+  unable           Knee Ext (#) 31.8 29.6                   Hip Abd:  L R L R L R L R   MMT (of 5) 4+ 4   unable            Hip Abd (#) 32.9 24.3                   LEFS (raw) 20  15       8/13/21  Rt knee ROM 0-100 EOB. Presented to clinic with walker but brought   8/11 - 95deg AAROM R knee flexion; lack 3 extension AROM; soft end feel for both; however significant pain. 8/7: AROM knee ext with quad set lacking 6 from neutral; Knee flexion AAROM heel slide and overpressure 97 degrees with soft end feel however significant pain. RESTRICTIONS/PRECAUTIONS: HX OF FALLS     Exercises/Interventions:   Therapeutic Exercises (47573) 8' Resistance / level Sets/sec Reps Notes   Towel gastroc stretch     Heel slides with slide board in supine     SAQ  LAQ  2 10 Post estimStanding HR  2 10 8/11 - added   IB Gastroc  30'' 2 8/11 - added   slr flexion and abduction  A/A initially with flexion 2 5/8 8/13                 Therapeutic Activities (19146)       R Bike (horseshoe pattern) NA  Seat 6 showing  2 min 8/11 - added; TE this date                               Neuromuscular Re-ed (98663) - 8' w/ setup       Quad set with Taiwanese estim    8/11 - continued   8/11   SAQ w/ Taiwanese estim  10x  8/11   Gait mechanics  4'   Walker to cane with TKE emphasis   TKE  Blue     added 8/13          Manual Intervention (47061) - 10'       Patellar mobs    2min total Inferior/sup/inf/lat   AAROM knee flexion with heel slide     PROM Knee flexion EOB with 10' patellar mobs/ tibia/ femoral mobs  5 min total; 10'' holds 8/13 - added; intermittent rest breaks   PROM Knee extension/ slight op  5'' holds 8 8/11 - added   8/11     Modalities: 8/11: NMR with Taiwanese estim to R quads: LAQ, SAQ, quad sets.  Pt instructed with verbal and tactile cueing for quad engagement B quads to facilitate activation. 10/10 on /off cycle x 10 mins. Pt. Education:  6/18 - Patient was thoroughly educated on this date regarding prehabilitation goals, importance of PT sessions in improving overall ROM, strength and stability prior to surgery, and how prehabilitation will facilitate improved post-operative outcomes. The patient was educated on and instructed in HEP as listed. The patient was given a detailed handout for exercises to initiate in the hospital post-operatively as well as at home. The discharge plan from the hospital was reviewed with the patient; specifically, to reduce length of hospital stay and to minimize time before reinitiating outpatient physical therapy after surgery. Education regarding early mobility post-operatively in the hospital and emphasis on working with both physical therapy and nursing staff to achieve ambulation goal of 150 feet was provided. The patient was highly encouraged to attend joint class in hospital prior to surgery for further instructions on pre and post-surgical care. Also, the patient was educated on precautions after hip replacement to avoid, specifically, hip extension and repetitive hip flexion. It is in my medical opinion that this patient is clear from all physical barriers prior to consideration for surgery, activity modifications prior to and post operatively have been discussed with this patient as well as discharge planning and is cleared for surgery from physical therapy perspective.  -patient educated on diagnosis, prognosis and expectations for rehab  -all patient questions were answered    Home Exercise Program:  Access Code: [facility]  URL: [email or web address]  Date: 08/05/2021  Prepared by: Sarmad Herrmann    Exercises  Long Sitting Calf Stretch with Strap - 3 x daily - 7 x weekly - 4 reps - 20 hold  Seated Gastroc Stretch with Strap - 3 x daily - 7 x weekly - 4 reps - 20 hold  Seated Hamstring Stretch - 3 x daily - 7 x weekly - 4 reps - 20 hold  8/5: HEP was also reviewed with pt from home health PT and encouraged to continue, additions of interventions above. 6/18 - The following exercises were discussed and added to the patient's home exercise program. (glute set, quad set, ankle pumps, heel prop, heel slide, SLR flexion). Additionally, the patient was educated on appropriate frequency, intensity and duration. Handout provided. Therapeutic Exercise and NMR EXR  [x] (41159) Provided verbal/tactile cueing for activities related to strengthening, flexibility, endurance, ROM for improvements in  [x] LE / Lumbar: LE, proximal hip, and core control with self care, mobility, lifting, ambulation. [] UE / Cervical: cervical, postural, scapular, scapulothoracic and UE control with self care, reaching, carrying, lifting, house/yardwork, driving, computer work. [x] (62347) Provided verbal/tactile cueing for activities related to improving balance, coordination, kinesthetic sense, posture, motor skill, proprioception to assist with   [x] LE / lumbar: LE, proximal hip, and core control in self care, mobility, lifting, ambulation and eccentric single leg control. [] UE / cervical: cervical, scapular, scapulothoracic and UE control with self care, reaching, carrying, lifting, house/yardwork, driving, computer work.   [] (31013) Therapist is in constant attendance of 2 or more patients providing skilled therapy interventions, but not providing any significant amount of measurable one-on-one time to either patient, for improvements in  [] LE / lumbar: LE, proximal hip, and core control in self care, mobility, lifting, ambulation and eccentric single leg control. [] UE / cervical: cervical, scapular, scapulothoracic and UE control with self care, reaching, carrying, lifting, house/yardwork, driving, computer work.      NMR and Therapeutic Activities:    [x] (02236 or 42444) Provided verbal/tactile cueing for activities related to improving balance, coordination, kinesthetic sense, posture, motor skill, proprioception and motor activation to allow for proper function of   [x] LE: / Lumbar core, proximal hip and LE with self care and ADLs  [] UE / Cervical: cervical, postural, scapular, scapulothoracic and UE control with self care, carrying, lifting, driving, computer work.   [] (66783) Gait Re-education- Provided training and instruction to the patient for proper LE, core and proximal hip recruitment and positioning and eccentric body weight control with ambulation re-education including up and down stairs     Home Management Training / Self Care:  [] (05057) Provided self-care/home management training related to activities of daily living and compensatory training, and/or use of adaptive equipment for improvement with: ADLs and compensatory training, meal preparation, safety procedures and instruction in use of adaptive equipment, including bathing, grooming, dressing, personal hygiene, basic household cleaning and chores.      Home Exercise Program:    [] (79795) Reviewed/Progressed HEP activities related to strengthening, flexibility, endurance, ROM of   [] LE / Lumbar: core, proximal hip and LE for functional self-care, mobility, lifting and ambulation/stair navigation   [] UE / Cervical: cervical, postural, scapular, scapulothoracic and UE control with self care, reaching, carrying, lifting, house/yardwork, driving, computer work  [] (59111)Reviewed/Progressed HEP activities related to improving balance, coordination, kinesthetic sense, posture, motor skill, proprioception of   [] LE: core, proximal hip and LE for self care, mobility, lifting, and ambulation/stair navigation    [] UE / Cervical: cervical, postural,  scapular, scapulothoracic and UE control with self care, reaching, carrying, lifting, house/yardwork, driving, computer work    Manual Treatments:  PROM / STM / Oscillations-Mobs:  G-I, II, III, IV (PA's, Inf., Post.)  [x] (56027) Provided manual therapy to mobilize LE, proximal hip and/or LS spine soft tissue/joints for the purpose of modulating pain, promoting relaxation,  increasing ROM, reducing/eliminating soft tissue swelling/inflammation/restriction, improving soft tissue extensibility and allowing for proper ROM for normal function with   [x] LE / lumbar: self care, mobility, lifting and ambulation. [] UE / Cervical: self care, reaching, carrying, lifting, house/yardwork, driving, computer work. Modalities:  [] (50269) Vasopneumatic compression: Utilized vasopneumatic compression to decrease edema / swelling for the purpose of improving mobility and quad tone / recruitment which will allow for increased overall function including but not limited to self-care, transfers, ambulation, and ascending / descending stairs. Charges:  Timed Code Treatment Minutes: 38   Total Treatment Minutes: 38     [] EVAL - LOW (88230)   [] EVAL - MOD (59277)  [] EVAL - HIGH (12385)  [] RE-EVAL (63490)  [x] VK(64337) x 1       [] Ionto  [x] NMR (74147) x 1     [] Vaso  [x] Manual (65926) x 1       [] Ultrasound  [] TA x        [] Mech Traction (45689)  [] Aquatic Therapy x      [] ES (un) (54516):   [] Home Management Training x  [] ES(attended) (50827)   [] Dry Needling 1-2 muscles (81252):  [] Dry Needling 3+ muscles (774918)  [] Group:      [] Other:     GOALS:  Patient stated goal: The patient will be able to walk >500ft without increase in symptoms. []? Progressing: []? Met: []? Not Met: []? Adjusted     Therapist goals for Patient:   Short Term Goals: To be achieved in: 2 weeks post-op  1. Independent in HEP and progression per patient tolerance without increase in symptoms. []? Progressing: []? Met: []? Not Met: []? Adjusted  2. Patient will have a decrease in pain to <5/10 to facilitate improvement in movement of the BLE, and ADLs as indicated by Functional Deficits. []? Progressing: []? Met: []? Not Met: []?  Adjusted     Long Term Goals: To be achieved in: 12 weeks post-op  1. Disability index score of 35% or less for the LEFS to assist with reaching prior level of function. []? Progressing: []? Met: []? Not Met: []? Adjusted  2. Patient will demonstrate increased knee AROM from 0 -120 allow for proper joint functioning as indicated by patients Functional Deficits. []? Progressing: []? Met: []? Not Met: []? Adjusted  3. Patient will demonstrate an increase in Strength to at least 4+/5 with R knee flexion and extension in order perform proper functional activities. []? Progressing: []? Met: []? Not Met: []? Adjusted  4. Patient will return to functional activities including walking in grocery strore without increased symptoms or restriction. []? Progressing: []? Met: []? Not Met: []? Adjusted  5. Patient will be able to walk >2 blocks without increase in pain <3/10. []? Progressing: []? Met: []? Not Met: []? Adjusted           Overall Progression Towards Functional goals/ Treatment Progress Update:  [] Patient is progressing as expected towards functional goals listed. [] Progression is slowed due to complexities/Impairments listed. [] Progression has been slowed due to co-morbidities. [x] Plan just implemented, too soon to assess goals progression <30days   [] Goals require adjustment due to lack of progress  [] Patient is not progressing as expected and requires additional follow up with physician  [] Other    Persisting Functional Limitations/Impairments:  [x]Sleeping [x]Sitting               [x]Standing [x]Transfers        [x]Walking [x]Kneeling               [x]Stairs [x]Squatting / bending   [x]ADLs []Reaching  []Lifting  [x]Housework  [x]Driving []Job related tasks  []Sports/Recreation []Other:      ASSESSMENT:  Continuation today with estim and progression with AROM interventions with focus on quad engagement.  Pt was able to progress ROM today with gentle overpressure into knee extension but obtained with active stretching with hamstrings. Pt with reports of increase in pain with activity addition of supine slr's due to quadriceps burning etc.  Pt was able to gain control of her quads with active slr's and less assistance with continued reps. Time spent today on TKE with addition of theraband and gait mechanics emphasized with ambultaion. Pt will benefit from continuation of PT to work on progressing quad engagement and activity as well as knee ROM. PT encouraged use of RW after session as quad is likely fatigued after TE and stim, and to avoid giveouts/buckling. Treatment/Activity Tolerance:  [x] Patient able to complete tx  [] Patient limited by fatigue  [] Patient limited by pain  [] Patient limited by other medical complications  [] Other:     Prognosis: [x] Good [] Fair  [] Poor    Patient Requires Follow-up: [x] Yes  [] No    Plan for next treatment session: knee ROM emphasis, quad strength and engagement/control as able. PLAN:  PT 2-3x / week for 12 weeks. [x] Continue per plan of care [] Alter current plan (see comments)  [] Plan of care initiated [] Hold pending MD visit [] Discharge    Electronically signed by: TUAN Kothari,[id number]  Note: If patient does not return for scheduled/ recommended follow up visits, this note will serve as a discharge from care along with most recent update on progress.

## 2021-08-17 ENCOUNTER — CARE COORDINATION (OUTPATIENT)
Dept: CASE MANAGEMENT | Age: 51
End: 2021-08-17

## 2021-08-17 ENCOUNTER — HOSPITAL ENCOUNTER (OUTPATIENT)
Dept: PHYSICAL THERAPY | Age: 51
Setting detail: THERAPIES SERIES
Discharge: HOME | End: 2021-08-17
Payer: COMMERCIAL

## 2021-08-17 PROCEDURE — 97750 PHYSICAL PERFORMANCE TEST: CPT

## 2021-08-17 PROCEDURE — 97110 THERAPEUTIC EXERCISES: CPT

## 2021-08-17 PROCEDURE — 97140 MANUAL THERAPY 1/> REGIONS: CPT

## 2021-08-17 NOTE — CARE COORDINATION
[facility] Transitions Follow Up Call    2021    Patient: Steph Lemons  Patient : 1970   MRN: <[id number]>  Reason for Admission: arthritis of right knee  Discharge Date: 21 RARS: No data recorded       Spoke with: Steph Lemons who reports that she is doing well. Patient reports that swelling continues, using elevation and ice. Patient states more achy  than pain so muscle relaxers have been more effective reports that she has not used pain med in 2 days. Patient doing outpatient PT 2 times a week. Appetite and fluid intake good and no issues with bowel and bladder. Patient reports using walker when out and cane in the home. Incision looks good denies any s/s or infection. Informed patient that this would be our last outreach. Patient understands to contact PCP/Surgeon for any health concerns for early intervention. The CTN's contact information was also given if needed. Care Transitions Follow Up Call    Needs to be reviewed by the provider   Additional needs identified to be addressed with provider: No  none             Method of communication with provider : none      Care Transition Nurse (CTN) contacted the patient by telephone to follow up after admission on 21. Verified name and  with patient as identifiers. Addressed changes since last contact: Closing encounter     CTN provided contact information for future needs. No further follow-up call indicated based on severity of symptoms and risk factors.   Plan for next call: Encounter closed          Care Transitions Subsequent and Final Call    Subsequent and Final Calls  Do you have any ongoing symptoms?: No  Have your medications changed?: No  Do you have any questions related to your medications?: No  Do you currently have any active services?: Yes  Are you currently active with any services?: Home Health  Do you have any needs or concerns that I can assist you with?: No  Identified Barriers: None  Care Transitions Interventions     Other Therapy Services: Completed    Home Care Waiver: Completed Physical Therapy: Completed     Occupational Therapy: Completed     Other Interventions:            Follow Up  Future Appointments   Date Time Provider Raj Buckleyi   8/17/2021  3:45 PM Lacy Uri Stephens [place]    8/18/2021  8:00 AM MD Malachi Cumminsf [facility]   8/20/2021  4:00 PM Raj [facility]   8/23/2021  4:00 PM Kendrick Ronquillo, PT [facility] PT [facility]   8/25/2021  4:00 PM Vanessa Ramachandran, PT [facility] PT [facility]   8/26/2021  1:15 PM Aline Apley, PA [facility]   8/30/2021 10:00 AM Vanessa Ramachandran, PT [facility]    9/1/2021  4:45 PM Vanessa Ramachandran, PT [facility] PT [facility]       Ronal Fulton LPN

## 2021-08-17 NOTE — FLOWSHEET NOTE
[address] [facility]  Phone: [phone]   Fax: [phone]    Physical Therapy Daily Treatment Note  Date:  2021    Patient Name:  Sandeep Kulkarni    :  1970  MRN: [id number]  Medical/Treatment Diagnosis Information:  Diagnosis: M17.11 (ICD-10-CM) - Primary osteoarthritis of right knee / PREHAB - sx: 21  Treatment Diagnosis: decreased RLE strength, knee pain, antalgic gait, knee instability  Insurance/Certification information:  PT Insurance Information: [address] [place] [address] - $25 copay - visits PMN  Physician Information:  Referring Practitioner: Avtar Rider MD  Plan of care signed (Y/N): [x]  Yes []  No     Date of Patient follow up with Physician: TBD      Progress Report: []  Yes  [x]  No     Date Range for reporting period:  Beginnin21  Ending: TBD    Progress report due (10 Rx/or 30 days whichever is less): visit #13      Recertification due (POC duration/ or 90 days whichever is less): visit #16      Visit # Insurance Allowable Auth required? Date Range   6 60 []  Yes  [x]  No AIM REPORTS NO AUTH NEEDED ON 21 CORESPONDENCE      Latex Allergy:  [x]NO      []YES  Preferred Language for Healthcare:   [x]English       []other:    Functional Scale:        Date assessed:  LEFS: raw score = 20/80; dysfunction = 75%  21  LEFS: raw score = 15/80; dysfunction = 81%  8/5    Pain level:  1/10 pain; 5/10 stiffness       SUBJECTIVE:  Patient reports pain is low, but stiffness is most limiting at this time. She is still using SPC for ambulation. She reports burning in knee and above kneecap with prolonged walking, quad activity, prolonged standing. She is 4 weeks s/p TKA this date.     OBJECTIVE:      Functional Testing  Sx Date 21 Prehab Date 21 Post-op Re-Eval Date 21 4 week F/U date 21 8 week F/U date TBD       TUG (sec) 8     20  16     30 second sit to stand (reps) 10  8  10     6 minute walk (m) 449.58  264 (RW)  288 (SPC)         Balance:     Narrowed SOFIA (sec) 10  10  10     Semi Tandem (sec) 10    10     10            Tandem (sec) 10     10     10           SLS (sec) 3  0  10         Knee AROM L R L R L R L R   Flexion 119 122  119  87  119  103       Extension hyper 1 0  0  -19  0  - 4           Knee Ext: L R L R L R L R   MMT (of 5) 4+ 4  4+  unable  NT  3       Knee Ext (#) 31.8 29.6      NT  11.6           Hip Abd:  L R L R L R L R   MMT (of 5) 4+ 4   unable  NT   NT       Hip Abd (#) 32.9 24.3      NT  NT           LEFS (raw) 20  15  27       8/13/21  Rt knee ROM 0-100 EOB. Presented to clinic with walker but brought   8/11 - 95deg AAROM R knee flexion; lack 3 extension AROM; soft end feel for both; however significant pain. 8/7: AROM knee ext with quad set lacking 6 from neutral; Knee flexion AAROM heel slide and overpressure 97 degrees with soft end feel however significant pain.      RESTRICTIONS/PRECAUTIONS: HX OF FALLS     Exercises/Interventions:   Therapeutic Exercises (29219) - 8' Resistance / level Sets/sec Reps Notes   Towel gastroc stretch     Heel slides with slide board in supine     SAQ  LAQ  Standing HR  IB Gastroc  slr flexion and abduction  Squats   1 5 8/17 - added   HEP review: brief edu on positioning, addition of squats, emphasis on heel propping and heel slides   3'  8/17           Therapeutic Activities (29309)       R Bike (horseshoe pattern) N/A;    Seat 7 showing  3 min 8/17 - added; TE this date                               Neuromuscular Re-ed (02551) -  w/ setup       Quad set with Barbadian estim  SAQ w/ Barbadian estim  Gait mechanics  TKE         Manual Intervention (38380) - 12'       Patellar mobs    2 min total Inferior/sup/inf/lat   AAROM knee flexion with heel slide     PROM Knee flexion  5 min total; 10'' holds PROM Knee extension w/ op Heel prop 8'' holds 8 8/17 - ^ intensity   8/11 8/17 - Functional testing performed this date and compared with previous values as noted above. Testing done in order to assess progress with community navigation and fall risk. A total of 25 minutes spent completing functional testing and reviewing results. Modalities: 8/11: NMR with [place] Islander estim to R quads: LAQ, SAQ, quad sets. Pt instructed with verbal and tactile cueing for quad engagement B quads to facilitate activation. 10/10 on /off cycle x 10 mins. Pt. Education:   6/18 - Patient was thoroughly educated on this date regarding prehabilitation goals, importance of PT sessions in improving overall ROM, strength and stability prior to surgery, and how prehabilitation will facilitate improved post-operative outcomes. The patient was educated on and instructed in HEP as listed. The patient was given a detailed handout for exercises to initiate in the hospital post-operatively as well as at home. The discharge plan from the hospital was reviewed with the patient; specifically, to reduce length of hospital stay and to minimize time before reinitiating outpatient physical therapy after surgery. Education regarding early mobility post-operatively in the hospital and emphasis on working with both physical therapy and nursing staff to achieve ambulation goal of 150 feet was provided. The patient was highly encouraged to attend joint class in hospital prior to surgery for further instructions on pre and post-surgical care. Also, the patient was educated on precautions after hip replacement to avoid, specifically, hip extension and repetitive hip flexion.  It is in my medical opinion that this patient is clear from all physical barriers prior to consideration for surgery, activity modifications prior to and post operatively have been discussed with this patient as well as discharge planning and is cleared for surgery from physical therapy perspective.  -patient educated on diagnosis, prognosis and expectations for rehab  -all patient questions were answered    Home Exercise Program:  8/17 - added 1/4 squat    Access Code: [email or web address]  URL: [email or web address]  Date: 08/05/2021  Prepared by: Ramesh Jordan    Exercises  Long Sitting Calf Stretch with Strap - 3 x daily - 7 x weekly - 4 reps - 20 hold  Seated Gastroc Stretch with Strap - 3 x daily - 7 x weekly - 4 reps - 20 hold   Seated Hamstring Stretch - 3 x daily - 7 x weekly - 4 reps - 20 hold     8/5: HEP was also reviewed with pt from home health PT and encouraged to continue, additions of interventions above. 6/18 - The following exercises were discussed and added to the patient's home exercise program. (glute set, quad set, ankle pumps, heel prop, heel slide, SLR flexion). Additionally, the patient was educated on appropriate frequency, intensity and duration. Handout provided. Therapeutic Exercise and NMR EXR  [x] (96213) Provided verbal/tactile cueing for activities related to strengthening, flexibility, endurance, ROM for improvements in  [x] LE / Lumbar: LE, proximal hip, and core control with self care, mobility, lifting, ambulation. [] UE / Cervical: cervical, postural, scapular, scapulothoracic and UE control with self care, reaching, carrying, lifting, house/yardwork, driving, computer work. [x] (78712) Provided verbal/tactile cueing for activities related to improving balance, coordination, kinesthetic sense, posture, motor skill, proprioception to assist with   [x] LE / lumbar: LE, proximal hip, and core control in self care, mobility, lifting, ambulation and eccentric single leg control.    [] UE / cervical: cervical, scapular, scapulothoracic and UE control with self care, reaching, carrying, lifting, house/yardwork, driving, computer work.   [] (71461) Therapist is in constant attendance of 2 or more patients providing skilled therapy interventions, but not providing any significant amount of measurable one-on-one time to either patient, for improvements in  [] LE / lumbar: LE, proximal hip, and core control in self care, mobility, lifting, ambulation and eccentric single leg control. [] UE / cervical: cervical, scapular, scapulothoracic and UE control with self care, reaching, carrying, lifting, house/yardwork, driving, computer work. NMR and Therapeutic Activities:    [x] (77980 or 16397) Provided verbal/tactile cueing for activities related to improving balance, coordination, kinesthetic sense, posture, motor skill, proprioception and motor activation to allow for proper function of   [x] LE: / Lumbar core, proximal hip and LE with self care and ADLs  [] UE / Cervical: cervical, postural, scapular, scapulothoracic and UE control with self care, carrying, lifting, driving, computer work.   [] (88289) Gait Re-education- Provided training and instruction to the patient for proper LE, core and proximal hip recruitment and positioning and eccentric body weight control with ambulation re-education including up and down stairs     Home Management Training / Self Care:  [] (32787) Provided self-care/home management training related to activities of daily living and compensatory training, and/or use of adaptive equipment for improvement with: ADLs and compensatory training, meal preparation, safety procedures and instruction in use of adaptive equipment, including bathing, grooming, dressing, personal hygiene, basic household cleaning and chores.      Home Exercise Program:    [] (82628) Reviewed/Progressed HEP activities related to strengthening, flexibility, endurance, ROM of   [] LE / Lumbar: core, proximal hip and LE for functional self-care, mobility, lifting and ambulation/stair navigation   [] UE / Cervical: cervical, postural, scapular, scapulothoracic and UE control with self care, reaching, carrying, lifting, house/yardwork, driving, computer work  [] (03031)Reviewed/Progressed HEP activities related to improving balance, coordination, kinesthetic sense, posture, motor skill, proprioception of   [] LE: core, proximal hip and LE for self care, mobility, lifting, and ambulation/stair navigation    [] UE / Cervical: cervical, postural,  scapular, scapulothoracic and UE control with self care, reaching, carrying, lifting, house/yardwork, driving, computer work    Manual Treatments:  PROM / STM / Oscillations-Mobs:  G-I, II, III, IV (PA's, Inf., Post.)  [x] (94459) Provided manual therapy to mobilize LE, proximal hip and/or LS spine soft tissue/joints for the purpose of modulating pain, promoting relaxation,  increasing ROM, reducing/eliminating soft tissue swelling/inflammation/restriction, improving soft tissue extensibility and allowing for proper ROM for normal function with   [x] LE / lumbar: self care, mobility, lifting and ambulation. [] UE / Cervical: self care, reaching, carrying, lifting, house/yardwork, driving, computer work. Modalities:  [] (44753) Vasopneumatic compression: Utilized vasopneumatic compression to decrease edema / swelling for the purpose of improving mobility and quad tone / recruitment which will allow for increased overall function including but not limited to self-care, transfers, ambulation, and ascending / descending stairs. Charges:  Timed Code Treatment Minutes: 45   Total Treatment Minutes: 45     [] EVAL - LOW (74375)   [] EVAL - MOD (78864)  [] EVAL - HIGH (68511)  [] RE-EVAL (88161)  [x] GQ(50132) x 1       [] Ionto  [] NMR (24338) x      [] Vaso  [x] Manual (22173) x 1       [] Ultrasound  [] TA x        [] Mech Traction (59553)  [] Aquatic Therapy x      [] ES (un) (28777):   [] Home Management Training x  [] ES(attended) (79994)   [] Dry Needling 1-2 muscles (79006):  [] Dry Needling 3+ muscles (811161)  [] Group:      [x] Other: phys perf x1     GOALS:  Patient stated goal: The patient will be able to walk >500ft without increase in symptoms. [x]? Progressing: []? Met: []? Not Met: []? Adjusted     Therapist goals for Patient:   Short Term Goals: To be achieved in: 2 weeks post-op  1. Limitations/Impairments:  [x]Sleeping [x]Sitting               [x]Standing [x]Transfers        [x]Walking [x]Kneeling               [x]Stairs [x]Squatting / bending   [x]ADLs []Reaching  []Lifting  [x]Housework  [x]Driving []Job related tasks  []Sports/Recreation []Other:      ASSESSMENT:  Due to physical performing testing, a majority of session was focused on patient functional assessment this date. The patient demos slowly improving knee flexion and extension with pain at end-ranges. Her gait (with and w/o SPC) is improving as quad continues to slowly improve in strength, activation and endurance. No stim performed this date due to limited time and emphasis on functional testing. Ongoing therapy highly warranted at this time to facilitate weight bearing tolerance, restore knee ROM, strength, endurance, activity tolerance. Treatment/Activity Tolerance:  [x] Patient able to complete tx  [] Patient limited by fatigue  [] Patient limited by pain  [] Patient limited by other medical complications  [] Other:     Prognosis: [x] Good [] Fair  [] Poor    Patient Requires Follow-up: [x] Yes  [] No    Plan for next treatment session: knee ROM emphasis, quad strength and engagement/control as able. Standing 3 way hip OKC/CKC; squats as tolerated. Step activity. PLAN:  PT 2-3x / week for 12 weeks. [x] Continue per plan of care [] Alter current plan (see comments)  [] Plan of care initiated [] Hold pending MD visit [] Discharge    Electronically signed by: PATRICIA Fontana,[id number]  Note: If patient does not return for scheduled/ recommended follow up visits, this note will serve as a discharge from care along with most recent update on progress.

## 2021-08-18 ENCOUNTER — OFFICE VISIT (OUTPATIENT)
Dept: ENDOCRINOLOGY | Age: 51
End: 2021-08-18
Payer: COMMERCIAL

## 2021-08-18 VITALS
SYSTOLIC BLOOD PRESSURE: 138 MMHG | HEART RATE: 64 BPM | BODY MASS INDEX: 51.35 KG/M2 | HEIGHT: 61 IN | DIASTOLIC BLOOD PRESSURE: 72 MMHG | OXYGEN SATURATION: 95 % | WEIGHT: 272 LBS

## 2021-08-18 DIAGNOSIS — Z96.651 STATUS POST RIGHT KNEE REPLACEMENT: ICD-10-CM

## 2021-08-18 DIAGNOSIS — N25.81 SECONDARY HYPERPARATHYROIDISM (HCC): Primary | ICD-10-CM

## 2021-08-18 DIAGNOSIS — E04.2 MULTIPLE THYROID NODULES: ICD-10-CM

## 2021-08-18 PROCEDURE — 99213 OFFICE O/P EST LOW 20 MIN: CPT | Performed by: INTERNAL MEDICINE

## 2021-08-18 RX ORDER — CYCLOBENZAPRINE HCL 10 MG
10 TABLET ORAL NIGHTLY
COMMUNITY
End: 2022-03-29

## 2021-08-18 RX ORDER — OXYCODONE HYDROCHLORIDE AND ACETAMINOPHEN 5; 325 MG/1; MG/1
1 TABLET ORAL EVERY 6 HOURS PRN
COMMUNITY
End: 2021-08-30 | Stop reason: SDUPTHER

## 2021-08-18 NOTE — PROGRESS NOTES
Patient ID: Savannah Blancas is a 46 y.o. female    Chief Complaint: Hyperparathyroidism        HPI:   Savannah Blancas is here for evaluation of hyperparathyroidism. PTH 93.9, GFR 53 July 2019   Ca: 9.5 .2, Cr 1.1, GFR > 60, Vit D 31  Nov 2020     Follows with nephrologist for about 5 years. No visit this year since her nephrologist.     She has polyuria, polydipsia,   No issues with memory, myalgias. Never had a fracture. She has OA of both knees, need replacement. Ct scan in 2010 showed 3 mm stone. Mother, maternal aunt and maternal GM had kidney stones   Maternal GM had osteoporosis     Her great GM and her sister had adrenal tumors. No family history of thyroid, parathyroid, pituitary tumors.      No use of tums/HCTZ/lithium/ radiation exposure     Interval history:   Right TKA   Not taking Ca/MVT/Vit d     The following portions of the patient's history were reviewed and updated as appropriate:     Family History   Problem Relation Age of Onset    High Blood Pressure Other         maternal aunt    Hypertension Mother     Hypertension Maternal Grandmother     Diabetes Maternal Grandmother     Breast Cancer Maternal Grandmother          Social History     Socioeconomic History    Marital status:      Spouse name: Not on file    Number of children: Not on file    Years of education: Not on file    Highest education level: Not on file   Occupational History    Not on file   Tobacco Use    Smoking status: Never Smoker    Smokeless tobacco: Never Used   Vaping Use    Vaping Use: Never used   Substance and Sexual Activity    Alcohol use: No    Drug use: No    Sexual activity: Never   Other Topics Concern    Not on file   Social History Narrative    Not on file     Social Determinants of Health     Financial Resource Strain: Low Risk     Difficulty of Paying Living Expenses: Not hard at all   Food Insecurity: No Food Insecurity    Worried About Running Out of Food in the Last Year: Never true   [address] in the Last Year: Never true   Transportation Needs:     Lack of Transportation (Medical):      Lack of Transportation (Non-Medical):    Physical Activity:     Days of Exercise per Week:     Minutes of Exercise per Session:    Stress:     Feeling of Stress :    Social Connections:     Frequency of Communication with Friends and Family:     Frequency of Social Gatherings with Friends and Family:     Attends [employer] Services:     Active Member of Clubs or Organizations:     Attends Club or Organization Meetings:     Marital Status:    Intimate Partner Violence:     Fear of Current or Ex-Partner:     Emotionally Abused:     Physically Abused:     Sexually Abused:          Past Medical History:   Diagnosis Date    Arthritis     Carpal tunnel syndrome     Convulsions     COVID-19     Dermatophytosis     Diarrhea     Hypertension     Hypertension, essential     Hypertensive kidney disease with chronic kidney disease stage V ([address]     Medulloadrenal hyperfunc     Meningococcal encephalitis     Morbid obesity with BMI of 45.0-49.9, adult ([address]     Obesity     Other malaise and fatigue     Seizure disorder ([facility]     tonic-last on was @     Status post right knee replacement        Procedure Laterality Date    APPENDECTOMY      CARPAL TUNNEL RELEASE Bilateral      SECTION      ENDOMETRIAL ABLATION     [facility] Right 2021    ROBOTIC ASSISTED RIGHT TOTAL KNEE REPLACEMENT performed by Samir Becerra MD at [place] [address]   Past Surgical History:   Procedure Laterality Date    APPENDECTOMY      CARPAL TUNNEL RELEASE Bilateral    [address]        Allergen Reactions    Ibuprofen       Patient has renal insuffiencey.          Nsaids       Patient has renal insuffiencey.               Current Outpatient Medications:     oxyCODONE-acetaminophen (PERCOCET) 5-325 MG per tablet, Take 1 tablet by mouth every 6 hours as needed for Pain., Disp: , Rfl:     cyclobenzaprine (FLEXERIL) 10 MG tablet, Take 10 mg by mouth nightly, Disp: , Rfl:     amLODIPine (NORVASC) 10 MG tablet, TAKE 1 TABLET BY MOUTH EVERY DAY, Disp: 90 tablet, Rfl: 1    acetaminophen (TYLENOL) 500 MG tablet, Take 500 mg by mouth every 6 hours as needed for Pain, Disp: , Rfl:     levETIRAcetam (KEPPRA) 500 MG tablet, TAKE 1 TABLET TWICE DAILY, Disp: 180 tablet, Rfl: 3    atenolol (TENORMIN) 50 MG tablet, Take 1 tablet by mouth daily, Disp: 90 tablet, Rfl: 1    meclizine (ANTIVERT) 25 MG tablet, Take 25 mg by mouth 3 times daily as needed , Disp: , Rfl:     Review of Systems:  Constitutional: Negative for fever, chills, and unexpected weight change. HENT: Negative for congestion, ear pain, rhinorrhea,  sore throat and trouble swallowing. Eyes: Negative for photophobia, redness, itching. Respiratory: Negative for cough, shortness of breath and sputum. Cardiovascular: Negative for chest pain, palpitations and leg swelling. Gastrointestinal: Negative for nausea, vomiting, abdominal pain, diarrhea, constipation. Endocrine: Negative for cold intolerance, heat intolerance, polydipsia, polyphagia and polyuria. Genitourinary: Negative for dysuria, urgency, frequency, hematuria and flank pain. Musculoskeletal: Negative for myalgias, back pain, arthralgias and neck pain. Skin/Nail: Negative for rash, itching. Normal nails. Neurological: Negative for seizures, weakness, light-headedness, numbness and headaches. Hematological/ Lymph nodes: Negative for adenopathy. Does not bruise/bleed easily. Psychiatric/Behavioral: Negative for suicidal ideas, depression, anxiety, sleep disturbance and decreased concentration.         Physical Exam:  /72 (Site: Right Lower Arm, Position: Sitting, Cuff Size: Large Adult)   Pulse 64   Ht 5' 1\" (1.549 m)   Wt 272 lb (123.4 kg)   SpO2 95%   BMI 51.39 kg/m²   Constitutional: Patient is oriented to person, place, and time. Patient appears well-developed and well-nourished. HENT:    Head: Normocephalic and atraumatic. Eyes: Conjunctivae and EOM are normal.     Neck: Normal range of motion. Thyroid palpable. Cardiovascular: normal rate, regular rhythm and normal heart sounds. Pulmonary/Chest: Effort normal and breath sounds normal.   Musculoskeletal: Normal range of motion. 1 plus LE edema. Neurological: Patient is alert and oriented to person, place, and time. Patient has normal reflexes. Skin: Skin is warm and dry. Psychiatric: Patient has a normal mood and affect.  Patient behavior is normal.     Lab Review:    Admission on 07/20/2021, Discharged on 07/21/2021   Component Date Value Ref Range Status    SARS-CoV-2, NAAT 07/20/2021 Not Detected  Not Detected Final    ABO/Rh 07/20/2021 O POS   Final    Antibody Screen 07/20/2021 NEG   Final    Pregnancy, Urine 07/20/2021 Negative  Detects HCG level >20 MIU/mL Final    POC Glucose 07/20/2021 92  70 - 99 mg/dl Final    Performed on 07/20/2021 ACCU-CHEK   Final    Sodium 07/21/2021 140  136 - 145 mmol/L Final    Potassium 07/21/2021 4.1  3.5 - 5.1 mmol/L Final    Chloride 07/21/2021 103  99 - 110 mmol/L Final    CO2 07/21/2021 23  21 - 32 mmol/L Final    Anion Gap 07/21/2021 14  3 - 16 Final    Glucose 07/21/2021 128* 70 - 99 mg/dL Final    BUN 07/21/2021 13  7 - 20 mg/dL Final    CREATININE 07/21/2021 1.2* 0.6 - 1.1 mg/dL Final    GFR Non- 07/21/2021 47* >60 Final    GFR  07/21/2021 57* >60 Final    Calcium 07/21/2021 8.8  8.3 - 10.6 mg/dL Final    WBC 07/21/2021 8.3  4.0 - 11.0 K/uL Final    RBC 07/21/2021 5.18  4.00 - 5.20 M/uL Final    Hemoglobin 07/21/2021 13.7  12.0 - 16.0 g/dL Final    Hematocrit 07/21/2021 39.7  36.0 - 48.0 % Final    MCV 07/21/2021 76.7* 80.0 - 100.0 fL Final  MCH 07/21/2021 26.5  26.0 - 34.0 pg Final    MCHC 07/21/2021 34.6  31.0 - 36.0 g/dL Final    RDW 07/21/2021 14.7  12.4 - 15.4 % Final    Platelets 78/14/6881 178  135 - 450 K/uL Final    MPV 07/21/2021 8.3  5.0 - 10.5 fL Final    POC Glucose 07/20/2021 167* 70 - 99 mg/dl Final    Performed on 07/20/2021 ACCU-CHEK   Final    POC Glucose 07/21/2021 121* 70 - 99 mg/dl Final    Performed on 07/21/2021 [facility] [place]   Final   Hospital Outpatient Visit on 06/28/2021   Component Date Value Ref Range Status    ABO/Rh 06/28/2021 O POS   Final    Antibody Screen 06/28/2021 NEG   Final    Vit D, 25-Hydroxy 06/28/2021 32.4  >=30 ng/mL Final    Color, UA 06/28/2021 YELLOW  Straw/Yellow Final    Clarity, UA 06/28/2021 Clear  Clear Final    Glucose, Ur 06/28/2021 Negative  Negative mg/dL Final    Bilirubin Urine 06/28/2021 Negative  Negative Final    Ketones, Urine 06/28/2021 Negative  Negative mg/dL Final    Specific [place], UA 06/28/2021 1.017  1.005 - 1.030 Final    Blood, Urine 06/28/2021 Negative  Negative Final    pH, UA 06/28/2021 7.0  5.0 - 8.0 Final    Protein, UA 06/28/2021 Negative  Negative mg/dL Final    Urobilinogen, Urine 06/28/2021 0.2  <2.0 E.U./dL Final    Nitrite, Urine 06/28/2021 Negative  Negative Final    Leukocyte Esterase, Urine 06/28/2021 Negative  Negative Final    Microscopic Examination 06/28/2021 Not Indicated   Final    Urine Type 06/28/2021 NotGiven   Final    Urine Reflex to Culture 06/28/2021 Not Indicated   Final    Sed Rate 06/28/2021 0  0 - 30 mm/Hr Final    Protime 06/28/2021 12.5  10.0 - 13.2 sec Final    INR 06/28/2021 1.08  0.86 - 1.14 Final    Prealbumin 06/28/2021 32.7  20.0 - 40.0 mg/dL Final    Hemoglobin A1C 06/28/2021 5.7  See comment % Final    eAG 06/28/2021 116.9  mg/dL Final    WBC 06/28/2021 3.1* 4.0 - 11.0 K/uL Final    RBC 06/28/2021 5.66* 4.00 - 5.20 M/uL Final    Hemoglobin 06/28/2021 15.0  12.0 - 16.0 g/dL Final    Hematocrit 06/28/2021 43.6  36.0 - 48.0 % Final    MCV 06/28/2021 77.0* 80.0 - 100.0 fL Final    MCH 06/28/2021 26.5  26.0 - 34.0 pg Final    MCHC 06/28/2021 34.4  31.0 - 36.0 g/dL Final    RDW 06/28/2021 15.0  12.4 - 15.4 % Final    Platelets 15/59/1500 195  135 - 450 K/uL Final    MPV 06/28/2021 8.3  5.0 - 10.5 fL Final    Neutrophils % 06/28/2021 36.3  % Final    Lymphocytes % 06/28/2021 54.3  % Final    Monocytes % 06/28/2021 6.4  % Final    Eosinophils % 06/28/2021 2.5  % Final    Basophils % 06/28/2021 0.5  % Final    Neutrophils Absolute 06/28/2021 1.1* 1.7 - 7.7 K/uL Final    Lymphocytes Absolute 06/28/2021 1.7  1.0 - 5.1 K/uL Final    Monocytes Absolute 06/28/2021 0.2  0.0 - 1.3 K/uL Final    Eosinophils Absolute 06/28/2021 0.1  0.0 - 0.6 K/uL Final    Basophils Absolute 06/28/2021 0.0  0.0 - 0.2 K/uL Final    Sodium 06/28/2021 140  136 - 145 mmol/L Final    Potassium 06/28/2021 4.0  3.5 - 5.1 mmol/L Final    Chloride 06/28/2021 103  99 - 110 mmol/L Final    CO2 06/28/2021 26  21 - 32 mmol/L Final    Anion Gap 06/28/2021 11  3 - 16 Final    Glucose 06/28/2021 76  70 - 99 mg/dL Final    BUN 06/28/2021 13  7 - 20 mg/dL Final    CREATININE 06/28/2021 1.1  0.6 - 1.1 mg/dL Final    GFR Non- 06/28/2021 52* >60 Final    GFR  06/28/2021 >60  >60 Final    Calcium 06/28/2021 9.4  8.3 - 10.6 mg/dL Final    aPTT 06/28/2021 36.2  24.2 - 36.2 sec Final    Albumin 06/28/2021 4.6  3.4 - 5.0 g/dL Final    MRSA SCREEN RT-PCR 06/28/2021    Final                    Value:Negative  MRSA DNA not detected.   Normal Range: Not detected      Ventricular Rate 06/28/2021 49  BPM Final    Atrial Rate 06/28/2021 49  BPM Final    P-R Interval 06/28/2021 160  ms Final    QRS Duration 06/28/2021 100  ms Final    Q-T Interval 06/28/2021 476  ms Final    QTc Calculation (Bazett) 06/28/2021 429  ms Final    P Axis 06/28/2021 57  degrees Final    R Axis 06/28/2021 55  degrees Final    T Axis 06/28/2021 30  degrees Final    Diagnosis 06/28/2021 Marked sinus bradycardiaPossible Left atrial enlargementNonspecific T wave abnormalityAbnormal ECGWhen compared with ECG of 25-NOV-2020 13:23,No significant change was foundConfirmed by Richard [address] [place] [address] [id number]) on 6/28/2021 9:41:25 PM   Final   Orders Only on 12/02/2020   Component Date Value Ref Range Status    24hr Urine Volume (ml) 12/02/2020 1000  mL Final    Calcium, 24H Urine 12/02/2020 111  42 - 353 mg/24 hr Final    Creatinine, 24H Ur 12/02/2020 2.1* 0.6 - 1.5 g/24hr Final   Admission on 11/25/2020, Discharged on 11/25/2020   Component Date Value Ref Range Status    Ventricular Rate 11/25/2020 85  BPM Final    Atrial Rate 11/25/2020 85  BPM Final    P-R Interval 11/25/2020 130  ms Final    QRS Duration 11/25/2020 92  ms Final    Q-T Interval 11/25/2020 370  ms Final    QTc Calculation (Bazett) 11/25/2020 440  ms Final    P Axis 11/25/2020 38  degrees Final    R Axis 11/25/2020 44  degrees Final    T Axis 11/25/2020 4  degrees Final    Diagnosis 11/25/2020 Normal sinus rhythmPossible Left atrial enlargementNonspecific T wave abnormalityConfirmed by Bishop Donahue ([id number]) on 11/25/2020 7:44:24 PM   Final    Sodium 11/25/2020 137  136 - 145 mmol/L Final    Potassium 11/25/2020 4.4  3.5 - 5.1 mmol/L Final    Chloride 11/25/2020 101  99 - 110 mmol/L Final    CO2 11/25/2020 25  21 - 32 mmol/L Final    Anion Gap 11/25/2020 11  3 - 16 Final    Glucose 11/25/2020 103* 70 - 99 mg/dL Final    BUN 11/25/2020 14  7 - 20 mg/dL Final    CREATININE 11/25/2020 1.4* 0.6 - 1.1 mg/dL Final    GFR Non- 11/25/2020 40* >60 Final    GFR  11/25/2020 48* >60 Final    Calcium 11/25/2020 9.1  8.3 - 10.6 mg/dL Final    Troponin 11/25/2020 <0.01  <0.01 ng/mL Final    WBC 11/25/2020 2.1* 4.0 - 11.0 K/uL Final    RBC 11/25/2020 5.60* 4.00 - 5.20 M/uL Final    Hemoglobin 11/25/2020 14.9  12.0 - 16.0 g/dL Final    Hematocrit 11/25/2020 43.7  36.0 - 48.0 % Final    MCV 11/25/2020 78.1* 80.0 - 100.0 fL Final    MCH 11/25/2020 26.6  26.0 - 34.0 pg Final    MCHC 11/25/2020 34.1  31.0 - 36.0 g/dL Final    RDW 11/25/2020 14.6  12.4 - 15.4 % Final    Platelets 51/55/8018 154  135 - 450 K/uL Final    MPV 11/25/2020 7.9  5.0 - 10.5 fL Final    Neutrophils % 11/25/2020 48.7  % Final    Lymphocytes % 11/25/2020 41.3  % Final    Monocytes % 11/25/2020 8.9  % Final    Eosinophils % 11/25/2020 0.1  % Final    Basophils % 11/25/2020 1.0  % Final    Neutrophils Absolute 11/25/2020 1.0* 1.7 - 7.7 K/uL Final    Lymphocytes Absolute 11/25/2020 0.9* 1.0 - 5.1 K/uL Final    Monocytes Absolute 11/25/2020 0.2  0.0 - 1.3 K/uL Final    Eosinophils Absolute 11/25/2020 0.0  0.0 - 0.6 K/uL Final    Basophils Absolute 11/25/2020 0.0  0.0 - 0.2 K/uL Final    hCG Qual 11/25/2020 Negative  Detects HCG level >10 MIU/mL Final    Lactic Acid, Sepsis 11/25/2020 1.1  0.4 - 1.9 mmol/L Final    Blood Culture, Routine 11/25/2020 No Growth after 4 days of incubation.    Final    SARS-CoV-2, PCR 11/25/2020 DETECTED* Not Detected Final   Hospital Outpatient Visit on 11/11/2020   Component Date Value Ref Range Status    Sodium 11/11/2020 143  136 - 145 mmol/L Final    Potassium 11/11/2020 4.3  3.5 - 5.1 mmol/L Final    Chloride 11/11/2020 103  99 - 110 mmol/L Final    CO2 11/11/2020 27  21 - 32 mmol/L Final    Anion Gap 11/11/2020 13  3 - 16 Final    Glucose 11/11/2020 94  70 - 99 mg/dL Final    BUN 11/11/2020 25* 7 - 20 mg/dL Final    CREATININE 11/11/2020 1.1  0.6 - 1.1 mg/dL Final    GFR Non- 11/11/2020 53* >60 Final    GFR  11/11/2020 >60  >60 Final    Calcium 11/11/2020 9.5  8.3 - 10.6 mg/dL Final    Total Protein 11/11/2020 6.9  6.4 - 8.2 g/dL Final    Albumin 11/11/2020 4.2  3.4 - 5.0 g/dL Final    Albumin/Globulin Ratio 11/11/2020 1.6  1.1 - 2.2 Final    Total Bilirubin 11/11/2020 0.5  0.0 - 1.0 mg/dL Final Bilirubin Urine 11/11/2020 Negative  Negative Final    Ketones, Urine 11/11/2020 Negative  Negative mg/dL Final    Specific [place], UA 11/11/2020 1.015  1.005 - 1.030 Final    Blood, Urine 11/11/2020 Negative  Negative Final    pH, UA 11/11/2020 6.5  5.0 - 8.0 Final    Protein, UA 11/11/2020 Negative  Negative mg/dL Final    Urobilinogen, Urine 11/11/2020 0.2  <2.0 E.U./dL Final    Nitrite, Urine 11/11/2020 Negative  Negative Final    Leukocyte Esterase, Urine 11/11/2020 Negative  Negative Final    Microscopic Examination 11/11/2020 Not Indicated   Final    Urine Type 11/11/2020 Voided   Final    PTH 11/11/2020 103.2* 14.0 - 72.0 pg/mL Final    Protein, Ur 11/11/2020 <4.00  <12 mg/dL Final          Assessment and plan:     Norah Vizcaino was seen today for follow-up. Norah Vizcaino was seen today for follow-up. Diagnoses and all orders for this visit:    Secondary hyperparathyroidism ([address]  -     Comprehensive Metabolic Panel; Future  -     PTH, Intact; Future  -     Vitamin D 25 Hydroxy; Future    Status post right knee replacement    Multiple thyroid nodules  -     US THYROID; Future              1: Normocalcemic hyparathyroidism     Likely due to chronic kidney disease     24 hour urine Calcium 111 - Dec 2020      Postmenopausal: DXA scan showed osteopenia - Nov 2020   Repeat in Nov 2022     Ca normal July 2021   Add Vit D and then check PTH       2: Vit D def   32 - June 2021   Add OTC Vit D 1,000 - 2,000 units a day     3: Left thyroid nodule   Us done in Oct 2019 : images reviewed   7x 5 x 7 mm  Left inferior hypoechoic nodule with echogenic foi   Right inferior aspect of thyroid shoed cystic / hypoechoic nodule     US neck Nov 2020   Left inf nodule of 9 x 7x 5 mm, repeat US in 12 months   1.5 cms heterogeneous hypoechoic area also stable from last US       Repeat US in Nov 2021     4: HTN and bradycardia   As per pcp   She has CKD and HTN.       RTC in 9 months if all labs are normal Electronically signed by Alena Gitelman, MD on 8/18/2021 at 8:02 AM

## 2021-08-20 ENCOUNTER — HOSPITAL ENCOUNTER (OUTPATIENT)
Dept: PHYSICAL THERAPY | Age: 51
Setting detail: THERAPIES SERIES
Discharge: HOME | End: 2021-08-20
Payer: COMMERCIAL

## 2021-08-20 PROCEDURE — 97530 THERAPEUTIC ACTIVITIES: CPT

## 2021-08-20 PROCEDURE — 97140 MANUAL THERAPY 1/> REGIONS: CPT

## 2021-08-20 PROCEDURE — 97112 NEUROMUSCULAR REEDUCATION: CPT

## 2021-08-20 NOTE — FLOWSHEET NOTE
[address] [facility] Physical Therapy  Phone: [phone]   Fax: [phone]    Physical Therapy Daily Treatment Note  Date:  2021    Patient Name:  Radha Cooper    :  1970  MRN: [id number]  Medical/Treatment Diagnosis Information:  Diagnosis: M17.11 (ICD-10-CM) - Primary osteoarthritis of right knee / PREHAB - sx: 21  Treatment Diagnosis: decreased RLE strength, knee pain, antalgic gait, knee instability  Insurance/Certification information:  PT Insurance Information: [address] [place] [address] - $25 copay - visits PMN  Physician Information:  Referring Practitioner: Lizeth Thao MD  Plan of care signed (Y/N): [x]  Yes []  No     Date of Patient follow up with Physician: TBD      Progress Report: []  Yes  [x]  No     Date Range for reporting period:  Beginnin21  Ending: TBD    Progress report due (10 Rx/or 30 days whichever is less): visit #56      Recertification due (POC duration/ or 90 days whichever is less): visit #16      Visit # Insurance Allowable Auth required? Date Range   7 60 []  Yes  [x]  No AIM REPORTS NO AUTH NEEDED ON 21 CORESPONDENCE      Latex Allergy:  [x]NO      []YES  Preferred Language for Healthcare:   [x]English       []other:    Functional Scale:        Date assessed:  LEFS: raw score = 20/80; dysfunction = 75%  21  LEFS: raw score = 15/80; dysfunction = 81%  8/5    Pain level:  1/10 pain; 5/10 stiffness        SUBJECTIVE:  Patient reports pain is low, but stiffness is most limiting at this time. Her  indicates she is starting to turn sideways when ambulating down stairs/steps.     OBJECTIVE:      Functional Testing  Sx Date 21 Prehab Date 21 Post-op Re-Eval Date 21 4 week F/U date 21 8 week F/U date TBD       TUG (sec) 8     20  16     30 second sit to stand (reps) 10  8  10     6 minute walk (m) 449.58   264 (RW)  288 (SPC)         Balance:     Narrowed SOFIA (sec) 10  10  10     Semi Tandem (sec) 10    10     10            Tandem (sec) 10     10     10           SLS (sec) 3  0  10         Knee AROM L R L R L R L R   Flexion 119 122  119  87  119  103       Extension hyper 1 0  0  -19  0  - 4           Knee Ext: L R L R L R L R   MMT (of 5) 4+ 4  4+  unable  NT  3       Knee Ext (#) 31.8 29.6      NT  11.6           Hip Abd:  L R L R L R L R   MMT (of 5) 4+ 4   unable  NT   NT       Hip Abd (#) 32.9 24.3      NT  NT           LEFS (raw) 20  15  27       8/20 - R knee AAROM flexion: 110   8/13/21  Rt knee ROM 0-100 EOB. Presented to clinic with walker but brought   8/11 - 95deg AAROM R knee flexion; lack 3 extension AROM; soft end feel for both; however significant pain. 8/7: AROM knee ext with quad set lacking 6 from neutral; Knee flexion AAROM heel slide and overpressure 97 degrees with soft end feel however significant pain.      RESTRICTIONS/PRECAUTIONS: HX OF FALLS     Exercises/Interventions:   Therapeutic Exercises (23024) -  Resistance / level Sets/sec Reps Notes   Towel gastroc stretch Heel slides with slide board in supine SAQ LAQ Standing HR IB Gastroc slr flexion and abduction  Squats         Therapeutic Activities (12091)       R Bike (horseshoe pattern) N/A;    Seat 7 showing  4 min 8/20 - added 1 min   Step Up / Retro Step Down 4'' 1 10; R 8/20 - added                        Neuromuscular Re-ed (34034)       SLR Flexion w/ Heel Prop   2 x 10 R 8/20 - added   Quad set with Cypriot estim  10x 10'' 8/20 - continued   LAQ w/ Cypriot estim  10x 3'' up, 3'' hold, 3'' down 8/20 - continued   SAQ w/ Cypriot estim  10x  8/20 - continued   Gait mechanics  TKE  Prone TKE  5'' 15 BLE 8/20 - added   Retro Step Back (involuntary quad contraction)  3'' 15x R 8/20 - added   SLR Ext  2 10 R 8/20 - added          Manual Intervention (56825) - 8'       Patellar mobs    AAROM knee flexion with heel slide     PROM Knee flexion & Hip Flexion  7.5 min total; 10'' holds PROM Knee extension w/ op Dressing Removed   20'' 8/20 - PT removed hanging dressing   8/11 8/17 - Functional testing performed this date and compared with previous values as noted above. Testing done in order to assess progress with community navigation and fall risk. A total of 25 minutes spent completing functional testing and reviewing results. Modalities: 8/20: NMR with Maldivian estim to R quads: LAQ, SAQ, quad sets. Pt instructed with verbal and tactile cueing for quad engagement B quads to facilitate activation. 10/10 on /off cycle x 10 mins. Pt. Education:   6/18 - Patient was thoroughly educated on this date regarding prehabilitation goals, importance of PT sessions in improving overall ROM, strength and stability prior to surgery, and how prehabilitation will facilitate improved post-operative outcomes. The patient was educated on and instructed in HEP as listed. The patient was given a detailed handout for exercises to initiate in the hospital post-operatively as well as at home. The discharge plan from the hospital was reviewed with the patient; specifically, to reduce length of hospital stay and to minimize time before reinitiating outpatient physical therapy after surgery. Education regarding early mobility post-operatively in the hospital and emphasis on working with both physical therapy and nursing staff to achieve ambulation goal of 150 feet was provided. The patient was highly encouraged to attend joint class in hospital prior to surgery for further instructions on pre and post-surgical care. Also, the patient was educated on precautions after hip replacement to avoid, specifically, hip extension and repetitive hip flexion.  It is in my medical opinion that this patient is clear from all physical barriers prior to consideration for surgery, activity modifications prior to and post operatively have been discussed with this patient as well as discharge planning and is cleared for surgery from physical therapy perspective.  -patient educated on diagnosis, prognosis and expectations for rehab  -all patient questions were answered    Home Exercise Program:  Access Code: [facility]  URL: [email or web address]  Date: 08/20/2021  Prepared by: Tonia Martinez    Exercises  Seated Hamstring Stretch - 2-3 x daily - 6 x weekly - 4 reps - 20 hold  Mini Squat - 2-3 x daily - 6 x weekly - 2 sets - 10 reps  Prone Quadriceps Set - 2-3 x daily - 6 x weekly - 1 sets - 10 reps - 5-8 hold  Supine Active Straight Leg Raise - 2-3 x daily - 6 x weekly - 3 sets - 10 reps  Gastroc Stretch on Wall - 2-3 x daily - 6 x weekly - 1 sets - 3 reps - 10 hold  Supine Heel Slide with Strap - 2-3 x daily - 6 x weekly - 1 sets - 10 reps - 10 hold  Supine Knee Extension Mobilization with Weight - 2-3 x daily - 6 x weekly - 1 sets - 3 reps - 60 hold  Seated Long Arc Quad - 2-3 x daily - 6 x weekly - 2 sets - 10 reps - 3 hold    8/17 - added 1/4 squat    Access Code: [email or web address]  URL: [email or web address]  Date: 08/05/2021  Prepared by: Dileep Carcamo    Exercises   Long Sitting Calf Stretch with Strap - 3 x daily - 7 x weekly - 4 reps - 20 hold  Seated Gastroc Stretch with Strap - 3 x daily - 7 x weekly - 4 reps - 20 hold   Seated Hamstring Stretch - 3 x daily - 7 x weekly - 4 reps - 20 hold     8/5: HEP was also reviewed with pt from home health PT and encouraged to continue, additions of interventions above. 6/18 - The following exercises were discussed and added to the patient's home exercise program. (glute set, quad set, ankle pumps, heel prop, heel slide, SLR flexion). Additionally, the patient was educated on appropriate frequency, intensity and duration. Handout provided. Therapeutic Exercise and NMR EXR  [] (59864) Provided verbal/tactile cueing for activities related to strengthening, flexibility, endurance, ROM for improvements in  [] LE / Lumbar: LE, proximal hip, and core control with self care, mobility, lifting, ambulation.   [] UE / Cervical: cervical, postural, scapular, scapulothoracic and UE control with self care, reaching, carrying, lifting, house/yardwork, driving, computer work. [x] (78505) Provided verbal/tactile cueing for activities related to improving balance, coordination, kinesthetic sense, posture, motor skill, proprioception to assist with   [x] LE / lumbar: LE, proximal hip, and core control in self care, mobility, lifting, ambulation and eccentric single leg control. [] UE / cervical: cervical, scapular, scapulothoracic and UE control with self care, reaching, carrying, lifting, house/yardwork, driving, computer work.   [] (07521) Therapist is in constant attendance of 2 or more patients providing skilled therapy interventions, but not providing any significant amount of measurable one-on-one time to either patient, for improvements in  [] LE / lumbar: LE, proximal hip, and core control in self care, mobility, lifting, ambulation and eccentric single leg control. [] UE / cervical: cervical, scapular, scapulothoracic and UE control with self care, reaching, carrying, lifting, house/yardwork, driving, computer work.      NMR and Therapeutic Activities:    [x] (54534 or 13998) Provided verbal/tactile cueing for activities related to improving balance, coordination, kinesthetic sense, posture, motor skill, proprioception and motor activation to allow for proper function of   [x] LE: / Lumbar core, proximal hip and LE with self care and ADLs  [] UE / Cervical: cervical, postural, scapular, scapulothoracic and UE control with self care, carrying, lifting, driving, computer work.   [] (21546) Gait Re-education- Provided training and instruction to the patient for proper LE, core and proximal hip recruitment and positioning and eccentric body weight control with ambulation re-education including up and down stairs     Home Management Training / Self Care:  [] (05170) Provided self-care/home management training related to activities of daily living and compensatory training, and/or use of adaptive equipment for improvement with: ADLs and compensatory training, meal preparation, safety procedures and instruction in use of adaptive equipment, including bathing, grooming, dressing, personal hygiene, basic household cleaning and chores. Home Exercise Program:    [] (99740) Reviewed/Progressed HEP activities related to strengthening, flexibility, endurance, ROM of   [] LE / Lumbar: core, proximal hip and LE for functional self-care, mobility, lifting and ambulation/stair navigation   [] UE / Cervical: cervical, postural, scapular, scapulothoracic and UE control with self care, reaching, carrying, lifting, house/yardwork, driving, computer work  [] (01181)Reviewed/Progressed HEP activities related to improving balance, coordination, kinesthetic sense, posture, motor skill, proprioception of   [] LE: core, proximal hip and LE for self care, mobility, lifting, and ambulation/stair navigation    [] UE / Cervical: cervical, postural,  scapular, scapulothoracic and UE control with self care, reaching, carrying, lifting, house/yardwork, driving, computer work    Manual Treatments:  PROM / STM / Oscillations-Mobs:  G-I, II, III, IV (PA's, Inf., Post.)  [x] (88479) Provided manual therapy to mobilize LE, proximal hip and/or LS spine soft tissue/joints for the purpose of modulating pain, promoting relaxation,  increasing ROM, reducing/eliminating soft tissue swelling/inflammation/restriction, improving soft tissue extensibility and allowing for proper ROM for normal function with   [x] LE / lumbar: self care, mobility, lifting and ambulation. [] UE / Cervical: self care, reaching, carrying, lifting, house/yardwork, driving, computer work.      Modalities:  [] (70475) Vasopneumatic compression: Utilized vasopneumatic compression to decrease edema / swelling for the purpose of improving mobility and quad tone / recruitment which will allow for increased overall function including but not limited to self-care, transfers, ambulation, and ascending / descending stairs. Charges:  Timed Code Treatment Minutes: 45   Total Treatment Minutes: 45     [] EVAL - LOW (96689)   [] EVAL - MOD (10172)  [] EVAL - HIGH (51357)  [] RE-EVAL (58619)  [] PN(99795) x        [] Ionto  [x] NMR (19000) x 1      [] Vaso  [x] Manual (65106) x 1       [] Ultrasound  [x] TA x 1        [] Mech Traction (43499)  [] Aquatic Therapy x      [] ES (un) (45547):   [] Home Management Training x  [] ES(attended) (63020)   [] Dry Needling 1-2 muscles (81100):  [] Dry Needling 3+ muscles (178760)  [] Group:      [] Other:     GOALS:  Patient stated goal: The patient will be able to walk >500ft without increase in symptoms. [x]? Progressing: []? Met: []? Not Met: []? Adjusted     Therapist goals for Patient:   Short Term Goals: To be achieved in: 2 weeks post-op  1. Independent in HEP and progression per patient tolerance without increase in symptoms. []? Progressing: [x]? Met: []? Not Met: []? Adjusted  2. Patient will have a decrease in pain to <5/10 to facilitate improvement in movement of the BLE, and ADLs as indicated by Functional Deficits. []? Progressing: [x]? Met: []? Not Met: []? Adjusted     Long Term Goals: To be achieved in: 12 weeks post-op  1. Disability index score of 35% or less for the LEFS to assist with reaching prior level of function. [x]? Progressing: []? Met: []? Not Met: []? Adjusted  2. Patient will demonstrate increased knee AROM from 0 -120 allow for proper joint functioning as indicated by patients Functional Deficits. [x]? Progressing: []? Met: []? Not Met: []? Adjusted  3. Patient will demonstrate an increase in Strength to at least 4+/5 with R knee flexion and extension in order perform proper functional activities. [x]? Progressing: []? Met: []? Not Met: []? Adjusted  4.  Patient will return to functional activities including walking in grocery strore without increased symptoms or restriction. [x]? Progressing: []? Met: []? Not Met: []? Adjusted  5. Patient will be able to walk >2 blocks without increase in pain <3/10. [x]? Progressing: []? Met: []? Not Met: []? Adjusted         Overall Progression Towards Functional goals/ Treatment Progress Update:  [x] Patient is progressing as expected towards functional goals listed. [] Progression is slowed due to complexities/Impairments listed. [] Progression has been slowed due to co-morbidities. [] Plan just implemented, too soon to assess goals progression <30days   [] Goals require adjustment due to lack of progress  [] Patient is not progressing as expected and requires additional follow up with physician  [] Other    Persisting Functional Limitations/Impairments:  [x]Sleeping [x]Sitting               [x]Standing [x]Transfers        [x]Walking [x]Kneeling               [x]Stairs [x]Squatting / bending   [x]ADLs []Reaching  []Lifting  [x]Housework  [x]Driving []Job related tasks  []Sports/Recreation []Other:      ASSESSMENT:  HEP updated this date with exercises to promote strength of quad, knee ROM and ability to weight bear without compensation. NMES continues to be utilized to activate and improve neuromuscular recruitment of quad, as well as exercises to facilitate voluntary and involuntary quad activity. The patient practiced sequencing of step ups and downs successfully. The patient progressed knee ROM towards LTG status, with pain at end range. PT removed dressing that was barely attached to skin. Incision appears healing and closed.     Treatment/Activity Tolerance:  [x] Patient able to complete tx  [] Patient limited by fatigue  [] Patient limited by pain  [] Patient limited by other medical complications  [] Other:     Prognosis: [x] Good [] Fair  [] Poor    Patient Requires Follow-up: [x] Yes  [] No     Plan for next treatment session: knee ROM emphasis, quad strength and engagement/control as able. Standing 3 way hip OKC/CKC; squats as tolerated. Step activity. PLAN:  PT 2-3x / week for 12 weeks. [x] Continue per plan of care [] Alter current plan (see comments)  [] Plan of care initiated [] Hold pending MD visit [] Discharge    Electronically signed by: JAVY Smith [id number]    Note: If patient does not return for scheduled/ recommended follow up visits, this note will serve as a discharge from care along with most recent update on progress.

## 2021-08-23 ENCOUNTER — HOSPITAL ENCOUNTER (OUTPATIENT)
Dept: PHYSICAL THERAPY | Age: 51
Setting detail: THERAPIES SERIES
Discharge: HOME | End: 2021-08-23
Payer: COMMERCIAL

## 2021-08-23 PROCEDURE — 97140 MANUAL THERAPY 1/> REGIONS: CPT

## 2021-08-23 PROCEDURE — 97112 NEUROMUSCULAR REEDUCATION: CPT

## 2021-08-23 PROCEDURE — 97110 THERAPEUTIC EXERCISES: CPT

## 2021-08-23 NOTE — FLOWSHEET NOTE
[address] [facility] Physical Therapy  Phone: [phone]   Fax: [phone]    Physical Therapy Daily Treatment Note  Date:  2021    Patient Name:  Johanny Suarez    :  1970  MRN: [id number]  Medical/Treatment Diagnosis Information:  Diagnosis: M17.11 (ICD-10-CM) - Primary osteoarthritis of right knee / PREHAB - sx: 21  Treatment Diagnosis: decreased RLE strength, knee pain, antalgic gait, knee instability  Insurance/Certification information:  PT Insurance Information: [address] [place] [address] - $25 copay - visits PMN  Physician Information:  Referring Practitioner: Yvonne Ramirez MD  Plan of care signed (Y/N): [x]  Yes []  No     Date of Patient follow up with Physician: TBD      Progress Report: []  Yes  [x]  No     Date Range for reporting period:  Beginnin21  Ending: TBD    Progress report due (10 Rx/or 30 days whichever is less): visit #62      Recertification due (POC duration/ or 90 days whichever is less): visit #16      Visit # Insurance Allowable Auth required? Date Range   8 60 []  Yes  [x]  No AIM REPORTS NO AUTH NEEDED ON 21 CORESPONDENCE      Latex Allergy:  [x]NO      []YES  Preferred Language for Healthcare:   [x]English       []other:    Functional Scale:        Date assessed:  LEFS: raw score = 20/80; dysfunction = 75%  21  LEFS: raw score = 15/80; dysfunction = 81%  8/5    Pain level:  1/10 pain; 5/10 stiffness        SUBJECTIVE:  Pt reports she did ok after last visit leaving session and getting into her car however when she went to get out, she got a severe pain in the back of her leg into her calf and foot and was unable to put weight on her leg that entire night and was really difficult for all day Saturday.  started feeling better and today is the best she has felt since. Still using the RW to get around due to some level of persisting soreness and irritation.  Pt did report she called her MD office and was instructed to place heat on the back of her knee which helped out a lot with the soreness. OBJECTIVE:      Functional Testing  Sx Date 7/20/21 Prehab Date 6/18/21 Post-op Re-Eval Date 8/5/21 4 week F/U date 8/17/21 8 week F/U date TBD       TUG (sec) 8     20  16     30 second sit to stand (reps) 10  8  10     6 minute walk (m) 449.58   264 (RW)  288 (SPC)         Balance:     Narrowed SOFIA (sec) 10  10  10     Semi Tandem (sec) 10    10     10            Tandem (sec) 10     10     10           SLS (sec) 3  0  10         Knee AROM L R L R L R L R   Flexion 119 122  119  87  119  103       Extension hyper 1 0  0  -19  0  - 4           Knee Ext: L R L R L R L R   MMT (of 5) 4+ 4  4+  unable  NT  3       Knee Ext (#) 31.8 29.6      NT  11.6           Hip Abd:  L R L R L R L R   MMT (of 5) 4+ 4   unable  NT   NT       Hip Abd (#) 32.9 24.3      NT  NT           LEFS (raw) 20  15  27       8/23: AROM knee flexion 105 degrees, AAROM with heel slide 109 degrees. Pt to department today with RW. Mild tenderness noted lateral distal hamstrings and proximal lateral gastroc  8/20 - R knee AAROM flexion: 110   8/13/21  Rt knee ROM 0-100 EOB. Presented to clinic with walker but brought   8/11 - 95deg AAROM R knee flexion; lack 3 extension AROM; soft end feel for both; however significant pain. 8/7: AROM knee ext with quad set lacking 6 from neutral; Knee flexion AAROM heel slide and overpressure 97 degrees with soft end feel however significant pain.      RESTRICTIONS/PRECAUTIONS: HX OF FALLS     Exercises/Interventions:   Therapeutic Exercises (41239) -  Resistance / level Sets/sec Reps Notes   Towel gastroc stretch Heel slides with slide board in supine SAQ LAQ Standing HR IB Gastroc slr flexion and abduction  Squats         Therapeutic Activities (78933)       R Bike (horseshoe pattern) N/A;    Seat 7 showing  3 min 8/20 - added 1 min   Step Up / Retro Step Down 4'' 1 10; R 8/20 - added   Standing runner's calf stretch 20\" 2 R 8/23     Standing HSS on step 6 inch 20\" 2 R 8/23          Neuromuscular Re-ed (62201)       SLR Flexion w/ Heel Prop   2 x 10 R 8/20 - added   Quad set   10x 10'' 8/20 - continued   LAQ   2 x 10 3'' up, 3'' hold, 3'' down 8/20 - continued   SAQ to SLR  1 10 8/23   SAQ   10x  8/20 - continued   Gait mechanics  TKE  Prone TKE  5'' 15 BLE 8/20 - added   Retro Step Back (involuntary quad contraction)  3'' 15x R 8/20 - added   SLR Ext  2 10 R 8/20 - added          Manual Intervention (74768) - 8'       Patellar mobs    AAROM knee flexion with heel slide     PROM Knee flexion & Hip Flexion  7.5 min total; 10'' holds PROM Knee extension w/ op    8/11 8/17 - Functional testing performed this date and compared with previous values as noted above. Testing done in order to assess progress with community navigation and fall risk. A total of 25 minutes spent completing functional testing and reviewing results. Modalities: 8/20: NMR with Tristanian estim to R quads: LAQ, SAQ, quad sets. Pt instructed with verbal and tactile cueing for quad engagement B quads to facilitate activation. 10/10 on /off cycle x 10 mins. Pt. Education:   6/18 - Patient was thoroughly educated on this date regarding prehabilitation goals, importance of PT sessions in improving overall ROM, strength and stability prior to surgery, and how prehabilitation will facilitate improved post-operative outcomes. The patient was educated on and instructed in HEP as listed. The patient was given a detailed handout for exercises to initiate in the hospital post-operatively as well as at home. The discharge plan from the hospital was reviewed with the patient; specifically, to reduce length of hospital stay and to minimize time before reinitiating outpatient physical therapy after surgery.  Education regarding early mobility post-operatively in the hospital and emphasis on working with both physical therapy and nursing staff to achieve ambulation goal of 150 feet was provided. The patient was highly encouraged to attend joint class in hospital prior to surgery for further instructions on pre and post-surgical care. Also, the patient was educated on precautions after hip replacement to avoid, specifically, hip extension and repetitive hip flexion. It is in my medical opinion that this patient is clear from all physical barriers prior to consideration for surgery, activity modifications prior to and post operatively have been discussed with this patient as well as discharge planning and is cleared for surgery from physical therapy perspective.  -patient educated on diagnosis, prognosis and expectations for rehab  -all patient questions were answered    Home Exercise Program:  Access Code: HOSP [facility]  URL: [email or web address]  Date: 08/20/2021  Prepared by: Jennifer Blunt    Exercises  Seated Hamstring Stretch - 2-3 x daily - 6 x weekly - 4 reps - 20 hold  Mini Squat - 2-3 x daily - 6 x weekly - 2 sets - 10 reps  Prone Quadriceps Set - 2-3 x daily - 6 x weekly - 1 sets - 10 reps - 5-8 hold  Supine Active Straight Leg Raise - 2-3 x daily - 6 x weekly - 3 sets - 10 reps  Gastroc Stretch on Wall - 2-3 x daily - 6 x weekly - 1 sets - 3 reps - 10 hold  Supine Heel Slide with Strap - 2-3 x daily - 6 x weekly - 1 sets - 10 reps - 10 hold  Supine Knee Extension Mobilization with Weight - 2-3 x daily - 6 x weekly - 1 sets - 3 reps - 60 hold  Seated Long Arc Quad - 2-3 x daily - 6 x weekly - 2 sets - 10 reps - 3 hold    8/17 - added 1/4 squat    Access Code: [email or web address]  URL: [email or web address]  Date: 08/05/2021  Prepared by: Mony Mullins    Exercises   Long Sitting Calf Stretch with Strap - 3 x daily - 7 x weekly - 4 reps - 20 hold  Seated Gastroc Stretch with Strap - 3 x daily - 7 x weekly - 4 reps - 20 hold   Seated Hamstring Stretch - 3 x daily - 7 x weekly - 4 reps - 20 hold     8/5: HEP was also reviewed with pt from home health PT and encouraged to continue, additions of interventions above. 6/18 - The following exercises were discussed and added to the patient's home exercise program. (glute set, quad set, ankle pumps, heel prop, heel slide, SLR flexion). Additionally, the patient was educated on appropriate frequency, intensity and duration. Handout provided. Therapeutic Exercise and NMR EXR  [] (29763) Provided verbal/tactile cueing for activities related to strengthening, flexibility, endurance, ROM for improvements in  [] LE / Lumbar: LE, proximal hip, and core control with self care, mobility, lifting, ambulation. [] UE / Cervical: cervical, postural, scapular, scapulothoracic and UE control with self care, reaching, carrying, lifting, house/yardwork, driving, computer work. [x] (26372) Provided verbal/tactile cueing for activities related to improving balance, coordination, kinesthetic sense, posture, motor skill, proprioception to assist with   [x] LE / lumbar: LE, proximal hip, and core control in self care, mobility, lifting, ambulation and eccentric single leg control. [] UE / cervical: cervical, scapular, scapulothoracic and UE control with self care, reaching, carrying, lifting, house/yardwork, driving, computer work.   [] (42847) Therapist is in constant attendance of 2 or more patients providing skilled therapy interventions, but not providing any significant amount of measurable one-on-one time to either patient, for improvements in  [] LE / lumbar: LE, proximal hip, and core control in self care, mobility, lifting, ambulation and eccentric single leg control. [] UE / cervical: cervical, scapular, scapulothoracic and UE control with self care, reaching, carrying, lifting, house/yardwork, driving, computer work.      NMR and Therapeutic Activities:    [x] (78014 or 38717) Provided verbal/tactile cueing for activities related to improving balance, coordination, kinesthetic sense, posture, motor skill, proprioception and motor activation to allow for proper function of   [x] LE: / Lumbar core, proximal hip and LE with self care and ADLs  [] UE / Cervical: cervical, postural, scapular, scapulothoracic and UE control with self care, carrying, lifting, driving, computer work.   [] (13816) Gait Re-education- Provided training and instruction to the patient for proper LE, core and proximal hip recruitment and positioning and eccentric body weight control with ambulation re-education including up and down stairs     Home Management Training / Self Care:  [] (23295) Provided self-care/home management training related to activities of daily living and compensatory training, and/or use of adaptive equipment for improvement with: ADLs and compensatory training, meal preparation, safety procedures and instruction in use of adaptive equipment, including bathing, grooming, dressing, personal hygiene, basic household cleaning and chores.      Home Exercise Program:    [] (82411) Reviewed/Progressed HEP activities related to strengthening, flexibility, endurance, ROM of   [] LE / Lumbar: core, proximal hip and LE for functional self-care, mobility, lifting and ambulation/stair navigation   [] UE / Cervical: cervical, postural, scapular, scapulothoracic and UE control with self care, reaching, carrying, lifting, house/yardwork, driving, computer work  [] (98660)Reviewed/Progressed HEP activities related to improving balance, coordination, kinesthetic sense, posture, motor skill, proprioception of   [] LE: core, proximal hip and LE for self care, mobility, lifting, and ambulation/stair navigation    [] UE / Cervical: cervical, postural,  scapular, scapulothoracic and UE control with self care, reaching, carrying, lifting, house/yardwork, driving, computer work    Manual Treatments:  PROM / STM / Oscillations-Mobs:  G-I, II, III, IV (PA's, Inf., Post.)  [x] (31869) Provided manual therapy to mobilize LE, proximal hip and/or LS spine soft tissue/joints for the purpose of modulating pain, promoting relaxation,  increasing ROM, reducing/eliminating soft tissue swelling/inflammation/restriction, improving soft tissue extensibility and allowing for proper ROM for normal function with   [x] LE / lumbar: self care, mobility, lifting and ambulation. [] UE / Cervical: self care, reaching, carrying, lifting, house/yardwork, driving, computer work. Modalities:  [] (59625) Vasopneumatic compression: Utilized vasopneumatic compression to decrease edema / swelling for the purpose of improving mobility and quad tone / recruitment which will allow for increased overall function including but not limited to self-care, transfers, ambulation, and ascending / descending stairs. Charges:  Timed Code Treatment Minutes: 45   Total Treatment Minutes: 45     [] EVAL - LOW (58934)   [] EVAL - MOD (15851)  [] EVAL - HIGH (65522)  [] RE-EVAL (08621)  [] CG(70754) x        [] Ionto  [x] NMR (37965) x 1      [] Vaso  [x] Manual (66435) x 1       [] Ultrasound  [x] TA x 1        [] Mech Traction (41726)  [] Aquatic Therapy x      [] ES (un) (00916):   [] Home Management Training x  [] ES(attended) (45651)   [] Dry Needling 1-2 muscles (71667):  [] Dry Needling 3+ muscles (194490)  [] Group:      [] Other:     GOALS:  Patient stated goal: The patient will be able to walk >500ft without increase in symptoms. [x]? Progressing: []? Met: []? Not Met: []? Adjusted     Therapist goals for Patient:   Short Term Goals: To be achieved in: 2 weeks post-op  1. Independent in HEP and progression per patient tolerance without increase in symptoms. []? Progressing: [x]? Met: []? Not Met: []? Adjusted  2. Patient will have a decrease in pain to <5/10 to facilitate improvement in movement of the BLE, and ADLs as indicated by Functional Deficits. []? Progressing: [x]? Met: []? Not Met: []? Adjusted     Long Term Goals: To be achieved in: 12 weeks post-op  1.  Disability index score of 35% or less for the LEFS to assist with reaching prior level of function. [x]? Progressing: []? Met: []? Not Met: []? Adjusted  2. Patient will demonstrate increased knee AROM from 0 -120 allow for proper joint functioning as indicated by patients Functional Deficits. [x]? Progressing: []? Met: []? Not Met: []? Adjusted  3. Patient will demonstrate an increase in Strength to at least 4+/5 with R knee flexion and extension in order perform proper functional activities. [x]? Progressing: []? Met: []? Not Met: []? Adjusted  4. Patient will return to functional activities including walking in grocery strore without increased symptoms or restriction. [x]? Progressing: []? Met: []? Not Met: []? Adjusted  5. Patient will be able to walk >2 blocks without increase in pain <3/10. [x]? Progressing: []? Met: []? Not Met: []? Adjusted         Overall Progression Towards Functional goals/ Treatment Progress Update:  [x] Patient is progressing as expected towards functional goals listed. [] Progression is slowed due to complexities/Impairments listed. [] Progression has been slowed due to co-morbidities. [] Plan just implemented, too soon to assess goals progression <30days   [] Goals require adjustment due to lack of progress  [] Patient is not progressing as expected and requires additional follow up with physician  [] Other    Persisting Functional Limitations/Impairments:  [x]Sleeping [x]Sitting               [x]Standing [x]Transfers        [x]Walking [x]Kneeling               [x]Stairs [x]Squatting / bending   [x]ADLs []Reaching  []Lifting  [x]Housework  [x]Driving []Job related tasks  []Sports/Recreation []Other:      ASSESSMENT:  Significant reports of increased pain and muscle spasms after last visit with reassessment today of musculature pt presenting with mild soft tissue restrictions of R hamstring and gastroc noted above in which pt reporting those areas reproduced the pain she experienced over the weekend however not as intense. Session modified as noted today with bolded interventions only carried out. Pt did not need estim for quad activation only positive encouragement to move through greater ranges independently. Pt did have some quad lag especially with increase in fatigue. Will continue to benefit from PT to work on progressing LE ROM and strength to maximize functional utilization of LE with activity. Treatment/Activity Tolerance:  [x] Patient able to complete tx  [] Patient limited by fatigue  [] Patient limited by pain  [] Patient limited by other medical complications  [] Other:     Prognosis: [x] Good [] Fair  [] Poor    Patient Requires Follow-up: [x] Yes  [] No     Plan for next treatment session: knee ROM emphasis, quad strength and engagement/control as able. Standing 3 way hip OKC/CKC; squats as tolerated. Step activity. PLAN:  PT 2-3x / week for 12 weeks. [x] Continue per plan of care [] Alter current plan (see comments)  [] Plan of care initiated [] Hold pending MD visit [] Discharge    Electronically signed by: Estevan Ortiz, PT,DPT, OMT-C    Note: If patient does not return for scheduled/ recommended follow up visits, this note will serve as a discharge from care along with most recent update on progress.

## 2021-08-25 ENCOUNTER — TELEPHONE (OUTPATIENT)
Dept: ORTHOPEDIC SURGERY | Age: 51
End: 2021-08-25

## 2021-08-25 ENCOUNTER — HOSPITAL ENCOUNTER (OUTPATIENT)
Dept: PHYSICAL THERAPY | Age: 51
Setting detail: THERAPIES SERIES
Discharge: HOME | End: 2021-08-25
Payer: COMMERCIAL

## 2021-08-25 PROCEDURE — 97530 THERAPEUTIC ACTIVITIES: CPT

## 2021-08-25 PROCEDURE — 97112 NEUROMUSCULAR REEDUCATION: CPT

## 2021-08-25 PROCEDURE — 97140 MANUAL THERAPY 1/> REGIONS: CPT

## 2021-08-25 NOTE — FLOWSHEET NOTE
[facility] Physical Therapy  Phone: [phone]   Fax: [phone]    Physical Therapy Daily Treatment Note  Date:  2021    Patient Name:  Erika Reyna    :  1970  MRN: [id number]  Medical/Treatment Diagnosis Information:  Diagnosis: M17.11 (ICD-10-CM) - Primary osteoarthritis of right knee / PREHAB - sx: 21  Treatment Diagnosis: decreased RLE strength, knee pain, antalgic gait, knee instability  Insurance/Certification information:  PT Insurance Information: [address] [place] [address] - $25 copay - visits PMN  Physician Information:  Referring Practitioner: Porsha Lawson MD  Plan of care signed (Y/N): [x]  Yes []  No     Date of Patient follow up with Physician: TBD      Progress Report: []  Yes  [x]  No     Date Range for reporting period:  Beginnin21  Ending: TBD    Progress report due (10 Rx/or 30 days whichever is less): visit #33      Recertification due (POC duration/ or 90 days whichever is less): visit #16      Visit # Insurance Allowable Auth required? Date Range   9 60 []  Yes  [x]  No AIM REPORTS NO AUTH NEEDED ON 21 CORESPONDENCE      Latex Allergy:  [x]NO      []YES  Preferred Language for Healthcare:   [x]English       []other:    Functional Scale:        Date assessed:  LEFS: raw score = 20/80; dysfunction = 75%  21  LEFS: raw score = 15/80; dysfunction = 81%  8/5    Pain level:  1/10 pain; 5/10 stiffness        SUBJECTIVE: Pt reports improvements in pain intensity since last visit. Has been trying to ambulate independently at home, however,  has noticed gait deviations and pt plans to resume use with AD until mechanics improve. Currently, pt has c/o stiffness in R knee.      OBJECTIVE:      Functional Testing  Sx Date 21 Prehab Date 21 Post-op Re-Eval Date 21 4 week F/U date 21 8 week F/U date TBD       TUG (sec) 8     20  16     30 second sit to stand (reps) 10  8  10     6 minute walk (m) 449.58   264 (RW)  288 (SPC)         Balance:     Narrowed SOFIA (sec) 10  10  10     Semi Tandem (sec) 10    10     10            Tandem (sec) 10     10     10           SLS (sec) 3  0  10         Knee AROM L R L R L R L R   Flexion 119 122  119  87  119  103       Extension hyper 1 0  0  -19  0  - 4           Knee Ext: L R L R L R L R   MMT (of 5) 4+ 4  4+  unable  NT  3       Knee Ext (#) 31.8 29.6      NT  11.6           Hip Abd:  L R L R L R L R   MMT (of 5) 4+ 4   unable  NT   NT       Hip Abd (#) 32.9 24.3      NT  NT           LEFS (raw) 20  15  27       8/25 - AAROM 116deg flexion; lack 3 deg AROM  8/23: AROM knee flexion 105 degrees, AAROM with heel slide 109 degrees. Pt to department today with RW. Mild tenderness noted lateral distal hamstrings and proximal lateral gastroc  8/20 - R knee AAROM flexion: 110   8/13/21  Rt knee ROM 0-100 EOB. Presented to clinic with walker but brought   8/11 - 95deg AAROM R knee flexion; lack 3 extension AROM; soft end feel for both; however significant pain. 8/7: AROM knee ext with quad set lacking 6 from neutral; Knee flexion AAROM heel slide and overpressure 97 degrees with soft end feel however significant pain.      RESTRICTIONS/PRECAUTIONS: HX OF FALLS     Exercises/Interventions:   Therapeutic Exercises (88528) -  Resistance / level Sets/sec Reps Notes   Towel gastroc stretch Heel slides with slide board in supine SAQ LAQ Standing HR IB Gastroc slr flexion and abduction  Squats         Therapeutic Activities (83035)       R Bike (horseshoe pattern) N/A;    Seat 7 showing  3 min 8/20 - added 1 min   Step Up / Retro Step Down 6'' 1 12; R 8/25 - ^ depth   Standing runner's calf stretch  20\" 2 R 8/23     Standing HSS on step 6 inch 20\" 2 R 8/23   Lateral Step Up 6'' 1 12, R 8/25 - added   TG Squats  2 10 8/25 - added; 70deg squat   TG Marches  2 5 R/L alt 8/25 - added          Neuromuscular Re-ed (82449)       SLR Flexion w/ Heel Prop   SLR up & over  3 5 8/25 - added; lifts up and over foam heel prop   Quad set   10x 10'' 8/20 - continued   LAQ   2 x 10 3'' up, 3'' hold, 3'' down 8/20 - continued   SAQ to SLR  1 10 8/23   SAQ   10x  8/20 - continued   Gait mechanics  TKE  Prone TKE  Retro Step Back (involuntary quad contraction)  3'' 15x R 8/20 - added   SLR Ext         Manual Intervention (26020) - 10'       Patellar mobs    AAROM knee flexion with heel slide     PROM Knee flexion & Hip Flexion   6 min total; 8'' holds PROM Knee extension w/ op Heel prop 5'' holds 10       8/11 8/17 - Functional testing performed this date and compared with previous values as noted above. Testing done in order to assess progress with community navigation and fall risk. A total of 25 minutes spent completing functional testing and reviewing results. Modalities: 8/20: NMR with Moroccan estim to R quads: LAQ, SAQ, quad sets. Pt instructed with verbal and tactile cueing for quad engagement B quads to facilitate activation. 10/10 on /off cycle x 10 mins. Pt. Education:   6/18 - Patient was thoroughly educated on this date regarding prehabilitation goals, importance of PT sessions in improving overall ROM, strength and stability prior to surgery, and how prehabilitation will facilitate improved post-operative outcomes. The patient was educated on and instructed in HEP as listed. The patient was given a detailed handout for exercises to initiate in the hospital post-operatively as well as at home. The discharge plan from the hospital was reviewed with the patient; specifically, to reduce length of hospital stay and to minimize time before reinitiating outpatient physical therapy after surgery. Education regarding early mobility post-operatively in the hospital and emphasis on working with both physical therapy and nursing staff to achieve ambulation goal of 150 feet was provided.  The patient was highly encouraged to attend joint class in hospital prior to surgery for further instructions on pre and post-surgical care. Also, the patient was educated on precautions after hip replacement to avoid, specifically, hip extension and repetitive hip flexion. It is in my medical opinion that this patient is clear from all physical barriers prior to consideration for surgery, activity modifications prior to and post operatively have been discussed with this patient as well as discharge planning and is cleared for surgery from physical therapy perspective.  -patient educated on diagnosis, prognosis and expectations for rehab  -all patient questions were answered    Home Exercise Program:  Access Code: [facility]  URL: [email or web address]  Date: 08/20/2021  Prepared by: Tonia Martinez    Exercises  Seated Hamstring Stretch - 2-3 x daily - 6 x weekly - 4 reps - 20 hold  Mini Squat - 2-3 x daily - 6 x weekly - 2 sets - 10 reps  Prone Quadriceps Set - 2-3 x daily - 6 x weekly - 1 sets - 10 reps - 5-8 hold  Supine Active Straight Leg Raise - 2-3 x daily - 6 x weekly - 3 sets - 10 reps  Gastroc Stretch on Wall - 2-3 x daily - 6 x weekly - 1 sets - 3 reps - 10 hold  Supine Heel Slide with Strap - 2-3 x daily - 6 x weekly - 1 sets - 10 reps - 10 hold  Supine Knee Extension Mobilization with Weight - 2-3 x daily - 6 x weekly - 1 sets - 3 reps - 60 hold  Seated Long Arc Quad - 2-3 x daily - 6 x weekly - 2 sets - 10 reps - 3 hold    8/17 - added 1/4 squat    Access Code: [email or web address]  URL: [email or web address]  Date: 08/05/2021  Prepared by: Dileep Carcamo    Exercises   Long Sitting Calf Stretch with Strap - 3 x daily - 7 x weekly - 4 reps - 20 hold  Seated Gastroc Stretch with Strap - 3 x daily - 7 x weekly - 4 reps - 20 hold   Seated Hamstring Stretch - 3 x daily - 7 x weekly - 4 reps - 20 hold     8/5: HEP was also reviewed with pt from home health PT and encouraged to continue, additions of interventions above.      6/18 - The following exercises were discussed and added to the patient's home exercise program. (glute set, quad set, ankle pumps, heel prop, heel slide, SLR flexion). Additionally, the patient was educated on appropriate frequency, intensity and duration. Handout provided. Therapeutic Exercise and NMR EXR  [] (47265) Provided verbal/tactile cueing for activities related to strengthening, flexibility, endurance, ROM for improvements in  [] LE / Lumbar: LE, proximal hip, and core control with self care, mobility, lifting, ambulation. [] UE / Cervical: cervical, postural, scapular, scapulothoracic and UE control with self care, reaching, carrying, lifting, house/yardwork, driving, computer work. [x] (96756) Provided verbal/tactile cueing for activities related to improving balance, coordination, kinesthetic sense, posture, motor skill, proprioception to assist with   [x] LE / lumbar: LE, proximal hip, and core control in self care, mobility, lifting, ambulation and eccentric single leg control. [] UE / cervical: cervical, scapular, scapulothoracic and UE control with self care, reaching, carrying, lifting, house/yardwork, driving, computer work.   [] (68962) Therapist is in constant attendance of 2 or more patients providing skilled therapy interventions, but not providing any significant amount of measurable one-on-one time to either patient, for improvements in  [] LE / lumbar: LE, proximal hip, and core control in self care, mobility, lifting, ambulation and eccentric single leg control. [] UE / cervical: cervical, scapular, scapulothoracic and UE control with self care, reaching, carrying, lifting, house/yardwork, driving, computer work.      NMR and Therapeutic Activities:    [x] (82272 or 40170) Provided verbal/tactile cueing for activities related to improving balance, coordination, kinesthetic sense, posture, motor skill, proprioception and motor activation to allow for proper function of   [x] LE: / Lumbar core, proximal hip and LE with self care and ADLs  [] UE / Cervical: cervical, postural, scapular, scapulothoracic and UE control with self care, carrying, lifting, driving, computer work.   [] (47718) Gait Re-education- Provided training and instruction to the patient for proper LE, core and proximal hip recruitment and positioning and eccentric body weight control with ambulation re-education including up and down stairs     Home Management Training / Self Care:  [] (53950) Provided self-care/home management training related to activities of daily living and compensatory training, and/or use of adaptive equipment for improvement with: ADLs and compensatory training, meal preparation, safety procedures and instruction in use of adaptive equipment, including bathing, grooming, dressing, personal hygiene, basic household cleaning and chores.      Home Exercise Program:    [] (50611) Reviewed/Progressed HEP activities related to strengthening, flexibility, endurance, ROM of   [] LE / Lumbar: core, proximal hip and LE for functional self-care, mobility, lifting and ambulation/stair navigation   [] UE / Cervical: cervical, postural, scapular, scapulothoracic and UE control with self care, reaching, carrying, lifting, house/yardwork, driving, computer work  [] (53938)Reviewed/Progressed HEP activities related to improving balance, coordination, kinesthetic sense, posture, motor skill, proprioception of   [] LE: core, proximal hip and LE for self care, mobility, lifting, and ambulation/stair navigation    [] UE / Cervical: cervical, postural,  scapular, scapulothoracic and UE control with self care, reaching, carrying, lifting, house/yardwork, driving, computer work    Manual Treatments:  PROM / STM / Oscillations-Mobs:  G-I, II, III, IV (PA's, Inf., Post.)  [x] (94945) Provided manual therapy to mobilize LE, proximal hip and/or LS spine soft tissue/joints for the purpose of modulating pain, promoting relaxation,  increasing ROM, reducing/eliminating soft tissue swelling/inflammation/restriction, improving soft tissue extensibility and allowing for proper ROM for normal function with   [x] LE / lumbar: self care, mobility, lifting and ambulation. [] UE / Cervical: self care, reaching, carrying, lifting, house/yardwork, driving, computer work. Modalities:  [] (58436) Vasopneumatic compression: Utilized vasopneumatic compression to decrease edema / swelling for the purpose of improving mobility and quad tone / recruitment which will allow for increased overall function including but not limited to self-care, transfers, ambulation, and ascending / descending stairs. Charges:  Timed Code Treatment Minutes: 50   Total Treatment Minutes: 50     [] EVAL - LOW (65965)   [] EVAL - MOD (81938)  [] EVAL - HIGH (70507)  [] RE-EVAL (14320)  [] WL(73077) x        [] Ionto  [x] NMR (73950) x 1      [] Vaso  [x] Manual (17701) x 1       [] Ultrasound  [x] TA x 1        [] Mech Traction (52106)  [] Aquatic Therapy x      [] ES (un) (25419):   [] Home Management Training x  [] ES(attended) (70264)   [] Dry Needling 1-2 muscles (45519):  [] Dry Needling 3+ muscles (456435)  [] Group:      [] Other:     GOALS:  Patient stated goal: The patient will be able to walk >500ft without increase in symptoms. [x]? Progressing: []? Met: []? Not Met: []? Adjusted     Therapist goals for Patient:   Short Term Goals: To be achieved in: 2 weeks post-op  1. Independent in HEP and progression per patient tolerance without increase in symptoms. []? Progressing: [x]? Met: []? Not Met: []? Adjusted  2. Patient will have a decrease in pain to <5/10 to facilitate improvement in movement of the BLE, and ADLs as indicated by Functional Deficits. []? Progressing: [x]? Met: []? Not Met: []? Adjusted     Long Term Goals: To be achieved in: 12 weeks post-op  1. Disability index score of 35% or less for the LEFS to assist with reaching prior level of function. [x]? Progressing: []? Met: []?  Not Met: []? Adjusted  2. Patient will demonstrate increased knee AROM from 0 -120 allow for proper joint functioning as indicated by patients Functional Deficits. [x]? Progressing: []? Met: []? Not Met: []? Adjusted  3. Patient will demonstrate an increase in Strength to at least 4+/5 with R knee flexion and extension in order perform proper functional activities. [x]? Progressing: []? Met: []? Not Met: []? Adjusted  4. Patient will return to functional activities including walking in grocery strore without increased symptoms or restriction. [x]? Progressing: []? Met: []? Not Met: []? Adjusted  5. Patient will be able to walk >2 blocks without increase in pain <3/10. [x]? Progressing: []? Met: []? Not Met: []? Adjusted         Overall Progression Towards Functional goals/ Treatment Progress Update:  [x] Patient is progressing as expected towards functional goals listed. [] Progression is slowed due to complexities/Impairments listed. [] Progression has been slowed due to co-morbidities. [] Plan just implemented, too soon to assess goals progression <30days   [] Goals require adjustment due to lack of progress  [] Patient is not progressing as expected and requires additional follow up with physician  [] Other    Persisting Functional Limitations/Impairments:  [x]Sleeping [x]Sitting               [x]Standing [x]Transfers        [x]Walking [x]Kneeling               [x]Stairs [x]Squatting / bending   [x]ADLs []Reaching  []Lifting  [x]Housework  [x]Driving []Job related tasks  []Sports/Recreation []Other:      ASSESSMENT:  Pt able to achieve full revolutions on recumbent bike this date after about 45 seconds of rocking pattern. The patient's knee flexion ROM continues to rapidly improve. Knee extension is slowly returning. The patient demos improving quad activation, marked by patellar movement, quad setting and improved activation with step ups, various exercises and gait.  The patient was able to perform TG squats this date without severe discomfort. Pt did not need estim for quad activation only positive encouragement to move through greater ranges independently, and some tactile cueing/tapping for quad. Pt did have some quad lag especially with increase in fatigue. Will continue to benefit from PT to work on progressing LE ROM, strength to maximize functional utilization of LE with activity, gait, stairs and CKC activity. Treatment/Activity Tolerance:  [x] Patient able to complete tx  [] Patient limited by fatigue  [] Patient limited by pain  [] Patient limited by other medical complications  [] Other:     Prognosis: [x] Good [] Fair  [] Poor    Patient Requires Follow-up: [x] Yes  [] No     Plan for next treatment session: knee ROM emphasis, quad strength and engagement/control as able. Standing 3 way hip OKC/CKC; squats as tolerated. Step activity. PLAN:  PT 2-3x / week for 12 weeks. [x] Continue per plan of care [] Alter current plan (see comments)  [] Plan of care initiated [] Hold pending MD visit [] Discharge    Electronically signed by: Jen Denver, PTA Margret Bending, PT, DPT, OMT-C    Note: If patient does not return for scheduled/ recommended follow up visits, this note will serve as a discharge from care along with most recent update on progress. present

## 2021-08-26 ENCOUNTER — OFFICE VISIT (OUTPATIENT)
Dept: ORTHOPEDIC SURGERY | Age: 51
End: 2021-08-26

## 2021-08-26 VITALS — HEIGHT: 61 IN | WEIGHT: 280 LBS | BODY MASS INDEX: 52.87 KG/M2

## 2021-08-26 DIAGNOSIS — Z96.651 STATUS POST TOTAL KNEE REPLACEMENT, RIGHT: ICD-10-CM

## 2021-08-26 DIAGNOSIS — M17.12 PRIMARY OSTEOARTHRITIS OF LEFT KNEE: Primary | ICD-10-CM

## 2021-08-26 PROCEDURE — 99024 POSTOP FOLLOW-UP VISIT: CPT | Performed by: PHYSICIAN ASSISTANT

## 2021-08-30 ENCOUNTER — HOSPITAL ENCOUNTER (OUTPATIENT)
Dept: PHYSICAL THERAPY | Age: 51
Setting detail: THERAPIES SERIES
Discharge: HOME | End: 2021-08-30
Payer: COMMERCIAL

## 2021-08-30 DIAGNOSIS — Z96.651 STATUS POST TOTAL KNEE REPLACEMENT, RIGHT: Primary | ICD-10-CM

## 2021-08-30 PROCEDURE — 97140 MANUAL THERAPY 1/> REGIONS: CPT

## 2021-08-30 PROCEDURE — 97110 THERAPEUTIC EXERCISES: CPT

## 2021-08-30 PROCEDURE — 97530 THERAPEUTIC ACTIVITIES: CPT

## 2021-08-30 RX ORDER — OXYCODONE HYDROCHLORIDE AND ACETAMINOPHEN 5; 325 MG/1; MG/1
1 TABLET ORAL EVERY 6 HOURS PRN
Qty: 28 TABLET | Refills: 0 | Status: SHIPPED | OUTPATIENT
Start: 2021-08-30 | End: 2021-09-06

## 2021-08-30 NOTE — TELEPHONE ENCOUNTER
Prescription Refill     Medication Name:  [address] [place] [address] Street: [facility]  Patient Contact Number:  [phone]

## 2021-08-30 NOTE — FLOWSHEET NOTE
[address] [facility] Physical Therapy  Phone: [phone]   Fax: [phone]    Physical Therapy Daily Treatment Note  Date:  2021    Patient Name:  Kalyani Mcgovern    :  1970  MRN: [id number]  Medical/Treatment Diagnosis Information:  Diagnosis: M17.11 (ICD-10-CM) - Primary osteoarthritis of right knee / PREHAB - sx: 21  Treatment Diagnosis: decreased RLE strength, knee pain, antalgic gait, knee instability  Insurance/Certification information:  PT Insurance Information: [address] [place] [address] - $25 copay - visits PMN  Physician Information:  Referring Practitioner: Samir Becerra MD  Plan of care signed (Y/N): [x]  Yes []  No     Date of Patient follow up with Physician: 21     Progress Report: []  Yes  [x]  No     Date Range for reporting period:  Beginnin21  Ending: TBD     Progress report due (10 Rx/or 30 days whichever is less): visit #37      Recertification due (POC duration/ or 90 days whichever is less): visit #16      Visit # Insurance Allowable Auth required? Date Range   10 60 []  Yes  [x]  No AIM REPORTS NO AUTH NEEDED ON 21 CORESPONDENCE       Latex Allergy:  [x]NO      []YES  Preferred Language for Healthcare:   [x]English       []other:    Functional Scale:        Date assessed:  LEFS: raw score = 20/80; dysfunction = 75%  21  LEFS: raw score = 15/80; dysfunction = 81%  8/5    Pain level:  <1/10 pain; 4-5/10 stiffness        SUBJECTIVE: Pt reports the more she does the more stiff she becomes. She states she needs to ice more and use her wedge to prop her leg. She went to football game over the weekend, and ramps helped, but distances challenged her - resulting in stiffness. She reports by  [facility], she felt okay and was able to attend. She did a little walking with SPC and noted a few moments of buckling. She will be 6 weeks tomorrow.     OBJECTIVE:      Functional Testing  Sx Date 21 Prehab Date 21 Post-op Re-Eval Date 8/5/21 4 week F/U date 8/17/21 8 week F/U date TBD       TUG (sec) 8     20  16     30 second sit to stand (reps) 10  8  10     6 minute walk (m) 449.58   264 (RW)  288 (SPC)         Balance:     Narrowed SOFIA (sec) 10  10  10     Semi Tandem (sec) 10    10     10            Tandem (sec) 10     10     10           SLS (sec) 3  0  10         Knee AROM L R L R L R L R   Flexion 119 122  119  87  119  103       Extension hyper 1 0  0  -19  0  - 4           Knee Ext: L R L R L R L R   MMT (of 5) 4+ 4  4+  unable  NT  3       Knee Ext (#) 31.8 29.6      NT  11.6           Hip Abd:  L R L R L R L R   MMT (of 5) 4+ 4   unable  NT   NT       Hip Abd (#) 32.9 24.3      NT  NT           LEFS (raw) 20  15  27       8/30 - AAROM = 116deg flexion, 0deg ext AAROM (after manual)  8/25 - AAROM 116deg flexion; lack 3 deg AROM  8/23: AROM knee flexion 105 degrees, AAROM with heel slide 109 degrees. Pt to department today with RW. Mild tenderness noted lateral distal hamstrings and proximal lateral gastroc  8/20 - R knee AAROM flexion: 110   8/13/21  Rt knee ROM 0-100 EOB. Presented to clinic with walker but brought     8/11 - 95deg AAROM R knee flexion; lack 3 extension AROM; soft end feel for both; however significant pain. 8/7: AROM knee ext with quad set lacking 6 from neutral; Knee flexion AAROM heel slide and overpressure 97 degrees with soft end feel however significant pain.      RESTRICTIONS/PRECAUTIONS: HX OF FALLS     Exercises/Interventions:   Therapeutic Exercises (56473) -  Resistance / level Sets/sec Reps Notes   Towel gastroc stretch Heel slides with slide board in supine Standing HR IB Gastroc slr flexion and abduction  Squats  Quantum HS Curls (B3, K2, A3) 25# 2 10 8/30 - added; V.C. to curl knees more          Therapeutic Activities (97435)       R Bike Seat 7 showing  4 min 8/30 - added 1 min   Fwd/Retro Step Up & Over / Retro 6'' 1 8; R 8/30 - progressed/modified; stopped at 8 reps due to fatigue   Standing runner's calf stretch Standing HSS on step Lateral Step Up 6'' 1 12, R 8/25 - added   TG Squats  3 10 8/30 - added set   TG Marches  2 10 R/L alt 8/25 - added reps; 1 set knees flexed, 1 set knees straight   No Hand Sit to Stand No airex  airex 1  1 5  5 8/30 - 1 set with airex under L foot to increase RLE loading          Neuromuscular Re-ed (60785)       SLR Flexion w/ Heel Prop   3 x 10 R 8/30 - added set; TE   SLR up & over  Quad set   LAQ 3# 3 10 8/30 - ^ resistance; TE   SAQ to SLR  SAQ   Gait mechanics  TKE  Prone TKE  Retro Step Back (involuntary quad contraction)  SLR Ext         Manual Intervention (82047) - 8'       Patellar mobs    AAROM knee flexion with heel slide     PROM Knee flexion & Hip Flexion   4 min total; 8'' holds PROM Knee extension w/ op Heel prop 5'' holds 10       8/11 8/17 - Functional testing performed this date and compared with previous values as noted above. Testing done in order to assess progress with community navigation and fall risk. A total of 25 minutes spent completing functional testing and reviewing results. Modalities: 8/20: NMR with Montenegrin estim to R quads: LAQ, SAQ, quad sets. Pt instructed with verbal and tactile cueing for quad engagement B quads to facilitate activation. 10/10 on /off cycle x 10 mins. Pt. Education:   6/18 - Patient was thoroughly educated on this date regarding prehabilitation goals, importance of PT sessions in improving overall ROM, strength and stability prior to surgery, and how prehabilitation will facilitate improved post-operative outcomes. The patient was educated on and instructed in HEP as listed. The patient was given a detailed handout for exercises to initiate in the hospital post-operatively as well as at home. The discharge plan from the hospital was reviewed with the patient; specifically, to reduce length of hospital stay and to minimize time before reinitiating outpatient physical therapy after surgery. Education regarding early mobility post-operatively in the hospital and emphasis on working with both physical therapy and nursing staff to achieve ambulation goal of 150 feet was provided. The patient was highly encouraged to attend joint class in hospital prior to surgery for further instructions on pre and post-surgical care. Also, the patient was educated on precautions after hip replacement to avoid, specifically, hip extension and repetitive hip flexion. It is in my medical opinion that this patient is clear from all physical barriers prior to consideration for surgery, activity modifications prior to and post operatively have been discussed with this patient as well as discharge planning and is cleared for surgery from physical therapy perspective.  -patient educated on diagnosis, prognosis and expectations for rehab  -all patient questions were answered    Home Exercise Program:  Access Code: [facility]  URL: [email or web address]  Date: 08/20/2021  Prepared by: Tonia Martinez    Exercises  Seated Hamstring Stretch - 2-3 x daily - 6 x weekly - 4 reps - 20 hold  Mini Squat - 2-3 x daily - 6 x weekly - 2 sets - 10 reps  Prone Quadriceps Set - 2-3 x daily - 6 x weekly - 1 sets - 10 reps - 5-8 hold  Supine Active Straight Leg Raise - 2-3 x daily - 6 x weekly - 3 sets - 10 reps  Gastroc Stretch on Wall - 2-3 x daily - 6 x weekly - 1 sets - 3 reps - 10 hold  Supine Heel Slide with Strap - 2-3 x daily - 6 x weekly - 1 sets - 10 reps - 10 hold  Supine Knee Extension Mobilization with Weight - 2-3 x daily - 6 x weekly - 1 sets - 3 reps - 60 hold  Seated Long Arc Quad - 2-3 x daily - 6 x weekly - 2 sets - 10 reps - 3 hold    8/17 - added 1/4 squat    Access Code: [email or web address]  URL: [email or web address]  Date: 08/05/2021  Prepared by: Dileep Carcamo    Exercises   Long Sitting Calf Stretch with Strap - 3 x daily - 7 x weekly - 4 reps - 20 hold  Seated Gastroc Stretch with Strap - 3 x daily - 7 x weekly - 4 reps - 20 hold   Seated Hamstring Stretch - 3 x daily - 7 x weekly - 4 reps - 20 hold     8/5: HEP was also reviewed with pt from home health PT and encouraged to continue, additions of interventions above. 6/18 - The following exercises were discussed and added to the patient's home exercise program. (glute set, quad set, ankle pumps, heel prop, heel slide, SLR flexion). Additionally, the patient was educated on appropriate frequency, intensity and duration. Handout provided. Therapeutic Exercise and NMR EXR  [x] (21485) Provided verbal/tactile cueing for activities related to strengthening, flexibility, endurance, ROM for improvements in  [x] LE / Lumbar: LE, proximal hip, and core control with self care, mobility, lifting, ambulation. [] UE / Cervical: cervical, postural, scapular, scapulothoracic and UE control with self care, reaching, carrying, lifting, house/yardwork, driving, computer work.  [] (81808) Provided verbal/tactile cueing for activities related to improving balance, coordination, kinesthetic sense, posture, motor skill, proprioception to assist with   [] LE / lumbar: LE, proximal hip, and core control in self care, mobility, lifting, ambulation and eccentric single leg control. [] UE / cervical: cervical, scapular, scapulothoracic and UE control with self care, reaching, carrying, lifting, house/yardwork, driving, computer work.   [] (86768) Therapist is in constant attendance of 2 or more patients providing skilled therapy interventions, but not providing any significant amount of measurable one-on-one time to either patient, for improvements in  [] LE / lumbar: LE, proximal hip, and core control in self care, mobility, lifting, ambulation and eccentric single leg control. [] UE / cervical: cervical, scapular, scapulothoracic and UE control with self care, reaching, carrying, lifting, house/yardwork, driving, computer work.      NMR and Therapeutic Activities:    [x] (89810 or 79354) Provided verbal/tactile cueing for activities related to improving balance, coordination, kinesthetic sense, posture, motor skill, proprioception and motor activation to allow for proper function of   [x] LE: / Lumbar core, proximal hip and LE with self care and ADLs  [] UE / Cervical: cervical, postural, scapular, scapulothoracic and UE control with self care, carrying, lifting, driving, computer work.   [] (74174) Gait Re-education- Provided training and instruction to the patient for proper LE, core and proximal hip recruitment and positioning and eccentric body weight control with ambulation re-education including up and down stairs     Home Management Training / Self Care:  [] (55722) Provided self-care/home management training related to activities of daily living and compensatory training, and/or use of adaptive equipment for improvement with: ADLs and compensatory training, meal preparation, safety procedures and instruction in use of adaptive equipment, including bathing, grooming, dressing, personal hygiene, basic household cleaning and chores.      Home Exercise Program:    [] (35540) Reviewed/Progressed HEP activities related to strengthening, flexibility, endurance, ROM of   [] LE / Lumbar: core, proximal hip and LE for functional self-care, mobility, lifting and ambulation/stair navigation   [] UE / Cervical: cervical, postural, scapular, scapulothoracic and UE control with self care, reaching, carrying, lifting, house/yardwork, driving, computer work  [] (87023)Reviewed/Progressed HEP activities related to improving balance, coordination, kinesthetic sense, posture, motor skill, proprioception of   [] LE: core, proximal hip and LE for self care, mobility, lifting, and ambulation/stair navigation    [] UE / Cervical: cervical, postural,  scapular, scapulothoracic and UE control with self care, reaching, carrying, lifting, house/yardwork, driving, computer work    Manual Treatments:  PROM / STM / Oscillations-Mobs:  G-I, II, III, IV (PA's, Inf., Post.)  [x] (61106) Provided manual therapy to mobilize LE, proximal hip and/or LS spine soft tissue/joints for the purpose of modulating pain, promoting relaxation,  increasing ROM, reducing/eliminating soft tissue swelling/inflammation/restriction, improving soft tissue extensibility and allowing for proper ROM for normal function with   [x] LE / lumbar: self care, mobility, lifting and ambulation. [] UE / Cervical: self care, reaching, carrying, lifting, house/yardwork, driving, computer work. Modalities:  [] (11429) Vasopneumatic compression: Utilized vasopneumatic compression to decrease edema / swelling for the purpose of improving mobility and quad tone / recruitment which will allow for increased overall function including but not limited to self-care, transfers, ambulation, and ascending / descending stairs. Charges:  Timed Code Treatment Minutes: 45   Total Treatment Minutes: 45     [] EVAL - LOW (34043)   [] EVAL - MOD (68592)  [] EVAL - HIGH (27810)  [] RE-EVAL (37698)  [x] UK(25405) x 1        [] Ionto  [] NMR (27102) x       [] Vaso  [x] Manual (03432) x 1       [] Ultrasound  [x] TA x 1        [] Mech Traction (58228)  [] Aquatic Therapy x      [] ES (un) (74349):   [] Home Management Training x  [] ES(attended) (00758)   [] Dry Needling 1-2 muscles (13352):  [] Dry Needling 3+ muscles (861937)  [] Group:      [] Other:     GOALS:  Patient stated goal: The patient will be able to walk >500ft without increase in symptoms. [x]? Progressing: []? Met: []? Not Met: []? Adjusted     Therapist goals for Patient:   Short Term Goals: To be achieved in: 2 weeks post-op  1. Independent in HEP and progression per patient tolerance without increase in symptoms. []? Progressing: [x]? Met: []? Not Met: []? Adjusted  2.  Patient will have a decrease in pain to <5/10 to facilitate improvement in movement of the BLE, and ADLs as indicated by Functional with LAQ, but this is being addressed in therapy and with HEP. The patient has pain at end range of knee extension and flexion ROM. The patient's progression to stair stepping up and over was limited in eccentric control down, and concentric control in retro fashion. Treatment/Activity Tolerance:  [x] Patient able to complete tx  [] Patient limited by fatigue  [] Patient limited by pain  [] Patient limited by other medical complications  [] Other:     Prognosis: [x] Good [] Fair  [] Poor    Patient Requires Follow-up: [x] Yes  [] No     Plan for next treatment session: knee ROM emphasis, quad strength and engagement/control as able. Standing 3 way hip OKC/CKC; squats as tolerated. Step activity. PLAN:  PT 2-3x / week for 12 weeks. [x] Continue per plan of care [] Alter current plan (see comments)  [] Plan of care initiated [] Hold pending MD visit [] Discharge    Electronically signed by: Linda Avilez, PT, DPT, OMT-C    Note: If patient does not return for scheduled/ recommended follow up visits, this note will serve as a discharge from care along with most recent update on progress.

## 2021-08-31 NOTE — PROGRESS NOTES
This dictation was done with [employer] dictation and may contain mechanical errors related to translation. I have today reviewed with Raj Head the clinically relevant, past medical history, medications, allergies, family history, social history, and Review Of Systems form the patients most recent history form & I have documented any details relevant to today's presenting complaints in my history below. Ms. Isaac Mir self-reported past medical history, medications, allergies, family history, social history, and Review Of Systems form has been scanned into the chart under the \"Media\" tab. Subjective:  Raj Head is a 46 y.o. who is here in follow-up for right total knee replacement which was done on 7/20/2021. Currently her pain score is 7 out of 10 and range of motion is about 0-104. She has swelling and soreness but incisions healing nicely no signs of infection.   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 we will see her back in 5 weeks and further treatment based on what she is doing at that time      Patient Active Problem List   Diagnosis    Carpal tunnel syndrome    Hypertension    Obesity    Renal insufficiency    Vitamin D deficiency    Contusion of right knee    Infected abrasion of left hand    Hand dermatitis    Candidiasis of finger web    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[address]    Anemia in chronic renal disease    Anemia, iron deficiency    Chronic kidney disease, stage 3, mod decreased GFR (HCC)    Chronic pain of right knee    Family history of cancer    Hypertension, essential, benign    Overweight and obesity    Primary localized osteoarthrosis of the knee    Proteinuria    Renal osteodystrophy    Tear of PCL (posterior cruciate ligament) of knee    Hypertension, essential    Morbid obesity with BMI of 45.0-49.9, adult ([address]    Arthritis of right knee    Status post right knee replacement Current Outpatient Medications on File Prior to Visit   Medication Sig Dispense Refill    cyclobenzaprine (FLEXERIL) 10 MG tablet Take 10 mg by mouth nightly      amLODIPine (NORVASC) 10 MG tablet TAKE 1 TABLET BY MOUTH EVERY DAY 90 tablet 1    acetaminophen (TYLENOL) 500 MG tablet Take 500 mg by mouth every 6 hours as needed for Pain      levETIRAcetam (KEPPRA) 500 MG tablet TAKE 1 TABLET TWICE DAILY 180 tablet 3    atenolol (TENORMIN) 50 MG tablet Take 1 tablet by mouth daily 90 tablet 1    meclizine (ANTIVERT) 25 MG tablet Take 25 mg by mouth 3 times daily as needed        No current facility-administered medications on file prior to visit. Objective:   Height 5' 1\" (1.549 m), weight 280 lb (127 kg). On examination she is 0 to 140 degrees of motion, no varus or valgus laxity good symmetric motion through the hip and negative straight leg raise at 40 degrees neuro exam grossly intact both lower extremities. Intact sensation to light touch. Motor exam 4+ to 5/5 in all major motor groups. Negative Voss's sign. Skin is warm, dry and intact with out erythema or significant increased temperature around the knee joint(s). There are no cutaneous lesions or lymphadenopathy present. X-RAYS:  None taken      Assessment:  Stable healing right total knee    Plan:  During today's visit, there was approximately 15 minutes of face-to-face discussion in regards to the patient's current condition and treatment options. More than 50 % of the time was counseling and coordination of care as indicated above.   At this point we talked about continued maintenance exercises and she will follow-up with us in 5 weeks      PROCEDURE NOTE:   Exam and discussion      They will schedule a follow up in 5 weeks

## 2021-09-01 ENCOUNTER — HOSPITAL ENCOUNTER (OUTPATIENT)
Dept: PHYSICAL THERAPY | Age: 51
Setting detail: THERAPIES SERIES
Discharge: HOME | End: 2021-09-01
Payer: COMMERCIAL

## 2021-09-01 PROCEDURE — 97140 MANUAL THERAPY 1/> REGIONS: CPT

## 2021-09-01 PROCEDURE — 97112 NEUROMUSCULAR REEDUCATION: CPT

## 2021-09-01 PROCEDURE — 97530 THERAPEUTIC ACTIVITIES: CPT

## 2021-09-01 NOTE — PROGRESS NOTES
SLS (sec) 3  0  10         Knee AROM L R L R L R L R   Flexion 119 122  119  87  119  103       Extension hyper 1 0  0  -19  0  - 4           Knee Ext: L R L R L R L R   MMT (of 5) 4+ 4  4+  unable  NT  3       Knee Ext (#) 31.8 29.6      NT  11.6           Hip Abd:  L R L R L R L R   MMT (of 5) 4+ 4   unable  NT   NT       Hip Abd (#) 32.9 24.3      NT  NT           LEFS (raw) 20  15  27       9/1 - 122deg knee flexion, lack 1 deg AROM, 0deg PROM  8/30 - AAROM = 116deg flexion, 0deg ext AAROM (after manual)  8/25 - AAROM 116deg flexion; lack 3 deg AROM  8/23: AROM knee flexion 105 degrees, AAROM with heel slide 109 degrees. Pt to department today with RW. Mild tenderness noted lateral distal hamstrings and proximal lateral gastroc  8/20 - R knee AAROM flexion: 110   8/13/21  Rt knee ROM 0-100 EOB. Presented to clinic with walker but brought     8/11 - 95deg AAROM R knee flexion; lack 3 extension AROM; soft end feel for both; however significant pain. 8/7: AROM knee ext with quad set lacking 6 from neutral; Knee flexion AAROM heel slide and overpressure 97 degrees with soft end feel however significant pain.      RESTRICTIONS/PRECAUTIONS: HX OF FALLS     Exercises/Interventions:   Therapeutic Exercises (50795) -  Resistance / level Sets/sec Reps Notes   Towel gastroc stretch Heel slides with slide board in supine Standing HR IB Gastroc slr flexion and abduction  Squats  Quantum HS Curls (B3, K2, A3)        Therapeutic Activities (18820)       R Bike Seat 7 showing  4 min 8/30 - added 1 min   Fwd/Retro Step Up & Over / Retro Standing runner's calf stretch Standing HSS on step Lateral Step Up to Step Tap 6'' 1 12, R 9/1 - added step tap   Fwd Step Up to Step Tap 6'' 1 12 R 9/1 - added   TG Squats  TG Marches  No Hand Sit to Stand TRX Squats  1 10 9/1 - added   [facility] Walking 8 cones 15ft lap 3.5 laps R over  1 lap L over 9/1 - added          Neuromuscular Re-ed (92210)       SLR Flexion w/ Heel Prop   SLR up & over Quad set   LAQ 3# 3 10 8/30 - ^ resistance   Lateral Band Walk Orange 2 laps 15ft R/L 9/1 - added   SAQ to SLR  SAQ   Gait mechanics  TKE  Prone TKE  Retro Step Back (involuntary quad contraction)  SLR Ext         Manual Intervention (53099) - 9'       Patellar mobs    AAROM knee flexion with heel slide     PROM Knee flexion & Hip Flexion   6 min total; 10'' holds PROM Knee extension w/ op Heel prop 5'' holds 10       8/11 8/17 - Functional testing performed this date and compared with previous values as noted above. Testing done in order to assess progress with community navigation and fall risk. A total of 25 minutes spent completing functional testing and reviewing results. Modalities: 8/20: NMR with [place] estim to R quads: LAQ, SAQ, quad sets. Pt instructed with verbal and tactile cueing for quad engagement B quads to facilitate activation. 10/10 on /off cycle x 10 mins. Pt. Education:   6/18 - Patient was thoroughly educated on this date regarding prehabilitation goals, importance of PT sessions in improving overall ROM, strength and stability prior to surgery, and how prehabilitation will facilitate improved post-operative outcomes. The patient was educated on and instructed in HEP as listed. The patient was given a detailed handout for exercises to initiate in the hospital post-operatively as well as at home. The discharge plan from the hospital was reviewed with the patient; specifically, to reduce length of hospital stay and to minimize time before reinitiating outpatient physical therapy after surgery. Education regarding early mobility post-operatively in the hospital and emphasis on working with both physical therapy and nursing staff to achieve ambulation goal of 150 feet was provided. The patient was highly encouraged to attend joint class in hospital prior to surgery for further instructions on pre and post-surgical care.  Also, the patient was educated on precautions after hip replacement to avoid, specifically, hip extension and repetitive hip flexion. It is in my medical opinion that this patient is clear from all physical barriers prior to consideration for surgery, activity modifications prior to and post operatively have been discussed with this patient as well as discharge planning and is cleared for surgery from physical therapy perspective.  -patient educated on diagnosis, prognosis and expectations for rehab  -all patient questions were answered    Home Exercise Program:  Access Code: [facility]  URL: [email or web address]  Date: 08/20/2021  Prepared by: Jenaro Knife    Exercises  Seated Hamstring Stretch - 2-3 x daily - 6 x weekly - 4 reps - 20 hold  Mini Squat - 2-3 x daily - 6 x weekly - 2 sets - 10 reps  Prone Quadriceps Set - 2-3 x daily - 6 x weekly - 1 sets - 10 reps - 5-8 hold  Supine Active Straight Leg Raise - 2-3 x daily - 6 x weekly - 3 sets - 10 reps  Gastroc Stretch on Wall - 2-3 x daily - 6 x weekly - 1 sets - 3 reps - 10 hold  Supine Heel Slide with Strap - 2-3 x daily - 6 x weekly - 1 sets - 10 reps - 10 hold  Supine Knee Extension Mobilization with Weight - 2-3 x daily - 6 x weekly - 1 sets - 3 reps - 60 hold  Seated Long Arc Quad - 2-3 x daily - 6 x weekly - 2 sets - 10 reps - 3 hold    8/17 - added 1/4 squat    Access Code: [email or web address]  URL: [email or web address]  Date: 08/05/2021  Prepared by: Lorne Blind    Exercises   Long Sitting Calf Stretch with Strap - 3 x daily - 7 x weekly - 4 reps - 20 hold  Seated Gastroc Stretch with Strap - 3 x daily - 7 x weekly - 4 reps - 20 hold   Seated Hamstring Stretch - 3 x daily - 7 x weekly - 4 reps - 20 hold     8/5: HEP was also reviewed with pt from home health PT and encouraged to continue, additions of interventions above. 6/18 - The following exercises were discussed and added to the patient's home exercise program. (glute set, quad set, ankle pumps, heel prop, heel slide, SLR flexion).  Additionally, the patient was educated on appropriate frequency, intensity and duration. Handout provided. Therapeutic Exercise and NMR EXR  [] (52422) Provided verbal/tactile cueing for activities related to strengthening, flexibility, endurance, ROM for improvements in  [] LE / Lumbar: LE, proximal hip, and core control with self care, mobility, lifting, ambulation. [] UE / Cervical: cervical, postural, scapular, scapulothoracic and UE control with self care, reaching, carrying, lifting, house/yardwork, driving, computer work. [x] (47642) Provided verbal/tactile cueing for activities related to improving balance, coordination, kinesthetic sense, posture, motor skill, proprioception to assist with   [x] LE / lumbar: LE, proximal hip, and core control in self care, mobility, lifting, ambulation and eccentric single leg control. [] UE / cervical: cervical, scapular, scapulothoracic and UE control with self care, reaching, carrying, lifting, house/yardwork, driving, computer work.   [] (13685) Therapist is in constant attendance of 2 or more patients providing skilled therapy interventions, but not providing any significant amount of measurable one-on-one time to either patient, for improvements in  [] LE / lumbar: LE, proximal hip, and core control in self care, mobility, lifting, ambulation and eccentric single leg control. [] UE / cervical: cervical, scapular, scapulothoracic and UE control with self care, reaching, carrying, lifting, house/yardwork, driving, computer work. NMR and Therapeutic Activities:    [x] (12611 or 78656) Provided verbal/tactile cueing for activities related to improving balance, coordination, kinesthetic sense, posture, motor skill, proprioception and motor activation to allow for proper function of   [x] LE: / Lumbar core, proximal hip and LE with self care and ADLs  [] UE / Cervical: cervical, postural, scapular, scapulothoracic and UE control with self care, carrying, lifting, driving, computer work. [] (19095) Gait Re-education- Provided training and instruction to the patient for proper LE, core and proximal hip recruitment and positioning and eccentric body weight control with ambulation re-education including up and down stairs     Home Management Training / Self Care:  [] (96197) Provided self-care/home management training related to activities of daily living and compensatory training, and/or use of adaptive equipment for improvement with: ADLs and compensatory training, meal preparation, safety procedures and instruction in use of adaptive equipment, including bathing, grooming, dressing, personal hygiene, basic household cleaning and chores.      Home Exercise Program:    [] (41999) Reviewed/Progressed HEP activities related to strengthening, flexibility, endurance, ROM of   [] LE / Lumbar: core, proximal hip and LE for functional self-care, mobility, lifting and ambulation/stair navigation   [] UE / Cervical: cervical, postural, scapular, scapulothoracic and UE control with self care, reaching, carrying, lifting, house/yardwork, driving, computer work  [] (36355)Reviewed/Progressed HEP activities related to improving balance, coordination, kinesthetic sense, posture, motor skill, proprioception of   [] LE: core, proximal hip and LE for self care, mobility, lifting, and ambulation/stair navigation    [] UE / Cervical: cervical, postural,  scapular, scapulothoracic and UE control with self care, reaching, carrying, lifting, house/yardwork, driving, computer work    Manual Treatments:  PROM / STM / Oscillations-Mobs:  G-I, II, III, IV (PA's, Inf., Post.)  [x] (90098) Provided manual therapy to mobilize LE, proximal hip and/or LS spine soft tissue/joints for the purpose of modulating pain, promoting relaxation,  increasing ROM, reducing/eliminating soft tissue swelling/inflammation/restriction, improving soft tissue extensibility and allowing for proper ROM for normal function with   [x] LE / lumbar: self care, mobility, lifting and ambulation. [] UE / Cervical: self care, reaching, carrying, lifting, house/yardwork, driving, computer work. Modalities:  [] (44463) Vasopneumatic compression: Utilized vasopneumatic compression to decrease edema / swelling for the purpose of improving mobility and quad tone / recruitment which will allow for increased overall function including but not limited to self-care, transfers, ambulation, and ascending / descending stairs. Charges:  Timed Code Treatment Minutes: 47   Total Treatment Minutes: 47     [] EVAL - LOW (26149)   [] EVAL - MOD (92517)  [] EVAL - HIGH (21474)  [] RE-EVAL (15027)  [] PZ(24072) x         [] Ionto  [x] NMR (92561) x 1       [] Vaso  [x] Manual (65659) x 1       [] Ultrasound  [x] TA x 1        [] Mech Traction (48186)  [] Aquatic Therapy x      [] ES (un) (47829):   [] Home Management Training x  [] ES(attended) (78044)   [] Dry Needling 1-2 muscles (37066):  [] Dry Needling 3+ muscles (099510)  [] Group:      [] Other:     GOALS:  Patient stated goal: The patient will be able to walk >500ft without increase in symptoms. [x]? Progressing: []? Met: []? Not Met: []? Adjusted     Therapist goals for Patient:   Short Term Goals: To be achieved in: 2 weeks post-op  1. Independent in HEP and progression per patient tolerance without increase in symptoms. []? Progressing: [x]? Met: []? Not Met: []? Adjusted  2. Patient will have a decrease in pain to <5/10 to facilitate improvement in movement of the BLE, and ADLs as indicated by Functional Deficits. []? Progressing: [x]? Met: []? Not Met: []? Adjusted     Long Term Goals: To be achieved in: 12 weeks post-op  1. Disability index score of 35% or less for the LEFS to assist with reaching prior level of function. [x]? Progressing: []? Met: []? Not Met: []? Adjusted  2.  Patient will demonstrate increased knee AROM from 0 -120 allow for proper joint functioning as indicated by patients Functional

## 2021-09-02 ENCOUNTER — TELEPHONE (OUTPATIENT)
Dept: ORTHOPEDIC SURGERY | Age: 51
End: 2021-09-02

## 2021-09-02 ENCOUNTER — HOSPITAL ENCOUNTER (OUTPATIENT)
Dept: ULTRASOUND IMAGING | Age: 51
Discharge: HOME | End: 2021-09-02
Payer: COMMERCIAL

## 2021-09-02 DIAGNOSIS — E04.2 MULTIPLE THYROID NODULES: ICD-10-CM

## 2021-09-02 PROCEDURE — 76536 US EXAM OF HEAD AND NECK: CPT

## 2021-09-02 NOTE — LETTER
[address] [facility]  [address] [address]  Phone: [phone]  Fax: [phone]    Deepthi Adhikari MD         September 2, 2021     Patient: Aida Burr   YOB: 1970   Date of Visit: 9/2/2021       To Whom It May Concern: It is my medical opinion that Tamir Liu requires a disability parking placard for the following reasons:  She has limited walking ability due to an arthritic condition. Duration of need: 6 months    If you have any questions or concerns, please don't hesitate to call.     Sincerely,          Deepthi Adhikari MD

## 2021-09-02 NOTE — TELEPHONE ENCOUNTER
General Question     Subject: HANDICAP PLACARD PAPERWORK  Patient and /or Facility Request: Donna Elizabeth  Contact Number: [phone]    PATIENT HAS A TEMPORARY HANDICAP PLACARD PAPERWORK. PATIENT WOULD LIKE TO SPEAK TO SOMEONE REGARDING FILLING OUT THE PAPERWORK.     PLEASE CALL BACK AT THE ABOVE NUMBER

## 2021-09-08 ENCOUNTER — TELEPHONE (OUTPATIENT)
Dept: ORTHOPEDIC SURGERY | Age: 51
End: 2021-09-08

## 2021-09-08 ENCOUNTER — OFFICE VISIT (OUTPATIENT)
Dept: ORTHOPEDIC SURGERY | Age: 51
End: 2021-09-08
Payer: COMMERCIAL

## 2021-09-08 ENCOUNTER — HOSPITAL ENCOUNTER (OUTPATIENT)
Dept: PHYSICAL THERAPY | Age: 51
Setting detail: THERAPIES SERIES
Discharge: HOME | End: 2021-09-08
Payer: COMMERCIAL

## 2021-09-08 VITALS — WEIGHT: 260 LBS | HEIGHT: 61 IN | RESPIRATION RATE: 20 BRPM | BODY MASS INDEX: 49.09 KG/M2

## 2021-09-08 DIAGNOSIS — M17.12 PRIMARY OSTEOARTHRITIS OF LEFT KNEE: Primary | ICD-10-CM

## 2021-09-08 PROCEDURE — 97112 NEUROMUSCULAR REEDUCATION: CPT

## 2021-09-08 PROCEDURE — 97530 THERAPEUTIC ACTIVITIES: CPT

## 2021-09-08 PROCEDURE — 20610 DRAIN/INJ JOINT/BURSA W/O US: CPT | Performed by: PHYSICIAN ASSISTANT

## 2021-09-08 NOTE — PROGRESS NOTES
No    Drug use: No    Sexual activity: Never       Current Outpatient Medications   Medication Sig Dispense Refill    cyclobenzaprine (FLEXERIL) 10 MG tablet Take 10 mg by mouth nightly      amLODIPine (NORVASC) 10 MG tablet TAKE 1 TABLET BY MOUTH EVERY DAY 90 tablet 1    acetaminophen (TYLENOL) 500 MG tablet Take 500 mg by mouth every 6 hours as needed for Pain      levETIRAcetam (KEPPRA) 500 MG tablet TAKE 1 TABLET TWICE DAILY 180 tablet 3    atenolol (TENORMIN) 50 MG tablet Take 1 tablet by mouth daily 90 tablet 1    meclizine (ANTIVERT) 25 MG tablet Take 25 mg by mouth 3 times daily as needed        No current facility-administered medications for this visit. Objective:     She is alert, oriented x 3, pleasant, well nourished, developed and in no   acute distress. Resp 20   Ht 5' 1\" (1.549 m)   Wt 260 lb (117.9 kg)   BMI 49.13 kg/m²      Examination of the left knee: Inspection of the skin and soft tissues reveals no erythema or significant swelling. The overall alignment of the knee is mild varus. Gait is antalgic. There is minimal intra-articular effusion. AROM:           PROM:  Extension-         0                   0  Flexion -           125                   130  There mild to moderate pain associated with ROM testing. Medial joint line is tender to palpation. Lateral joint line is somewhat tender to palpation. Pes anserine bursa not tender to palpation. Patellar tendon is not tender to palpation. Quadriceps tendon is not tender to palpation. Collateral ligaments is not tender to palpation. Popliteal fossa is not tender to palpation. Varus Stress testing negative for instability. Valgus Stress testing negative for instability. Patellar Compression testing is positive for pain or crepitus. Extensor Mechanism is intact.           X Rays: not performed in the office today:   Previous x-rays shows mild to moderate degenerative arthritis of the medial and patellofemoral compartments. Diagnosis:       ICD-10-CM    1. Primary osteoarthritis of left knee  M17.12 AL ARTHROCENTESIS ASPIR&/INJ MAJOR JT/BURSA W/O US     AL TRIAMCINOLONE ACETONIDE INJ        Assessment and Plan:       Assessment:  Left knee pain felt to be related to left knee medial and patellofemoral compartment arthritis. Recently underwent right knee arthroplasty. I had an extensive discussion with Ms. Iam Garcia regarding the natural history, etiology, and long term consequences of her condition. I have presented reasonable alternatives to the patient's proposed care, treatment, and services. Risks and benefits of the treatment options also reviewed in detail. I have outlined a treatment plan with them. She has had full opportunity to ask her questions. I have answered them all to her satisfaction. I feel that Ms. Iam Garcia understands our discussion today. Plan:  Risk and benefits of corticosteroid intra-articular injection was discussed today. All questions were answered to her satisfaction. She verbally consented to proceed with intra-articular injection today. Cortisone Injection                                                       PROCEDURE NOTE:     Pre op Diagnosis:  left knee pain     Post op Diagnosis: Same  With the Iam Garcia permission, her left knee was prepped  in standard sterile fashion with  Alcohol and 2 cc of 0.25% Marcaine and 1 cc of Kenalog 40 mg was injected into the left lateral compartment  without difficulty. The patient tolerated this well without difficulty. A band-aid was applied. The patient was advised to ice the knee for 15-20 minutes to relieve any injection site related pain. Follow up-as scheduled with Dr. Aidee Rogers for her postop visit regarding right knee. Call or return to clinic if these symptoms worsen or fail to improve as anticipated.                                                       Veronica So [address] [facility]                                         Disclaimer: This note was generated with use of a verbal recognition program (DRAGON) and an attempt was made to check for errors. It is possible that there are still dictated errors within this office note. If so, please bring any significant errors to my attention for an addendum. All efforts were made to ensure that this office note is accurate.

## 2021-09-08 NOTE — FLOWSHEET NOTE
[address] [facility] Physical Therapy  Phone: [phone]   Fax: [phone]    Physical Therapy Daily Treatment Note  Date:  2021    Patient Name:  Iam Garcia    :  1970  MRN: [id number]  Medical/Treatment Diagnosis Information:  Diagnosis: M17.11 (ICD-10-CM) - Primary osteoarthritis of right knee / PREHAB - sx: 21  Treatment Diagnosis: decreased RLE strength, knee pain, antalgic gait, knee instability  Insurance/Certification information:  PT Insurance Information: [address] [place] [address] - $25 copay - visits PMN  Physician Information:  Referring Practitioner: Michelle Horn MD  Plan of care signed (Y/N): [x]  Yes []  No     Date of Patient follow up with Physician: 21     Progress Report: [x]  Yes  []  No     Date Range for reporting period:  Beginnin21  PN: 21  Ending: TBD     Progress report due (10 Rx/or 30 days whichever is less): visit #34      Recertification due (POC duration/ or 90 days whichever is less): visit #16      Visit # Insurance Allowable Auth required? Date Range   12 60 []  Yes  [x]  No AIM REPORTS NO AUTH NEEDED ON 21 CORESPONDENCE       Latex Allergy:  [x]NO      []YES  Preferred Language for Healthcare:   [x]English       []other:    Functional Scale:        Date assessed:  LEFS: raw score = 20/80; dysfunction = 75%  21  LEFS: raw score = 15/80; dysfunction = 81%  8/5    Pain level:  <1/10 pain; 4-5/10 stiffness        SUBJECTIVE: Pt saw supervising MD prior to tx and had cortisone injection in L knee. MD had conversations with pt regarding TKA on L knee and pt displays emotional distress with conversation. R knee has been responding well to strengthening and pt is trying to perform stairs, at home, with a reciprocal pattern using handrail and SPC for support.      OBJECTIVE:      Functional Testing  Sx Date 21 Prehab Date 21 Post-op Re-Eval Date 21 4 week F/U date 21 8 week F/U date 9/14/21       TUG (sec) 8     20  16     30 second sit to stand (reps) 10  8  10     6 minute walk (m) 449.58   264 (RW)  288 (SPC)         Balance:     Narrowed SOFIA (sec) 10  10  10     Semi Tandem (sec) 10    10     10            Tandem (sec) 10     10     10           SLS (sec) 3  0  10         Knee AROM L R L R L R L R   Flexion 119 122  119  87  119  103       Extension hyper 1 0  0  -19  0  - 4           Knee Ext: L R L R L R L R   MMT (of 5) 4+ 4  4+  unable  NT  3       Knee Ext (#) 31.8 29.6      NT  11.6           Hip Abd:  L R L R L R L R   MMT (of 5) 4+ 4   unable  NT   NT       Hip Abd (#) 32.9 24.3      NT  NT           LEFS (raw) 20  15  27       9/1 - 122deg knee flexion, lack 1 deg AROM, 0deg PROM  8/30 - AAROM = 116deg flexion, 0deg ext AAROM (after manual)  8/25 - AAROM 116deg flexion; lack 3 deg AROM  8/23: AROM knee flexion 105 degrees, AAROM with heel slide 109 degrees. Pt to department today with RW. Mild tenderness noted lateral distal hamstrings and proximal lateral gastroc  8/20 - R knee AAROM flexion: 110   8/13/21  Rt knee ROM 0-100 EOB. Presented to clinic with walker but brought     8/11 - 95deg AAROM R knee flexion; lack 3 extension AROM; soft end feel for both; however significant pain. 8/7: AROM knee ext with quad set lacking 6 from neutral; Knee flexion AAROM heel slide and overpressure 97 degrees with soft end feel however significant pain.      RESTRICTIONS/PRECAUTIONS: HX OF FALLS     Exercises/Interventions:   Therapeutic Exercises (27553) -  Resistance / level Sets/sec Reps Notes   Towel gastroc stretch Heel slides with slide board in supine Standing HR  2 10 IB Gastroc slr flexion and abduction  Squats  Quantum HS Curls (B3, K2, A3)        Therapeutic Activities (79376)       R Bike Seat 7 showing  4 min 8/30 - added 1 min   Fwd/Retro Step Up & Over / Retro Standing runner's calf stretch Standing HSS on step TG Squats  TG Marches  No Hand Sit to Stand TRX Squats  2 10 9/1 - added   Fwd/Post wt shift 3 cones   Standing on 6\" box  3x each   9/8- ant, ant/lat, ant/med   [facility] Walking 8 cones 15ft lap 3.5 laps R over   9/1 - added   Pt Ed. On L knee management after injection  7'  9/8-Regards to loading/ice/rest          Neuromuscular Re-ed (64688)       SLR Flexion w/ Heel Prop   SLR up & over  Quad set   LAQ 3#+BS  3 10 8/30 - ^ resistance   R CKC c L OKC Hip ABD/Ext  [place]  1 10 9/8-Added    Lateral Band Walk Orange 2 laps 15ft R/L 9/1 - added   SAQ to SLR 3#  5\"H 15 SAQ   Gait mechanics   TKE  Prone TKE  Retro Step Back (involuntary quad contraction)  SLR Ext         Manual Intervention (13715) -        Patellar mobs    AAROM knee flexion with heel slide        8/11 8/17 - Functional testing performed this date and compared with previous values as noted above. Testing done in order to assess progress with community navigation and fall risk. A total of 25 minutes spent completing functional testing and reviewing results. Modalities: 8/20: NMR with [place] estim to R quads: LAQ, SAQ, quad sets. Pt instructed with verbal and tactile cueing for quad engagement B quads to facilitate activation. 10/10 on /off cycle x 10 mins. Pt. Education:   6/18 - Patient was thoroughly educated on this date regarding prehabilitation goals, importance of PT sessions in improving overall ROM, strength and stability prior to surgery, and how prehabilitation will facilitate improved post-operative outcomes. The patient was educated on and instructed in HEP as listed. The patient was given a detailed handout for exercises to initiate in the hospital post-operatively as well as at home. The discharge plan from the hospital was reviewed with the patient; specifically, to reduce length of hospital stay and to minimize time before reinitiating outpatient physical therapy after surgery.  Education regarding early mobility post-operatively in the hospital and emphasis on working with both physical therapy and nursing staff to achieve ambulation goal of 150 feet was provided. The patient was highly encouraged to attend joint class in hospital prior to surgery for further instructions on pre and post-surgical care. Also, the patient was educated on precautions after hip replacement to avoid, specifically, hip extension and repetitive hip flexion. It is in my medical opinion that this patient is clear from all physical barriers prior to consideration for surgery, activity modifications prior to and post operatively have been discussed with this patient as well as discharge planning and is cleared for surgery from physical therapy perspective.  -patient educated on diagnosis, prognosis and expectations for rehab  -all patient questions were answered    Home Exercise Program:  Access Code: [facility]  URL: [email or web address]  Date: 08/20/2021  Prepared by: Jeronimo Newman    Exercises  Seated Hamstring Stretch - 2-3 x daily - 6 x weekly - 4 reps - 20 hold  Mini Squat - 2-3 x daily - 6 x weekly - 2 sets - 10 reps  Prone Quadriceps Set - 2-3 x daily - 6 x weekly - 1 sets - 10 reps - 5-8 hold  Supine Active Straight Leg Raise - 2-3 x daily - 6 x weekly - 3 sets - 10 reps  Gastroc Stretch on Wall - 2-3 x daily - 6 x weekly - 1 sets - 3 reps - 10 hold  Supine Heel Slide with Strap - 2-3 x daily - 6 x weekly - 1 sets - 10 reps - 10 hold  Supine Knee Extension Mobilization with Weight - 2-3 x daily - 6 x weekly - 1 sets - 3 reps - 60 hold  Seated Long Arc Quad - 2-3 x daily - 6 x weekly - 2 sets - 10 reps - 3 hold    8/17 - added 1/4 squat    Access Code: [email or web address]  URL: [email or web address]  Date: 08/05/2021  Prepared by: Weston Back    Exercises   Long Sitting Calf Stretch with Strap - 3 x daily - 7 x weekly - 4 reps - 20 hold  Seated Gastroc Stretch with Strap - 3 x daily - 7 x weekly - 4 reps - 20 hold   Seated Hamstring Stretch - 3 x daily - 7 x weekly - 4 reps - 20 hold     8/5: HEP was also reviewed with pt from home health PT and encouraged to continue, additions of interventions above. 6/18 - The following exercises were discussed and added to the patient's home exercise program. (glute set, quad set, ankle pumps, heel prop, heel slide, SLR flexion). Additionally, the patient was educated on appropriate frequency, intensity and duration. Handout provided. Therapeutic Exercise and NMR EXR  [] (76009) Provided verbal/tactile cueing for activities related to strengthening, flexibility, endurance, ROM for improvements in  [] LE / Lumbar: LE, proximal hip, and core control with self care, mobility, lifting, ambulation. [] UE / Cervical: cervical, postural, scapular, scapulothoracic and UE control with self care, reaching, carrying, lifting, house/yardwork, driving, computer work. [x] (32966) Provided verbal/tactile cueing for activities related to improving balance, coordination, kinesthetic sense, posture, motor skill, proprioception to assist with   [x] LE / lumbar: LE, proximal hip, and core control in self care, mobility, lifting, ambulation and eccentric single leg control. [] UE / cervical: cervical, scapular, scapulothoracic and UE control with self care, reaching, carrying, lifting, house/yardwork, driving, computer work.   [] (03419) Therapist is in constant attendance of 2 or more patients providing skilled therapy interventions, but not providing any significant amount of measurable one-on-one time to either patient, for improvements in  [] LE / lumbar: LE, proximal hip, and core control in self care, mobility, lifting, ambulation and eccentric single leg control. [] UE / cervical: cervical, scapular, scapulothoracic and UE control with self care, reaching, carrying, lifting, house/yardwork, driving, computer work.      NMR and Therapeutic Activities:    [x] (82094 or 56857) Provided verbal/tactile cueing for activities related to improving balance, coordination, kinesthetic sense, posture, motor skill, proprioception and motor activation to allow for proper function of   [x] LE: / Lumbar core, proximal hip and LE with self care and ADLs  [] UE / Cervical: cervical, postural, scapular, scapulothoracic and UE control with self care, carrying, lifting, driving, computer work.   [] (98456) Gait Re-education- Provided training and instruction to the patient for proper LE, core and proximal hip recruitment and positioning and eccentric body weight control with ambulation re-education including up and down stairs     Home Management Training / Self Care:  [] (63920) Provided self-care/home management training related to activities of daily living and compensatory training, and/or use of adaptive equipment for improvement with: ADLs and compensatory training, meal preparation, safety procedures and instruction in use of adaptive equipment, including bathing, grooming, dressing, personal hygiene, basic household cleaning and chores.      Home Exercise Program:    [] (13932) Reviewed/Progressed HEP activities related to strengthening, flexibility, endurance, ROM of   [] LE / Lumbar: core, proximal hip and LE for functional self-care, mobility, lifting and ambulation/stair navigation   [] UE / Cervical: cervical, postural, scapular, scapulothoracic and UE control with self care, reaching, carrying, lifting, house/yardwork, driving, computer work  [] (92621)Reviewed/Progressed HEP activities related to improving balance, coordination, kinesthetic sense, posture, motor skill, proprioception of   [] LE: core, proximal hip and LE for self care, mobility, lifting, and ambulation/stair navigation    [] UE / Cervical: cervical, postural,  scapular, scapulothoracic and UE control with self care, reaching, carrying, lifting, house/yardwork, driving, computer work    Manual Treatments:  PROM / STM / Oscillations-Mobs:  G-I, II, III, IV (PA's, Inf., Post.)  [x] (83742) Provided manual therapy to mobilize LE, proximal hip and/or LS spine soft tissue/joints for the purpose of modulating pain, promoting relaxation,  increasing ROM, reducing/eliminating soft tissue swelling/inflammation/restriction, improving soft tissue extensibility and allowing for proper ROM for normal function with   [x] LE / lumbar: self care, mobility, lifting and ambulation. [] UE / Cervical: self care, reaching, carrying, lifting, house/yardwork, driving, computer work. Modalities:  [] (95526) Vasopneumatic compression: Utilized vasopneumatic compression to decrease edema / swelling for the purpose of improving mobility and quad tone / recruitment which will allow for increased overall function including but not limited to self-care, transfers, ambulation, and ascending / descending stairs. Charges:  Timed Code Treatment Minutes: 40   Total Treatment Minutes: 40     [] EVAL - LOW (02879)   [] EVAL - MOD (00764)  [] EVAL - HIGH (24736)  [] RE-EVAL (36130)  [] OE(09165) x         [] Ionto  [x] NMR (92143) x 1       [] Vaso  [] Manual (17133) x 1       [] Ultrasound  [x] TA x 2       [] Mech Traction (85843)  [] Aquatic Therapy x      [] ES (un) (59973):   [] Home Management Training x  [] ES(attended) (17806)   [] Dry Needling 1-2 muscles (80207):  [] Dry Needling 3+ muscles (816668)  [] Group:      [] Other:     GOALS:  Patient stated goal: The patient will be able to walk >500ft without increase in symptoms. [x]? Progressing: []? Met: []? Not Met: []? Adjusted     Therapist goals for Patient:   Short Term Goals: To be achieved in: 2 weeks post-op  1. Independent in HEP and progression per patient tolerance without increase in symptoms. []? Progressing: [x]? Met: []? Not Met: []? Adjusted  2. Patient will have a decrease in pain to <5/10 to facilitate improvement in movement of the BLE, and ADLs as indicated by Functional Deficits. []? Progressing: [x]? Met: []? Not Met: []? Adjusted     Long Term Goals: To be achieved in: 12 weeks post-op  1. Disability index score of 35% or less for the LEFS to assist with reaching prior level of function. [x]? Progressing: []? Met: []? Not Met: []? Adjusted  2. Patient will demonstrate increased knee AROM from 0 -120 allow for proper joint functioning as indicated by patients Functional Deficits. [x]? Progressing: []? Met: []? Not Met: []? Adjusted  3. Patient will demonstrate an increase in Strength to at least 4+/5 with R knee flexion and extension in order perform proper functional activities. [x]? Progressing: []? Met: []? Not Met: []? Adjusted  4. Patient will return to functional activities including walking in grocery strore without increased symptoms or restriction. [x]? Progressing: []? Met: []? Not Met: []? Adjusted  5. Patient will be able to walk >2 blocks without increase in pain <3/10. [x]? Progressing: []? Met: []? Not Met: []? Adjusted         Overall Progression Towards Functional goals/ Treatment Progress Update:  [x] Patient is progressing as expected towards functional goals listed. [] Progression is slowed due to complexities/Impairments listed. [] Progression has been slowed due to co-morbidities. [] Plan just implemented, too soon to assess goals progression <30days   [] Goals require adjustment due to lack of progress  [] Patient is not progressing as expected and requires additional follow up with physician  [] Other    Persisting Functional Limitations/Impairments:  [x]Sleeping [x]Sitting               [x]Standing [x]Transfers        [x]Walking [x]Kneeling               [x]Stairs [x]Squatting / bending   [x]ADLs []Reaching  []Lifting  [x]Housework  [x]Driving []Job related tasks  []Sports/Recreation []Other:      ASSESSMENT: Manual interventions were held this date as pt is showing improvements in ROM over the last couple of visits. Pt challenged with gait mechanics over cone 2/2 hip flexion and DF ROM and strength deficits present.  Ant and posterior wt shifting on R CKC was performed with cones provided for visual feedback to challenge quad, HS, and glut endurance needed for improved LE stability and gait mechanics. Education provided to pt in regards to self-care management of L knee cortisone injection with special attention placed on appropriate loading over the next couple of days through L LE. Ongoing efforts in therapy to improve gait, maximize quad activation/strength in OKC/CKC, normalization of gait and reduce stiffness (while improving flexibility) warranted. Treatment/Activity Tolerance:  [x] Patient able to complete tx  [] Patient limited by fatigue  [] Patient limited by pain  [] Patient limited by other medical complications  [] Other:     Prognosis: [x] Good [] Fair  [] Poor    Patient Requires Follow-up: [x] Yes  [] No     Plan for next treatment session: knee ROM emphasis, quad strength and engagement/control as able. Standing 3 way hip OKC/CKC; squats as tolerated. Step activity. PLAN:  PT 2-3x / week for 12 weeks. [x] Continue per plan of care [] Alter current plan (see comments)   [] Plan of care initiated [] Hold pending MD visit [] Discharge    Electronically signed by: Stacia Jimenes PTA, [id number]    Note: If patient does not return for scheduled/ recommended follow up visits, this note will serve as a discharge from care along with most recent update on progress.

## 2021-09-10 ENCOUNTER — HOSPITAL ENCOUNTER (OUTPATIENT)
Dept: PHYSICAL THERAPY | Age: 51
Setting detail: THERAPIES SERIES
Discharge: HOME | End: 2021-09-10
Payer: COMMERCIAL

## 2021-09-10 PROCEDURE — 97112 NEUROMUSCULAR REEDUCATION: CPT

## 2021-09-10 PROCEDURE — 97530 THERAPEUTIC ACTIVITIES: CPT

## 2021-09-10 NOTE — FLOWSHEET NOTE
[facility]  Phone: [phone]   Fax: [phone]    Physical Therapy Daily Treatment Note  Date:  9/10/2021    Patient Name:  Raj Head    :  1970  MRN: [id number]  Medical/Treatment Diagnosis Information:  Diagnosis: M17.11 (ICD-10-CM) - Primary osteoarthritis of right knee / PREHAB - sx: 21  Treatment Diagnosis: decreased RLE strength, knee pain, antalgic gait, knee instability  Insurance/Certification information:  PT Insurance Information: [address] [place] [address] - $25 copay - visits PMN  Physician Information:  Referring Practitioner: Abdon Karimi MD  Plan of care signed (Y/N): [x]  Yes []  No     Date of Patient follow up with Physician: 21     Progress Report: [x]  Yes  []  No     Date Range for reporting period:  Beginnin21  PN: 21  Ending: TBD     Progress report due (10 Rx/or 30 days whichever is less): visit #76      Recertification due (POC duration/ or 90 days whichever is less): visit #16      Visit # Insurance Allowable Auth required? Date Range   13 60 []  Yes  [x]  No AIM REPORTS NO AUTH NEEDED ON 21 CORESPONDENCE       Latex Allergy:  [x]NO      []YES  Preferred Language for Healthcare:   [x]English       []other:    Functional Scale:        Date assessed:  LEFS: raw score = 20/80; dysfunction = 75%  21  LEFS: raw score = 15/80; dysfunction = 81%  8/    Pain level:  0/10 pain; 3/10 stiffness        SUBJECTIVE:  States feeling like cortisone shot last week has helped L LE a little bit. Continues to work on going up and down stairs reciprocally as well as working on walking endurance.       OBJECTIVE:      Functional Testing  Sx Date 21 Prehab Date 21 Post-op Re-Eval Date 21 4 week F/U date 21 8 week F/U date 21       TUG (sec) 8     20  16     30 second sit to stand (reps) 10  8  10     6 minute walk (m) 449.58   264 (RW)  288 (SPC)         Balance:     Narrowed SOFIA (sec) 10  10  10     Semi Tandem (sec) 10    10     10            Tandem (sec) 10     10     10           SLS (sec) 3  0  10         Knee AROM L R L R L R L R   Flexion 119 122  119  87  119  103       Extension hyper 1 0  0  -19  0  - 4           Knee Ext: L R L R L R L R   MMT (of 5) 4+ 4  4+  unable  NT  3       Knee Ext (#) 31.8 29.6      NT  11.6           Hip Abd:  L R L R L R L R   MMT (of 5) 4+ 4   unable  NT   NT       Hip Abd (#) 32.9 24.3      NT  NT           LEFS (raw) 20  15  27       9/1 - 122deg knee flexion, lack 1 deg AROM, 0deg PROM  8/30 - AAROM = 116deg flexion, 0deg ext AAROM (after manual)  8/25 - AAROM 116deg flexion; lack 3 deg AROM  8/23: AROM knee flexion 105 degrees, AAROM with heel slide 109 degrees. Pt to department today with RW. Mild tenderness noted lateral distal hamstrings and proximal lateral gastroc  8/20 - R knee AAROM flexion: 110   8/13/21  Rt knee ROM 0-100 EOB. Presented to clinic with walker but brought     8/11 - 95deg AAROM R knee flexion; lack 3 extension AROM; soft end feel for both; however significant pain. 8/7: AROM knee ext with quad set lacking 6 from neutral; Knee flexion AAROM heel slide and overpressure 97 degrees with soft end feel however significant pain.      RESTRICTIONS/PRECAUTIONS: HX OF FALLS     Exercises/Interventions:   Therapeutic Exercises (84393) -  Resistance / level Sets/sec Reps Notes   Standing HR/TR  2 10 IB Gastroc Squats  Quantum HS Curls (B3, K2, A3)        Therapeutic Activities (81624)       R Bike Seat 7 showing  4 min 8/30 - added 1 min   Fwd/Retro Step Up & Over / Retro Standing runner's calf stretch Standing HSS on step TG Squats  TG Marches  TRX Squats  2 10 9/1 - added   Fwd/Post wt shift 3 cones   Standing on 6\" box  3x each   9/8- ant, ant/lat, ant/med   Cone Walking 8 cones 15ft lap 3.5 laps R over   9/1 - added          Neuromuscular Re-ed (89407)       SLR Flexion w/ Heel Prop   SLR up & over  LAQ 3#+BS  3 10 8/30 - ^ resistance   R CKC c L OKC Hip ABD/Ext  [place]  1 10 9/8-Added    Lateral Band Walk Orange 2 laps 15ft R/L 9/1 - added   SAQ to SLR 3#  5\"H 15  TKE  Prone TKE  Retro Step Back (involuntary quad contraction)         Manual Intervention (43938) -        Patellar mobs    AAROM knee flexion with heel slide        8/11 8/17 - Functional testing performed this date and compared with previous values as noted above. Testing done in order to assess progress with community navigation and fall risk. A total of 25 minutes spent completing functional testing and reviewing results. Modalities: 8/20: NMR with Portuguese estim to R quads: LAQ, SAQ, quad sets. Pt instructed with verbal and tactile cueing for quad engagement B quads to facilitate activation. 10/10 on /off cycle x 10 mins. Pt. Education:   6/18 - Patient was thoroughly educated on this date regarding prehabilitation goals, importance of PT sessions in improving overall ROM, strength and stability prior to surgery, and how prehabilitation will facilitate improved post-operative outcomes. The patient was educated on and instructed in HEP as listed. The patient was given a detailed handout for exercises to initiate in the hospital post-operatively as well as at home. The discharge plan from the hospital was reviewed with the patient; specifically, to reduce length of hospital stay and to minimize time before reinitiating outpatient physical therapy after surgery. Education regarding early mobility post-operatively in the hospital and emphasis on working with both physical therapy and nursing staff to achieve ambulation goal of 150 feet was provided. The patient was highly encouraged to attend joint class in hospital prior to surgery for further instructions on pre and post-surgical care. Also, the patient was educated on precautions after hip replacement to avoid, specifically, hip extension and repetitive hip flexion.  It is in my medical opinion that this patient is clear from all physical barriers prior to consideration for surgery, activity modifications prior to and post operatively have been discussed with this patient as well as discharge planning and is cleared for surgery from physical therapy perspective.  -patient educated on diagnosis, prognosis and expectations for rehab  -all patient questions were answered    Home Exercise Program:  Access Code: [facility]  URL: [email or web address]  Date: 08/20/2021  Prepared by: Antonio Mantilla    Exercises  Seated Hamstring Stretch - 2-3 x daily - 6 x weekly - 4 reps - 20 hold  Mini Squat - 2-3 x daily - 6 x weekly - 2 sets - 10 reps  Prone Quadriceps Set - 2-3 x daily - 6 x weekly - 1 sets - 10 reps - 5-8 hold  Supine Active Straight Leg Raise - 2-3 x daily - 6 x weekly - 3 sets - 10 reps  Gastroc Stretch on Wall - 2-3 x daily - 6 x weekly - 1 sets - 3 reps - 10 hold  Supine Heel Slide with Strap - 2-3 x daily - 6 x weekly - 1 sets - 10 reps - 10 hold  Supine Knee Extension Mobilization with Weight - 2-3 x daily - 6 x weekly - 1 sets - 3 reps - 60 hold  Seated Long Arc Quad - 2-3 x daily - 6 x weekly - 2 sets - 10 reps - 3 hold    8/17 - added 1/4 squat    Access Code: [email or web address]  URL: [email or web address]  Date: 08/05/2021  Prepared by: David Pleitez    Exercises   Long Sitting Calf Stretch with Strap - 3 x daily - 7 x weekly - 4 reps - 20 hold  Seated Gastroc Stretch with Strap - 3 x daily - 7 x weekly - 4 reps - 20 hold   Seated Hamstring Stretch - 3 x daily - 7 x weekly - 4 reps - 20 hold     8/5: HEP was also reviewed with pt from home health PT and encouraged to continue, additions of interventions above. 6/18 - The following exercises were discussed and added to the patient's home exercise program. (glute set, quad set, ankle pumps, heel prop, heel slide, SLR flexion). Additionally, the patient was educated on appropriate frequency, intensity and duration. Handout provided.     Therapeutic Exercise and NMR EXR  [] (20171) Provided verbal/tactile cueing for activities related to strengthening, flexibility, endurance, ROM for improvements in  [] LE / Lumbar: LE, proximal hip, and core control with self care, mobility, lifting, ambulation. [] UE / Cervical: cervical, postural, scapular, scapulothoracic and UE control with self care, reaching, carrying, lifting, house/yardwork, driving, computer work. [x] (03221) Provided verbal/tactile cueing for activities related to improving balance, coordination, kinesthetic sense, posture, motor skill, proprioception to assist with   [x] LE / lumbar: LE, proximal hip, and core control in self care, mobility, lifting, ambulation and eccentric single leg control. [] UE / cervical: cervical, scapular, scapulothoracic and UE control with self care, reaching, carrying, lifting, house/yardwork, driving, computer work.   [] (31055) Therapist is in constant attendance of 2 or more patients providing skilled therapy interventions, but not providing any significant amount of measurable one-on-one time to either patient, for improvements in  [] LE / lumbar: LE, proximal hip, and core control in self care, mobility, lifting, ambulation and eccentric single leg control. [] UE / cervical: cervical, scapular, scapulothoracic and UE control with self care, reaching, carrying, lifting, house/yardwork, driving, computer work.      NMR and Therapeutic Activities:    [x] (17281 or 94722) Provided verbal/tactile cueing for activities related to improving balance, coordination, kinesthetic sense, posture, motor skill, proprioception and motor activation to allow for proper function of   [x] LE: / Lumbar core, proximal hip and LE with self care and ADLs  [] UE / Cervical: cervical, postural, scapular, scapulothoracic and UE control with self care, carrying, lifting, driving, computer work.   [] (77150) Gait Re-education- Provided training and instruction to the patient for proper LE, core and proximal hip recruitment and positioning and eccentric body weight control with ambulation re-education including up and down stairs     Home Management Training / Self Care:  [] (12983) Provided self-care/home management training related to activities of daily living and compensatory training, and/or use of adaptive equipment for improvement with: ADLs and compensatory training, meal preparation, safety procedures and instruction in use of adaptive equipment, including bathing, grooming, dressing, personal hygiene, basic household cleaning and chores. Home Exercise Program:    [] (27249) Reviewed/Progressed HEP activities related to strengthening, flexibility, endurance, ROM of   [] LE / Lumbar: core, proximal hip and LE for functional self-care, mobility, lifting and ambulation/stair navigation   [] UE / Cervical: cervical, postural, scapular, scapulothoracic and UE control with self care, reaching, carrying, lifting, house/yardwork, driving, computer work  [] (71400)Reviewed/Progressed HEP activities related to improving balance, coordination, kinesthetic sense, posture, motor skill, proprioception of   [] LE: core, proximal hip and LE for self care, mobility, lifting, and ambulation/stair navigation    [] UE / Cervical: cervical, postural,  scapular, scapulothoracic and UE control with self care, reaching, carrying, lifting, house/yardwork, driving, computer work    Manual Treatments:  PROM / STM / Oscillations-Mobs:  G-I, II, III, IV (PA's, Inf., Post.)  [x] (49712) Provided manual therapy to mobilize LE, proximal hip and/or LS spine soft tissue/joints for the purpose of modulating pain, promoting relaxation,  increasing ROM, reducing/eliminating soft tissue swelling/inflammation/restriction, improving soft tissue extensibility and allowing for proper ROM for normal function with   [x] LE / lumbar: self care, mobility, lifting and ambulation.     [] UE / Cervical: self care, reaching, carrying, lifting, house/yardwork, driving, computer work. Modalities:  [] (19986) Vasopneumatic compression: Utilized vasopneumatic compression to decrease edema / swelling for the purpose of improving mobility and quad tone / recruitment which will allow for increased overall function including but not limited to self-care, transfers, ambulation, and ascending / descending stairs. Charges:  Timed Code Treatment Minutes: 40   Total Treatment Minutes: 40     [] EVAL - LOW (11091)   [] EVAL - MOD (68690)  [] EVAL - HIGH (10039)  [] RE-EVAL (18861)  [] PQ(68895) x         [] Ionto  [x] NMR (63215) x 1       [] Vaso  [] Manual (39059) x 1       [] Ultrasound  [x] TA x 2       [] Mech Traction (10132)  [] Aquatic Therapy x      [] ES (un) (81218):   [] Home Management Training x  [] ES(attended) (28831)   [] Dry Needling 1-2 muscles (48941):  [] Dry Needling 3+ muscles (194207)  [] Group:      [] Other:     GOALS:  Patient stated goal: The patient will be able to walk >500ft without increase in symptoms. [x]? Progressing: []? Met: []? Not Met: []? Adjusted     Therapist goals for Patient:   Short Term Goals: To be achieved in: 2 weeks post-op  1. Independent in HEP and progression per patient tolerance without increase in symptoms. []? Progressing: [x]? Met: []? Not Met: []? Adjusted  2. Patient will have a decrease in pain to <5/10 to facilitate improvement in movement of the BLE, and ADLs as indicated by Functional Deficits. []? Progressing: [x]? Met: []? Not Met: []? Adjusted     Long Term Goals: To be achieved in: 12 weeks post-op  1. Disability index score of 35% or less for the LEFS to assist with reaching prior level of function. [x]? Progressing: []? Met: []? Not Met: []? Adjusted  2. Patient will demonstrate increased knee AROM from 0 -120 allow for proper joint functioning as indicated by patients Functional Deficits. [x]? Progressing: []? Met: []? Not Met: []? Adjusted  3.  Patient will demonstrate an increase in Strength to at least 4+/5 with R knee flexion and extension in order perform proper functional activities. [x]? Progressing: []? Met: []? Not Met: []? Adjusted  4. Patient will return to functional activities including walking in grocery strore without increased symptoms or restriction. [x]? Progressing: []? Met: []? Not Met: []? Adjusted  5. Patient will be able to walk >2 blocks without increase in pain <3/10. [x]? Progressing: []? Met: []? Not Met: []? Adjusted         Overall Progression Towards Functional goals/ Treatment Progress Update:  [x] Patient is progressing as expected towards functional goals listed. [] Progression is slowed due to complexities/Impairments listed. [] Progression has been slowed due to co-morbidities. [] Plan just implemented, too soon to assess goals progression <30days   [] Goals require adjustment due to lack of progress  [] Patient is not progressing as expected and requires additional follow up with physician  [] Other    Persisting Functional Limitations/Impairments:  [x]Sleeping [x]Sitting               [x]Standing [x]Transfers        [x]Walking [x]Kneeling               [x]Stairs [x]Squatting / bending   [x]ADLs []Reaching  []Lifting  [x]Housework  [x]Driving []Job related tasks  []Sports/Recreation []Other:      ASSESSMENT:  Pt continues to demonstrate deficits in quad and general LE strengthening especially with cone walking with cueing to maintain quadricep contraction. Pt demonstrates decreased strength, control and endurance for ambulation, transfers and stairs. Pt demonstrates increased hip compensation and circumduction with hip flexion activity with cueing to focus on proper technique. Continue to limit exercise this date due to recent cortisone injection of L LE with goal of resuming weight bearing exercises at next visit to focus on continued strength, stability and endurance with decreased pain.      Treatment/Activity Tolerance:  [x] Patient able to complete tx  [] Patient limited by fatigue  [] Patient limited by pain  [] Patient limited by other medical complications  [] Other:     Prognosis: [x] Good [] Fair  [] Poor    Patient Requires Follow-up: [x] Yes  [] No     Plan for next treatment session: knee ROM emphasis, quad strength and engagement/control as able. Standing 3 way hip OKC/CKC; squats as tolerated. Step activity. PLAN:  PT 2-3x / week for 12 weeks. [x] Continue per plan of care [] Alter current plan (see comments)   [] Plan of care initiated [] Hold pending MD visit [] Discharge    Electronically signed by: Thomas Solis, CECIL [id number]    Note: If patient does not return for scheduled/ recommended follow up visits, this note will serve as a discharge from care along with most recent update on progress.

## 2021-09-14 ENCOUNTER — HOSPITAL ENCOUNTER (OUTPATIENT)
Dept: PHYSICAL THERAPY | Age: 51
Setting detail: THERAPIES SERIES
Discharge: HOME | End: 2021-09-14
Payer: COMMERCIAL

## 2021-09-14 PROCEDURE — 97110 THERAPEUTIC EXERCISES: CPT

## 2021-09-14 PROCEDURE — 97750 PHYSICAL PERFORMANCE TEST: CPT

## 2021-09-14 NOTE — FLOWSHEET NOTE
[address] [facility] Physical Therapy  Phone: [phone]   Fax: [phone]    Physical Therapy Daily Treatment Note  Date:  2021    Patient Name:  Dennis Martinez    :  1970  MRN: [id number]  Medical/Treatment Diagnosis Information:  Diagnosis: M17.11 (ICD-10-CM) - Primary osteoarthritis of right knee / PREHAB - sx: 21  Treatment Diagnosis: decreased RLE strength, knee pain, antalgic gait, knee instability  Insurance/Certification information:  PT Insurance Information: [address] [place] [address] - $25 copay - visits PMN  Physician Information:  Referring Practitioner: Marcin Mead MD  Plan of care signed (Y/N): [x]  Yes []  No     Date of Patient follow up with Physician: 21     Progress Report: []  Yes  [x]  No     Date Range for reporting period:  Beginnin21  PN: 21  Ending: TBD     Progress report due (10 Rx/or 30 days whichever is less): #63     Recertification due (POC duration/ or 90 days whichever is less): visit #16      Visit # Insurance Allowable Auth required? Date Range   14 60 []  Yes  [x]  No AIM REPORTS NO AUTH NEEDED ON 21 CORESPONDENCE       Latex Allergy:  [x]NO      []YES  Preferred Language for Healthcare:   [x]English       []other:    Functional Scale:        Date assessed:  LEFS: raw score = 20/80; dysfunction = 75%  21  LEFS: raw score = 15/80; dysfunction = 81%    LEFS: raw score = 49/80; dysfunction = 39%      Pain level:  0/10 pain; 3/10 stiffness        SUBJECTIVE:  The patient reports tightness of feeling like band in the right knee. If she does too much activity, this gets worse. If she does nothing, it is still present. It never goes away during the day. She reports difficulty with stair ambulation and concerns about returning to work.     OBJECTIVE:      Functional Testing  Sx Date 21 Prehab Date 21 Post-op Re-Eval Date 21 4 week F/U date 21 8 week F/U date 21     TUG (sec) 8     20  16  7   30 second sit to stand (reps) 10  8  10  13   6 minute walk (m) 449.58   264 (RW)  288 (SPC)  425. 5       Balance:     Narrowed SOFIA (sec) 10  10  10  10   Semi Tandem (sec) 10    10     10     10       Tandem (sec) 10     10     10    10      SLS (sec) 3  0  10  10       Knee AROM L R L R L R L R   Flexion 119 122  119  87  119  103  119  120   Extension hyper 1 0  0  -19  0  - 4  0 0       Knee Ext: L R L R L R L R   MMT (of 5) 4+ 4  4+  unable  NT  3  5/5  5/5   Knee Ext (#) 31.8 29.6      NT  11.6  58.9  40.9       Hip Abd:  L R L R L R L R   MMT (of 5) 4+ 4   unable  NT   NT  5 4    Hip Abd (#) 32.9 24.3      NT  NT  36.4 36.8        LEFS (raw) 20  15  27  49     9/1 - 122deg knee flexion, lack 1 deg AROM, 0deg PROM  8/30 - AAROM = 116deg flexion, 0deg ext AAROM (after manual)  8/25 - AAROM 116deg flexion; lack 3 deg AROM  8/23: AROM knee flexion 105 degrees, AAROM with heel slide 109 degrees. Pt to department today with RW. Mild tenderness noted lateral distal hamstrings and proximal lateral gastroc  8/20 - R knee AAROM flexion: 110   8/13/21  Rt knee ROM 0-100 EOB. Presented to clinic with walker but brought     8/11 - 95deg AAROM R knee flexion; lack 3 extension AROM; soft end feel for both; however significant pain. 8/7: AROM knee ext with quad set lacking 6 from neutral; Knee flexion AAROM heel slide and overpressure 97 degrees with soft end feel however significant pain.      RESTRICTIONS/PRECAUTIONS: HX OF FALLS     Exercises/Interventions:   Therapeutic Exercises (34947) - 10' Resistance / level Sets/sec Reps Notes   Standing HR/TR  2 10 IB Gastroc Squats  Quantum HS Curls (B3, K2, A3) Prone Quad Strap Stretch  20'' 3 R 9/14 - added   Standing HF Stretch  10'' 5 R 9/14 - added          Therapeutic Activities (29960)       R Bike Seat 7 showing  3 min 9/14 - TE   Fwd/Retro Step Up & Over / Retro Standing runner's calf stretch Standing HSS on step TG Squats  TG Haleigh  TRX Squats Fwd/Post wt shift [facility] Walking        Neuromuscular Re-ed (11835)       SLR Flexion w/ Heel Prop   SLR up & over  LAQ R CKC c L OKC Hip ABD/Ext  Lateral Band Walk SAQ to SLR  TKE  Prone TKE  Retro Step Back (involuntary quad contraction)         Manual Intervention (99720) -        Patellar mobs    AAROM knee flexion with heel slide        8/11 9/14 - phys perf testing. Functional testing performed this date and compared with previous values as noted above. Testing done in order to assess progress with community navigation and fall risk. Patient educated on the current focus of skilled physical therapy services and plan of care, including: diagnosis, prognosis, treatment goals & options at this time. Patient educated on scar tissue mobilization at home, encouraged ongoing use of vitamin E oil, encouraged the patient to begin ambulation up to 0.5 miles at comfortable pace 3-4 days per week. Patient and PT discussion regarding her anxiety surrounding resuming activities that were difficult/painful prior to having TKA and that if anxiety is beginning to limit participation in going certain places that it is worth discussing with her PCP. A total of 40 minutes spent completing functional testing as well as educating patient on results and other instruction. 8/17 - Functional testing performed this date and compared with previous values as noted above. Testing done in order to assess progress with community navigation and fall risk. A total of 25 minutes spent completing functional testing and reviewing results. Modalities: 8/20: NMR with [place] citizen estim to R quads: LAQ, SAQ, quad sets. Pt instructed with verbal and tactile cueing for quad engagement B quads to facilitate activation. 10/10 on /off cycle x 10 mins.      Pt. Education:   6/18 - Patient was thoroughly educated on this date regarding prehabilitation goals, importance of PT sessions in improving overall ROM, strength and stability prior to surgery, and how prehabilitation will facilitate improved post-operative outcomes. The patient was educated on and instructed in HEP as listed. The patient was given a detailed handout for exercises to initiate in the hospital post-operatively as well as at home. The discharge plan from the hospital was reviewed with the patient; specifically, to reduce length of hospital stay and to minimize time before reinitiating outpatient physical therapy after surgery. Education regarding early mobility post-operatively in the hospital and emphasis on working with both physical therapy and nursing staff to achieve ambulation goal of 150 feet was provided. The patient was highly encouraged to attend joint class in hospital prior to surgery for further instructions on pre and post-surgical care. Also, the patient was educated on precautions after hip replacement to avoid, specifically, hip extension and repetitive hip flexion. It is in my medical opinion that this patient is clear from all physical barriers prior to consideration for surgery, activity modifications prior to and post operatively have been discussed with this patient as well as discharge planning and is cleared for surgery from physical therapy perspective.  -patient educated on diagnosis, prognosis and expectations for rehab  -all patient questions were answered    Home Exercise Program:  Access Code: [facility]  URL: [email or web address]  Date: 08/20/2021  Prepared by: Tee Blanco    Exercises  Seated Hamstring Stretch - 2-3 x daily - 6 x weekly - 4 reps - 20 hold  Mini Squat - 2-3 x daily - 6 x weekly - 2 sets - 10 reps  Prone Quadriceps Set - 2-3 x daily - 6 x weekly - 1 sets - 10 reps - 5-8 hold  Supine Active Straight Leg Raise - 2-3 x daily - 6 x weekly - 3 sets - 10 reps  Gastroc Stretch on Wall - 2-3 x daily - 6 x weekly - 1 sets - 3 reps - 10 hold  Supine Heel Slide with Strap - 2-3 x daily - 6 x weekly - 1 sets - 10 reps - 10 hold  Supine Knee Extension Mobilization with Weight - 2-3 x daily - 6 x weekly - 1 sets - 3 reps - 60 hold  Seated Long Arc Quad - 2-3 x daily - 6 x weekly - 2 sets - 10 reps - 3 hold    8/17 - added 1/4 squat    Access Code: [email or web address]  URL: [email or web address]  Date: 08/05/2021  Prepared by: Zoe Chaidez    Exercises   Long Sitting Calf Stretch with Strap - 3 x daily - 7 x weekly - 4 reps - 20 hold  Seated Gastroc Stretch with Strap - 3 x daily - 7 x weekly - 4 reps - 20 hold   Seated Hamstring Stretch - 3 x daily - 7 x weekly - 4 reps - 20 hold     8/5: HEP was also reviewed with pt from home health PT and encouraged to continue, additions of interventions above. 6/18 - The following exercises were discussed and added to the patient's home exercise program. (glute set, quad set, ankle pumps, heel prop, heel slide, SLR flexion). Additionally, the patient was educated on appropriate frequency, intensity and duration. Handout provided. Therapeutic Exercise and NMR EXR  [x] (47145) Provided verbal/tactile cueing for activities related to strengthening, flexibility, endurance, ROM for improvements in  [x] LE / Lumbar: LE, proximal hip, and core control with self care, mobility, lifting, ambulation. [] UE / Cervical: cervical, postural, scapular, scapulothoracic and UE control with self care, reaching, carrying, lifting, house/yardwork, driving, computer work.  [] (52788) Provided verbal/tactile cueing for activities related to improving balance, coordination, kinesthetic sense, posture, motor skill, proprioception to assist with   [] LE / lumbar: LE, proximal hip, and core control in self care, mobility, lifting, ambulation and eccentric single leg control.    [] UE / cervical: cervical, scapular, scapulothoracic and UE control with self care, reaching, carrying, lifting, house/yardwork, driving, computer work.   [] (47833) Therapist is in constant attendance of 2 or more patients providing skilled therapy interventions, but not providing any significant amount of measurable one-on-one time to either patient, for improvements in  [] LE / lumbar: LE, proximal hip, and core control in self care, mobility, lifting, ambulation and eccentric single leg control. [] UE / cervical: cervical, scapular, scapulothoracic and UE control with self care, reaching, carrying, lifting, house/yardwork, driving, computer work. NMR and Therapeutic Activities:    [x] (67770 or 27919) Provided verbal/tactile cueing for activities related to improving balance, coordination, kinesthetic sense, posture, motor skill, proprioception and motor activation to allow for proper function of   [x] LE: / Lumbar core, proximal hip and LE with self care and ADLs  [] UE / Cervical: cervical, postural, scapular, scapulothoracic and UE control with self care, carrying, lifting, driving, computer work.   [] (93587) Gait Re-education- Provided training and instruction to the patient for proper LE, core and proximal hip recruitment and positioning and eccentric body weight control with ambulation re-education including up and down stairs     Home Management Training / Self Care:  [] (29217) Provided self-care/home management training related to activities of daily living and compensatory training, and/or use of adaptive equipment for improvement with: ADLs and compensatory training, meal preparation, safety procedures and instruction in use of adaptive equipment, including bathing, grooming, dressing, personal hygiene, basic household cleaning and chores.      Home Exercise Program:    [] (10276) Reviewed/Progressed HEP activities related to strengthening, flexibility, endurance, ROM of   [] LE / Lumbar: core, proximal hip and LE for functional self-care, mobility, lifting and ambulation/stair navigation   [] UE / Cervical: cervical, postural, scapular, scapulothoracic and UE control with self care, reaching, carrying, lifting, house/yardwork, driving, computer work  [] (41590)Reviewed/Progressed HEP activities related to improving balance, coordination, kinesthetic sense, posture, motor skill, proprioception of   [] LE: core, proximal hip and LE for self care, mobility, lifting, and ambulation/stair navigation    [] UE / Cervical: cervical, postural,  scapular, scapulothoracic and UE control with self care, reaching, carrying, lifting, house/yardwork, driving, computer work    Manual Treatments:  PROM / STM / Oscillations-Mobs:  G-I, II, III, IV (PA's, Inf., Post.)  [] (34363) Provided manual therapy to mobilize LE, proximal hip and/or LS spine soft tissue/joints for the purpose of modulating pain, promoting relaxation,  increasing ROM, reducing/eliminating soft tissue swelling/inflammation/restriction, improving soft tissue extensibility and allowing for proper ROM for normal function with   [] LE / lumbar: self care, mobility, lifting and ambulation. [] UE / Cervical: self care, reaching, carrying, lifting, house/yardwork, driving, computer work. Modalities:  [] (50445) Vasopneumatic compression: Utilized vasopneumatic compression to decrease edema / swelling for the purpose of improving mobility and quad tone / recruitment which will allow for increased overall function including but not limited to self-care, transfers, ambulation, and ascending / descending stairs.      Charges:  Timed Code Treatment Minutes: 50   Total Treatment Minutes: 50     [] EVAL - LOW (45474)   [] EVAL - MOD (55624)  [] EVAL - HIGH (99852)  [] RE-EVAL (10659)  [x] LJ(55155) x 1         [] Ionto  [] NMR (44093) x        [] Vaso  [] Manual (38171) x        [] Ultrasound  [] TA x       [] Mech Traction (47530)  [] Aquatic Therapy x      [] ES (un) (62060):   [] Home Management Training x  [] ES(attended) (56766)   [] Dry Needling 1-2 muscles (16171):  [] Dry Needling 3+ muscles (077239)  [] Group:      [x] Other: phys perf x 2     GOALS:  Patient stated goal: The patient will be able to walk >500ft without increase in symptoms. []? Progressing: [x]? Met: []? Not Met: []? Adjusted     Therapist goals for Patient:   Short Term Goals: To be achieved in: 2 weeks post-op  1. Independent in HEP and progression per patient tolerance without increase in symptoms. []? Progressing: [x]? Met: []? Not Met: []? Adjusted  2. Patient will have a decrease in pain to <5/10 to facilitate improvement in movement of the BLE, and ADLs as indicated by Functional Deficits. []? Progressing: [x]? Met: []? Not Met: []? Adjusted     Long Term Goals: To be achieved in: 12 weeks post-op  1. Disability index score of 35% or less for the LEFS to assist with reaching prior level of function. [x]? Progressing: []? Met: []? Not Met: []? Adjusted  2. Patient will demonstrate increased knee AROM from 0 -120 allow for proper joint functioning as indicated by patients Functional Deficits. []? Progressing: [x]? Met: []? Not Met: []? Adjusted   3. Patient will demonstrate an increase in Strength to at least 4+/5 with R knee flexion and extension in order perform proper functional activities. [x]? Progressing: []? Met: []? Not Met: []? Adjusted  4. Patient will return to functional activities including walking in grocery strore without increased symptoms or restriction. [x]? Progressing: []? Met: []? Not Met: []? Adjusted  5. Patient will be able to walk >2 blocks without increase in pain <3/10. []? Progressing: [x]? Met: []? Not Met: []? Adjusted         Overall Progression Towards Functional goals/ Treatment Progress Update:  [x] Patient is progressing as expected towards functional goals listed. [] Progression is slowed due to complexities/Impairments listed. [] Progression has been slowed due to co-morbidities.   [] Plan just implemented, too soon to assess goals progression <30days   [] Goals require adjustment due to lack of progress  [] Patient is not progressing as expected and requires additional follow up with physician  [] Other    Persisting Functional Limitations/Impairments:  [x]Sleeping [x]Sitting               [x]Standing [x]Transfers        [x]Walking [x]Kneeling               [x]Stairs [x]Squatting / bending   [x]ADLs []Reaching  []Lifting  [x]Housework  [x]Driving []Job related tasks  []Sports/Recreation []Other:      ASSESSMENT: The patient has demonstrated achievement of several LTGs, as well as better functional results than pre and post-operative measures. The patient should continue to focus on strength progressions in PT. The patient was also educated that she should begin walking up to 0.5 miles 3-4 times per week as tolerated, and PT will then increase distance as patient is able. Treatment/Activity Tolerance:  [x] Patient able to complete tx  [] Patient limited by fatigue  [] Patient limited by pain  [] Patient limited by other medical complications  [] Other:     Prognosis: [x] Good [] Fair  [] Poor    Patient Requires Follow-up: [x] Yes  [] No     Plan for next treatment session: quad strength, strengthening in OKC/CKC; squats as tolerated. Step activity & eccentrics    PLAN:  PT 2-3x / week for 12 weeks. [x] Continue per plan of care [] Alter current plan (see comments)   [] Plan of care initiated [] Hold pending MD visit [] Discharge    Electronically signed by: Rehana Ward PT, DPT, OMT-C [id number]    Note: If patient does not return for scheduled/ recommended follow up visits, this note will serve as a discharge from care along with most recent update on progress.

## 2021-09-17 ENCOUNTER — HOSPITAL ENCOUNTER (OUTPATIENT)
Dept: PHYSICAL THERAPY | Age: 51
Setting detail: THERAPIES SERIES
Discharge: HOME | End: 2021-09-17
Payer: COMMERCIAL

## 2021-09-17 PROCEDURE — 97110 THERAPEUTIC EXERCISES: CPT

## 2021-09-17 PROCEDURE — 97112 NEUROMUSCULAR REEDUCATION: CPT

## 2021-09-17 NOTE — FLOWSHEET NOTE
[facility]  Phone: [phone]   Fax: [phone]    Physical Therapy Daily Treatment Note  Date:  2021    Patient Name:  Zoey Huston    :  1970  MRN: [id number]  Medical/Treatment Diagnosis Information:  Diagnosis: M17.11 (ICD-10-CM) - Primary osteoarthritis of right knee / PREHAB - sx: 21  Treatment Diagnosis: decreased RLE strength, knee pain, antalgic gait, knee instability  Insurance/Certification information:  PT Insurance Information: [address] [place] [address] - $25 copay - visits PMN  Physician Information:  Referring Practitioner: Mounika Jameson MD  Plan of care signed (Y/N): [x]  Yes []  No     Date of Patient follow up with Physician: 21     Progress Report: []  Yes  [x]  No     Date Range for reporting period:  Beginnin21  PN: 21  Ending: TBD     Progress report due (10 Rx/or 30 days whichever is less): #54     Recertification due (POC duration/ or 90 days whichever is less): visit #16      Visit # Insurance Allowable Auth required? Date Range   15 60 []  Yes  [x]  No AIM REPORTS NO AUTH NEEDED ON 21 CORESPONDENCE       Latex Allergy:  [x]NO      []YES  Preferred Language for Healthcare:   [x]English       []other:    Functional Scale:        Date assessed:  LEFS: raw score = 20/80; dysfunction = 75%  21  LEFS: raw score = 15/80; dysfunction = 81%    LEFS: raw score = 49/80; dysfunction = 39%      Pain level:  0/10 pain; 3/10 stiffness        SUBJECTIVE:  Pt states her main complaint is the tight band around the top of her knee. She states walking takes effort because her R knee just doesn't feel good.      OBJECTIVE:      Functional Testing  Sx Date 21 Prehab Date 21 Post-op Re-Eval Date 21 4 week F/U date 21 8 week F/U date 21       TUG (sec) 8     20  16  7   30 second sit to stand (reps) 10  8  10  13   6 minute walk (m) 449.58   264 (RW)  288 (SPC)  425.5 wt shift [facility]        Neuromuscular Re-ed (62409)       SLR Flexion w/ Heel Prop   SLR up & over  LAQ 5#+BS  3 10 9/17 - ^ resistance   R CKC c L OKC Hip ABD/Ext  lime 1 10 9/17- performed both directions    Lateral Band Walk lime 2 laps 20ft R/L 9/1 - added   SAQ to SLR  TKE  Prone TKE  Retro Step Back (involuntary quad contraction)  Biodex Balance Level 10 LOS x2 9/17 - added    LSU - heel taps 2\" 2 10 9/17 - added          Manual Intervention (08150) -        Patellar mobs    AAROM knee flexion with heel slide        8/11 9/14 - phys perf testing. Functional testing performed this date and compared with previous values as noted above. Testing done in order to assess progress with community navigation and fall risk. Patient educated on the current focus of skilled physical therapy services and plan of care, including: diagnosis, prognosis, treatment goals & options at this time. Patient educated on scar tissue mobilization at home, encouraged ongoing use of vitamin E oil, encouraged the patient to begin ambulation up to 0.5 miles at comfortable pace 3-4 days per week. Patient and PT discussion regarding her anxiety surrounding resuming activities that were difficult/painful prior to having TKA and that if anxiety is beginning to limit participation in going certain places that it is worth discussing with her PCP. A total of 40 minutes spent completing functional testing as well as educating patient on results and other instruction. 8/17 - Functional testing performed this date and compared with previous values as noted above. Testing done in order to assess progress with community navigation and fall risk. A total of 25 minutes spent completing functional testing and reviewing results. Modalities: 8/20: NMR with Burkinan estim to R quads: LAQ, SAQ, quad sets. Pt instructed with verbal and tactile cueing for quad engagement B quads to facilitate activation. 10/10 on /off cycle x 10 mins.      Pt. Education:   6/18 - Patient was thoroughly educated on this date regarding prehabilitation goals, importance of PT sessions in improving overall ROM, strength and stability prior to surgery, and how prehabilitation will facilitate improved post-operative outcomes. The patient was educated on and instructed in HEP as listed. The patient was given a detailed handout for exercises to initiate in the hospital post-operatively as well as at home. The discharge plan from the hospital was reviewed with the patient; specifically, to reduce length of hospital stay and to minimize time before reinitiating outpatient physical therapy after surgery. Education regarding early mobility post-operatively in the hospital and emphasis on working with both physical therapy and nursing staff to achieve ambulation goal of 150 feet was provided. The patient was highly encouraged to attend joint class in hospital prior to surgery for further instructions on pre and post-surgical care. Also, the patient was educated on precautions after hip replacement to avoid, specifically, hip extension and repetitive hip flexion. It is in my medical opinion that this patient is clear from all physical barriers prior to consideration for surgery, activity modifications prior to and post operatively have been discussed with this patient as well as discharge planning and is cleared for surgery from physical therapy perspective.  -patient educated on diagnosis, prognosis and expectations for rehab  -all patient questions were answered    Home Exercise Program:  Access Code: [facility]  URL: [email or web address]  Date: 08/20/2021  Prepared by: Yovany Back    Exercises  Seated Hamstring Stretch - 2-3 x daily - 6 x weekly - 4 reps - 20 hold  Mini Squat - 2-3 x daily - 6 x weekly - 2 sets - 10 reps  Prone Quadriceps Set - 2-3 x daily - 6 x weekly - 1 sets - 10 reps - 5-8 hold  Supine Active Straight Leg Raise - 2-3 x daily - 6 x weekly - 3 sets - 10 reps  Gastroc Stretch on Wall - 2-3 x daily - 6 x weekly - 1 sets - 3 reps - 10 hold  Supine Heel Slide with Strap - 2-3 x daily - 6 x weekly - 1 sets - 10 reps - 10 hold  Supine Knee Extension Mobilization with Weight - 2-3 x daily - 6 x weekly - 1 sets - 3 reps - 60 hold  Seated Long Arc Quad - 2-3 x daily - 6 x weekly - 2 sets - 10 reps - 3 hold    8/17 - added 1/4 squat    Access Code: [email or web address]  URL: [email or web address]  Date: 08/05/2021  Prepared by: Jet Payam    Exercises   Long Sitting Calf Stretch with Strap - 3 x daily - 7 x weekly - 4 reps - 20 hold  Seated Gastroc Stretch with Strap - 3 x daily - 7 x weekly - 4 reps - 20 hold   Seated Hamstring Stretch - 3 x daily - 7 x weekly - 4 reps - 20 hold     8/5: HEP was also reviewed with pt from home health PT and encouraged to continue, additions of interventions above. 6/18 - The following exercises were discussed and added to the patient's home exercise program. (glute set, quad set, ankle pumps, heel prop, heel slide, SLR flexion). Additionally, the patient was educated on appropriate frequency, intensity and duration. Handout provided. Therapeutic Exercise and NMR EXR  [x] (10789) Provided verbal/tactile cueing for activities related to strengthening, flexibility, endurance, ROM for improvements in  [x] LE / Lumbar: LE, proximal hip, and core control with self care, mobility, lifting, ambulation. [] UE / Cervical: cervical, postural, scapular, scapulothoracic and UE control with self care, reaching, carrying, lifting, house/yardwork, driving, computer work.  [] (48334) Provided verbal/tactile cueing for activities related to improving balance, coordination, kinesthetic sense, posture, motor skill, proprioception to assist with   [] LE / lumbar: LE, proximal hip, and core control in self care, mobility, lifting, ambulation and eccentric single leg control.    [] UE / cervical: cervical, scapular, scapulothoracic and UE control with self care, reaching, carrying, lifting, house/yardwork, driving, computer work.   [] (56824) Therapist is in constant attendance of 2 or more patients providing skilled therapy interventions, but not providing any significant amount of measurable one-on-one time to either patient, for improvements in  [] LE / lumbar: LE, proximal hip, and core control in self care, mobility, lifting, ambulation and eccentric single leg control. [] UE / cervical: cervical, scapular, scapulothoracic and UE control with self care, reaching, carrying, lifting, house/yardwork, driving, computer work. NMR and Therapeutic Activities:    [x] (69626 or 96770) Provided verbal/tactile cueing for activities related to improving balance, coordination, kinesthetic sense, posture, motor skill, proprioception and motor activation to allow for proper function of   [x] LE: / Lumbar core, proximal hip and LE with self care and ADLs  [] UE / Cervical: cervical, postural, scapular, scapulothoracic and UE control with self care, carrying, lifting, driving, computer work.   [] (02653) Gait Re-education- Provided training and instruction to the patient for proper LE, core and proximal hip recruitment and positioning and eccentric body weight control with ambulation re-education including up and down stairs     Home Management Training / Self Care:  [] (94670) Provided self-care/home management training related to activities of daily living and compensatory training, and/or use of adaptive equipment for improvement with: ADLs and compensatory training, meal preparation, safety procedures and instruction in use of adaptive equipment, including bathing, grooming, dressing, personal hygiene, basic household cleaning and chores.      Home Exercise Program:    [] (55441) Reviewed/Progressed HEP activities related to strengthening, flexibility, endurance, ROM of   [] LE / Lumbar: core, proximal hip and LE for functional self-care, mobility, lifting and ambulation/stair navigation   [] UE / Cervical: cervical, postural, scapular, scapulothoracic and UE control with self care, reaching, carrying, lifting, house/yardwork, driving, computer work  [] (26531)Reviewed/Progressed HEP activities related to improving balance, coordination, kinesthetic sense, posture, motor skill, proprioception of   [] LE: core, proximal hip and LE for self care, mobility, lifting, and ambulation/stair navigation    [] UE / Cervical: cervical, postural,  scapular, scapulothoracic and UE control with self care, reaching, carrying, lifting, house/yardwork, driving, computer work    Manual Treatments:  PROM / STM / Oscillations-Mobs:  G-I, II, III, IV (PA's, Inf., Post.)  [] (76276) Provided manual therapy to mobilize LE, proximal hip and/or LS spine soft tissue/joints for the purpose of modulating pain, promoting relaxation,  increasing ROM, reducing/eliminating soft tissue swelling/inflammation/restriction, improving soft tissue extensibility and allowing for proper ROM for normal function with   [] LE / lumbar: self care, mobility, lifting and ambulation. [] UE / Cervical: self care, reaching, carrying, lifting, house/yardwork, driving, computer work. Modalities:  [] (36088) Vasopneumatic compression: Utilized vasopneumatic compression to decrease edema / swelling for the purpose of improving mobility and quad tone / recruitment which will allow for increased overall function including but not limited to self-care, transfers, ambulation, and ascending / descending stairs.      Charges:  Timed Code Treatment Minutes: 43   Total Treatment Minutes: 43     [] EVAL - LOW (95573)   [] EVAL - MOD (19847)  [] EVAL - HIGH (29027)  [] RE-EVAL (15197)  [x] NI(39679) x 1         [] Ionto  [x] NMR (36211) x 2       [] Vaso  [] Manual (46043) x        [] Ultrasound  [] TA x       [] Mech Traction (59488)  [] Aquatic Therapy x      [] ES (un) (23635):   [] Home Management Training x  [] ES(attended) (45401)   [] Dry Needling 1-2 muscles (38905):  [] Dry Needling 3+ muscles (874836)  [] Group:      [] Other: phys perf x 2     GOALS:  Patient stated goal: The patient will be able to walk >500ft without increase in symptoms. []? Progressing: [x]? Met: []? Not Met: []? Adjusted     Therapist goals for Patient:   Short Term Goals: To be achieved in: 2 weeks post-op  1. Independent in HEP and progression per patient tolerance without increase in symptoms. []? Progressing: [x]? Met: []? Not Met: []? Adjusted  2. Patient will have a decrease in pain to <5/10 to facilitate improvement in movement of the BLE, and ADLs as indicated by Functional Deficits. []? Progressing: [x]? Met: []? Not Met: []? Adjusted     Long Term Goals: To be achieved in: 12 weeks post-op  1. Disability index score of 35% or less for the LEFS to assist with reaching prior level of function. [x]? Progressing: []? Met: []? Not Met: []? Adjusted  2. Patient will demonstrate increased knee AROM from 0 -120 allow for proper joint functioning as indicated by patients Functional Deficits. []? Progressing: [x]? Met: []? Not Met: []? Adjusted   3. Patient will demonstrate an increase in Strength to at least 4+/5 with R knee flexion and extension in order perform proper functional activities. [x]? Progressing: []? Met: []? Not Met: []? Adjusted  4. Patient will return to functional activities including walking in grocery strore without increased symptoms or restriction. [x]? Progressing: []? Met: []? Not Met: []? Adjusted  5. Patient will be able to walk >2 blocks without increase in pain <3/10. []? Progressing: [x]? Met: []? Not Met: []? Adjusted         Overall Progression Towards Functional goals/ Treatment Progress Update:  [x] Patient is progressing as expected towards functional goals listed. [] Progression is slowed due to complexities/Impairments listed. [] Progression has been slowed due to co-morbidities.   [] Plan just implemented, too soon to assess goals progression <30days   [] Goals require adjustment due to lack of progress  [] Patient is not progressing as expected and requires additional follow up with physician  [] Other    Persisting Functional Limitations/Impairments:  [x]Sleeping [x]Sitting               [x]Standing [x]Transfers        [x]Walking [x]Kneeling               [x]Stairs [x]Squatting / bending   [x]ADLs []Reaching  []Lifting  [x]Housework  [x]Driving []Job related tasks  []Sports/Recreation []Other:      ASSESSMENT: Pt does well with progressions outlined in bold above, without increase in pain. Pt responds excellent to standing quad stretch with decreased band tightness sensation. Pt challenged by progressions, especially heel taps off 2\" step for eccentric quad control. Pt cont to benefit from skilled PT. Treatment/Activity Tolerance:  [x] Patient able to complete tx  [] Patient limited by fatigue  [] Patient limited by pain  [] Patient limited by other medical complications  [] Other:     Prognosis: [x] Good [] Fair  [] Poor    Patient Requires Follow-up: [x] Yes  [] No     Plan for next treatment session: quad strength, strengthening in OKC/CKC; squats as tolerated. Step activity & eccentrics    PLAN:  PT 2-3x / week for 12 weeks. [x] Continue per plan of care [] Alter current plan (see comments)   [] Plan of care initiated [] Hold pending MD visit [] Discharge    Electronically signed by: Donovan Manuel, PT, DPT    Note: If patient does not return for scheduled/ recommended follow up visits, this note will serve as a discharge from care along with most recent update on progress.

## 2021-09-20 ENCOUNTER — HOSPITAL ENCOUNTER (OUTPATIENT)
Dept: PHYSICAL THERAPY | Age: 51
Setting detail: THERAPIES SERIES
Discharge: HOME | End: 2021-09-20
Payer: COMMERCIAL

## 2021-09-21 ENCOUNTER — HOSPITAL ENCOUNTER (OUTPATIENT)
Dept: PHYSICAL THERAPY | Age: 51
Setting detail: THERAPIES SERIES
Discharge: HOME | End: 2021-09-21
Payer: COMMERCIAL

## 2021-09-21 NOTE — FLOWSHEET NOTE
Physical Therapy  Cancellation/No-show Note  Patient Name:  Meron Luong  :  1970   Date:  2021  Cancelled visits to date: 2  No-shows to date: 0    Patient status for today's appointment patient:  [x]  Cancelled ,   []  Rescheduled appointment  []  No-show     Reason given by patient:  []  Patient ill  []  Conflicting appointment  []  No transportation    []  Conflict with work  [x]  No reason given  []  Other:     Comments:      Phone call information:   []  Phone call made today to patient at _ time at number provided:      []  Patient answered, conversation as follows:    []  Patient did not answer, message left as follows:  []  Phone call not made today  [x]  Phone call not needed - pt contacted us to cancel and provided reason for cancellation.      Electronically signed by:  Olivia Chaves PT

## 2021-09-22 ENCOUNTER — HOSPITAL ENCOUNTER (OUTPATIENT)
Dept: WOMENS IMAGING | Age: 51
Discharge: HOME | End: 2021-09-22
Payer: COMMERCIAL

## 2021-09-22 ENCOUNTER — HOSPITAL ENCOUNTER (OUTPATIENT)
Dept: PHYSICAL THERAPY | Age: 51
Setting detail: THERAPIES SERIES
Discharge: HOME | End: 2021-09-22
Payer: COMMERCIAL

## 2021-09-22 VITALS — WEIGHT: 270 LBS | HEIGHT: 61 IN | BODY MASS INDEX: 50.98 KG/M2

## 2021-09-22 DIAGNOSIS — Z12.31 VISIT FOR SCREENING MAMMOGRAM: ICD-10-CM

## 2021-09-22 PROCEDURE — 97110 THERAPEUTIC EXERCISES: CPT

## 2021-09-22 PROCEDURE — 77063 BREAST TOMOSYNTHESIS BI: CPT

## 2021-09-22 PROCEDURE — 97530 THERAPEUTIC ACTIVITIES: CPT

## 2021-09-22 NOTE — FLOWSHEET NOTE
[address] [facility] Physical Therapy  Phone: [phone]   Fax: [phone]    Physical Therapy Daily Treatment Note  Date:  2021    Patient Name:  Steven Noel    :  1970  MRN: [id number]  Medical/Treatment Diagnosis Information:  Diagnosis: M17.11 (ICD-10-CM) - Primary osteoarthritis of right knee / PREHAB - sx: 21  Treatment Diagnosis: decreased RLE strength, knee pain, antalgic gait, knee instability  Insurance/Certification information:  PT Insurance Information: [address] [place] [address] - $25 copay - visits PMN  Physician Information:  Referring Practitioner: Linnea Malin MD  Plan of care signed (Y/N): [x]  Yes []  No     Date of Patient follow up with Physician: 21     Progress Report: []  Yes  [x]  No     Date Range for reporting period:  Beginnin21  PN: 21  Ending: TBD     Progress report due (10 Rx/or 30 days whichever is less): #52     Recertification due (POC duration/ or 90 days whichever is less): visit #16      Visit # Insurance Allowable Auth required? Date Range   16 60 []  Yes  [x]  No AIM REPORTS NO AUTH NEEDED ON 21 CORESPONDENCE       Latex Allergy:  [x]NO      []YES  Preferred Language for Healthcare:   [x]English       []other:    Functional Scale:        Date assessed:  LEFS: raw score = 20/80; dysfunction = 75%  21  LEFS: raw score = 15/80; dysfunction = 81%    LEFS: raw score = 49/80; dysfunction = 39%      Pain level:  0/10 pain; 3/10 stiffness        SUBJECTIVE:  The patient reports she has stiffness more than any other symptom. She reports difficulty with ambulating down stairs, especially putting pressure on R knee. The patient is able to do reciprocal stair ambulation up stairs. The patient reports she was able to walk up to 3/4 mile, but \"paid the price\" later with stiffness.     OBJECTIVE:      Functional Testing  Sx Date 21 Prehab Date 21 Post-op Re-Eval Date 21 4 week F/U date 8/17/21 8 week F/U date 9/14/21       TUG (sec) 8     20  16  7   30 second sit to stand (reps) 10  8  10  13   6 minute walk (m) 449.58   264 (RW)  288 (SPC)  425. 5       Balance:     Narrowed SOFIA (sec) 10  10  10  10   Semi Tandem (sec) 10    10     10     10       Tandem (sec) 10     10     10    10      SLS (sec) 3  0  10  10       Knee AROM L R L R L R L R   Flexion 119 122  119  87  119  103  119  120   Extension hyper 1 0  0  -19  0  - 4  0 0       Knee Ext: L R L R L R L R   MMT (of 5) 4+ 4  4+  unable  NT  3  5/5  5/5   Knee Ext (#) 31.8 29.6      NT  11.6  58.9  40.9       Hip Abd:  L R L R L R L R   MMT (of 5) 4+ 4   unable  NT   NT  5 4    Hip Abd (#) 32.9 24.3      NT  NT  36.4 36.8        LEFS (raw) 20  15  27  49     9/1 - 122deg knee flexion, lack 1 deg AROM, 0deg PROM  8/30 - AAROM = 116deg flexion, 0deg ext AAROM (after manual)  8/25 - AAROM 116deg flexion; lack 3 deg AROM  8/23: AROM knee flexion 105 degrees, AAROM with heel slide 109 degrees. Pt to department today with RW. Mild tenderness noted lateral distal hamstrings and proximal lateral gastroc  8/20 - R knee AAROM flexion: 110   8/13/21  Rt knee ROM 0-100 EOB. Presented to clinic with walker but brought     8/11 - 95deg AAROM R knee flexion; lack 3 extension AROM; soft end feel for both; however significant pain. 8/7: AROM knee ext with quad set lacking 6 from neutral; Knee flexion AAROM heel slide and overpressure 97 degrees with soft end feel however significant pain.      RESTRICTIONS/PRECAUTIONS: HX OF FALLS     Exercises/Interventions:   Therapeutic Exercises (18492) Resistance / level Sets/sec Reps Notes   Standing HR/TR  IB Gastroc Squats  Quantum HS Curls (B3, K2, A3) Prone Quad Strap Stretch  9/14 - added   Standing HF Stretch  9/14 - added   Standing Quad Stretch w/ R ankle on chair   30\" 3 9/17 - added; PT assisted for positioning of chair    Leg Press (B1, L5) 75# 3 10 9/22 - added   Single Leg Deadlift  1 15, RLE down 9/22 - addled          Therapeutic Activities (04397)       [address] 7 showing  3 min 9/14 - TE   Fwd/Retro Step Up & Over / Retro Standing runner's calf stretch Standing HSS on step TG Squats  TG Marches  TRX Squats  1 10 9/1 - added   TRX Lateral L Tap  2 8 9/22 - added   Fwd/Post wt shift [facility] Walking BOSU Step Up to SLS  1 12 9/22 - added   Staggered Lunge (LLE back on 6'' step; RLE on floor)  1 10 R 9/22 - added   Stair Navigation (12 steps w/ HR)   1x reciprocal up 9/22 - unable to perform eccentric on RLE ambulating down stairs          Neuromuscular Re-ed (60218)       SLR Flexion w/ Heel Prop   SLR up & over  LAQ 5# + BS  3 10 9/17 - ^ resistance; TE   R CKC c L OKC Hip ABD/Ext  Lateral Band Walk SAQ to SLR  TKE  Prone TKE  Retro Step Back (involuntary quad contraction)  Biodex Balance LSU - heel taps        Manual Intervention (97756) -        Patellar mobs    AAROM knee flexion with heel slide        8/11 9/14 - phys perf testing. Functional testing performed this date and compared with previous values as noted above. Testing done in order to assess progress with community navigation and fall risk. Patient educated on the current focus of skilled physical therapy services and plan of care, including: diagnosis, prognosis, treatment goals & options at this time. Patient educated on scar tissue mobilization at home, encouraged ongoing use of vitamin E oil, encouraged the patient to begin ambulation up to 0.5 miles at comfortable pace 3-4 days per week. Patient and PT discussion regarding her anxiety surrounding resuming activities that were difficult/painful prior to having TKA and that if anxiety is beginning to limit participation in going certain places that it is worth discussing with her PCP. A total of 40 minutes spent completing functional testing as well as educating patient on results and other instruction.      8/17 - Functional testing performed this date and compared with previous values as noted above. Testing done in order to assess progress with community navigation and fall risk. A total of 25 minutes spent completing functional testing and reviewing results. Modalities: 8/20: NMR with Ethiopian estim to R quads: LAQ, SAQ, quad sets. Pt instructed with verbal and tactile cueing for quad engagement B quads to facilitate activation. 10/10 on /off cycle x 10 mins. Pt. Education:   6/18 - Patient was thoroughly educated on this date regarding prehabilitation goals, importance of PT sessions in improving overall ROM, strength and stability prior to surgery, and how prehabilitation will facilitate improved post-operative outcomes. The patient was educated on and instructed in HEP as listed. The patient was given a detailed handout for exercises to initiate in the hospital post-operatively as well as at home. The discharge plan from the hospital was reviewed with the patient; specifically, to reduce length of hospital stay and to minimize time before reinitiating outpatient physical therapy after surgery. Education regarding early mobility post-operatively in the hospital and emphasis on working with both physical therapy and nursing staff to achieve ambulation goal of 150 feet was provided. The patient was highly encouraged to attend joint class in hospital prior to surgery for further instructions on pre and post-surgical care. Also, the patient was educated on precautions after hip replacement to avoid, specifically, hip extension and repetitive hip flexion.  It is in my medical opinion that this patient is clear from all physical barriers prior to consideration for surgery, activity modifications prior to and post operatively have been discussed with this patient as well as discharge planning and is cleared for surgery from physical therapy perspective.  -patient educated on diagnosis, prognosis and expectations for rehab  -all patient questions were answered    Home Exercise Program:  Access Code: [email or web address]  URL: [email or web address]  Date: 08/20/2021  Prepared by: Maria Richter    Exercises  Seated Hamstring Stretch - 2-3 x daily - 6 x weekly - 4 reps - 20 hold  Mini Squat - 2-3 x daily - 6 x weekly - 2 sets - 10 reps  Prone Quadriceps Set - 2-3 x daily - 6 x weekly - 1 sets - 10 reps - 5-8 hold  Supine Active Straight Leg Raise - 2-3 x daily - 6 x weekly - 3 sets - 10 reps  Gastroc Stretch on Wall - 2-3 x daily - 6 x weekly - 1 sets - 3 reps - 10 hold  Supine Heel Slide with Strap - 2-3 x daily - 6 x weekly - 1 sets - 10 reps - 10 hold  Supine Knee Extension Mobilization with Weight - 2-3 x daily - 6 x weekly - 1 sets - 3 reps - 60 hold  Seated Long Arc Quad - 2-3 x daily - 6 x weekly - 2 sets - 10 reps - 3 hold    8/17 - added 1/4 squat    Access Code: [email or web address]  URL: [email or web address]  Date: 08/05/2021  Prepared by: Lyle Orellana    Exercises   Long Sitting Calf Stretch with Strap - 3 x daily - 7 x weekly - 4 reps - 20 hold  Seated Gastroc Stretch with Strap - 3 x daily - 7 x weekly - 4 reps - 20 hold   Seated Hamstring Stretch - 3 x daily - 7 x weekly - 4 reps - 20 hold     8/5: HEP was also reviewed with pt from home health PT and encouraged to continue, additions of interventions above. 6/18 - The following exercises were discussed and added to the patient's home exercise program. (glute set, quad set, ankle pumps, heel prop, heel slide, SLR flexion). Additionally, the patient was educated on appropriate frequency, intensity and duration. Handout provided. Therapeutic Exercise and NMR EXR  [x] (04986) Provided verbal/tactile cueing for activities related to strengthening, flexibility, endurance, ROM for improvements in  [x] LE / Lumbar: LE, proximal hip, and core control with self care, mobility, lifting, ambulation.   [] UE / Cervical: cervical, postural, scapular, scapulothoracic and UE control with self care, reaching, carrying, lifting, house/yardwork, driving, computer work.  [] (56335) Provided verbal/tactile cueing for activities related to improving balance, coordination, kinesthetic sense, posture, motor skill, proprioception to assist with   [] LE / lumbar: LE, proximal hip, and core control in self care, mobility, lifting, ambulation and eccentric single leg control. [] UE / cervical: cervical, scapular, scapulothoracic and UE control with self care, reaching, carrying, lifting, house/yardwork, driving, computer work.   [] (45533) Therapist is in constant attendance of 2 or more patients providing skilled therapy interventions, but not providing any significant amount of measurable one-on-one time to either patient, for improvements in  [] LE / lumbar: LE, proximal hip, and core control in self care, mobility, lifting, ambulation and eccentric single leg control. [] UE / cervical: cervical, scapular, scapulothoracic and UE control with self care, reaching, carrying, lifting, house/yardwork, driving, computer work.      NMR and Therapeutic Activities:    [x] (27074 or 03801) Provided verbal/tactile cueing for activities related to improving balance, coordination, kinesthetic sense, posture, motor skill, proprioception and motor activation to allow for proper function of   [x] LE: / Lumbar core, proximal hip and LE with self care and ADLs  [] UE / Cervical: cervical, postural, scapular, scapulothoracic and UE control with self care, carrying, lifting, driving, computer work.   [] (04695) Gait Re-education- Provided training and instruction to the patient for proper LE, core and proximal hip recruitment and positioning and eccentric body weight control with ambulation re-education including up and down stairs     Home Management Training / Self Care:  [] (22775) Provided self-care/home management training related to activities of daily living and compensatory training, and/or use of adaptive equipment for improvement with: ADLs and compensatory training, meal preparation, safety procedures and instruction in use of adaptive equipment, including bathing, grooming, dressing, personal hygiene, basic household cleaning and chores. Home Exercise Program:    [] (61681) Reviewed/Progressed HEP activities related to strengthening, flexibility, endurance, ROM of   [] LE / Lumbar: core, proximal hip and LE for functional self-care, mobility, lifting and ambulation/stair navigation   [] UE / Cervical: cervical, postural, scapular, scapulothoracic and UE control with self care, reaching, carrying, lifting, house/yardwork, driving, computer work  [] (23089)Reviewed/Progressed HEP activities related to improving balance, coordination, kinesthetic sense, posture, motor skill, proprioception of   [] LE: core, proximal hip and LE for self care, mobility, lifting, and ambulation/stair navigation    [] UE / Cervical: cervical, postural,  scapular, scapulothoracic and UE control with self care, reaching, carrying, lifting, house/yardwork, driving, computer work    Manual Treatments:  PROM / STM / Oscillations-Mobs:  G-I, II, III, IV (PA's, Inf., Post.)  [] (55038) Provided manual therapy to mobilize LE, proximal hip and/or LS spine soft tissue/joints for the purpose of modulating pain, promoting relaxation,  increasing ROM, reducing/eliminating soft tissue swelling/inflammation/restriction, improving soft tissue extensibility and allowing for proper ROM for normal function with   [] LE / lumbar: self care, mobility, lifting and ambulation. [] UE / Cervical: self care, reaching, carrying, lifting, house/yardwork, driving, computer work. Modalities:  [] (59300) Vasopneumatic compression: Utilized vasopneumatic compression to decrease edema / swelling for the purpose of improving mobility and quad tone / recruitment which will allow for increased overall function including but not limited to self-care, transfers, ambulation, and ascending / descending stairs.      Charges:  Timed Code Treatment Minutes: 45   Total Treatment Minutes: 45     [] EVAL - LOW (55629)   [] EVAL - MOD (01879)  [] EVAL - HIGH (39080)  [] RE-EVAL (13415)  [x] BT(00073) x 1         [] Ionto  [] NMR (23043) x        [] Vaso  [] Manual (88430) x        [] Ultrasound  [x] TA x 2      [] Mech Traction (53170)  [] Aquatic Therapy x      [] ES (un) (13932):   [] Home Management Training x  [] ES(attended) (77167)   [] Dry Needling 1-2 muscles (87756):  [] Dry Needling 3+ muscles (894496)  [] Group:      [] Other: phys perf x 2     GOALS:  Patient stated goal: The patient will be able to walk >500ft without increase in symptoms. []? Progressing: [x]? Met: []? Not Met: []? Adjusted     Therapist goals for Patient:   Short Term Goals: To be achieved in: 2 weeks post-op  1. Independent in HEP and progression per patient tolerance without increase in symptoms. []? Progressing: [x]? Met: []? Not Met: []? Adjusted  2. Patient will have a decrease in pain to <5/10 to facilitate improvement in movement of the BLE, and ADLs as indicated by Functional Deficits. []? Progressing: [x]? Met: []? Not Met: []? Adjusted     Long Term Goals: To be achieved in: 12 weeks post-op  1. Disability index score of 35% or less for the LEFS to assist with reaching prior level of function. [x]? Progressing: []? Met: []? Not Met: []? Adjusted  2. Patient will demonstrate increased knee AROM from 0 -120 allow for proper joint functioning as indicated by patients Functional Deficits. []? Progressing: [x]? Met: []? Not Met: []? Adjusted   3. Patient will demonstrate an increase in Strength to at least 4+/5 with R knee flexion and extension in order perform proper functional activities. [x]? Progressing: []? Met: []? Not Met: []? Adjusted  4. Patient will return to functional activities including walking in grocery strore without increased symptoms or restriction. [x]? Progressing: []? Met: []? Not Met: []? Adjusted  5.  Patient will be able to walk >2 blocks without increase in pain <3/10. []? Progressing: [x]? Met: []? Not Met: []? Adjusted         Overall Progression Towards Functional goals/ Treatment Progress Update:  [x] Patient is progressing as expected towards functional goals listed. [] Progression is slowed due to complexities/Impairments listed. [] Progression has been slowed due to co-morbidities. [] Plan just implemented, too soon to assess goals progression <30days   [] Goals require adjustment due to lack of progress  [] Patient is not progressing as expected and requires additional follow up with physician  [] Other    Persisting Functional Limitations/Impairments:  [x]Sleeping [x]Sitting               [x]Standing [x]Transfers        [x]Walking [x]Kneeling               [x]Stairs [x]Squatting / bending   [x]ADLs []Reaching  []Lifting  [x]Housework  [x]Driving []Job related tasks  []Sports/Recreation []Other:      ASSESSMENT: The patient was able to perform progression to leg press, single leg deadlifts this date, but cannot tolerate CKC eccentrics on R quad on step deeper than 4''. The patient continues to experience stiffness, which is expected at this time in recovery. The patient has been able to progress walking tolerance, despite some stiffness after, to distances she never performed before surgery. The patient continues to improve with PT services at this time in regards to activity tolerance, strength. Treatment/Activity Tolerance:  [x] Patient able to complete tx  [] Patient limited by fatigue  [] Patient limited by pain  [] Patient limited by other medical complications  [] Other:     Prognosis: [x] Good [] Fair  [] Poor    Patient Requires Follow-up: [x] Yes  [] No     Plan for next treatment session: quad strength, strengthening in OKC/CKC; squats as tolerated. Step activity & eccentrics    PLAN:  PT 2-3x / week for 12 weeks.    [x] Continue per plan of care [] Alter current plan (see comments)   [] Plan of care initiated [] Hold pending MD visit [] Discharge    Electronically signed by: Marta Russell, PT, DPT    Note: If patient does not return for scheduled/ recommended follow up visits, this note will serve as a discharge from care along with most recent update on progress.

## 2021-09-27 ENCOUNTER — HOSPITAL ENCOUNTER (OUTPATIENT)
Dept: PHYSICAL THERAPY | Age: 51
Setting detail: THERAPIES SERIES
Discharge: HOME | End: 2021-09-27
Payer: COMMERCIAL

## 2021-09-27 PROCEDURE — 97112 NEUROMUSCULAR REEDUCATION: CPT

## 2021-09-27 PROCEDURE — 97110 THERAPEUTIC EXERCISES: CPT

## 2021-09-27 NOTE — FLOWSHEET NOTE
assisted for positioning of chair    Leg Press (B1, L5) 80#  90# 1  2 10  10 9/27 - ^ resistance   Single Leg Deadlift  Quantum Knee Ext (B3, KD, A4) 15# 3 10 9/27 - added; final set thighs adducted   Alt LE SLR Abd - standing Orange loop 2 15 9/27 - added   Standing Resisted Glute Kick Purple Tb 2 10 R 9/27 - added          Therapeutic Activities (99388)       R Bike Seat 7 showing L2 5 min 9/27 - ^ time; TE    Standing runner's calf stretch Standing HSS on step TG Squats  TG Marches  TRX Squats  TRX Lateral L Tap  Fwd/Post wt shift Cone Walking BOSU Step Up to SLS  Staggered Lunge (LLE back on 6'' step; RLE on floor)  Stair Navigation (12 steps w/ HR)         Neuromuscular Re-ed (77662)       Lateral Heel Tap 6'' 3 10, R 9/27 - added   Step Up & Over 4'' 1 10, R 9/27 - added   SLS EC  10'' 2 R 9/27 - added   Lateral Step Over 4'' box on side 1 8 R/L 9/27 - added   SLR Flexion w/ Heel Prop   SLR up & over  R CKC c L OKC Hip ABD/Ext  Lateral Band Walk SAQ to SLR  TKE  Prone TKE  Retro Step Back (involuntary quad contraction)  Biodex Balance LSU - heel taps        Manual Intervention (47717) -        Patellar mobs    AAROM knee flexion with heel slide        8/11 9/14 - phys perf testing. Functional testing performed this date and compared with previous values as noted above. Testing done in order to assess progress with community navigation and fall risk. Patient educated on the current focus of skilled physical therapy services and plan of care, including: diagnosis, prognosis, treatment goals & options at this time. Patient educated on scar tissue mobilization at home, encouraged ongoing use of vitamin E oil, encouraged the patient to begin ambulation up to 0.5 miles at comfortable pace 3-4 days per week.  Patient and PT discussion regarding her anxiety surrounding resuming activities that were difficult/painful prior to having TKA and that if anxiety is beginning to limit participation in going certain places that it is worth discussing with her PCP. A total of 40 minutes spent completing functional testing as well as educating patient on results and other instruction. 8/17 - Functional testing performed this date and compared with previous values as noted above. Testing done in order to assess progress with community navigation and fall risk. A total of 25 minutes spent completing functional testing and reviewing results. Modalities: 8/20: NMR with Wallisian estim to R quads: LAQ, SAQ, quad sets. Pt instructed with verbal and tactile cueing for quad engagement B quads to facilitate activation. 10/10 on /off cycle x 10 mins. Pt. Education:   6/18 - Patient was thoroughly educated on this date regarding prehabilitation goals, importance of PT sessions in improving overall ROM, strength and stability prior to surgery, and how prehabilitation will facilitate improved post-operative outcomes. The patient was educated on and instructed in HEP as listed. The patient was given a detailed handout for exercises to initiate in the hospital post-operatively as well as at home. The discharge plan from the hospital was reviewed with the patient; specifically, to reduce length of hospital stay and to minimize time before reinitiating outpatient physical therapy after surgery. Education regarding early mobility post-operatively in the hospital and emphasis on working with both physical therapy and nursing staff to achieve ambulation goal of 150 feet was provided. The patient was highly encouraged to attend joint class in hospital prior to surgery for further instructions on pre and post-surgical care. Also, the patient was educated on precautions after hip replacement to avoid, specifically, hip extension and repetitive hip flexion.  It is in my medical opinion that this patient is clear from all physical barriers prior to consideration for surgery, activity modifications prior to and post operatively have been discussed with this patient as well as discharge planning and is cleared for surgery from physical therapy perspective.  -patient educated on diagnosis, prognosis and expectations for rehab  -all patient questions were answered    Home Exercise Program:  [facility]  URL: [email or web address]  Date: 08/20/2021  Prepared by: Shalonda Maciel    Exercises  Seated Hamstring Stretch - 2-3 x daily - 6 x weekly - 4 reps - 20 hold  Mini Squat - 2-3 x daily - 6 x weekly - 2 sets - 10 reps  Prone Quadriceps Set - 2-3 x daily - 6 x weekly - 1 sets - 10 reps - 5-8 hold  Supine Active Straight Leg Raise - 2-3 x daily - 6 x weekly - 3 sets - 10 reps  Gastroc Stretch on Wall - 2-3 x daily - 6 x weekly - 1 sets - 3 reps - 10 hold  Supine Heel Slide with Strap - 2-3 x daily - 6 x weekly - 1 sets - 10 reps - 10 hold  Supine Knee Extension Mobilization with Weight - 2-3 x daily - 6 x weekly - 1 sets - 3 reps - 60 hold  Seated Long Arc Quad - 2-3 x daily - 6 x weekly - 2 sets - 10 reps - 3 hold    8/17 - added 1/4 squat    Access Code: [email or web address]  URL: [email or web address]  Date: 08/05/2021  Prepared by: Shannon Beavers    Exercises   Long Sitting Calf Stretch with Strap - 3 x daily - 7 x weekly - 4 reps - 20 hold  Seated Gastroc Stretch with Strap - 3 x daily - 7 x weekly - 4 reps - 20 hold   Seated Hamstring Stretch - 3 x daily - 7 x weekly - 4 reps - 20 hold     8/5: HEP was also reviewed with pt from home health PT and encouraged to continue, additions of interventions above. 6/18 - The following exercises were discussed and added to the patient's home exercise program. (glute set, quad set, ankle pumps, heel prop, heel slide, SLR flexion). Additionally, the patient was educated on appropriate frequency, intensity and duration. Handout provided.     Therapeutic Exercise and NMR EXR  [x] (87470) Provided verbal/tactile cueing for activities related to strengthening, flexibility, endurance, ROM for improvements in  [x] LE / Lumbar: LE, proximal hip, and core control with self care, mobility, lifting, ambulation. [] UE / Cervical: cervical, postural, scapular, scapulothoracic and UE control with self care, reaching, carrying, lifting, house/yardwork, driving, computer work. [x] (80373) Provided verbal/tactile cueing for activities related to improving balance, coordination, kinesthetic sense, posture, motor skill, proprioception to assist with   [x] LE / lumbar: LE, proximal hip, and core control in self care, mobility, lifting, ambulation and eccentric single leg control. [] UE / cervical: cervical, scapular, scapulothoracic and UE control with self care, reaching, carrying, lifting, house/yardwork, driving, computer work.   [] (48959) Therapist is in constant attendance of 2 or more patients providing skilled therapy interventions, but not providing any significant amount of measurable one-on-one time to either patient, for improvements in  [] LE / lumbar: LE, proximal hip, and core control in self care, mobility, lifting, ambulation and eccentric single leg control. [] UE / cervical: cervical, scapular, scapulothoracic and UE control with self care, reaching, carrying, lifting, house/yardwork, driving, computer work.      NMR and Therapeutic Activities:    [x] (26257 or 52324) Provided verbal/tactile cueing for activities related to improving balance, coordination, kinesthetic sense, posture, motor skill, proprioception and motor activation to allow for proper function of   [x] LE: / Lumbar core, proximal hip and LE with self care and ADLs  [] UE / Cervical: cervical, postural, scapular, scapulothoracic and UE control with self care, carrying, lifting, driving, computer work.   [] (41436) Gait Re-education- Provided training and instruction to the patient for proper LE, core and proximal hip recruitment and positioning and eccentric body weight control with ambulation re-education including up and down stairs     Home Management Training / Self Care:  [] (55575) Provided self-care/home management training related to activities of daily living and compensatory training, and/or use of adaptive equipment for improvement with: ADLs and compensatory training, meal preparation, safety procedures and instruction in use of adaptive equipment, including bathing, grooming, dressing, personal hygiene, basic household cleaning and chores. Home Exercise Program:    [] (27214) Reviewed/Progressed HEP activities related to strengthening, flexibility, endurance, ROM of   [] LE / Lumbar: core, proximal hip and LE for functional self-care, mobility, lifting and ambulation/stair navigation   [] UE / Cervical: cervical, postural, scapular, scapulothoracic and UE control with self care, reaching, carrying, lifting, house/yardwork, driving, computer work  [] (48248)Reviewed/Progressed HEP activities related to improving balance, coordination, kinesthetic sense, posture, motor skill, proprioception of   [] LE: core, proximal hip and LE for self care, mobility, lifting, and ambulation/stair navigation    [] UE / Cervical: cervical, postural,  scapular, scapulothoracic and UE control with self care, reaching, carrying, lifting, house/yardwork, driving, computer work    Manual Treatments:  PROM / STM / Oscillations-Mobs:  G-I, II, III, IV (PA's, Inf., Post.)  [] (54182) Provided manual therapy to mobilize LE, proximal hip and/or LS spine soft tissue/joints for the purpose of modulating pain, promoting relaxation,  increasing ROM, reducing/eliminating soft tissue swelling/inflammation/restriction, improving soft tissue extensibility and allowing for proper ROM for normal function with   [] LE / lumbar: self care, mobility, lifting and ambulation. [] UE / Cervical: self care, reaching, carrying, lifting, house/yardwork, driving, computer work.      Modalities:  [] (72263) Vasopneumatic compression: Utilized vasopneumatic compression to decrease edema / swelling for the purpose of improving mobility and quad tone / recruitment which will allow for increased overall function including but not limited to self-care, transfers, ambulation, and ascending / descending stairs. Charges:  Timed Code Treatment Minutes: 44   Total Treatment Minutes: 44     [] EVAL - LOW (00186)   [] EVAL - MOD (48964)  [] EVAL - HIGH (81062)  [] RE-EVAL (08426)  [x] GO(64700) x 2         [] Ionto  [x] NMR (05192) x 1       [] Vaso  [] Manual (49946) x        [] Ultrasound  [] TA x       [] Mech Traction (20577)  [] Aquatic Therapy x      [] ES (un) (65617):   [] Home Management Training x  [] ES(attended) (57602)   [] Dry Needling 1-2 muscles (56130):  [] Dry Needling 3+ muscles (769292)  [] Group:      [] Other: phys perf x 2     GOALS:  Patient stated goal: The patient will be able to walk >500ft without increase in symptoms. []? Progressing: [x]? Met: []? Not Met: []? Adjusted     Therapist goals for Patient:   Short Term Goals: To be achieved in: 2 weeks post-op  1. Independent in HEP and progression per patient tolerance without increase in symptoms. []? Progressing: [x]? Met: []? Not Met: []? Adjusted  2. Patient will have a decrease in pain to <5/10 to facilitate improvement in movement of the BLE, and ADLs as indicated by Functional Deficits. []? Progressing: [x]? Met: []? Not Met: []? Adjusted     Long Term Goals: To be achieved in: 12 weeks post-op  1. Disability index score of 35% or less for the LEFS to assist with reaching prior level of function. [x]? Progressing: []? Met: []? Not Met: []? Adjusted  2. Patient will demonstrate increased knee AROM from 0 -120 allow for proper joint functioning as indicated by patients Functional Deficits. []? Progressing: [x]? Met: []? Not Met: []? Adjusted   3. Patient will demonstrate an increase in Strength to at least 4+/5 with R knee flexion and extension in order perform proper functional activities. [x]? Progressing: []? Met: []?  Not Met: []? Adjusted  4. Patient will return to functional activities including walking in grocery strore without increased symptoms or restriction. [x]? Progressing: []? Met: []? Not Met: []? Adjusted  5. Patient will be able to walk >2 blocks without increase in pain <3/10. []? Progressing: [x]? Met: []? Not Met: []? Adjusted         Overall Progression Towards Functional goals/ Treatment Progress Update:  [x] Patient is progressing as expected towards functional goals listed. [] Progression is slowed due to complexities/Impairments listed. [] Progression has been slowed due to co-morbidities. [] Plan just implemented, too soon to assess goals progression <30days   [] Goals require adjustment due to lack of progress  [] Patient is not progressing as expected and requires additional follow up with physician  [] Other    Persisting Functional Limitations/Impairments:  [x]Sleeping [x]Sitting               [x]Standing [x]Transfers        [x]Walking [x]Kneeling               [x]Stairs [x]Squatting / bending   [x]ADLs []Reaching  []Lifting  [x]Housework  [x]Driving []Job related tasks  []Sports/Recreation []Other:      ASSESSMENT: Progression with CKC eccentrics this date, as well as lateral heel taps, forward step up and overs and lateral step overs. The patient is slowly building confidence and proprioception in R knee with loaded activities, but slowly at this time. She continues to struggle with sagittal plane strength on stairs for navigation, as well as overall endurance. Treatment/Activity Tolerance:  [x] Patient able to complete tx  [] Patient limited by fatigue  [] Patient limited by pain  [] Patient limited by other medical complications  [] Other:     Prognosis: [x] Good [] Fair  [] Poor    Patient Requires Follow-up: [x] Yes  [] No     Plan for next treatment session: quad strength, strengthening in OKC/CKC; squats as tolerated. Step activity & eccentrics    PLAN:  PT 2-3x / week for 12 weeks. [x] Continue per plan of care [] Alter current plan (see comments)   [] Plan of care initiated [] Hold pending MD visit [] Discharge    Electronically signed by: Rayne Reaves PT, DPT, [email or web address]    Note: If patient does not return for scheduled/ recommended follow up visits, this note will serve as a discharge from care along with most recent update on progress.

## 2021-09-29 ENCOUNTER — HOSPITAL ENCOUNTER (OUTPATIENT)
Dept: PHYSICAL THERAPY | Age: 51
Setting detail: THERAPIES SERIES
Discharge: HOME | End: 2021-09-29
Payer: COMMERCIAL

## 2021-09-29 PROCEDURE — 97110 THERAPEUTIC EXERCISES: CPT

## 2021-09-29 PROCEDURE — 97112 NEUROMUSCULAR REEDUCATION: CPT

## 2021-09-29 PROCEDURE — 97530 THERAPEUTIC ACTIVITIES: CPT

## 2021-09-29 NOTE — PLAN OF CARE
[facility]  Phone: [phone]   Fax: [phone]  Physical Therapy Re-Certification Plan of Care    Dear   Virginia Costa MD,    We had the pleasure of treating the following patient for physical therapy services at [facility]. A summary of our findings can be found in the updated assessment below. This includes our plan of care. If you have any questions or concerns regarding these findings, please do not hesitate to contact me at the office phone number checked above. Thank you for the referral.     Physician Signature:________________________________Date:__________________  By signing above (or electronic signature), therapist's plan is approved by physician    Functional Outcome: LEFS: raw score = 49/80; dysfunction = 39%  9/29/21    R Knee Flexion: 114  R knee Extension: 0 w/ overpressure, lack 1 AROM    MMT:   Right:    Hip Flexion:  4+/5  Hip aBduction: 4+/5  Hip Extension: 4+/5  Knee Extension: 5/5  Knee Flexion:  4+/5  Ankle DF:  5/5    Overall Response to Treatment:   [x]Patient is responding well to treatment and improvement is noted with regards to goals. Ongoing need for R knee and hip strengthening in OKC/CKC positions, as well as endurance exercise to promote return to PLOF/work without restrictions. At 10 weeks post op TKA, she is performing well with therapy, despite stiffness, swelling and decreased activity tolerance. The patient will soon begin [employer] activity independently, and will soon transition to DC of independent exercise at that time. She will coordinate with MD her return to work.    []Patient should continue to improve in reasonable time if they continue HEP   []Patient has plateaued and is no longer responding to skilled PT intervention    []Patient is getting worse and would benefit from return to referring MD   []Patient unable to adhere to initial POC   []Other: Date range of Visits: 21 - 21  Total Visits: 18    Recommendation:    [x]Continue PT 1-2X / wk for 1-3 weeks. []Hold PT, pending MD visit        Physical Therapy Daily Treatment Note  Date:  2021    Patient Name:  Raj Head    :  1970  MRN: [id number]  Medical/Treatment Diagnosis Information:  Diagnosis: M17.11 (ICD-10-CM) - Primary osteoarthritis of right knee / PREHAB - sx: 21  Treatment Diagnosis: decreased RLE strength, knee pain, antalgic gait, knee instability  Insurance/Certification information:  PT Insurance Information: [address] [place] [address] - $25 copay - visits PMN  Physician Information:  Referring Practitioner: Abdon Karimi MD  Plan of care signed (Y/N): [x]  Yes []  No     Date of Patient follow up with Physician: 21     Progress Report: [x]  Yes  []  No     Date Range for reporting period:  Beginnin21   PN: 21  POC: 21  Ending: TBD     Progress report due (10 Rx/or 30 days whichever is less): #45     Recertification due (POC duration/ or 90 days whichever is less): visit #16      Visit # Insurance Allowable Auth required? Date Range   18 60 []  Yes  [x]  No AIM REPORTS NO AUTH NEEDED ON 21 CORESPONDENCE       Latex Allergy:  [x]NO      []YES  Preferred Language for Healthcare:   [x]English       []other:    Functional Scale:        Date assessed:  LEFS: raw score = 20/80; dysfunction = 75%  21  LEFS: raw score = 15/80; dysfunction = 81%    LEFS: raw score = 49/80; dysfunction = 39%    LEFS: raw score = 49/80; dysfunction = 39%      Pain level:  0/10 pain; 1/10 stiffness        SUBJECTIVE:  The patient reports she has stiffness today in the knee after 3-4 hours of walking, standing, driving, moving around on uneven surfaces looking for a car for her daughter. Some swelling noted. The patient is 10 weeks and 1 day post op TKA.     OBJECTIVE:      Functional Testing   Sx Date 21 Prehab Date 6/18/21 Post-op Re-Eval Date 8/5/21 4 week F/U date 8/17/21 8 week F/U date 9/14/21       TUG (sec) 8     20  16  7   30 second sit to stand (reps) 10  8  10  13   6 minute walk (m) 449.58   264 (RW)  288 ([address])  425. 5       Balance:     Narrowed SOFIA (sec) 10  10  10  10   Semi Tandem (sec) 10    10     10     10       Tandem (sec) 10     10     10    10      SLS (sec) 3  0  10  10       Knee AROM L R L R L R L R   Flexion 119 122  119  87  119  103  119  120   Extension hyper 1 0  0  -19  0  - 4  0 0       Knee Ext: L R L R L R L R   MMT (of 5) 4+ 4  4+  unable  NT  3  5/5 5/5   Knee Ext (#) 31.8 29.6      NT  11.6  58.9  40.9       Hip Abd:  L R L R L R L R   MMT (of 5) 4+ 4   unable  NT   NT  5 4    Hip Abd (#) 32.9 24.3      NT  NT  36.4 36.8        LEFS (raw) 20  15  27  49     9/29 - SEE POC OBJ ABOVE  9/1 - 122deg knee flexion, lack 1 deg AROM, 0deg PROM  8/30 - AAROM = 116deg flexion, 0deg ext AAROM (after manual)  8/25 - AAROM 116deg flexion; lack 3 deg AROM  8/23: AROM knee flexion 105 degrees, AAROM with heel slide 109 degrees. Pt to department today with RW. Mild tenderness noted lateral distal hamstrings and proximal lateral gastroc  8/20 - R knee AAROM flexion: 110   8/13/21  Rt knee ROM 0-100 EOB. Presented to clinic with walker but brought     8/11 - 95deg AAROM R knee flexion; lack 3 extension AROM; soft end feel for both; however significant pain. 8/7: AROM knee ext with quad set lacking 6 from neutral; Knee flexion AAROM heel slide and overpressure 97 degrees with soft end feel however significant pain.      RESTRICTIONS/PRECAUTIONS: HX OF FALLS     Exercises/Interventions:   Therapeutic Exercises (13537) Resistance / level Sets/sec Reps Notes   Standing HR/TR  IB Gastroc Squats  Quantum HS Curls (B3, K2, A4) 30# 3 10 9/27 - added; final set thighs adducted   Prone Quad Strap Stretch  9/14 - added   Standing HF Stretch  9/14 - added   Standing Quad Stretch w/ R ankle on chair   9/17 - added; PT assisted for positioning of chair    Leg Press (B1, L5) 90# 3 10 9/29 - ^ resistance across sets   Single Leg Deadlift  Quantum Knee Ext (B3, KD, A4) 15# 3 10 9/27 - added; final set thighs adducted   Alt LE SLR Abd - standing Standing Resisted Glute Kick        Therapeutic Activities (57963)       The patient was provided an assessment including evaluative tests and measures, as well as documentation to track progress     8' 9/29 - added   R Bike Seat 7 showing L2 2 min 9/27 - ^ time; TE    Standing runner's calf stretch Standing HSS on step TG Squats  TG Marches  TRX Squats  TRX Lateral L Tap  Fwd/Post wt shift Cone Walking BOSU Step Up to SLS  Staggered Lunge (LLE back on 6'' step; RLE on floor)  Stair Navigation (12 steps w/ HR)         Neuromuscular Re-ed (53685)       Retro Gliders  2 10  9/29 - R in stance; added   Lateral Heel Tap 6'' 3 10, R 9/27 - added   Step Up & Over SLS EC Lateral Step Over SLR Flexion w/ Heel Prop   SLR up & over  R CKC c L OKC Hip ABD/Ext  Lateral Band Walk Green loop (ankles) 2 laps 20ft R/L 9/29 - green loopSAQ to SLR  TKE  Prone TKE  Retro Step Back (involuntary quad contraction)  Biodex Balance LSU - heel taps        Manual Intervention (01651) -        Patellar mobs    AAROM knee flexion with heel slide        8/11 9/14 - phys perf testing. Functional testing performed this date and compared with previous values as noted above. Testing done in order to assess progress with community navigation and fall risk. Patient educated on the current focus of skilled physical therapy services and plan of care, including: diagnosis, prognosis, treatment goals & options at this time. Patient educated on scar tissue mobilization at home, encouraged ongoing use of vitamin E oil, encouraged the patient to begin ambulation up to 0.5 miles at comfortable pace 3-4 days per week.  Patient and PT discussion regarding her anxiety surrounding resuming activities that were difficult/painful prior to having TKA and that if anxiety is beginning to limit participation in going certain places that it is worth discussing with her PCP. A total of 40 minutes spent completing functional testing as well as educating patient on results and other instruction. 8/17 - Functional testing performed this date and compared with previous values as noted above. Testing done in order to assess progress with community navigation and fall risk. A total of 25 minutes spent completing functional testing and reviewing results. Modalities: 8/20: NMR with Argentine estim to R quads: LAQ, SAQ, quad sets. Pt instructed with verbal and tactile cueing for quad engagement B quads to facilitate activation. 10/10 on /off cycle x 10 mins. Pt. Education:   6/18 - Patient was thoroughly educated on this date regarding prehabilitation goals, importance of PT sessions in improving overall ROM, strength and stability prior to surgery, and how prehabilitation will facilitate improved post-operative outcomes. The patient was educated on and instructed in HEP as listed. The patient was given a detailed handout for exercises to initiate in the hospital post-operatively as well as at home. The discharge plan from the hospital was reviewed with the patient; specifically, to reduce length of hospital stay and to minimize time before reinitiating outpatient physical therapy after surgery. Education regarding early mobility post-operatively in the hospital and emphasis on working with both physical therapy and nursing staff to achieve ambulation goal of 150 feet was provided. The patient was highly encouraged to attend joint class in hospital prior to surgery for further instructions on pre and post-surgical care. Also, the patient was educated on precautions after hip replacement to avoid, specifically, hip extension and repetitive hip flexion.  It is in my medical opinion that this patient is clear from all physical barriers prior to consideration for surgery, activity modifications prior to and post operatively have been discussed with this patient as well as discharge planning and is cleared for surgery from physical therapy perspective.  -patient educated on diagnosis, prognosis and expectations for rehab  -all patient questions were answered    Home Exercise Program:  Access Code: [facility]  URL: [email or web address]  Date: 08/20/2021  Prepared by: Ten Scott    Exercises  Seated Hamstring Stretch - 2-3 x daily - 6 x weekly - 4 reps - 20 hold  Mini Squat - 2-3 x daily - 6 x weekly - 2 sets - 10 reps  Prone Quadriceps Set - 2-3 x daily - 6 x weekly - 1 sets - 10 reps - 5-8 hold  Supine Active Straight Leg Raise - 2-3 x daily - 6 x weekly - 3 sets - 10 reps  Gastroc Stretch on Wall - 2-3 x daily - 6 x weekly - 1 sets - 3 reps - 10 hold  Supine Heel Slide with Strap - 2-3 x daily - 6 x weekly - 1 sets - 10 reps - 10 hold  Supine Knee Extension Mobilization with Weight - 2-3 x daily - 6 x weekly - 1 sets - 3 reps - 60 hold  Seated Long Arc Quad - 2-3 x daily - 6 x weekly - 2 sets - 10 reps - 3 hold    8/17 - added 1/4 squat    Access Code: [email or web address]  URL: [email or web address]  Date: 08/05/2021  Prepared by: Sonali Read    Exercises   Long Sitting Calf Stretch with Strap - 3 x daily - 7 x weekly - 4 reps - 20 hold  Seated Gastroc Stretch with Strap - 3 x daily - 7 x weekly - 4 reps - 20 hold   Seated Hamstring Stretch - 3 x daily - 7 x weekly - 4 reps - 20 hold     8/5: HEP was also reviewed with pt from home health PT and encouraged to continue, additions of interventions above. 6/18 - The following exercises were discussed and added to the patient's home exercise program. (glute set, quad set, ankle pumps, heel prop, heel slide, SLR flexion). Additionally, the patient was educated on appropriate frequency, intensity and duration. Handout provided.     Therapeutic Exercise and NMR EXR  [x] (77465) Provided verbal/tactile cueing for activities related to strengthening, flexibility, endurance, ROM for improvements in  [x] LE / Lumbar: LE, proximal hip, and core control with self care, mobility, lifting, ambulation. [] UE / Cervical: cervical, postural, scapular, scapulothoracic and UE control with self care, reaching, carrying, lifting, house/yardwork, driving, computer work. [x] (01143) Provided verbal/tactile cueing for activities related to improving balance, coordination, kinesthetic sense, posture, motor skill, proprioception to assist with   [x] LE / lumbar: LE, proximal hip, and core control in self care, mobility, lifting, ambulation and eccentric single leg control. [] UE / cervical: cervical, scapular, scapulothoracic and UE control with self care, reaching, carrying, lifting, house/yardwork, driving, computer work.   [] (15886) Therapist is in constant attendance of 2 or more patients providing skilled therapy interventions, but not providing any significant amount of measurable one-on-one time to either patient, for improvements in  [] LE / lumbar: LE, proximal hip, and core control in self care, mobility, lifting, ambulation and eccentric single leg control. [] UE / cervical: cervical, scapular, scapulothoracic and UE control with self care, reaching, carrying, lifting, house/yardwork, driving, computer work.      NMR and Therapeutic Activities:    [x] (27226 or 56156) Provided verbal/tactile cueing for activities related to improving balance, coordination, kinesthetic sense, posture, motor skill, proprioception and motor activation to allow for proper function of   [x] LE: / Lumbar core, proximal hip and LE with self care and ADLs  [] UE / Cervical: cervical, postural, scapular, scapulothoracic and UE control with self care, carrying, lifting, driving, computer work.   [] (17601) Gait Re-education- Provided training and instruction to the patient for proper LE, core and proximal hip recruitment and positioning and eccentric body weight control with ambulation re-education including up and down stairs     Home Management Training / Self Care:  [] (30203) Provided self-care/home management training related to activities of daily living and compensatory training, and/or use of adaptive equipment for improvement with: ADLs and compensatory training, meal preparation, safety procedures and instruction in use of adaptive equipment, including bathing, grooming, dressing, personal hygiene, basic household cleaning and chores. Home Exercise Program:    [] (94476) Reviewed/Progressed HEP activities related to strengthening, flexibility, endurance, ROM of   [] LE / Lumbar: core, proximal hip and LE for functional self-care, mobility, lifting and ambulation/stair navigation   [] UE / Cervical: cervical, postural, scapular, scapulothoracic and UE control with self care, reaching, carrying, lifting, house/yardwork, driving, computer work  [] (64109)Reviewed/Progressed HEP activities related to improving balance, coordination, kinesthetic sense, posture, motor skill, proprioception of   [] LE: core, proximal hip and LE for self care, mobility, lifting, and ambulation/stair navigation    [] UE / Cervical: cervical, postural,  scapular, scapulothoracic and UE control with self care, reaching, carrying, lifting, house/yardwork, driving, computer work    Manual Treatments:  PROM / STM / Oscillations-Mobs:  G-I, II, III, IV (PA's, Inf., Post.)  [] (40613) Provided manual therapy to mobilize LE, proximal hip and/or LS spine soft tissue/joints for the purpose of modulating pain, promoting relaxation,  increasing ROM, reducing/eliminating soft tissue swelling/inflammation/restriction, improving soft tissue extensibility and allowing for proper ROM for normal function with   [] LE / lumbar: self care, mobility, lifting and ambulation. [] UE / Cervical: self care, reaching, carrying, lifting, house/yardwork, driving, computer work.      Modalities:  [] (90969) Vasopneumatic compression: Utilized vasopneumatic compression to decrease edema / swelling for the purpose of improving mobility and quad tone / recruitment which will allow for increased overall function including but not limited to self-care, transfers, ambulation, and ascending / descending stairs. Charges:  Timed Code Treatment Minutes: 42   Total Treatment Minutes: 42     [] EVAL - LOW (95607)   [] EVAL - MOD (09566)  [] EVAL - HIGH (15900)  [] RE-EVAL (78928)  [x] BC(43118) x 1         [] Ionto  [x] NMR (92692) x 1       [] Vaso  [] Manual (99577) x        [] Ultrasound  [x] TA x 1       [] Mech Traction (08996)  [] Aquatic Therapy x      [] ES (un) (50635):   [] Home Management Training x  [] ES(attended) (37146)   [] Dry Needling 1-2 muscles (24815):  [] Dry Needling 3+ muscles (394092)  [] Group:      [] Other: phys perf x 2     GOALS:  Patient stated goal: The patient will be able to walk >500ft without increase in symptoms. []? Progressing: [x]? Met: []? Not Met: []? Adjusted     Therapist goals for Patient:   Short Term Goals: To be achieved in: 2 weeks post-op  1. Independent in HEP and progression per patient tolerance without increase in symptoms. []? Progressing: [x]? Met: []? Not Met: []? Adjusted  2. Patient will have a decrease in pain to <5/10 to facilitate improvement in movement of the BLE, and ADLs as indicated by Functional Deficits. []? Progressing: [x]? Met: []? Not Met: []? Adjusted     Long Term Goals: To be achieved in: 12 weeks post-op  1. Disability index score of 35% or less for the LEFS to assist with reaching prior level of function. [x]? Progressing: []? Met: [x]? Not Met: []? Adjusted  2. Patient will demonstrate increased knee AROM from 0 -120 allow for proper joint functioning as indicated by patients Functional Deficits. []? Progressing: [x]? Met: []? Not Met: []? Adjusted   3.  Patient will demonstrate an increase in Strength to at least 4+/5 with R knee flexion and extension in order perform proper functional activities. []? Progressing: [x]? Met: []? Not Met: [x]? Adjusted: 5/5  4. Patient will return to functional activities including walking in grocery strore without increased symptoms or restriction. [x]? Progressing: []? Met: [x]? Not Met: []? Adjusted  5. Patient will be able to walk >2 blocks without increase in pain <3/10. []? Progressing: [x]? Met: []? Not Met: []? Adjusted         Overall Progression Towards Functional goals/ Treatment Progress Update:  [x] Patient is progressing as expected towards functional goals listed. [] Progression is slowed due to complexities/Impairments listed. [] Progression has been slowed due to co-morbidities. [] Plan just implemented, too soon to assess goals progression <30days   [] Goals require adjustment due to lack of progress  [] Patient is not progressing as expected and requires additional follow up with physician  [] Other    Persisting Functional Limitations/Impairments:  []Sleeping []Sitting               [x]Standing [x]Transfers        [x]Walking [x]Kneeling               [x]Stairs []Squatting / bending   []ADLs []Reaching  []Lifting  []Housework  [x]Driving []Job related tasks  []Sports/Recreation [x]Other: donning R shoe      ASSESSMENT: SEE POC ABOVE    Treatment/Activity Tolerance:  [x] Patient able to complete tx  [] Patient limited by fatigue  [] Patient limited by pain  [] Patient limited by other medical complications  [] Other:     Prognosis: [x] Good [] Fair  [] Poor    Patient Requires Follow-up: [x] Yes  [] No      Plan for next treatment session: quad strength, strengthening in OKC/CKC; squats as tolerated. Step activity & eccentrics. [employer] ROUTINE FOR LEG MACHINES. PLAN:  PT 2-3x / week for 12 weeks.    [x] Continue per plan of care [] Alter current plan (see comments)   [] Plan of care initiated [] Hold pending MD visit [] Discharge    Electronically signed by: Rayne Reaves PT, DPT, OMT-C    Note: If patient does not return for scheduled/ recommended follow up visits, this note will serve as a discharge from care along with most recent update on progress.

## 2021-09-30 ENCOUNTER — OFFICE VISIT (OUTPATIENT)
Dept: ORTHOPEDIC SURGERY | Age: 51
End: 2021-09-30

## 2021-09-30 VITALS — BODY MASS INDEX: 50.98 KG/M2 | HEIGHT: 61 IN | WEIGHT: 270 LBS

## 2021-09-30 DIAGNOSIS — Z96.651 STATUS POST TOTAL KNEE REPLACEMENT, RIGHT: Primary | ICD-10-CM

## 2021-09-30 PROCEDURE — 99024 POSTOP FOLLOW-UP VISIT: CPT | Performed by: ORTHOPAEDIC SURGERY

## 2021-09-30 NOTE — PROGRESS NOTES
[facility]  Office Visit    Chief Complaint: Follow-up s/p right total knee arthroplasty    HPI:  Feli Hayes is a 46 y.o. who is here in follow-up of right total knee arthroplasty performed on July 20, 2021. She is doing well. She is active with physical therapy. She still has trouble on steps but is working on this. She walks that assistive device. She reports 0/10 pain today. Patient Active Problem List   Diagnosis    Carpal tunnel syndrome    Hypertension    Obesity    Renal insufficiency    Vitamin D deficiency    Contusion of right knee    Infected abrasion of left hand    Hand dermatitis    Candidiasis of finger web    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[facility]    Anemia in chronic renal disease    Anemia, iron deficiency    Chronic kidney disease, stage 3, mod decreased GFR (HCC)    Chronic pain of right knee    Family history of cancer    Hypertension, essential, benign    Overweight and obesity    Primary localized osteoarthrosis of the knee    Proteinuria    Renal osteodystrophy    Tear of PCL (posterior cruciate ligament) of knee    Hypertension, essential    Morbid obesity with BMI of 45.0-49.9, adult ([facility]    Arthritis of right knee    Status post right knee replacement    Primary osteoarthritis of left knee       ROS:  Constitutional: denies fever, chills, weight loss  MSK: denies pain in other joints, muscle aches  Neurological: denies numbness, tingling, weakness    Exam:  Height 5' 1\" (1.549 m), weight 270 lb (122.5 kg), not currently breastfeeding. Appearance: sitting in exam room chair, appears to be in no acute distress, awake and alert  Resp: unlabored breathing on room air  Skin: warm, dry and intact with out erythema or significant increased temperature  Neuro: grossly intact both lower extremities. Intact sensation to light touch. Motor exam 4+ to 5/5 in all major motor groups. Right knee: Incision is healed.   Range of motion is 0 to

## 2021-10-01 ENCOUNTER — TELEPHONE (OUTPATIENT)
Dept: ORTHOPEDIC SURGERY | Age: 51
End: 2021-10-01

## 2021-10-01 NOTE — TELEPHONE ENCOUNTER
Faxed completed AFLAC form to [facility] @ [phone] per the patient's email.

## 2021-10-04 ENCOUNTER — HOSPITAL ENCOUNTER (OUTPATIENT)
Dept: PHYSICAL THERAPY | Age: 51
Setting detail: THERAPIES SERIES
Discharge: HOME | End: 2021-10-04
Payer: COMMERCIAL

## 2021-10-04 PROCEDURE — 97110 THERAPEUTIC EXERCISES: CPT

## 2021-10-04 PROCEDURE — 97530 THERAPEUTIC ACTIVITIES: CPT

## 2021-10-06 ENCOUNTER — HOSPITAL ENCOUNTER (OUTPATIENT)
Dept: PHYSICAL THERAPY | Age: 51
Setting detail: THERAPIES SERIES
Discharge: HOME | End: 2021-10-06
Payer: COMMERCIAL

## 2021-10-06 PROCEDURE — 97110 THERAPEUTIC EXERCISES: CPT

## 2021-10-06 NOTE — FLOWSHEET NOTE
[facility] Physical Therapy  Phone: [phone]   Fax: [phone]    Physical Therapy Daily Treatment Note  Date:  10/6/2021    Patient Name:  Dona Thorpe    :  1970  MRN: [id number]  Medical/Treatment Diagnosis Information:  Diagnosis: M17.11 (ICD-10-CM) - Primary osteoarthritis of right knee / PREHAB - sx: 21  Treatment Diagnosis: decreased RLE strength, knee pain, antalgic gait, knee instability  Insurance/Certification information:  PT Insurance Information: [address] [place] [address] - $25 copay - visits PMN  Physician Information:  Referring Practitioner: Bradford Roque MD  Plan of care signed (Y/N): [x]  Yes []  No     Date of Patient follow up with Physician: 21     Progress Report: []  Yes  [x]  No     Date Range for reporting period:  Beginnin21   PN: 21  POC: 21  Ending: TBD     Progress report due (10 Rx/or 30 days whichever is less): DC    Recertification due (POC duration/ or 90 days whichever is less): DC    Visit # Insurance Allowable Auth required? Date Range   20 60 []  Yes  [x]  No AIM REPORTS NO AUTH NEEDED ON 21 CORESPONDENCE       Latex Allergy:  [x]NO      []YES  Preferred Language for Healthcare:   [x]English       []other:     Functional Scale:        Date assessed:  LEFS: raw score = 20/80; dysfunction = 75%  21  LEFS: raw score = 15/80; dysfunction = 81%    LEFS: raw score = 49/80; dysfunction = 39%    LEFS: raw score = 49/80; dysfunction = 39%      Pain level:  0/10 pain; DOMS 3/10      SUBJECTIVE:  The patient reports DOMS started last evening after last session. She is ready and eager to perform [facility] routine again. She did schedule additional visits.     OBJECTIVE:      Functional Testing   Sx Date 21 Prehab Date 21 Post-op Re-Eval Date 21 4 week F/U date 21 8 week F/U date 21       TUG (sec) 8     20  16  7   30 second sit to stand (reps) 10  8  10  13   6 minute walk (m) 449.58   264 (RW)  288 ([address] [place] [address]) [id number]       Balance:     Narrowed SOFIA (sec) 10  10  10  10   Semi Tandem (sec) 10    10     10     10       Tandem (sec) 10     10     10    10      SLS (sec) 3  0  10  10       Knee AROM L R L R L R L R   Flexion 119 122  119  87  119  103  119  120   Extension hyper 1 0  0  -19  0  - 4  0 0       Knee Ext: L R L R L R L R   MMT (of 5) 4+ 4  4+  unable  NT  3  5/5  5/5   Knee Ext (#) 31.8 29.6      NT  11.6  58.9  40.9       Hip Abd:  L R L R L R L R   MMT (of 5) 4+ 4   unable  NT   NT  5 4    Hip Abd (#) 32.9 24.3      NT  NT  36.4 36.8        LEFS (raw) 20  15  27  49     9/29 - SEE POC OBJ ABOVE  9/1 - 122deg knee flexion, lack 1 deg AROM, 0deg PROM  8/30 - AAROM = 116deg flexion, 0deg ext AAROM (after manual)  8/25 - AAROM 116deg flexion; lack 3 deg AROM  8/23: AROM knee flexion 105 degrees, AAROM with heel slide 109 degrees. Pt to department today with RW. Mild tenderness noted lateral distal hamstrings and proximal lateral gastroc  8/20 - R knee AAROM flexion: 110   8/13/21  Rt knee ROM 0-100 EOB. Presented to clinic with walker but brought     8/11 - 95deg AAROM R knee flexion; lack 3 extension AROM; soft end feel for both; however significant pain. 8/7: AROM knee ext with quad set lacking 6 from neutral; Knee flexion AAROM heel slide and overpressure 97 degrees with soft end feel however significant pain.      RESTRICTIONS/PRECAUTIONS: HX OF FALLS     Exercises/Interventions:   Therapeutic Exercises (38773) Resistance / level Sets/sec Reps Notes   Standing HR/TR  IB Gastroc Squats  Quantum HS Curls (B3, K2, A4) Prone Quad Strap Stretch  9/14 - added   Standing HF Stretch  9/14 - added   Standing Quad Stretch w/ R ankle on chair   9/17 - added; PT assisted for positioning of chair    Leg Press (B1, L5) Single Leg Deadlift  Quantum Knee Ext (B3, KD, A4) Alt LE SLR Abd - standing Standing Resisted Glute Kick Hip Abduction Machine - Democravise 40# 3 10 10/4 - added   Hip Adduction Machine - [facility]: red arrow 5 40# 3 10 10/4 - added   Knee Ext - [facility] (B5, K5, A2) 20# 3 10 10/4 - added   HS Curls - [facility] (B6, K5, A2, Th7) 40# 3 10 10/4 - added   Landmine Squats [facility] Elliptical 18 stride  5 min x 2 10/6 - TE, ^ time  Warmup & cooldown   FM Leg Press (B1, L5) 100# 3 10 10/6 - added   TRX Squats  2 10 10/6 - added          Therapeutic Activities (59130)       R Bike  Standing runner's calf stretch Standing HSS on step TG Squats  TG Marches  TRX Squats  TRX Lateral L Tap  Fwd/Post wt shift [facility] Walking BOSU Step Up to SLS  Staggered Lunge (LLE back on 6'' step; RLE on floor)  Stair Navigation (27 steps w/ HR)   1x reciprocal up, non-recip down 10/4 - added       Neuromuscular Re-ed (54691)       Retro Gliders Lateral Heel Tap Step Up & Over SLS EC Lateral Step Over SLR Flexion w/ Heel Prop   SLR up & over  R CKC c L OKC Hip ABD/Ext  Lateral Band Walk SAQ to SLR  TKE  Prone TKE  Retro Step Back (involuntary quad contraction)  Biodex Balance LSU - heel taps        Manual Intervention (59769) -        Patellar mobs    AAROM knee flexion with heel slide        8/11 9/14 - phys perf testing. Functional testing performed this date and compared with previous values as noted above. Testing done in order to assess progress with community navigation and fall risk. Patient educated on the current focus of skilled physical therapy services and plan of care, including: diagnosis, prognosis, treatment goals & options at this time. Patient educated on scar tissue mobilization at home, encouraged ongoing use of vitamin E oil, encouraged the patient to begin ambulation up to 0.5 miles at comfortable pace 3-4 days per week. Patient and PT discussion regarding her anxiety surrounding resuming activities that were difficult/painful prior to having TKA and that if anxiety is beginning to limit participation in going certain places that it is worth discussing with her PCP.  VICTORIA total of 40 minutes spent completing functional testing as well as educating patient on results and other instruction. 8/17 - Functional testing performed this date and compared with previous values as noted above. Testing done in order to assess progress with community navigation and fall risk. A total of 25 minutes spent completing functional testing and reviewing results. Modalities: 8/20: NMR with Ukrainian estim to R quads: LAQ, SAQ, quad sets. Pt instructed with verbal and tactile cueing for quad engagement B quads to facilitate activation. 10/10 on /off cycle x 10 mins. Pt. Education:   6/18 - Patient was thoroughly educated on this date regarding prehabilitation goals, importance of PT sessions in improving overall ROM, strength and stability prior to surgery, and how prehabilitation will facilitate improved post-operative outcomes. The patient was educated on and instructed in HEP as listed. The patient was given a detailed handout for exercises to initiate in the hospital post-operatively as well as at home. The discharge plan from the hospital was reviewed with the patient; specifically, to reduce length of hospital stay and to minimize time before reinitiating outpatient physical therapy after surgery. Education regarding early mobility post-operatively in the hospital and emphasis on working with both physical therapy and nursing staff to achieve ambulation goal of 150 feet was provided. The patient was highly encouraged to attend joint class in hospital prior to surgery for further instructions on pre and post-surgical care. Also, the patient was educated on precautions after hip replacement to avoid, specifically, hip extension and repetitive hip flexion.  It is in my medical opinion that this patient is clear from all physical barriers prior to consideration for surgery, activity modifications prior to and post operatively have been discussed with this patient as well as discharge planning and is cleared for surgery from physical therapy perspective.  -patient educated on diagnosis, prognosis and expectations for rehab  -all patient questions were answered    Home Exercise Program:  Access Code: [email or web address]  - Octapoly Leg Machines: Leg press, HS Curls, Knee Ext, Hip Abd, Hip Add (settings still needed)    Access Code: [email or web address]  URL: [email or web address]  Date: 08/20/2021  Prepared by: Abbey Mullins    Exercises  Seated Hamstring Stretch - 2-3 x daily - 6 x weekly - 4 reps - 20 hold  Mini Squat - 2-3 x daily - 6 x weekly - 2 sets - 10 reps  Prone Quadriceps Set - 2-3 x daily - 6 x weekly - 1 sets - 10 reps - 5-8 hold  Supine Active Straight Leg Raise - 2-3 x daily - 6 x weekly - 3 sets - 10 reps  Gastroc Stretch on Wall - 2-3 x daily - 6 x weekly - 1 sets - 3 reps - 10 hold  Supine Heel Slide with Strap - 2-3 x daily - 6 x weekly - 1 sets - 10 reps - 10 hold  Supine Knee Extension Mobilization with Weight - 2-3 x daily - 6 x weekly - 1 sets - 3 reps - 60 hold  Seated Long Arc Quad - 2-3 x daily - 6 x weekly - 2 sets - 10 reps - 3 hold    8/17 - added 1/4 squat    Access Code: [email or web address]  URL: [email or web address]  Date: 08/05/2021  Prepared by: Geeta Page    Exercises   Long Sitting Calf Stretch with Strap - 3 x daily - 7 x weekly - 4 reps - 20 hold  Seated Gastroc Stretch with Strap - 3 x daily - 7 x weekly - 4 reps - 20 hold   Seated Hamstring Stretch - 3 x daily - 7 x weekly - 4 reps - 20 hold     8/5: HEP was also reviewed with pt from home health PT and encouraged to continue, additions of interventions above. 6/18 - The following exercises were discussed and added to the patient's home exercise program. (glute set, quad set, ankle pumps, heel prop, heel slide, SLR flexion). Additionally, the patient was educated on appropriate frequency, intensity and duration. Handout provided.     Therapeutic Exercise and NMR EXR  [x] (69692) Provided verbal/tactile cueing for activities related to strengthening, flexibility, endurance, ROM for improvements in  [x] LE / Lumbar: LE, proximal hip, and core control with self care, mobility, lifting, ambulation. [] UE / Cervical: cervical, postural, scapular, scapulothoracic and UE control with self care, reaching, carrying, lifting, house/yardwork, driving, computer work.  [] (18985) Provided verbal/tactile cueing for activities related to improving balance, coordination, kinesthetic sense, posture, motor skill, proprioception to assist with   [] LE / lumbar: LE, proximal hip, and core control in self care, mobility, lifting, ambulation and eccentric single leg control. [] UE / cervical: cervical, scapular, scapulothoracic and UE control with self care, reaching, carrying, lifting, house/yardwork, driving, computer work.   [] (16395) Therapist is in constant attendance of 2 or more patients providing skilled therapy interventions, but not providing any significant amount of measurable one-on-one time to either patient, for improvements in  [] LE / lumbar: LE, proximal hip, and core control in self care, mobility, lifting, ambulation and eccentric single leg control. [] UE / cervical: cervical, scapular, scapulothoracic and UE control with self care, reaching, carrying, lifting, house/yardwork, driving, computer work.      NMR and Therapeutic Activities:    [x] (46895 or 37426) Provided verbal/tactile cueing for activities related to improving balance, coordination, kinesthetic sense, posture, motor skill, proprioception and motor activation to allow for proper function of   [x] LE: / Lumbar core, proximal hip and LE with self care and ADLs  [] UE / Cervical: cervical, postural, scapular, scapulothoracic and UE control with self care, carrying, lifting, driving, computer work.   [] (10263) Gait Re-education- Provided training and instruction to the patient for proper LE, core and proximal hip recruitment and positioning and eccentric body weight control with ambulation re-education including up and down stairs     Home Management Training / Self Care:  [] (87136) Provided self-care/home management training related to activities of daily living and compensatory training, and/or use of adaptive equipment for improvement with: ADLs and compensatory training, meal preparation, safety procedures and instruction in use of adaptive equipment, including bathing, grooming, dressing, personal hygiene, basic household cleaning and chores. Home Exercise Program:    [] (94748) Reviewed/Progressed HEP activities related to strengthening, flexibility, endurance, ROM of   [] LE / Lumbar: core, proximal hip and LE for functional self-care, mobility, lifting and ambulation/stair navigation   [] UE / Cervical: cervical, postural, scapular, scapulothoracic and UE control with self care, reaching, carrying, lifting, house/yardwork, driving, computer work  [] (34379)Reviewed/Progressed HEP activities related to improving balance, coordination, kinesthetic sense, posture, motor skill, proprioception of   [] LE: core, proximal hip and LE for self care, mobility, lifting, and ambulation/stair navigation    [] UE / Cervical: cervical, postural,  scapular, scapulothoracic and UE control with self care, reaching, carrying, lifting, house/yardwork, driving, computer work    Manual Treatments:  PROM / STM / Oscillations-Mobs:  G-I, II, III, IV (PA's, Inf., Post.)  [] (66539) Provided manual therapy to mobilize LE, proximal hip and/or LS spine soft tissue/joints for the purpose of modulating pain, promoting relaxation,  increasing ROM, reducing/eliminating soft tissue swelling/inflammation/restriction, improving soft tissue extensibility and allowing for proper ROM for normal function with   [] LE / lumbar: self care, mobility, lifting and ambulation. [] UE / Cervical: self care, reaching, carrying, lifting, house/yardwork, driving, computer work.      Modalities:  [] (36261) Vasopneumatic order perform proper functional activities. []? Progressing: [x]? Met: []? Not Met: [x]? Adjusted: 5/5  4. Patient will return to functional activities including walking in grocery strore without increased symptoms or restriction. [x]? Progressing: []? Met: [x]? Not Met: []? Adjusted  5. Patient will be able to walk >2 blocks without increase in pain <3/10. []? Progressing: [x]? Met: []? Not Met: []? Adjusted         Overall Progression Towards Functional goals/ Treatment Progress Update:  [x] Patient is progressing as expected towards functional goals listed. [] Progression is slowed due to complexities/Impairments listed. [] Progression has been slowed due to co-morbidities. [] Plan just implemented, too soon to assess goals progression <30days   [] Goals require adjustment due to lack of progress  [] Patient is not progressing as expected and requires additional follow up with physician  [] Other    Persisting Functional Limitations/Impairments:  []Sleeping []Sitting               [x]Standing [x]Transfers        [x]Walking [x]Kneeling               [x]Stairs []Squatting / bending   []ADLs []Reaching  []Lifting  []Housework  [x]Driving []Job related tasks  []Sports/Recreation [x]Other: donning R shoe      ASSESSMENT: Despite DOMS, the patient was able to perform warm up and cooldown on elliptical, perform resisted leg/hip strengthening as recommended intensity - all without pain. By skilled nursing through session, patient reported that soreness had subsided and she was fully warmed up. The patient is making great progress at this time with progression of resistance and activity tolerance. She will continue to utilize compression stockings to reduce swelling in RLE.     Treatment/Activity Tolerance:  [x] Patient able to complete tx  [] Patient limited by fatigue  [] Patient limited by pain  [] Patient limited by other medical complications  [] Other:     Prognosis: [x] Good [] Fair  [] Poor    Patient Requires

## 2021-10-11 ENCOUNTER — TELEPHONE (OUTPATIENT)
Dept: ORTHOPEDIC SURGERY | Age: 51
End: 2021-10-11

## 2021-10-11 NOTE — TELEPHONE ENCOUNTER
Faxed 9/30/2021 office note to Buddy Crain @ [phone] (per patient) reflecting her current work status.

## 2021-10-13 ENCOUNTER — HOSPITAL ENCOUNTER (OUTPATIENT)
Dept: PHYSICAL THERAPY | Age: 51
Setting detail: THERAPIES SERIES
Discharge: HOME | End: 2021-10-13
Payer: COMMERCIAL

## 2021-10-13 PROCEDURE — 97110 THERAPEUTIC EXERCISES: CPT

## 2021-10-13 NOTE — FLOWSHEET NOTE
[address] [facility]  Phone: [phone]   Fax: [phone]    Physical Therapy Daily Treatment Note  Date:  10/13/2021    Patient Name:  Rajendra Chandra    :  1970  MRN: [id number]  Medical/Treatment Diagnosis Information:  Diagnosis: M17.11 (ICD-10-CM) - Primary osteoarthritis of right knee / PREHAB - sx: 21  Treatment Diagnosis: decreased RLE strength, knee pain, antalgic gait, knee instability  Insurance/Certification information:  PT Insurance Information: [address] [place] [address] - $25 copay - visits PMN  Physician Information:  Referring Practitioner: Mckay Cee MD  Plan of care signed (Y/N): [x]  Yes []  No     Date of Patient follow up with Physician: 21     Progress Report: []  Yes  [x]  No     Date Range for reporting period:  Beginnin21   PN: 21  POC: 21  Ending: TBD     Progress report due (10 Rx/or 30 days whichever is less): DC    Recertification due (POC duration/ or 90 days whichever is less): DC    Visit # Insurance Allowable Auth required? Date Range    60 []  Yes  [x]  No AIM REPORTS NO AUTH NEEDED ON 21 CORESPONDENCE       Latex Allergy:  [x]NO      []YES  Preferred Language for Healthcare:   [x]English       []other:     Functional Scale:        Date assessed:  LEFS: raw score = 20/80; dysfunction = 75%  21  LEFS: raw score = 15/80; dysfunction = 81%    LEFS: raw score = 49/80; dysfunction = 39%    LEFS: raw score = 49/80; dysfunction = 39%      Pain level:  0/10 pain; DOMS 3/10      SUBJECTIVE:  Pt got in late last night from flight back from [place]. States that she was running late and was unable to put on compression socks on the flight home and notices increased ant and posterior swelling present. Did a lot of walking on uneven surfaces (sand/grass) with associated fatigue through B LE's. Good tolerance to previous gym session.        OBJECTIVE:      Functional Testing   Sx Date 7/20/21 Prehab Date 6/18/21 Post-op Re-Eval Date 8/5/21 4 week F/U date 8/17/21 8 week F/U date 9/14/21       TUG (sec) 8     20  16  7   30 second sit to stand (reps) 10  8  10  13   6 minute walk (m) 449.58   264 (RW)  288 ([address])  425. 5       Balance:     Narrowed SOFIA (sec) 10  10  10  10   Semi Tandem (sec) 10    10     10     10       Tandem (sec) 10     10     10    10      SLS (sec) 3  0  10  10       Knee AROM L R L R L R L R   Flexion 119 122  119  87  119  103  119  120   Extension hyper 1 0  0  -19  0  - 4  0 0       Knee Ext: L R L R L R L R   MMT (of 5) 4+ 4  4+  unable  NT  3  5/5 5/5   Knee Ext (#) 31.8 29.6      NT  11.6  58.9  40.9       Hip Abd:  L R L R L R L R   MMT (of 5) 4+ 4   unable  NT   NT  5 4    Hip Abd (#) 32.9 24.3      NT  NT  36.4 36.8        LEFS (raw) 20  15  27  49     9/29 - SEE POC OBJ ABOVE  9/1 - 122deg knee flexion, lack 1 deg AROM, 0deg PROM  8/30 - AAROM = 116deg flexion, 0deg ext AAROM (after manual)  8/25 - AAROM 116deg flexion; lack 3 deg AROM  8/23: AROM knee flexion 105 degrees, AAROM with heel slide 109 degrees. Pt to department today with RW. Mild tenderness noted lateral distal hamstrings and proximal lateral gastroc  8/20 - R knee AAROM flexion: 110   8/13/21  Rt knee ROM 0-100 EOB. Presented to clinic with walker but brought     8/11 - 95deg AAROM R knee flexion; lack 3 extension AROM; soft end feel for both; however significant pain. 8/7: AROM knee ext with quad set lacking 6 from neutral; Knee flexion AAROM heel slide and overpressure 97 degrees with soft end feel however significant pain.      RESTRICTIONS/PRECAUTIONS: HX OF FALLS     Exercises/Interventions:   Therapeutic Exercises (68967) Resistance / level Sets/sec Reps Notes   Standing HR/TR  IB Gastroc Squats  Quantum HS Curls (B3, K2, A4) Prone Quad Strap Stretch  9/14 - added   Standing HF Stretch  9/14 - added   Standing Quad Stretch w/ R ankle on chair   9/17 - added; PT assisted for positioning of chair    Leg Press (B1, L5) Single Leg Deadlift  Quantum Knee Ext (B3, KD, A4) Alt LE SLR Abd - standing Standing Resisted Glute Kick Hip Abduction Machine - [facility] 40# 3 10 10/4 - added   Hip Adduction Machine - [facility]: red arrow 5 40# 3 10 10/4 - added   Knee Ext - [facility] (B5, K5, A2) 20# 3 10 10/4 - added   HS Curls - [facility] (B6, K5, A2, Th7) 40# 3 10 10/4 - added   Landmine Squats [facility] Elliptical 18 stride  5 min x 1 10/6 - TE, ^ time  Warmup & cooldown   FM Leg Press (B1, L5) 100# 3 10 10/6 - added   TRX Squats  2 10 10/6 - added          Therapeutic Activities (88234)       R Bike Seat 7 showing L2 5 min 10/13-At end of session in lieu of elliptical  Standing runner's calf stretch Standing HSS on step TG Squats  TG Marches  TRX Squats  TRX Lateral L Tap  Fwd/Post wt shift [facility] Walking BOSU Step Up to SLS  Staggered Lunge (LLE back on 6'' step; RLE on floor)  Stair Navigation (27 steps w/ HR)   1x reciprocal up, non-recip down 10/4 - added       Neuromuscular Re-ed (24808)       Retro Gliders Lateral Heel Tap Step Up & Over SLS EC Lateral Step Over SLR Flexion w/ Heel Prop   SLR up & over  R CKC c L OKC Hip ABD/Ext  Lateral Band Walk SAQ to SLR  TKE  Prone TKE  Retro Step Back (involuntary quad contraction)  Biodex Balance LSU - heel taps        Manual Intervention (23335) -        Patellar mobs    AAROM knee flexion with heel slide        8/11 9/14 - phys perf testing. Functional testing performed this date and compared with previous values as noted above. Testing done in order to assess progress with community navigation and fall risk. Patient educated on the current focus of skilled physical therapy services and plan of care, including: diagnosis, prognosis, treatment goals & options at this time.  Patient educated on scar tissue mobilization at home, encouraged ongoing use of vitamin E oil, encouraged the patient to begin ambulation up to 0.5 miles at comfortable pace 3-4 days per week. Patient and PT discussion regarding her anxiety surrounding resuming activities that were difficult/painful prior to having TKA and that if anxiety is beginning to limit participation in going certain places that it is worth discussing with her PCP. A total of 40 minutes spent completing functional testing as well as educating patient on results and other instruction. 8/17 - Functional testing performed this date and compared with previous values as noted above. Testing done in order to assess progress with community navigation and fall risk. A total of 25 minutes spent completing functional testing and reviewing results. Modalities: 8/20: NMR with Austrian estim to R quads: LAQ, SAQ, quad sets. Pt instructed with verbal and tactile cueing for quad engagement B quads to facilitate activation. 10/10 on /off cycle x 10 mins. Pt. Education:   6/18 - Patient was thoroughly educated on this date regarding prehabilitation goals, importance of PT sessions in improving overall ROM, strength and stability prior to surgery, and how prehabilitation will facilitate improved post-operative outcomes. The patient was educated on and instructed in HEP as listed. The patient was given a detailed handout for exercises to initiate in the hospital post-operatively as well as at home. The discharge plan from the hospital was reviewed with the patient; specifically, to reduce length of hospital stay and to minimize time before reinitiating outpatient physical therapy after surgery. Education regarding early mobility post-operatively in the hospital and emphasis on working with both physical therapy and nursing staff to achieve ambulation goal of 150 feet was provided. The patient was highly encouraged to attend joint class in hospital prior to surgery for further instructions on pre and post-surgical care.  Also, the patient was educated on precautions after hip replacement to avoid, specifically, hip extension and repetitive hip flexion. It is in my medical opinion that this patient is clear from all physical barriers prior to consideration for surgery, activity modifications prior to and post operatively have been discussed with this patient as well as discharge planning and is cleared for surgery from physical therapy perspective.  -patient educated on diagnosis, prognosis and expectations for rehab  -all patient questions were answered    Home Exercise Program:  Access Code: [email or web address]  - Interneer Leg Machines: Leg press, HS Curls, Knee Ext, Hip Abd, Hip Add (settings still needed)    Access Code: [facility]  URL: [email or web address]  Date: 08/20/2021  Prepared by: Delia Pearson    Exercises  Seated Hamstring Stretch - 2-3 x daily - 6 x weekly - 4 reps - 20 hold  Mini Squat - 2-3 x daily - 6 x weekly - 2 sets - 10 reps  Prone Quadriceps Set - 2-3 x daily - 6 x weekly - 1 sets - 10 reps - 5-8 hold  Supine Active Straight Leg Raise - 2-3 x daily - 6 x weekly - 3 sets - 10 reps  Gastroc Stretch on Wall - 2-3 x daily - 6 x weekly - 1 sets - 3 reps - 10 hold  Supine Heel Slide with Strap - 2-3 x daily - 6 x weekly - 1 sets - 10 reps - 10 hold  Supine Knee Extension Mobilization with Weight - 2-3 x daily - 6 x weekly - 1 sets - 3 reps - 60 hold  Seated Long Arc Quad - 2-3 x daily - 6 x weekly - 2 sets - 10 reps - 3 hold    8/17 - added 1/4 squat    Access Code: [email or web address]  URL: [email or web address]  Date: 08/05/2021  Prepared by: Gallo Bruce    Exercises   Long Sitting Calf Stretch with Strap - 3 x daily - 7 x weekly - 4 reps - 20 hold  Seated Gastroc Stretch with Strap - 3 x daily - 7 x weekly - 4 reps - 20 hold   Seated Hamstring Stretch - 3 x daily - 7 x weekly - 4 reps - 20 hold     8/5: HEP was also reviewed with pt from home health PT and encouraged to continue, additions of interventions above.      6/18 - The following exercises were discussed and added to the patient's home exercise program. (glute set, quad set, ankle pumps, heel prop, heel slide, SLR flexion). Additionally, the patient was educated on appropriate frequency, intensity and duration. Handout provided. Therapeutic Exercise and NMR EXR  [x] (79744) Provided verbal/tactile cueing for activities related to strengthening, flexibility, endurance, ROM for improvements in  [x] LE / Lumbar: LE, proximal hip, and core control with self care, mobility, lifting, ambulation. [] UE / Cervical: cervical, postural, scapular, scapulothoracic and UE control with self care, reaching, carrying, lifting, house/yardwork, driving, computer work.  [] (78919) Provided verbal/tactile cueing for activities related to improving balance, coordination, kinesthetic sense, posture, motor skill, proprioception to assist with   [] LE / lumbar: LE, proximal hip, and core control in self care, mobility, lifting, ambulation and eccentric single leg control. [] UE / cervical: cervical, scapular, scapulothoracic and UE control with self care, reaching, carrying, lifting, house/yardwork, driving, computer work.   [] (21235) Therapist is in constant attendance of 2 or more patients providing skilled therapy interventions, but not providing any significant amount of measurable one-on-one time to either patient, for improvements in  [] LE / lumbar: LE, proximal hip, and core control in self care, mobility, lifting, ambulation and eccentric single leg control. [] UE / cervical: cervical, scapular, scapulothoracic and UE control with self care, reaching, carrying, lifting, house/yardwork, driving, computer work.      NMR and Therapeutic Activities:    [x] (23353 or 53902) Provided verbal/tactile cueing for activities related to improving balance, coordination, kinesthetic sense, posture, motor skill, proprioception and motor activation to allow for proper function of   [x] LE: / Lumbar core, proximal hip and LE with self care and ADLs  [] UE / Cervical: cervical, postural, scapular, scapulothoracic and UE control with self care, carrying, lifting, driving, computer work.   [] (71850) Gait Re-education- Provided training and instruction to the patient for proper LE, core and proximal hip recruitment and positioning and eccentric body weight control with ambulation re-education including up and down stairs     Home Management Training / Self Care:  [] (74307) Provided self-care/home management training related to activities of daily living and compensatory training, and/or use of adaptive equipment for improvement with: ADLs and compensatory training, meal preparation, safety procedures and instruction in use of adaptive equipment, including bathing, grooming, dressing, personal hygiene, basic household cleaning and chores.      Home Exercise Program:    [] (27387) Reviewed/Progressed HEP activities related to strengthening, flexibility, endurance, ROM of   [] LE / Lumbar: core, proximal hip and LE for functional self-care, mobility, lifting and ambulation/stair navigation   [] UE / Cervical: cervical, postural, scapular, scapulothoracic and UE control with self care, reaching, carrying, lifting, house/yardwork, driving, computer work  [] (20845)Reviewed/Progressed HEP activities related to improving balance, coordination, kinesthetic sense, posture, motor skill, proprioception of   [] LE: core, proximal hip and LE for self care, mobility, lifting, and ambulation/stair navigation    [] UE / Cervical: cervical, postural,  scapular, scapulothoracic and UE control with self care, reaching, carrying, lifting, house/yardwork, driving, computer work    Manual Treatments:  PROM / STM / Oscillations-Mobs:  G-I, II, III, IV (PA's, Inf., Post.)  [] (25192) Provided manual therapy to mobilize LE, proximal hip and/or LS spine soft tissue/joints for the purpose of modulating pain, promoting relaxation,  increasing ROM, reducing/eliminating soft tissue swelling/inflammation/restriction, improving soft tissue extensibility and allowing for proper ROM for normal function with   [] LE / lumbar: self care, mobility, lifting and ambulation. [] UE / Cervical: self care, reaching, carrying, lifting, house/yardwork, driving, computer work. Modalities:  [] (09864) Vasopneumatic compression: Utilized vasopneumatic compression to decrease edema / swelling for the purpose of improving mobility and quad tone / recruitment which will allow for increased overall function including but not limited to self-care, transfers, ambulation, and ascending / descending stairs. Charges:  Timed Code Treatment Minutes: 45   Total Treatment Minutes: 45     [] EVAL - LOW (41003)   [] EVAL - MOD (78230)  [] EVAL - HIGH (04000)  [] RE-EVAL (62050)  [x] XS(23168) x 3         [] Ionto  [] NMR (83983) x         [] Vaso  [] Manual (06234) x        [] Ultrasound  [] TA x        [] Mech Traction (42506)  [] Aquatic Therapy x      [] ES (un) (47728):   [] Home Management Training x  [] ES(attended) (30997)   [] Dry Needling 1-2 muscles (84768):  [] Dry Needling 3+ muscles (637897)  [] Group:      [] Other: phys perf x 2     GOALS:  Patient stated goal: The patient will be able to walk >500ft without increase in symptoms. []? Progressing: [x]? Met: []? Not Met: []? Adjusted     Therapist goals for Patient:   Short Term Goals: To be achieved in: 2 weeks post-op  1. Independent in HEP and progression per patient tolerance without increase in symptoms. []? Progressing: [x]? Met: []? Not Met: []? Adjusted  2. Patient will have a decrease in pain to <5/10 to facilitate improvement in movement of the BLE, and ADLs as indicated by Functional Deficits. []? Progressing: [x]? Met: []? Not Met: []? Adjusted     Long Term Goals: To be achieved in: 12 weeks post-op  1. Disability index score of 35% or less for the LEFS to assist with reaching prior level of function. [x]? Progressing: []? Met: [x]? Not Met: []? Adjusted  2.  Patient will demonstrate increased knee AROM from 0 -120 allow for proper joint functioning as indicated by patients Functional Deficits. []? Progressing: [x]? Met: []? Not Met: []? Adjusted   3. Patient will demonstrate an increase in Strength to at least 4+/5 with R knee flexion and extension in order perform proper functional activities. []? Progressing: [x]? Met: []? Not Met: [x]? Adjusted: 5/5  4. Patient will return to functional activities including walking in grocery strore without increased symptoms or restriction. [x]? Progressing: []? Met: [x]? Not Met: []? Adjusted  5. Patient will be able to walk >2 blocks without increase in pain <3/10. []? Progressing: [x]? Met: []? Not Met: []? Adjusted         Overall Progression Towards Functional goals/ Treatment Progress Update:  [x] Patient is progressing as expected towards functional goals listed. [] Progression is slowed due to complexities/Impairments listed. [] Progression has been slowed due to co-morbidities. [] Plan just implemented, too soon to assess goals progression <30days   [] Goals require adjustment due to lack of progress  [] Patient is not progressing as expected and requires additional follow up with physician  [] Other    Persisting Functional Limitations/Impairments:  []Sleeping []Sitting               [x]Standing [x]Transfers        [x]Walking [x]Kneeling               [x]Stairs []Squatting / bending   []ADLs []Reaching  []Lifting  []Housework  [x]Driving []Job related tasks  []Sports/Recreation [x]Other: donning R shoe      ASSESSMENT: Pt had difficulty at end range flexion this date d/t popliteal swelling with end range LP, and recumbent bike. Pt participated in recumbent bike \"cool down\" 2/2 fatigue in LE after [facility] strengthening. Pt felt that LE swelling was biggest limiting factor this date and plans to put on compression socks and ice upon returning home after exercise.  LE strength continues to improve and pt able to demonstrate appropriate

## 2021-10-15 ENCOUNTER — HOSPITAL ENCOUNTER (OUTPATIENT)
Dept: PHYSICAL THERAPY | Age: 51
Setting detail: THERAPIES SERIES
Discharge: HOME | End: 2021-10-15
Payer: COMMERCIAL

## 2021-10-15 PROCEDURE — 97110 THERAPEUTIC EXERCISES: CPT

## 2021-10-15 NOTE — FLOWSHEET NOTE
[facility]  Phone: [phone]   Fax: [phone]    Physical Therapy Daily Treatment Note  Date:  10/15/2021    Patient Name:  Maty Hua    :  1970  MRN: [id number]  Medical/Treatment Diagnosis Information:  Diagnosis: M17.11 (ICD-10-CM) - Primary osteoarthritis of right knee / PREHAB - sx: 21  Treatment Diagnosis: decreased RLE strength, knee pain, antalgic gait, knee instability  Insurance/Certification information:  PT Insurance Information: [address] [place] [address] - $25 copay - visits PMN  Physician Information:  Referring Practitioner: Cedric Gupta MD  Plan of care signed (Y/N): [x]  Yes []  No     Date of Patient follow up with Physician: 21     Progress Report: []  Yes  [x]  No     Date Range for reporting period:  Beginnin21   PN: 21  POC: 21  Ending: TBD     Progress report due (10 Rx/or 30 days whichever is less): DC    Recertification due (POC duration/ or 90 days whichever is less): DC    Visit # Insurance Allowable Auth required? Date Range   22 60 []  Yes  [x]  No AIM REPORTS NO AUTH NEEDED ON 21 CORESPONDENCE       Latex Allergy:  [x]NO      []YES  Preferred Language for Healthcare:   [x]English       []other:     Functional Scale:        Date assessed:  LEFS: raw score = 20/80; dysfunction = 75%  21  LEFS: raw score = 15/80; dysfunction = 81%    LEFS: raw score = 49/80; dysfunction = 39%    LEFS: raw score = 49/80; dysfunction = 39%      Pain level:  0/10 pain; DOMS 3/10      SUBJECTIVE:  Pt states feeling horrible today. Received COVID shot yesterday and feels achy today and L arm hurts really bad but wants to push through session today.        OBJECTIVE:      Functional Testing   Sx Date 21 Prehab Date 21 Post-op Re-Eval Date 21 4 week F/U date 21 8 week F/U date 21       TUG (sec) 8     20  16  7   30 second sit to stand (reps) 10  8  10  13   6 minute walk (m) 449.58   264 (RW)  288 ([facility]) [id number]. 5       Balance:     Narrowed SOFIA (sec) 10  10  10  10   Semi Tandem (sec) 10    10     10     10       Tandem (sec) 10     10     10    10      SLS (sec) 3  0  10  10       Knee AROM L R L R L R L R   Flexion 119 122  119  87  119  103  119  120   Extension hyper 1 0  0  -19  0  - 4  0 0       Knee Ext: L R L R L R L R   MMT (of 5) 4+ 4  4+  unable  NT  3  5/5  5/5   Knee Ext (#) 31.8 29.6      NT  11.6  58.9  40.9       Hip Abd:  L R L R L R L R   MMT (of 5) 4+ 4   unable  NT   NT  5 4    Hip Abd (#) 32.9 24.3      NT  NT  36.4 36.8        LEFS (raw) 20  15  27  49     9/29 - SEE POC OBJ ABOVE  9/1 - 122deg knee flexion, lack 1 deg AROM, 0deg PROM  8/30 - AAROM = 116deg flexion, 0deg ext AAROM (after manual)  8/25 - AAROM 116deg flexion; lack 3 deg AROM  8/23: AROM knee flexion 105 degrees, AAROM with heel slide 109 degrees. Pt to department today with RW. Mild tenderness noted lateral distal hamstrings and proximal lateral gastroc  8/20 - R knee AAROM flexion: 110   8/13/21  Rt knee ROM 0-100 EOB. Presented to clinic with walker but brought     8/11 - 95deg AAROM R knee flexion; lack 3 extension AROM; soft end feel for both; however significant pain. 8/7: AROM knee ext with quad set lacking 6 from neutral; Knee flexion AAROM heel slide and overpressure 97 degrees with soft end feel however significant pain.      RESTRICTIONS/PRECAUTIONS: HX OF FALLS     Exercises/Interventions:   Therapeutic Exercises (90773) Resistance / level Sets/sec Reps Notes   Quantum HS Curls (B3, K2, A4) Prone Quad Strap Stretch  9/14 - added   Leg Press (B1, L5) Single Leg Deadlift  Quantum Knee Ext (B3, KD, A4) Alt LE SLR Abd - standing Standing Resisted Glute Kick Hip Abduction Machine - [facility] 40# 3 10 10/4 - added   Hip Adduction Machine - [employer]: red arrow 5 40# 3 10 10/4 - added   Knee Ext - [facility] (B5, K5, A2) 20# 3 10 10/4 - added   HS Curls - [facility] (B6, K5, A2, Th7) 40# 3 10 10/4 - added   Landmine Squats FM Leg Press (B1, L5) 100# 3 10 10/6 - added          Therapeutic Activities (29791)       R Bike Seat 7 showing L2 5 min x2  Warmup + cool down  10/13-At end of session in lieu of elliptical  Standing runner's calf stretch Standing HSS on step TG Squats  TG Marches  TRX Squats  TRX Lateral L Tap  Fwd/Post wt shift [facility] Walking BOSU Step Up to SLS  Staggered Lunge (LLE back on 6'' step; RLE on floor)  Stair Navigation (27 steps w/ HR)   1x reciprocal up, non-recip down 10/4 - added       Neuromuscular Re-ed (56455)       Retro Gliders Lateral Heel Tap Step Up & Over SLS EC Lateral Step Over SLR Flexion w/ Heel Prop   SLR up & over  R CKC c L OKC Hip ABD/Ext  Lateral Band Walk SAQ to SLR  TKE  Prone TKE  Retro Step Back (involuntary quad contraction)  Biodex Balance LSU - heel taps        Manual Intervention (78636) -        Patellar mobs    AAROM knee flexion with heel slide        8/11 9/14 - phys perf testing. Functional testing performed this date and compared with previous values as noted above. Testing done in order to assess progress with community navigation and fall risk. Patient educated on the current focus of skilled physical therapy services and plan of care, including: diagnosis, prognosis, treatment goals & options at this time. Patient educated on scar tissue mobilization at home, encouraged ongoing use of vitamin E oil, encouraged the patient to begin ambulation up to 0.5 miles at comfortable pace 3-4 days per week. Patient and PT discussion regarding her anxiety surrounding resuming activities that were difficult/painful prior to having TKA and that if anxiety is beginning to limit participation in going certain places that it is worth discussing with her PCP. A total of 40 minutes spent completing functional testing as well as educating patient on results and other instruction.      8/17 - Functional testing performed this date and compared with previous values as noted above. Testing done in order to assess progress with community navigation and fall risk. A total of 25 minutes spent completing functional testing and reviewing results. Modalities: 8/20: NMR with Niuean estim to R quads: LAQ, SAQ, quad sets. Pt instructed with verbal and tactile cueing for quad engagement B quads to facilitate activation. 10/10 on /off cycle x 10 mins. Pt. Education:   6/18 - Patient was thoroughly educated on this date regarding prehabilitation goals, importance of PT sessions in improving overall ROM, strength and stability prior to surgery, and how prehabilitation will facilitate improved post-operative outcomes. The patient was educated on and instructed in HEP as listed. The patient was given a detailed handout for exercises to initiate in the hospital post-operatively as well as at home. The discharge plan from the hospital was reviewed with the patient; specifically, to reduce length of hospital stay and to minimize time before reinitiating outpatient physical therapy after surgery. Education regarding early mobility post-operatively in the hospital and emphasis on working with both physical therapy and nursing staff to achieve ambulation goal of 150 feet was provided. The patient was highly encouraged to attend joint class in hospital prior to surgery for further instructions on pre and post-surgical care. Also, the patient was educated on precautions after hip replacement to avoid, specifically, hip extension and repetitive hip flexion.  It is in my medical opinion that this patient is clear from all physical barriers prior to consideration for surgery, activity modifications prior to and post operatively have been discussed with this patient as well as discharge planning and is cleared for surgery from physical therapy perspective.  -patient educated on diagnosis, prognosis and expectations for rehab  -all patient questions were answered    Home Exercise function including but not limited to self-care, transfers, ambulation, and ascending / descending stairs. Charges:  Timed Code Treatment Minutes: 45   Total Treatment Minutes: 45     [] EVAL - LOW (49532)   [] EVAL - MOD (56732)  [] EVAL - HIGH (31275)  [] RE-EVAL (64760)  [x] XI(92440) x 3         [] Ionto  [] NMR (36033) x         [] Vaso  [] Manual (44409) x        [] Ultrasound  [] TA x        [] Mech Traction (87036)  [] Aquatic Therapy x      [] ES (un) (74650):   [] Home Management Training x  [] ES(attended) (51049)   [] Dry Needling 1-2 muscles (82111):  [] Dry Needling 3+ muscles (324058)  [] Group:      [] Other: phys perf x 2     GOALS:  Patient stated goal: The patient will be able to walk >500ft without increase in symptoms. []? Progressing: [x]? Met: []? Not Met: []? Adjusted     Therapist goals for Patient:   Short Term Goals: To be achieved in: 2 weeks post-op  1. Independent in HEP and progression per patient tolerance without increase in symptoms. []? Progressing: [x]? Met: []? Not Met: []? Adjusted  2. Patient will have a decrease in pain to <5/10 to facilitate improvement in movement of the BLE, and ADLs as indicated by Functional Deficits. []? Progressing: [x]? Met: []? Not Met: []? Adjusted     Long Term Goals: To be achieved in: 12 weeks post-op  1. Disability index score of 35% or less for the LEFS to assist with reaching prior level of function. [x]? Progressing: []? Met: [x]? Not Met: []? Adjusted  2. Patient will demonstrate increased knee AROM from 0 -120 allow for proper joint functioning as indicated by patients Functional Deficits. []? Progressing: [x]? Met: []? Not Met: []? Adjusted   3. Patient will demonstrate an increase in Strength to at least 4+/5 with R knee flexion and extension in order perform proper functional activities. []? Progressing: [x]? Met: []? Not Met: [x]? Adjusted: 5/5  4.  Patient will return to functional activities including walking in grocery strore without increased symptoms or restriction. [x]? Progressing: []? Met: [x]? Not Met: []? Adjusted  5. Patient will be able to walk >2 blocks without increase in pain <3/10. []? Progressing: [x]? Met: []? Not Met: []? Adjusted         Overall Progression Towards Functional goals/ Treatment Progress Update:  [x] Patient is progressing as expected towards functional goals listed. [] Progression is slowed due to complexities/Impairments listed. [] Progression has been slowed due to co-morbidities. [] Plan just implemented, too soon to assess goals progression <30days   [] Goals require adjustment due to lack of progress  [] Patient is not progressing as expected and requires additional follow up with physician  [] Other    Persisting Functional Limitations/Impairments:  []Sleeping []Sitting               [x]Standing [x]Transfers        [x]Walking [x]Kneeling               [x]Stairs []Squatting / bending   []ADLs []Reaching  []Lifting  []Housework  [x]Driving []Job related tasks  []Sports/Recreation [x]Other: donning R shoe      ASSESSMENT: Pt performed weight machines this date and performed warm up + cool down on recumbent bike this date due to arms being sore and not being able to use elliptical this date. Performed weight machines this date as noted above on flow sheet, held TRX squats due to inability to hold straps because of shoulder pain. Will plan to resume exercises at next visit as tolerated. Pt demonstrates fatigue this date due to not feeling well and needed short rest breaks between reps and exercises with ability to perform routine this date. Continue as tolerated to improve function and strength to progress toward goals.       Treatment/Activity Tolerance:  [x] Patient able to complete tx  [] Patient limited by fatigue  [] Patient limited by pain  [] Patient limited by other medical complications  [] Other:     Prognosis: [x] Good [] Fair  [] Poor    Patient Requires Follow-up: [x] Yes  [] No      Plan for next treatment session: quad strength, strengthening in OKC/CKC; squats as tolerated. Step activity & eccentrics. If patient desires, perform [employer] routine for leg strengthening and endurance    PLAN:  PT 2-3x / week for 12 weeks. [x] Continue per plan of care [] Alter current plan (see comments)   [] Plan of care initiated [] Hold pending MD visit [] Discharge    Electronically signed by: Ally Gomez, PTA [id number]    Note: If patient does not return for scheduled/ recommended follow up visits, this note will serve as a discharge from care along with most recent update on progress.

## 2021-10-18 ENCOUNTER — HOSPITAL ENCOUNTER (OUTPATIENT)
Dept: PHYSICAL THERAPY | Age: 51
Setting detail: THERAPIES SERIES
Discharge: HOME | End: 2021-10-18
Payer: COMMERCIAL

## 2021-10-18 PROCEDURE — 97110 THERAPEUTIC EXERCISES: CPT

## 2021-10-18 NOTE — FLOWSHEET NOTE
[facility]  Phone: [phone]   Fax: [phone]    Physical Therapy Daily Treatment Note  Date:  10/18/2021    Patient Name:  Binh Morris    :  1970  MRN: [id number]  Medical/Treatment Diagnosis Information:  Diagnosis: M17.11 (ICD-10-CM) - Primary osteoarthritis of right knee / PREHAB - sx: 21  Treatment Diagnosis: decreased RLE strength, knee pain, antalgic gait, knee instability  Insurance/Certification information:  PT Insurance Information: [address] [place] [address] - $25 copay - visits PMN  Physician Information:  Referring Practitioner: Shantel Reynoso MD  Plan of care signed (Y/N): [x]  Yes []  No     Date of Patient follow up with Physician: 21     Progress Report: []  Yes  [x]  No     Date Range for reporting period:  Beginnin21   PN: 21  POC: 21  Ending: TBD     Progress report due (10 Rx/or 30 days whichever is less): DC    Recertification due (POC duration/ or 90 days whichever is less): DC    Visit # Insurance Allowable Auth required? Date Range    60 []  Yes  [x]  No AIM REPORTS NO AUTH NEEDED ON 21 CORESPONDENCE       Latex Allergy:  [x]NO      []YES  Preferred Language for Healthcare:   [x]English       []other:     Functional Scale:        Date assessed:  LEFS: raw score = 20/80; dysfunction = 75%  21  LEFS: raw score = 15/80; dysfunction = 81%    LEFS: raw score = 49/80; dysfunction = 39%    LEFS: raw score = 49/80; dysfunction = 39%      Pain level:  0/10 pain      SUBJECTIVE:   Reports being very sick after she left last visit with high fever and extremely fatigued from COVID injection. Ended up sleeping for all of Friday and most of Saturday after last visit. Feels much better today.        OBJECTIVE:      Functional Testing   Sx Date 21 Prehab Date 21 Post-op Re-Eval Date 21 4 week F/U date 21 8 week F/U date 21       TUG (sec) 8     20  16  7   30 second sit to stand (reps) 10  8  10  13   6 minute walk (m) 449.58   264 (RW)  288 (SPC)  425. 5       Balance:     Narrowed SOFIA (sec) 10  10  10  10   Semi Tandem (sec) 10    10     10     10       Tandem (sec) 10     10     10    10      SLS (sec) 3  0  10  10       Knee AROM L R L R L R L R   Flexion 119 122  119  87  119  103  119  120   Extension hyper 1 0  0  -19  0  - 4  0 0       Knee Ext: L R L R L R L R   MMT (of 5) 4+ 4  4+  unable  NT  3  5/5  5/5   Knee Ext (#) 31.8 29.6      NT  11.6  58.9  40.9       Hip Abd:  L R L R L R L R   MMT (of 5) 4+ 4   unable  NT   NT  5 4    Hip Abd (#) 32.9 24.3      NT  NT  36.4 36.8        LEFS (raw) 20  15  27  49     9/29 - SEE POC OBJ ABOVE  9/1 - 122deg knee flexion, lack 1 deg AROM, 0deg PROM  8/30 - AAROM = 116deg flexion, 0deg ext AAROM (after manual)  8/25 - AAROM 116deg flexion; lack 3 deg AROM  8/23: AROM knee flexion 105 degrees, AAROM with heel slide 109 degrees. Pt to department today with RW. Mild tenderness noted lateral distal hamstrings and proximal lateral gastroc  8/20 - R knee AAROM flexion: 110   8/13/21  Rt knee ROM 0-100 EOB. Presented to clinic with walker but brought     8/11 - 95deg AAROM R knee flexion; lack 3 extension AROM; soft end feel for both; however significant pain. 8/7: AROM knee ext with quad set lacking 6 from neutral; Knee flexion AAROM heel slide and overpressure 97 degrees with soft end feel however significant pain.      RESTRICTIONS/PRECAUTIONS: HX OF FALLS     Exercises/Interventions:   Therapeutic Exercises (13049) Resistance / level Sets/sec Reps Notes   Quantum HS Curls (B3, K2, A4) Prone Quad Strap Stretch  9/14 - added   Leg Press (B1, L5) Single Leg Deadlift  Quantum Knee Ext (B3, KD, A4) Alt LE SLR Abd - standing Standing Resisted Glute Kick Hip Abduction Machine - appsplit 40# 3 10 10/4 - added   Hip Adduction Machine - [facility]: red arrow 5 40# 3 10 10/4 - added   Knee Ext - [facility] (B5, K5, A2) 20# 3 10 10/4 - added   HS Curls - [facility] (B6, K5, A2, Th7) 40# 3 10 10/4 - added   Landmine Squats FM Leg Press (B1, L5) 100# 3 10 10/6 - added          Therapeutic Activities (19541)       R Bike Seat 7 showing L2 5 min x2  Warmup + cool down  10/13-At end of session in lieu of elliptical  Standing runner's calf stretch Standing HSS on step TG Squats  TG Marches  TRX Squats  TRX Lateral L Tap  Fwd/Post wt shift [facility] Walking BOSU Step Up to SLS  Staggered Lunge (LLE back on 6'' step; RLE on floor)  Stair Navigation (27 steps w/ HR)   1x reciprocal up, non-recip down 10/4 - added       Neuromuscular Re-ed (37377)       Retro Gliders Lateral Heel Tap Step Up & Over SLS EC Lateral Step Over SLR Flexion w/ Heel Prop   SLR up & over  R CKC c L OKC Hip ABD/Ext  Lateral Band Walk SAQ to SLR  TKE  Prone TKE  Retro Step Back (involuntary quad contraction)  Biodex Balance LSU - heel taps        Manual Intervention (33107) -        Patellar mobs    AAROM knee flexion with heel slide        8/11 9/14 - phys perf testing. Functional testing performed this date and compared with previous values as noted above. Testing done in order to assess progress with community navigation and fall risk. Patient educated on the current focus of skilled physical therapy services and plan of care, including: diagnosis, prognosis, treatment goals & options at this time. Patient educated on scar tissue mobilization at home, encouraged ongoing use of vitamin E oil, encouraged the patient to begin ambulation up to 0.5 miles at comfortable pace 3-4 days per week. Patient and PT discussion regarding her anxiety surrounding resuming activities that were difficult/painful prior to having TKA and that if anxiety is beginning to limit participation in going certain places that it is worth discussing with her PCP. A total of 40 minutes spent completing functional testing as well as educating patient on results and other instruction.      8/17 - Functional testing performed this date and compared with previous values as noted above. Testing done in order to assess progress with community navigation and fall risk. A total of 25 minutes spent completing functional testing and reviewing results. Modalities: 8/20: NMR with Afghan estim to R quads: LAQ, SAQ, quad sets. Pt instructed with verbal and tactile cueing for quad engagement B quads to facilitate activation. 10/10 on /off cycle x 10 mins. Pt. Education:   6/18 - Patient was thoroughly educated on this date regarding prehabilitation goals, importance of PT sessions in improving overall ROM, strength and stability prior to surgery, and how prehabilitation will facilitate improved post-operative outcomes. The patient was educated on and instructed in HEP as listed. The patient was given a detailed handout for exercises to initiate in the hospital post-operatively as well as at home. The discharge plan from the hospital was reviewed with the patient; specifically, to reduce length of hospital stay and to minimize time before reinitiating outpatient physical therapy after surgery. Education regarding early mobility post-operatively in the hospital and emphasis on working with both physical therapy and nursing staff to achieve ambulation goal of 150 feet was provided. The patient was highly encouraged to attend joint class in hospital prior to surgery for further instructions on pre and post-surgical care. Also, the patient was educated on precautions after hip replacement to avoid, specifically, hip extension and repetitive hip flexion.  It is in my medical opinion that this patient is clear from all physical barriers prior to consideration for surgery, activity modifications prior to and post operatively have been discussed with this patient as well as discharge planning and is cleared for surgery from physical therapy perspective.  -patient educated on diagnosis, prognosis and expectations for rehab  -all patient questions were answered    Home Exercise Program:  Access Code: [email or web address]  - Qifang Leg Machines: Leg press, HS Curls, Knee Ext, Hip Abd, Hip Add (settings still needed)    Access Code: [email or web address]  URL: [email or web address]/  Date: 08/20/2021  Prepared by: Stephanie Alicea    Exercises  Seated Hamstring Stretch - 2-3 x daily - 6 x weekly - 4 reps - 20 hold  Mini Squat - 2-3 x daily - 6 x weekly - 2 sets - 10 reps  Prone Quadriceps Set - 2-3 x daily - 6 x weekly - 1 sets - 10 reps - 5-8 hold  Supine Active Straight Leg Raise - 2-3 x daily - 6 x weekly - 3 sets - 10 reps  Gastroc Stretch on Wall - 2-3 x daily - 6 x weekly - 1 sets - 3 reps - 10 hold  Supine Heel Slide with Strap - 2-3 x daily - 6 x weekly - 1 sets - 10 reps - 10 hold  Supine Knee Extension Mobilization with Weight - 2-3 x daily - 6 x weekly - 1 sets - 3 reps - 60 hold  Seated Long Arc Quad - 2-3 x daily - 6 x weekly - 2 sets - 10 reps - 3 hold    8/17 - added 1/4 squat    Access Code: [email or web address]  URL: [email or web address]  Date: 08/05/2021  Prepared by: Milo Dong    Exercises   Long Sitting Calf Stretch with Strap - 3 x daily - 7 x weekly - 4 reps - 20 hold  Seated Gastroc Stretch with Strap - 3 x daily - 7 x weekly - 4 reps - 20 hold   Seated Hamstring Stretch - 3 x daily - 7 x weekly - 4 reps - 20 hold     8/5: HEP was also reviewed with pt from home health PT and encouraged to continue, additions of interventions above. 6/18 - The following exercises were discussed and added to the patient's home exercise program. (glute set, quad set, ankle pumps, heel prop, heel slide, SLR flexion). Additionally, the patient was educated on appropriate frequency, intensity and duration. Handout provided.     Therapeutic Exercise and NMR EXR  [x] (84363) Provided verbal/tactile cueing for activities related to strengthening, flexibility, endurance, ROM for improvements in  [x] LE / Lumbar: LE, proximal hip, and core control with self care, mobility, lifting, ambulation. [] UE / Cervical: cervical, postural, scapular, scapulothoracic and UE control with self care, reaching, carrying, lifting, house/yardwork, driving, computer work.  [] (85694) Provided verbal/tactile cueing for activities related to improving balance, coordination, kinesthetic sense, posture, motor skill, proprioception to assist with   [] LE / lumbar: LE, proximal hip, and core control in self care, mobility, lifting, ambulation and eccentric single leg control. [] UE / cervical: cervical, scapular, scapulothoracic and UE control with self care, reaching, carrying, lifting, house/yardwork, driving, computer work.   [] (10919) Therapist is in constant attendance of 2 or more patients providing skilled therapy interventions, but not providing any significant amount of measurable one-on-one time to either patient, for improvements in  [] LE / lumbar: LE, proximal hip, and core control in self care, mobility, lifting, ambulation and eccentric single leg control. [] UE / cervical: cervical, scapular, scapulothoracic and UE control with self care, reaching, carrying, lifting, house/yardwork, driving, computer work.      NMR and Therapeutic Activities:    [x] (12967 or 65047) Provided verbal/tactile cueing for activities related to improving balance, coordination, kinesthetic sense, posture, motor skill, proprioception and motor activation to allow for proper function of   [x] LE: / Lumbar core, proximal hip and LE with self care and ADLs  [] UE / Cervical: cervical, postural, scapular, scapulothoracic and UE control with self care, carrying, lifting, driving, computer work.   [] (07627) Gait Re-education- Provided training and instruction to the patient for proper LE, core and proximal hip recruitment and positioning and eccentric body weight control with ambulation re-education including up and down stairs     Home Management Training / Self Care:  [] (64279) Provided self-care/home management training related to activities of daily living and compensatory training, and/or use of adaptive equipment for improvement with: ADLs and compensatory training, meal preparation, safety procedures and instruction in use of adaptive equipment, including bathing, grooming, dressing, personal hygiene, basic household cleaning and chores. Home Exercise Program:    [] (64024) Reviewed/Progressed HEP activities related to strengthening, flexibility, endurance, ROM of   [] LE / Lumbar: core, proximal hip and LE for functional self-care, mobility, lifting and ambulation/stair navigation   [] UE / Cervical: cervical, postural, scapular, scapulothoracic and UE control with self care, reaching, carrying, lifting, house/yardwork, driving, computer work  [] (37478)Reviewed/Progressed HEP activities related to improving balance, coordination, kinesthetic sense, posture, motor skill, proprioception of   [] LE: core, proximal hip and LE for self care, mobility, lifting, and ambulation/stair navigation    [] UE / Cervical: cervical, postural,  scapular, scapulothoracic and UE control with self care, reaching, carrying, lifting, house/yardwork, driving, computer work    Manual Treatments:  PROM / STM / Oscillations-Mobs:  G-I, II, III, IV (PA's, Inf., Post.)  [] (98642) Provided manual therapy to mobilize LE, proximal hip and/or LS spine soft tissue/joints for the purpose of modulating pain, promoting relaxation,  increasing ROM, reducing/eliminating soft tissue swelling/inflammation/restriction, improving soft tissue extensibility and allowing for proper ROM for normal function with   [] LE / lumbar: self care, mobility, lifting and ambulation. [] UE / Cervical: self care, reaching, carrying, lifting, house/yardwork, driving, computer work.      Modalities:  [] (09299) Vasopneumatic compression: Utilized vasopneumatic compression to decrease edema / swelling for the purpose of improving mobility and quad tone / recruitment which will allow for increased overall function including but not limited to self-care, transfers, ambulation, and ascending / descending stairs. Charges:  Timed Code Treatment Minutes: 45   Total Treatment Minutes: 45     [] EVAL - LOW (53427)   [] EVAL - MOD (23577)  [] EVAL - HIGH (48977)  [] RE-EVAL (97893)  [x] LO(36619) x 3         [] Ionto  [] NMR (68206) x         [] Vaso  [] Manual (58118) x        [] Ultrasound  [] TA x        [] Mech Traction (17099)  [] Aquatic Therapy x      [] ES (un) (26311):   [] Home Management Training x  [] ES(attended) (88735)   [] Dry Needling 1-2 muscles (64715):  [] Dry Needling 3+ muscles (622276)  [] Group:      [] Other: phys perf x 2     GOALS:  Patient stated goal: The patient will be able to walk >500ft without increase in symptoms. []? Progressing: [x]? Met: []? Not Met: []? Adjusted     Therapist goals for Patient:   Short Term Goals: To be achieved in: 2 weeks post-op  1. Independent in HEP and progression per patient tolerance without increase in symptoms. []? Progressing: [x]? Met: []? Not Met: []? Adjusted  2. Patient will have a decrease in pain to <5/10 to facilitate improvement in movement of the BLE, and ADLs as indicated by Functional Deficits. []? Progressing: [x]? Met: []? Not Met: []? Adjusted     Long Term Goals: To be achieved in: 12 weeks post-op  1. Disability index score of 35% or less for the LEFS to assist with reaching prior level of function. [x]? Progressing: []? Met: [x]? Not Met: []? Adjusted  2. Patient will demonstrate increased knee AROM from 0 -120 allow for proper joint functioning as indicated by patients Functional Deficits. []? Progressing: [x]? Met: []? Not Met: []? Adjusted   3. Patient will demonstrate an increase in Strength to at least 4+/5 with R knee flexion and extension in order perform proper functional activities. []? Progressing: [x]? Met: []? Not Met: [x]? Adjusted: 5/5  4.  Patient will return to functional activities including walking in grocery strore without increased symptoms or restriction. [x]? Progressing: []? Met: [x]? Not Met: []? Adjusted  5. Patient will be able to walk >2 blocks without increase in pain <3/10. []? Progressing: [x]? Met: []? Not Met: []? Adjusted         Overall Progression Towards Functional goals/ Treatment Progress Update:  [x] Patient is progressing as expected towards functional goals listed. [] Progression is slowed due to complexities/Impairments listed. [] Progression has been slowed due to co-morbidities. [] Plan just implemented, too soon to assess goals progression <30days   [] Goals require adjustment due to lack of progress  [] Patient is not progressing as expected and requires additional follow up with physician  [] Other    Persisting Functional Limitations/Impairments:  []Sleeping []Sitting               [x]Standing [x]Transfers        [x]Walking [x]Kneeling               [x]Stairs []Squatting / bending   []ADLs []Reaching  []Lifting  []Housework  [x]Driving []Job related tasks  []Sports/Recreation [x]Other: donning R shoe      ASSESSMENT: Pt able to adjust weight machines mostly independently this date and perform exercises without difficulty. Pt challenged and fatigued with current weights/reps on machines, but able to complete routine. Continue with progressions per PT recommendation. Treatment/Activity Tolerance:  [x] Patient able to complete tx  [] Patient limited by fatigue  [] Patient limited by pain  [] Patient limited by other medical complications  [] Other:     Prognosis: [x] Good [] Fair  [] Poor    Patient Requires Follow-up: [x] Yes  [] No      Plan for next treatment session: quad strength, strengthening in OKC/CKC; squats as tolerated. Step activity & eccentrics. If patient desires, perform [facility] routine for leg strengthening and endurance    PLAN:  PT 2-3x / week for 12 weeks.    [x] Continue per plan of care [] Alter current plan (see comments)   [] Plan of care initiated [] Hold pending MD visit [] Discharge    Electronically signed by: Barrera Ross, PTA [id number]    Note: If patient does not return for scheduled/ recommended follow up visits, this note will serve as a discharge from care along with most recent update on progress.

## 2021-10-20 ENCOUNTER — APPOINTMENT (OUTPATIENT)
Dept: PHYSICAL THERAPY | Age: 51
End: 2021-10-20
Payer: COMMERCIAL

## 2021-10-25 ENCOUNTER — APPOINTMENT (OUTPATIENT)
Dept: PHYSICAL THERAPY | Age: 51
End: 2021-10-25
Payer: COMMERCIAL

## 2021-10-26 DIAGNOSIS — I10 ESSENTIAL HYPERTENSION: ICD-10-CM

## 2021-10-26 RX ORDER — ATENOLOL 50 MG/1
TABLET ORAL
Qty: 90 TABLET | Refills: 1 | Status: SHIPPED | OUTPATIENT
Start: 2021-10-26 | End: 2022-04-04

## 2021-10-26 NOTE — TELEPHONE ENCOUNTER
Medication:   Requested Prescriptions     Pending Prescriptions Disp Refills    atenolol (TENORMIN) 50 MG tablet [Pharmacy Med Name: ATENOLOL 50 MG TABLET] 90 tablet 1     Sig: TAKE 1 TABLET BY MOUTH EVERY DAY       Last Filled:  2/1/21 with 1 refill    Patient Phone Number: [phone] (home) [phone] (work)    Last appt: 6/29/2021   Next appt: Visit date not found    Lab Results   Component Value Date     07/21/2021    K 4.1 07/21/2021     07/21/2021    CO2 23 07/21/2021    BUN 13 07/21/2021    CREATININE 1.2 (H) 07/21/2021    GLUCOSE 128 (H) 07/21/2021    CALCIUM 8.8 07/21/2021    PROT 6.9 11/11/2020    LABALBU 4.6 06/28/2021    BILITOT 0.5 11/11/2020    ALKPHOS 78 11/11/2020    AST 16 11/11/2020    ALT 15 11/11/2020    LABGLOM 47 (A) 07/21/2021    GFRAA 57 (A) 07/21/2021    AGRATIO 1.6 11/11/2020    GLOB 2.7 11/11/2020

## 2021-10-27 ENCOUNTER — HOSPITAL ENCOUNTER (OUTPATIENT)
Dept: PHYSICAL THERAPY | Age: 51
Setting detail: THERAPIES SERIES
Discharge: HOME | End: 2021-10-27
Payer: COMMERCIAL

## 2021-10-27 PROCEDURE — 97530 THERAPEUTIC ACTIVITIES: CPT

## 2021-10-27 PROCEDURE — 97750 PHYSICAL PERFORMANCE TEST: CPT

## 2021-10-27 NOTE — DISCHARGE SUMMARY
[address] [facility]  Phone: [phone]   Fax: [phone]   Physical Therapy Discharge Summary    Dear   Sharla Duran MD,    We had the pleasure of treating the following patient for physical therapy services at [facility]. A summary of our findings can be found in the discharge summary below. If you have any questions or concerns regarding these findings, please do not hesitate to contact me at the office phone number checked above. Thank you for the referral.     Physician Signature:________________________________Date:__________________  By signing above (or electronic signature), therapists plan is approved by physician    Functional Outcome:   LEFS = 61/80 = 24% dys     MMT RLE:   Hip Flexion = 5/5 R  Hip abduction 4+/5  Hip Extension 5/5  Knee Flexion 5/5  Knee Extension 5/5    33.7# R Hip Abd  62.6# R Knee Ext    SLS R = 10sec    30 second sit to stand: 14  TUG = 7sec    GOALS:  Patient stated goal: The patient will be able to walk >500ft without increase in symptoms. []? Progressing: [x]? Met: []? Not Met: []? Adjusted     Therapist goals for Patient:   Short Term Goals: To be achieved in: 2 weeks post-op  1. Independent in HEP and progression per patient tolerance without increase in symptoms. []? Progressing: [x]? Met: []? Not Met: []? Adjusted  2. Patient will have a decrease in pain to <5/10 to facilitate improvement in movement of the BLE, and ADLs as indicated by Functional Deficits. []? Progressing: [x]? Met: []? Not Met: []? Adjusted     Long Term Goals: To be achieved in: 12 weeks post-op  1. Disability index score of 35% or less for the LEFS to assist with reaching prior level of function. []? Progressing: [x]? Met: []? Not Met: []? Adjusted  2. Patient will demonstrate increased knee AROM from 0 -120 allow for proper joint functioning as indicated by patients Functional Deficits. []? Progressing: [x]? Met: []? Not Met: []? Adjusted   3. Patient will demonstrate an increase in Strength to at least 4+/5 with R knee flexion and extension in order perform proper functional activities. []? Progressing: []? Met: [x]? Not Met: [x]? Adjusted: 5/5  4. Patient will return to functional activities including walking in grocery strore without increased symptoms or restriction. []? Progressing: [x]? Met: []? Not Met: []? Adjusted  5. Patient will be able to walk >2 blocks without increase in pain <3/10. []? Progressing: [x]? Met: []? Not Met: []? Adjusted       Overall Response to Treatment:   [x]Patient is responding well to treatment and improvement is noted with regards to goals and will DC with individualized HEP and continue use of healthplex for strength and endurance. The patient was instructed to follow up with MD or PT in case of new or unexpected symptoms. []Patient should continue to improve in reasonable time if they continue HEP   []Patient has plateaued and is no longer responding to skilled PT intervention    []Patient is getting worse and would benefit from return to referring MD   []Patient unable to adhere to initial POC   []Other:     Date range of Visits: 21 - 10/27/21  Total Visits: 24    Recommendation:    [x] Discharge to HEP. Follow up with PT or MD PRN.          Physical Therapy Daily Treatment Note  Date:  10/27/2021    Patient Name:  Joanie Trevino    :  1970  MRN: [id number]  Medical/Treatment Diagnosis Information:  Diagnosis: M17.11 (ICD-10-CM) - Primary osteoarthritis of right knee / PREHAB - sx: 21  Treatment Diagnosis: decreased RLE strength, knee pain, antalgic gait, knee instability  Insurance/Certification information:  PT Insurance Information: [address] [place] [address] - $25 copay - visits PMN  Physician Information:  Referring Practitioner: Parveen Treviño MD  Plan of care signed (Y/N): [x]  Yes []  No     Date of Patient follow up with Physician: 10/28/21     Progress Report: [x]  Yes  []  No     Date Range for reporting period:  Beginnin21   PN: 21  POC: 21  Ending: 10/27/21    Progress report due (10 Rx/or 30 days whichever is less): DC    Recertification due (POC duration/ or 90 days whichever is less): DC    Visit # Insurance Allowable Auth required? Date Range   24 60 []  Yes  [x]  No AIM REPORTS NO AUTH NEEDED ON 21 CORESPONDENCE       Latex Allergy:  [x]NO      []YES  Preferred Language for Healthcare:   [x]English       []other:     Functional Scale:        Date assessed:  LEFS: raw score = 20/80; dysfunction = 75%  21  LEFS: raw score = 15/80; dysfunction = 81%    LEFS: raw score = 49/80; dysfunction = 39%    LEFS: raw score = 49/80; dysfunction = 39%    LEFS: raw score = 61/80; dysfunction = 24%  10/27    Pain level:  0/10 pain      SUBJECTIVE:  SEE DC    OBJECTIVE: SEE DC     Functional Testing   Sx Date 21 Prehab Date 21 Post-op Re-Eval Date 21 4 week F/U date 21 8 week F/U date 21       TUG (sec) 8     20  16  7   30 second sit to stand (reps) 10  8  10  13   6 minute walk (m) 449.58   264 (RW)  288 ([facility])  425. 5       Balance:     Narrowed SOFIA (sec) 10  10  10  10   Semi Tandem (sec) 10    10     10     10       Tandem (sec) 10     10     10    10      SLS (sec) 3  0  10  10       Knee AROM L R L R L R L R   Flexion 119 122  119  87  119  103  119  120   Extension hyper 1 0  0  -19  0  - 4  0 0       Knee Ext: L R L R L R L R   MMT (of 5) 4+ 4  4+  unable  NT  3  5/5  5/5   Knee Ext (#) 31.8 29.6      NT  11.6  58.9  40.9       Hip Abd:  L R L R L R L R   MMT (of 5) 4+ 4   unable  NT   NT  5 4    Hip Abd (#) 32.9 24.3      NT  NT  36.4 36.8        LEFS (raw) 20  15  27  49      - SEE POC OBJ ABOVE   - 122deg knee flexion, lack 1 deg AROM, 0deg PROM   - AAROM = 116deg flexion, 0deg ext AAROM (after manual)   - AAROM 116deg flexion; lack 3 deg AROM  : AROM knee flexion 105 degrees, AAROM with heel slide 109 degrees. Pt to department today with RW. Mild tenderness noted lateral distal hamstrings and proximal lateral gastroc  8/20 - R knee AAROM flexion: 110   8/13/21  Rt knee ROM 0-100 EOB. Presented to clinic with walker but brought     8/11 - 95deg AAROM R knee flexion; lack 3 extension AROM; soft end feel for both; however significant pain. 8/7: AROM knee ext with quad set lacking 6 from neutral; Knee flexion AAROM heel slide and overpressure 97 degrees with soft end feel however significant pain. RESTRICTIONS/PRECAUTIONS: HX OF FALLS     Exercises/Interventions:   Therapeutic Exercises (98193) Resistance / level Sets/sec Reps Notes   Quantum HS Curls (B3, K2, A4) Prone Quad Strap Stretch Leg Press (B1, L5) Single Leg Deadlift Quantum Knee Ext (B3, KD, A4) Alt LE SLR Abd - standing Standing Resisted Glute Kick Hip Abduction Machine - [facility]plex Hip Adduction Machine - [facility]plex: red arrow 5 Knee Ext - [facility]plex (B5, K5, A2) HS Curls - [facility]plex (B6, K5, A2, Th7) Landmine Squats FM Leg Press (B1, L5)        Therapeutic Activities (05805)        R Bike  Standing runner's calf stretch Standing HSS on step TG Squats  TG Marches  TRX Squats  TRX Lateral L Tap  Fwd/Post wt shift Cone Walking BOSU Step Up to SLS  Staggered Lunge (LLE back on 6'' step; RLE on floor)  Stair Navigation (27 steps w/ HR) Phys perf. Testing & DC measures   20' 10/27 - performed for DC   Standing -> Half Kneeling -> Tall Kneeling Transfer   1x R/L ea 10/27 - added   Q&A regarding HEP progression/regression, Healthplex, symptoms at 3-6 mos.  post-op   8' 10/27 - added          Neuromuscular Re-ed (34540)       Retro Gliders Lateral Heel Tap Step Up & Over SLS EC Lateral Step Over SLR Flexion w/ Heel Prop   SLR up & over  R CKC c L OKC Hip ABD/Ext  Lateral Band Walk SAQ to SLR  TKE  Prone TKE  Retro Step Back (involuntary quad contraction)  Biodex Balance LSU - heel taps        Manual Intervention (78906) -        Patellar mobs    AAROM knee flexion with heel slide        8/11   10/27 - Functional testing performed this date and compared with previous values as noted above. Testing done in order to assess readiness for DC and achievement of LTGs. 9/14 - phys perf testing. Functional testing performed this date and compared with previous values as noted above. Testing done in order to assess progress with community navigation and fall risk. Patient educated on the current focus of skilled physical therapy services and plan of care, including: diagnosis, prognosis, treatment goals & options at this time. Patient educated on scar tissue mobilization at home, encouraged ongoing use of vitamin E oil, encouraged the patient to begin ambulation up to 0.5 miles at comfortable pace 3-4 days per week. Patient and PT discussion regarding her anxiety surrounding resuming activities that were difficult/painful prior to having TKA and that if anxiety is beginning to limit participation in going certain places that it is worth discussing with her PCP. A total of 40 minutes spent completing functional testing as well as educating patient on results and other instruction. 8/17 - Functional testing performed this date and compared with previous values as noted above. Testing done in order to assess progress with community navigation and fall risk. A total of 25 minutes spent completing functional testing and reviewing results. Modalities: 8/20: NMR with Welsh estim to R quads: LAQ, SAQ, quad sets. Pt instructed with verbal and tactile cueing for quad engagement B quads to facilitate activation. 10/10 on /off cycle x 10 mins. Pt. Education:   6/18 - Patient was thoroughly educated on this date regarding prehabilitation goals, importance of PT sessions in improving overall ROM, strength and stability prior to surgery, and how prehabilitation will facilitate improved post-operative outcomes.  The patient was educated on and instructed in HEP as listed. The patient was given a detailed handout for exercises to initiate in the hospital post-operatively as well as at home. The discharge plan from the hospital was reviewed with the patient; specifically, to reduce length of hospital stay and to minimize time before reinitiating outpatient physical therapy after surgery. Education regarding early mobility post-operatively in the hospital and emphasis on working with both physical therapy and nursing staff to achieve ambulation goal of 150 feet was provided. The patient was highly encouraged to attend joint class in hospital prior to surgery for further instructions on pre and post-surgical care. Also, the patient was educated on precautions after hip replacement to avoid, specifically, hip extension and repetitive hip flexion. It is in my medical opinion that this patient is clear from all physical barriers prior to consideration for surgery, activity modifications prior to and post operatively have been discussed with this patient as well as discharge planning and is cleared for surgery from physical therapy perspective.  -patient educated on diagnosis, prognosis and expectations for rehab  -all patient questions were answered    Home Exercise Program:  Access Code: [email or web address]  - NicOx Leg Machines: Leg press, HS Curls, Knee Ext, Hip Abd, Hip Add (settings still needed)    Access Code: [facility]  URL: [email or web address]  Date: 08/20/2021  Prepared by: Miguelina Lynn    Exercises  Seated Hamstring Stretch - 2-3 x daily - 6 x weekly - 4 reps - 20 hold  Mini Squat - 2-3 x daily - 6 x weekly - 2 sets - 10 reps  Prone Quadriceps Set - 2-3 x daily - 6 x weekly - 1 sets - 10 reps - 5-8 hold  Supine Active Straight Leg Raise - 2-3 x daily - 6 x weekly - 3 sets - 10 reps  Gastroc Stretch on Wall - 2-3 x daily - 6 x weekly - 1 sets - 3 reps - 10 hold  Supine Heel Slide with Strap - 2-3 x daily - 6 x weekly - 1 sets - 10 reps - 10 hold  Supine Knee Extension Mobilization with Weight - 2-3 x daily - 6 x weekly - 1 sets - 3 reps - 60 hold  Seated Long Arc Quad - 2-3 x daily - 6 x weekly - 2 sets - 10 reps - 3 hold    8/17 - added 1/4 squat    Access Code: [email or web address]  URL: [email or web address]  Date: 08/05/2021  Prepared by: Eliz Jones    Exercises   Long Sitting Calf Stretch with Strap - 3 x daily - 7 x weekly - 4 reps - 20 hold  Seated Gastroc Stretch with Strap - 3 x daily - 7 x weekly - 4 reps - 20 hold   Seated Hamstring Stretch - 3 x daily - 7 x weekly - 4 reps - 20 hold     8/5: HEP was also reviewed with pt from home health PT and encouraged to continue, additions of interventions above. 6/18 - The following exercises were discussed and added to the patient's home exercise program. (glute set, quad set, ankle pumps, heel prop, heel slide, SLR flexion). Additionally, the patient was educated on appropriate frequency, intensity and duration. Handout provided. Therapeutic Exercise and NMR EXR  [] (65978) Provided verbal/tactile cueing for activities related to strengthening, flexibility, endurance, ROM for improvements in  [] LE / Lumbar: LE, proximal hip, and core control with self care, mobility, lifting, ambulation. [] UE / Cervical: cervical, postural, scapular, scapulothoracic and UE control with self care, reaching, carrying, lifting, house/yardwork, driving, computer work.  [] (44913) Provided verbal/tactile cueing for activities related to improving balance, coordination, kinesthetic sense, posture, motor skill, proprioception to assist with   [] LE / lumbar: LE, proximal hip, and core control in self care, mobility, lifting, ambulation and eccentric single leg control.    [] UE / cervical: cervical, scapular, scapulothoracic and UE control with self care, reaching, carrying, lifting, house/yardwork, driving, computer work.   [] (99584) Therapist is in constant attendance of 2 or more patients providing skilled therapy interventions, but not providing any significant amount of measurable one-on-one time to either patient, for improvements in  [] LE / lumbar: LE, proximal hip, and core control in self care, mobility, lifting, ambulation and eccentric single leg control. [] UE / cervical: cervical, scapular, scapulothoracic and UE control with self care, reaching, carrying, lifting, house/yardwork, driving, computer work. NMR and Therapeutic Activities:    [x] (74073 or 04713) Provided verbal/tactile cueing for activities related to improving balance, coordination, kinesthetic sense, posture, motor skill, proprioception and motor activation to allow for proper function of   [x] LE: / Lumbar core, proximal hip and LE with self care and ADLs  [] UE / Cervical: cervical, postural, scapular, scapulothoracic and UE control with self care, carrying, lifting, driving, computer work.   [] (30267) Gait Re-education- Provided training and instruction to the patient for proper LE, core and proximal hip recruitment and positioning and eccentric body weight control with ambulation re-education including up and down stairs     Home Management Training / Self Care:  [] (95716) Provided self-care/home management training related to activities of daily living and compensatory training, and/or use of adaptive equipment for improvement with: ADLs and compensatory training, meal preparation, safety procedures and instruction in use of adaptive equipment, including bathing, grooming, dressing, personal hygiene, basic household cleaning and chores.      Home Exercise Program:    [] (93342) Reviewed/Progressed HEP activities related to strengthening, flexibility, endurance, ROM of   [] LE / Lumbar: core, proximal hip and LE for functional self-care, mobility, lifting and ambulation/stair navigation   [] UE / Cervical: cervical, postural, scapular, scapulothoracic and UE control with self care, reaching, carrying, lifting, house/yardwork, driving, computer work  [] (26780)Reviewed/Progressed HEP activities related to improving balance, coordination, kinesthetic sense, posture, motor skill, proprioception of   [] LE: core, proximal hip and LE for self care, mobility, lifting, and ambulation/stair navigation    [] UE / Cervical: cervical, postural,  scapular, scapulothoracic and UE control with self care, reaching, carrying, lifting, house/yardwork, driving, computer work    Manual Treatments:  PROM / STM / Oscillations-Mobs:  G-I, II, III, IV (PA's, Inf., Post.)  [] ([id number]) Provided manual therapy to mobilize LE, proximal hip and/or LS spine soft tissue/joints for the purpose of modulating pain, promoting relaxation,  increasing ROM, reducing/eliminating soft tissue swelling/inflammation/restriction, improving soft tissue extensibility and allowing for proper ROM for normal function with   [] LE / lumbar: self care, mobility, lifting and ambulation. [] UE / Cervical: self care, reaching, carrying, lifting, house/yardwork, driving, computer work. Modalities:  [] (08710) Vasopneumatic compression: Utilized vasopneumatic compression to decrease edema / swelling for the purpose of improving mobility and quad tone / recruitment which will allow for increased overall function including but not limited to self-care, transfers, ambulation, and ascending / descending stairs.      Charges:  Timed Code Treatment Minutes: 30   Total Treatment Minutes: 30     [] EVAL - LOW (98913)   [] EVAL - MOD (38496)  [] EVAL - HIGH (48655)  [] RE-EVAL (95643)  [] SM(80244) x         [] Ionto  [] NMR (32492) x         [] Vaso  [] Manual (89236) x        [] Ultrasound  [x] TA x 1       [] Mech Traction (89009)  [] Aquatic Therapy x      [] ES (un) (82507):   [] Home Management Training x  [] ES(attended) (75219)   [] Dry Needling 1-2 muscles (15597):  [] Dry Needling 3+ muscles (329517)  [] Group:      [x] Other: phys perf x 1       Overall Progression Towards Functional goals/ Treatment Progress Update:  [x] Patient is progressing as expected towards functional goals listed. [] Progression is slowed due to complexities/Impairments listed. [] Progression has been slowed due to co-morbidities. [] Plan just implemented, too soon to assess goals progression <30days   [] Goals require adjustment due to lack of progress  [] Patient is not progressing as expected and requires additional follow up with physician  [] Other    Persisting Functional Limitations/Impairments:  []Sleeping []Sitting               []Standing []Transfers        []Walking [x]Kneeling               []Stairs []Squatting / bending   []ADLs []Reaching  []Lifting  []Housework  []Driving []Job related tasks  []Sports/Recreation []Other: donning R shoe      ASSESSMENT:   SEE DC ABOVE    Treatment/Activity Tolerance:  [x] Patient able to complete tx  [] Patient limited by fatigue  [] Patient limited by pain  [] Patient limited by other medical complications  [] Other:     Prognosis: [x] Good [] Fair  [] Poor    Patient Requires Follow-up: [] Yes  [x] No      Plan for next treatment session: DC    PLAN:  PT 2-3x / week for 12 weeks. [] Continue per plan of care [] Alter current plan (see comments)   [] Plan of care initiated [] Hold pending MD visit [x] Discharge    Electronically signed by: Constance Rg, PT, DPT, OMT-C    Note: If patient does not return for scheduled/ recommended follow up visits, this note will serve as a discharge from care along with most recent update on progress.

## 2021-10-28 ENCOUNTER — OFFICE VISIT (OUTPATIENT)
Dept: ORTHOPEDIC SURGERY | Age: 51
End: 2021-10-28
Payer: COMMERCIAL

## 2021-10-28 VITALS — HEIGHT: 61 IN | RESPIRATION RATE: 18 BRPM | BODY MASS INDEX: 50.98 KG/M2 | WEIGHT: 270 LBS

## 2021-10-28 DIAGNOSIS — Z96.651 STATUS POST TOTAL KNEE REPLACEMENT, RIGHT: Primary | ICD-10-CM

## 2021-10-28 PROCEDURE — 99213 OFFICE O/P EST LOW 20 MIN: CPT | Performed by: ORTHOPAEDIC SURGERY

## 2021-10-28 NOTE — PROGRESS NOTES
[facility]  Office Visit    Chief Complaint: Follow-up s/p right total knee arthroplasty    HPI:  Corby Posey is a 46 y.o. who is here in follow-up of right total knee arthroplasty performed on July 20, 2021. She is doing well. She has occasional feelings of swelling and stiffness but no pain. She walks without assistive device. She feels she is ready to return to work. Patient Active Problem List   Diagnosis    Carpal tunnel syndrome    Hypertension    Obesity    Renal insufficiency    Vitamin D deficiency    Contusion of right knee    Infected abrasion of left hand    Hand dermatitis    Candidiasis of finger web    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[facility] [address]    Anemia in chronic renal disease    Anemia, iron deficiency    Chronic kidney disease, stage 3, mod decreased GFR (HCC)    Chronic pain of right knee    Family history of cancer    Hypertension, essential, benign    Overweight and obesity    Primary localized osteoarthrosis of the knee    Proteinuria    Renal osteodystrophy    Tear of PCL (posterior cruciate ligament) of knee    Hypertension, essential    Morbid obesity with BMI of 45.0-49.9, adult ([facility] [address]    Arthritis of right knee    Status post right knee replacement    Primary osteoarthritis of left knee       ROS:  Constitutional: denies fever, chills, weight loss  MSK: denies pain in other joints, muscle aches  Neurological: denies numbness, tingling, weakness    Exam:  Resp. rate 18, height 5' 1\" (1.549 m), weight 270 lb (122.5 kg)    Appearance: sitting in exam room chair, appears to be in no acute distress, awake and alert  Resp: unlabored breathing on room air  Skin: warm, dry and intact with out erythema or significant increased temperature  Neuro: grossly intact both lower extremities. Intact sensation to light touch. Motor exam 4+ to 5/5 in all major motor groups. Right knee: Incision is healed. Range of motion is 0 to 115 degrees.   Sensation is intact light touch. There is brisk capillary refill. Dorsalis pedis and posterior tibial pulses are intact. There is 5/5 muscle strength in all muscle groups. Imaging:  3 views of the right knee were performed and interpreted today. She has a total knee arthroplasty prosthesis in place with no signs of osteolysis, loosening, fracture, dislocation. She will continue working on home exercises. Assessment:  S/p right total knee arthroplasty    Plan:  She is recovering well after right total knee arthroplasty. We discussed with the patient today the use of antibiotic prophylaxis prior to any dental procedures. We discussed that the antibiotic prophylaxis would be used to help prevent periprosthetic joint infections. If they were not offered antibiotics by the physician performing the procedure, they should contact our office that we may prescribe them antibiotics. A note was provided for her to return to work full duty with no restrictions starting November 2. She will follow-up on an annual basis or sooner as needed. Total time spent on today's encounter was at least 23 minutes. This time included reviewing prior notes, radiographs, and lab results when available, reviewing history obtained by medical assistant, performing history and physical exam, reviewing tests/radiographs with the patient, counseling the patient, ordering medications or tests, documentation in the electronic health record, and coordination of care. This dictation was done with Dragon dictation and may contain mechanical errors related to translation.

## 2021-10-28 NOTE — LETTER
[facility]  [place] [address] [address] [place] [address] [address] [address]  Phone: [phone]  Fax: [phone]    Hilda Rosado MD        October 28, 2021     Patient: Milton Mann   YOB: 1970   Date of Visit: 10/28/2021       To Whom It May Concern: It is my medical opinion that Luda Herbert may return to work on 11/2/21. If you have any questions or concerns, please don't hesitate to call.     Sincerely,          Hilda Rosdao MD

## 2021-10-29 ENCOUNTER — HOSPITAL ENCOUNTER (OUTPATIENT)
Age: 51
Discharge: HOME | End: 2021-10-29
Payer: COMMERCIAL

## 2021-10-29 DIAGNOSIS — N25.81 SECONDARY HYPERPARATHYROIDISM (HCC): ICD-10-CM

## 2021-10-29 LAB
A/G RATIO: 1.6 (ref 1.1–2.2)
ALBUMIN SERPL-MCNC: 4.6 G/DL (ref 3.4–5)
ALP BLD-CCNC: 99 U/L (ref 40–129)
ALT SERPL-CCNC: 10 U/L (ref 10–40)
ANION GAP SERPL CALCULATED.3IONS-SCNC: 15 MMOL/L (ref 3–16)
AST SERPL-CCNC: 16 U/L (ref 15–37)
BILIRUB SERPL-MCNC: 0.5 MG/DL (ref 0–1)
BUN BLDV-MCNC: 16 MG/DL (ref 7–20)
CALCIUM SERPL-MCNC: 9.8 MG/DL (ref 8.3–10.6)
CHLORIDE BLD-SCNC: 102 MMOL/L (ref 99–110)
CO2: 23 MMOL/L (ref 21–32)
CREAT SERPL-MCNC: 1.3 MG/DL (ref 0.6–1.1)
GFR AFRICAN AMERICAN: 52
GFR NON-AFRICAN AMERICAN: 43
GLUCOSE BLD-MCNC: 92 MG/DL (ref 70–99)
PARATHYROID HORMONE INTACT: 82.2 PG/ML (ref 14–72)
POTASSIUM SERPL-SCNC: 4.2 MMOL/L (ref 3.5–5.1)
SODIUM BLD-SCNC: 140 MMOL/L (ref 136–145)
TOTAL PROTEIN: 7.5 G/DL (ref 6.4–8.2)

## 2021-10-29 PROCEDURE — 36415 COLL VENOUS BLD VENIPUNCTURE: CPT

## 2021-10-29 PROCEDURE — 80053 COMPREHEN METABOLIC PANEL: CPT

## 2021-10-29 PROCEDURE — 83970 ASSAY OF PARATHORMONE: CPT

## 2021-10-29 PROCEDURE — 82306 VITAMIN D 25 HYDROXY: CPT

## 2021-10-30 LAB — VITAMIN D 25-HYDROXY: 33.5 NG/ML

## 2022-01-24 ENCOUNTER — TELEPHONE (OUTPATIENT)
Dept: ORTHOPEDIC SURGERY | Age: 52
End: 2022-01-24

## 2022-01-24 ENCOUNTER — OFFICE VISIT (OUTPATIENT)
Dept: ORTHOPEDIC SURGERY | Age: 52
End: 2022-01-24
Payer: COMMERCIAL

## 2022-01-24 VITALS — HEIGHT: 61 IN | BODY MASS INDEX: 50.79 KG/M2 | WEIGHT: 269 LBS

## 2022-01-24 DIAGNOSIS — M17.12 PRIMARY OSTEOARTHRITIS OF LEFT KNEE: Primary | ICD-10-CM

## 2022-01-24 PROCEDURE — 20610 DRAIN/INJ JOINT/BURSA W/O US: CPT | Performed by: PHYSICIAN ASSISTANT

## 2022-01-24 PROCEDURE — 99213 OFFICE O/P EST LOW 20 MIN: CPT | Performed by: PHYSICIAN ASSISTANT

## 2022-01-24 RX ORDER — BUPIVACAINE HYDROCHLORIDE 2.5 MG/ML
4 INJECTION, SOLUTION INFILTRATION; PERINEURAL ONCE
Status: COMPLETED | OUTPATIENT
Start: 2022-01-24 | End: 2022-01-24

## 2022-01-24 RX ORDER — TRIAMCINOLONE ACETONIDE 40 MG/ML
40 INJECTION, SUSPENSION INTRA-ARTICULAR; INTRAMUSCULAR ONCE
Status: COMPLETED | OUTPATIENT
Start: 2022-01-24 | End: 2022-01-24

## 2022-01-24 RX ADMIN — BUPIVACAINE HYDROCHLORIDE 10 MG: 2.5 INJECTION, SOLUTION INFILTRATION; PERINEURAL at 15:44

## 2022-01-24 RX ADMIN — TRIAMCINOLONE ACETONIDE 40 MG: 40 INJECTION, SUSPENSION INTRA-ARTICULAR; INTRAMUSCULAR at 15:44

## 2022-01-24 NOTE — PROGRESS NOTES
This dictation was done with [employer] dictation and may contain mechanical errors related to translation. Height 5' 1\" (1.549 m), weight 269 lb (122 kg), not currently breastfeeding. This is a pleasant 14-year-old female who has ongoing pain from osteoarthritis in her left knee. It is pretty bad at times and can be an 8 out of 10. She has had some relief with cortisone injections in the past.  She had a right total knee replacement done by a zina and is doing fairly well with that. This patient is here in follow-up for ongoing pain and dysfunction in their Left knee. There x-rays done previously showed joint space narrowing subchondral sclerosis with osteophyte formation. They currently have had relief with injections of cortisone, anti-inflammatories and a home exercise program. There are here with increased pain over the last few days with swelling and dysfunction. On examination this is a well-developed patient in no acute distress. They are alert and oriented ×3. They walk without antalgia or any significant varus or valgus deformity. They have crepitus during flexion and extension in the patellofemoral joint. They have a negative Lachman and a negative Abigail. There are good distal pulses and good dorsiflexion and plantarflexion strength present    My impression is left knee osteoarthritis    After a discussion of the multiple options, they consented to a cortisone shot. 1 ml of 40mg/ml Kenalog and 2 ml's of 0.25%Marcaine were injected into the leftt knee joint space. During today's visit, there was approximately 20 minutes of face-to-face discussion in regards to the patient's current condition and treatment options. More than 50 % of the time was counseling and coordination of care. The leg was slightly flexed and injected just lateral to the patella tendon to under the patella. This was done with sterile technique and they tolerated it well.     I went through a good stretch and strengthening exercise program. They understand that at some point, they will need a knee replacement. And they will follow up with us on a when necessary basis over the next 3-6 months    This patient has a well documented history of osteoarthritis in their knee. They have trialed Nsaid medications, physical therapy exercises and steroid injections. They continue to have pain, swelling and dysfunction. At this junction, hyalgen injections are advisable.  I gave them literature and discussed the risks and benefits with them and would like to seek pre-authorization for

## 2022-03-01 DIAGNOSIS — I10 ESSENTIAL HYPERTENSION: ICD-10-CM

## 2022-03-01 NOTE — TELEPHONE ENCOUNTER
lov 6/29/21  Led 30 0 2/4/22  Lab Results   Component Value Date     10/29/2021    K 4.2 10/29/2021     10/29/2021    CO2 23 10/29/2021    BUN 16 10/29/2021    CREATININE 1.3 (H) 10/29/2021    GLUCOSE 92 10/29/2021    CALCIUM 9.8 10/29/2021    PROT 7.5 10/29/2021    LABALBU 4.6 10/29/2021    BILITOT 0.5 10/29/2021    ALKPHOS 99 10/29/2021    AST 16 10/29/2021    ALT 10 10/29/2021    LABGLOM 43 (A) 10/29/2021    GFRAA 52 (A) 10/29/2021    AGRATIO 1.6 10/29/2021    GLOB 2.7 11/11/2020

## 2022-03-01 NOTE — TELEPHONE ENCOUNTER
Medication and Quantity requested: amLODIPine (NORVASC) 10 MG tablet-QTY.  90 tablets         Last Visit  6/29/21    Pharmacy and phone number updated in EPIC:  yes  [facility]

## 2022-03-02 RX ORDER — AMLODIPINE BESYLATE 10 MG/1
TABLET ORAL
Qty: 30 TABLET | Refills: 0 | OUTPATIENT
Start: 2022-03-02

## 2022-03-03 NOTE — TELEPHONE ENCOUNTER
Called patient and she states she already picked up her medication and she will call back to schedule an appointment.

## 2022-03-25 DIAGNOSIS — I10 ESSENTIAL HYPERTENSION: ICD-10-CM

## 2022-03-25 RX ORDER — AMLODIPINE BESYLATE 10 MG/1
TABLET ORAL
Qty: 30 TABLET | Refills: 0 | OUTPATIENT
Start: 2022-03-25

## 2022-03-25 NOTE — TELEPHONE ENCOUNTER
Medication:   Requested Prescriptions     Pending Prescriptions Disp Refills    amLODIPine (NORVASC) 10 MG tablet [Pharmacy Med Name: AMLODIPINE BESYLATE 10 MG TAB] 30 tablet 0     Sig: TAKE 1 TABLET BY MOUTH EVERY DAY       Last Filled:  2/4/2022    Patient Phone Number: [phone] (home) [phone] (work)    Last appt: 6/29/2021   Next appt: Visit date not found    Lab Results   Component Value Date     10/29/2021    K 4.2 10/29/2021     10/29/2021    CO2 23 10/29/2021    BUN 16 10/29/2021    CREATININE 1.3 (H) 10/29/2021    GLUCOSE 92 10/29/2021    CALCIUM 9.8 10/29/2021    PROT 7.5 10/29/2021    LABALBU 4.6 10/29/2021    BILITOT 0.5 10/29/2021    ALKPHOS 99 10/29/2021    AST 16 10/29/2021    ALT 10 10/29/2021    LABGLOM 43 (A) 10/29/2021    GFRAA 52 (A) 10/29/2021    AGRATIO 1.6 10/29/2021    GLOB 2.7 11/11/2020

## 2022-03-29 ENCOUNTER — OFFICE VISIT (OUTPATIENT)
Dept: FAMILY MEDICINE CLINIC | Age: 52
End: 2022-03-29
Payer: COMMERCIAL

## 2022-03-29 VITALS
SYSTOLIC BLOOD PRESSURE: 130 MMHG | WEIGHT: 268 LBS | DIASTOLIC BLOOD PRESSURE: 86 MMHG | BODY MASS INDEX: 50.64 KG/M2 | OXYGEN SATURATION: 98 % | HEART RATE: 54 BPM

## 2022-03-29 DIAGNOSIS — I10 HYPERTENSION, ESSENTIAL, BENIGN: ICD-10-CM

## 2022-03-29 DIAGNOSIS — Z00.00 ANNUAL PHYSICAL EXAM: Primary | ICD-10-CM

## 2022-03-29 PROCEDURE — 99396 PREV VISIT EST AGE 40-64: CPT | Performed by: FAMILY MEDICINE

## 2022-03-29 ASSESSMENT — PATIENT HEALTH QUESTIONNAIRE - PHQ9
2. FEELING DOWN, DEPRESSED OR HOPELESS: 0
1. LITTLE INTEREST OR PLEASURE IN DOING THINGS: 0
SUM OF ALL RESPONSES TO PHQ QUESTIONS 1-9: 0
SUM OF ALL RESPONSES TO PHQ QUESTIONS 1-9: 0
SUM OF ALL RESPONSES TO PHQ9 QUESTIONS 1 & 2: 0
SUM OF ALL RESPONSES TO PHQ QUESTIONS 1-9: 0
SUM OF ALL RESPONSES TO PHQ QUESTIONS 1-9: 0

## 2022-03-29 NOTE — PROGRESS NOTES
[address] Note    Date: 3/29/2022                                               Eduardo Jaeger:     Chief Complaint   Patient presents with    Blood Pressure Check       HPI   Patient is here for annual exam.  Tdap 2013. Last Pap smear 3 years ago, within normal limits. Last mammogram 6 months ago. Last colonoscopy 2 years ago, so to follow-up in 10 years. Patient is here for blood pressure check. Currently takes amlodipine and atenolol. Denies chest pain or shortness of breath. Pt feels well and has no complaints.          Patient Active Problem List    Diagnosis Date Noted    Primary osteoarthritis of left knee 09/08/2021    Status post right knee replacement     Arthritis of right knee 07/20/2021    Hypertension, essential     Morbid obesity with BMI of 45.0-49.9, adult ([address]     Anemia in chronic renal disease 01/09/2020    Proteinuria 01/09/2020    Chronic pain of right knee 03/26/2019    Candidiasis of finger web 04/04/2018    Hand dermatitis 03/19/2018    Infected abrasion of left hand 03/15/2018    Primary localized osteoarthrosis of the knee 10/04/2016    Tear of PCL (posterior cruciate ligament) of knee 10/04/2016    Contusion of right knee 06/29/2016    Family history of cancer 06/21/2013    Renal insufficiency 06/13/2012    Vitamin D deficiency 06/13/2012    Carpal tunnel syndrome     Hypertension     Obesity     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[address] 04/19/2007    Anemia, iron deficiency 04/19/2007    Chronic kidney disease, stage 3, mod decreased GFR ([address] 04/19/2007    Hypertension, essential, benign 04/19/2007    Overweight and obesity 04/19/2007    Renal osteodystrophy 04/19/2007       Past Medical History:   Diagnosis Date    Arthritis     BRCA1 negative     BRCA2 negative     Carpal tunnel syndrome     Convulsions     COVID-19     Dermatophytosis     Diarrhea     Hypertension     Hypertension, essential     Hypertensive kidney disease with chronic kidney disease stage V [facility]     Medulloadrenal hyperfunc     Meningococcal encephalitis     Morbid obesity with BMI of 45.0-49.9, adult (HCC)     Obesity     Other malaise and fatigue     Seizure disorder (HCC)     tonic-last on was @ 2005    Status post right knee replacement        Current Outpatient Medications   Medication Sig Dispense Refill    diclofenac sodium (VOLTAREN) 1 % GEL APPLY 4 G TOPICALLY 4 TIMES A  g 0    amLODIPine (NORVASC) 10 MG tablet TAKE 1 TABLET BY MOUTH EVERY DAY 30 tablet 0    atenolol (TENORMIN) 50 MG tablet TAKE 1 TABLET BY MOUTH EVERY DAY 90 tablet 1    levETIRAcetam (KEPPRA) 500 MG tablet TAKE 1 TABLET TWICE DAILY 180 tablet 3    meclizine (ANTIVERT) 25 MG tablet Take 25 mg by mouth 3 times daily as needed        No current facility-administered medications for this visit. Allergies   Allergen Reactions    Ibuprofen       Patient has renal insuffiencey.          Nsaids       Patient has renal insuffiencey.             Review of Systems   No CP, no SOB, no rash, no bruise, no HA, no vision change, no ankle swelling, no hearing problems, no LAD      Vitals:  /86   Pulse 54   Wt 268 lb (121.6 kg)   SpO2 98%   BMI 50.64 kg/m²     Wt Readings from Last 3 Encounters:   03/29/22 268 lb (121.6 kg)   01/24/22 269 lb (122 kg)   10/28/21 270 lb (122.5 kg)        Physical Exam   General:  Well-appearing, NAD, alert, non-toxic  HEENT:  Normocephalic, atraumatic. Pupils equal and round. TMs pearly with good landmarks. Moist mucous membranes. Normal dentition  NECK:  Supple, normal range of motion, no LAD, no meningeal signs, no JVD, nontender  CHEST/LUNGS: CTAB, no crackles, no wheeze, no rhonchi. Symmetric rise  CARDIOVASCULAR: RRR,  no murmur, no rub  ABDOMEN: Soft, non-tender, non-distended. No masses  EXTREMETIES: Normal movement of all extremities. No edema. No joint swelling.   SKIN:  No rash, no cellulitis, no bruising, no petechiae/purpura/vesicles/pustules/abscess  PSYCH:  A+O x 3; normal affect  NEURO:  GCS 15, CN2-12 grossly intact, no focal motor/sensory deficits, no cerebellar deficits, normal gait, normal speech      Assessment/Plan     40-year-old female here for annual exam.  Colonoscopy is up-to-date. Mammograms up-to-date. Pap smear is up-to-date. Check routine labs. Tdap is up-to-date. Patient is hypertension. Blood pressures goal.  Continue current medicines. Follow-up in 6 months for blood pressure check. Discharged in stable condition at 3:20 PM.    1. Annual physical exam  -     CBC with Auto Differential; Future  -     Comprehensive Metabolic Panel; Future  -     Hemoglobin A1C; Future  -     Lipid, Fasting; Future  -     TSH with Reflex; Future  -     Hepatitis C Antibody; Future  2. Hypertension, essential, benign       Orders Placed This Encounter   Procedures    CBC with Auto Differential     Standing Status:   Future     Standing Expiration Date:   3/29/2023    Comprehensive Metabolic Panel     Standing Status:   Future     Standing Expiration Date:   3/29/2023    Hemoglobin A1C     Standing Status:   Future     Standing Expiration Date:   3/29/2023    Lipid, Fasting     Standing Status:   Future     Standing Expiration Date:   3/29/2023    TSH with Reflex     Standing Status:   Future     Standing Expiration Date:   3/29/2023    Hepatitis C Antibody     Standing Status:   Future     Standing Expiration Date:   3/29/2023       No follow-ups on file.     Radha Fortune MD, MD    3/29/2022  3:20 PM

## 2022-04-03 DIAGNOSIS — I10 ESSENTIAL HYPERTENSION: ICD-10-CM

## 2022-04-04 RX ORDER — ATENOLOL 50 MG/1
TABLET ORAL
Qty: 90 TABLET | Refills: 1 | Status: SHIPPED | OUTPATIENT
Start: 2022-04-04 | End: 2022-09-30 | Stop reason: SDUPTHER

## 2022-04-04 NOTE — TELEPHONE ENCOUNTER
Medication:   Requested Prescriptions     Pending Prescriptions Disp Refills    atenolol (TENORMIN) 50 MG tablet [Pharmacy Med Name: ATENOLOL 50 MG TABLET] 90 tablet 1     Sig: TAKE 1 TABLET BY MOUTH EVERY DAY       Last Filled:  10/26/2021 #90 1rf    Patient Phone Number: [phone] (home) [phone] (work)    Last appt: 3/29/2022   Next appt: 9/30/2022    Lab Results   Component Value Date     10/29/2021    K 4.2 10/29/2021     10/29/2021    CO2 23 10/29/2021    BUN 16 10/29/2021    CREATININE 1.3 (H) 10/29/2021    GLUCOSE 92 10/29/2021    CALCIUM 9.8 10/29/2021    PROT 7.5 10/29/2021    LABALBU 4.6 10/29/2021    BILITOT 0.5 10/29/2021    ALKPHOS 99 10/29/2021    AST 16 10/29/2021    ALT 10 10/29/2021    LABGLOM 43 (A) 10/29/2021    GFRAA 52 (A) 10/29/2021    AGRATIO 1.6 10/29/2021    GLOB 2.7 11/11/2020

## 2022-04-07 DIAGNOSIS — I10 ESSENTIAL HYPERTENSION: ICD-10-CM

## 2022-04-08 DIAGNOSIS — I10 ESSENTIAL HYPERTENSION: ICD-10-CM

## 2022-04-08 RX ORDER — AMLODIPINE BESYLATE 10 MG/1
10 TABLET ORAL DAILY
Qty: 30 TABLET | Refills: 3 | Status: CANCELLED | OUTPATIENT
Start: 2022-04-08

## 2022-04-08 RX ORDER — AMLODIPINE BESYLATE 10 MG/1
TABLET ORAL
Qty: 90 TABLET | Refills: 1 | Status: SHIPPED | OUTPATIENT
Start: 2022-04-08 | End: 2022-09-30 | Stop reason: SDUPTHER

## 2022-04-08 NOTE — TELEPHONE ENCOUNTER
Medication and Quantity requested: amLODIPine (NORVASC) 10 MG tablet         Last Visit  3/29/22    Pharmacy and phone number updated in EPIC:  yes

## 2022-04-08 NOTE — TELEPHONE ENCOUNTER
lov 3/29/22  lrf 30 0 2/4/22   Lab Results   Component Value Date     10/29/2021    K 4.2 10/29/2021     10/29/2021    CO2 23 10/29/2021    BUN 16 10/29/2021    CREATININE 1.3 (H) 10/29/2021    GLUCOSE 92 10/29/2021    CALCIUM 9.8 10/29/2021    PROT 7.5 10/29/2021    LABALBU 4.6 10/29/2021    BILITOT 0.5 10/29/2021    ALKPHOS 99 10/29/2021    AST 16 10/29/2021    ALT 10 10/29/2021    LABGLOM 43 (A) 10/29/2021    GFRAA 52 (A) 10/29/2021    AGRATIO 1.6 10/29/2021    GLOB 2.7 11/11/2020

## 2022-04-16 RX ORDER — MECLIZINE HYDROCHLORIDE 25 MG/1
25 TABLET ORAL 3 TIMES DAILY PRN
Qty: 30 TABLET | Refills: 2 | Status: SHIPPED | OUTPATIENT
Start: 2022-04-16

## 2022-04-18 ENCOUNTER — TELEPHONE (OUTPATIENT)
Dept: FAMILY MEDICINE CLINIC | Age: 52
End: 2022-04-18

## 2022-04-18 NOTE — TELEPHONE ENCOUNTER
----- Message from Heather Yaw sent at 4/16/2022  9:39 AM EDT -----  Subject: Refill Request    QUESTIONS  Name of Medication? Other - Meclizine 25mg   Patient-reported dosage and instructions? By mouth three times a day as   needed   How many days do you have left? 0  [id number]  Pharmacy phone number (if available)? [phone]  ---------------------------------------------------------------------------  --------------  L[place] P[place] INFO  What is the best way for the office to contact you? OK to leave message on   voicemail  Preferred Call Back Phone Number? [phone]  ---------------------------------------------------------------------------  --------------  SCRIPT ANSWERS  Relationship to Patient?  Self

## 2022-04-30 DIAGNOSIS — G40.409 MYOCLONIC SEIZURE DISORDER (HCC): ICD-10-CM

## 2022-05-02 RX ORDER — LEVETIRACETAM 500 MG/1
500 TABLET ORAL 2 TIMES DAILY
Qty: 180 TABLET | Refills: 0 | Status: SHIPPED | OUTPATIENT
Start: 2022-05-02 | End: 2022-07-27

## 2022-05-02 RX ORDER — LEVETIRACETAM 500 MG/1
TABLET ORAL
Qty: 180 TABLET | Refills: 3 | OUTPATIENT
Start: 2022-05-02

## 2022-05-02 NOTE — TELEPHONE ENCOUNTER
Medication:   Requested Prescriptions     Pending Prescriptions Disp Refills    levETIRAcetam (KEPPRA) 500 MG tablet 180 tablet 3     Sig: TAKE 1 TABLET TWICE DAILY        Last Filled:  4/12/2021, 180, 3    Patient Phone Number: [phone] (home) [phone] (work)    Last appt: 3/29/2022   Next appt:     Last OARRS: No flowsheet data found.

## 2022-05-02 NOTE — TELEPHONE ENCOUNTER
Medication:   Requested Prescriptions     Pending Prescriptions Disp Refills    levETIRAcetam (KEPPRA) 500 MG tablet [Pharmacy Med Name: LEVETIRACETAM 500 MG TABLET] 180 tablet 3     Sig: TAKE 1 TABLET BY MOUTH TWICE A DAY        Last Filled:  4/12/2021, 180, 3    Patient Phone Number: [phone] (home) [phone] (work)    Last appt: 3/29/2022   Next appt: 9/30/2022    Last OARRS: No flowsheet data found.

## 2022-05-12 ENCOUNTER — TELEPHONE (OUTPATIENT)
Dept: ORTHOPEDIC SURGERY | Age: 52
End: 2022-05-12

## 2022-05-12 DIAGNOSIS — M17.12 PRIMARY OSTEOARTHRITIS OF LEFT KNEE: Primary | ICD-10-CM

## 2022-05-12 NOTE — TELEPHONE ENCOUNTER
General Question     Subject: KNEE INJECTIONS  Patient and /or Facility Request: Lakesha Barbour  Contact Number: [phone]    PATIENT IS REQUESTING A CALL BACK REGARDING GEL INJECTIONS FOR THE LEFT KNEE. LAST SEEN IN JAN. FOR CORTISONE INJECTIONS. PLEASE CALL THE PATIENT BACK AT THE ABOVE NUMBER.

## 2022-05-24 NOTE — TELEPHONE ENCOUNTER
General Question     Subject: EUFLEXXA INJECTION  Patient and /or Facility Request: Genevieve Morris   Contact Number: [phone]    PATIENT CALLED TO FOLLOW UP ON EUFLEXXA INJECTIONS AND WILL ALSO LIKE A CALL BACK FROM SOMEONE IN THE OFFICE PATIENT CAN BE REACHED AT THE NUMBER ABOVE

## 2022-06-09 ENCOUNTER — OFFICE VISIT (OUTPATIENT)
Dept: ORTHOPEDIC SURGERY | Age: 52
End: 2022-06-09
Payer: COMMERCIAL

## 2022-06-09 VITALS — WEIGHT: 269 LBS | BODY MASS INDEX: 50.79 KG/M2 | HEIGHT: 61 IN

## 2022-06-09 DIAGNOSIS — M17.12 PRIMARY OSTEOARTHRITIS OF LEFT KNEE: Primary | ICD-10-CM

## 2022-06-09 PROCEDURE — 20610 DRAIN/INJ JOINT/BURSA W/O US: CPT | Performed by: PHYSICIAN ASSISTANT

## 2022-06-09 RX ORDER — HYALURONATE SODIUM 10 MG/ML
20 SYRINGE (ML) INTRAARTICULAR ONCE
Status: COMPLETED | OUTPATIENT
Start: 2022-06-09 | End: 2022-06-09

## 2022-06-09 RX ADMIN — Medication 20 MG: at 15:23

## 2022-06-09 NOTE — PROGRESS NOTES
This dictation was done with Dragon dictation and may contain mechanical errors related to translation. Height 5' 1\" (1.549 m), weight 269 lb (122 kg), not currently breastfeeding. This is a pleasant 29-year-old female has advanced osteoarthritis in her left knee for her young age she has had relief in the past with cortisone injections recently she was authorized for the Euflexxa injection for the 8 out of 10 pain that she is having from osteoarthritis in her left knee  This dictation was done with Dragon dictation and may contain mechanical errors related to translation. Subjective:  left knee pain. Meron Luong is here for the osteoarthritis in the knee. After discussion and examination we decided to proceed with the 1st Eufflexxa injection for the  left knee(s). Objective:   Height 5' 1\" (1.549 m), weight 269 lb (122 kg), not currently breastfeeding. There is a milld joint effusion noted of the left knee(s). There is moderate pain with range of motion testing. There is no significant  instability noted. Neuro exam grossly intact both lower extremities. Intact sensation to light touch. Motor exam 4+ to 5/5 in all major motor groups. Negative Voss's sign. Skin is warm, dry and intact with out erythema or significant increased temperature around the knee joint(s). Assessment:  Degenerative Joint Disease of the left Knee(s). Plan:  Discussed  injections including all  risks and benefits including increased pain, drug reaction, infection, bleeding, lack of improvement and  neurovascular injury. All the questions were answered. PROCEDURE NOTE:  PRE-PROCEDURE DIAGNOSIS: DJD knee  POST-PROCEDURE DIAGNOSIS: DJD knee    With the patient's permission, her left knee was prepped in standard sterile fashion with  Alcohol. The prefilled injection of the preferred Eufflexxa was injected into the left lateral joint space compartment without difficulty.   The patient tolerated the procedure(s) well without difficulty. A band-aid was applied. POST-PROCEDURE INSTRUCTIONS GIVEN TO PATIENT: The patient was advised to ice the knee for 15-20 minutes to relieve any injection site related pain. Decrease activity for the next 24 to 48 hours. FOLLOW-UP:   As directed or call or return to clinic if these symptoms worsen or fail to improve as anticipated. If at any time you are concerned you may contact the office for further instructions or care.

## 2022-06-16 ENCOUNTER — OFFICE VISIT (OUTPATIENT)
Dept: ORTHOPEDIC SURGERY | Age: 52
End: 2022-06-16
Payer: COMMERCIAL

## 2022-06-16 DIAGNOSIS — M17.12 PRIMARY OSTEOARTHRITIS OF LEFT KNEE: Primary | ICD-10-CM

## 2022-06-16 PROCEDURE — 20610 DRAIN/INJ JOINT/BURSA W/O US: CPT | Performed by: ORTHOPAEDIC SURGERY

## 2022-06-16 PROCEDURE — 99999 PR OFFICE/OUTPT VISIT,PROCEDURE ONLY: CPT | Performed by: ORTHOPAEDIC SURGERY

## 2022-06-16 RX ORDER — HYALURONATE SODIUM 10 MG/ML
20 SYRINGE (ML) INTRAARTICULAR ONCE
Status: COMPLETED | OUTPATIENT
Start: 2022-06-16 | End: 2022-06-16

## 2022-06-16 RX ADMIN — Medication 20 MG: at 15:19

## 2022-06-19 NOTE — PROGRESS NOTES
[facility]    Chief Complaint: Left knee pain    HPI:  Sandeep Kulkarni is a 46 y.o. who is here in follow-up of left knee pain due to osteoarthritis. She is here for her second Euflexxa injection today. She had her first injection last week and reports minimal changes in symptoms. She denies adverse events. Exam:  Appearance: sitting in exam room chair, appears to be in no acute distress, awake and alert  Resp: unlabored breathing on room air  Skin: warm, dry and intact with out erythema or significant increased temperature  Neuro: grossly intact both lower extremities. Intact sensation to light touch. Motor exam 4+ to 5/5 in all major motor groups. Left knee: Examination reveals that knee range of motion is 0 to 130 degrees. There is varus deformity, positive crepitus, positive joint line tenderness, positive antalgic gait. Neurologically, plantar flexion and dorsiflexion is intact. 5/5 strength. Imaging:  Prior left knee radiographs were reviewed and significant for bone-on-bone osteoarthritis of medial compartment with patellofemoral joint space narrowing as well. Assessment:  Left knee osteoarthritis    Plan:  She is here for her second Euflexxa injection today. This was performed as described below. She will follow-up next week for her third injection. PROCEDURE NOTE:  After verbal consent was obtained, the patient's left knee was prepped with alcohol. Skin was anesthetized with ethyl chloride. The knee was then injected under sterile technique with the prepackaged dose of Euflexxa. A bandage was applied. The patient tolerated the procedure well and there were no complications. This dictation was done with [employer] dictation and may contain mechanical errors related to translation.

## 2022-06-27 ENCOUNTER — OFFICE VISIT (OUTPATIENT)
Dept: ORTHOPEDIC SURGERY | Age: 52
End: 2022-06-27
Payer: COMMERCIAL

## 2022-06-27 VITALS — WEIGHT: 265 LBS | HEIGHT: 61 IN | BODY MASS INDEX: 50.03 KG/M2

## 2022-06-27 DIAGNOSIS — M17.12 PRIMARY OSTEOARTHRITIS OF LEFT KNEE: Primary | ICD-10-CM

## 2022-06-27 PROCEDURE — 99999 PR OFFICE/OUTPT VISIT,PROCEDURE ONLY: CPT | Performed by: ORTHOPAEDIC SURGERY

## 2022-06-27 PROCEDURE — 20610 DRAIN/INJ JOINT/BURSA W/O US: CPT | Performed by: ORTHOPAEDIC SURGERY

## 2022-06-27 RX ORDER — HYALURONATE SODIUM 10 MG/ML
20 SYRINGE (ML) INTRAARTICULAR ONCE
Status: COMPLETED | OUTPATIENT
Start: 2022-06-27 | End: 2022-06-27

## 2022-06-27 RX ADMIN — Medication 20 MG: at 15:12

## 2022-06-27 NOTE — PROGRESS NOTES
[facility]  Office Visit    Chief Complaint: Left knee pain    HPI:  Johanny Suarez is a 46 y.o. who is here in follow-up of left knee pain due to osteoarthritis. She is here for her third Euflexxa injection today. She denies adverse events. Exam:  Appearance: sitting in exam room chair, appears to be in no acute distress, awake and alert  Resp: unlabored breathing on room air  Skin: warm, dry and intact with out erythema or significant increased temperature  Neuro: grossly intact both lower extremities. Intact sensation to light touch. Motor exam 4+ to 5/5 in all major motor groups. Left knee: Examination reveals that knee range of motion is 0 to 130 degrees. There is varus deformity, positive crepitus, positive joint line tenderness, positive antalgic gait. Neurologically, plantar flexion and dorsiflexion is intact. 5/5 strength. Imaging:  Prior left knee radiographs were reviewed and significant for bone-on-bone osteoarthritis of medial compartment with patellofemoral joint space narrowing as well. Assessment:  Left knee osteoarthritis    Plan:  She is here for her third Euflexxa injection today. This was performed as described below. She will follow-up in 3 months for repeat assessment. PROCEDURE NOTE:  After verbal consent was obtained, the patient's left knee was prepped with alcohol. Skin was anesthetized with ethyl chloride. The knee was then injected under sterile technique with the prepackaged dose of Euflexxa. A bandage was applied. The patient tolerated the procedure well and there were no complications. This dictation was done with [employer] dictation and may contain mechanical errors related to translation.

## 2022-07-21 ENCOUNTER — OFFICE VISIT (OUTPATIENT)
Dept: ORTHOPEDIC SURGERY | Age: 52
End: 2022-07-21
Payer: COMMERCIAL

## 2022-07-21 VITALS — BODY MASS INDEX: 49.09 KG/M2 | WEIGHT: 260 LBS | HEIGHT: 61 IN

## 2022-07-21 DIAGNOSIS — Z96.651 STATUS POST TOTAL KNEE REPLACEMENT, RIGHT: Primary | ICD-10-CM

## 2022-07-21 PROCEDURE — 99213 OFFICE O/P EST LOW 20 MIN: CPT | Performed by: ORTHOPAEDIC SURGERY

## 2022-07-21 NOTE — PROGRESS NOTES
[facility]  Office Visit    Chief Complaint: Follow-up s/p right total knee arthroplasty    HPI:  Jaclyn Oliver is a 46 y.o. who is here in follow-up of right total knee arthroplasty performed on July 20, 2021. She is now 1 year out from surgery and doing well with minimal pain. She walks without assistive device. Patient Active Problem List   Diagnosis    Carpal tunnel syndrome    Hypertension    Obesity    Renal insufficiency    Vitamin D deficiency    Contusion of right knee    Infected abrasion of left hand    Hand dermatitis    Candidiasis of finger web    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HCC)    Anemia in chronic renal disease    Anemia, iron deficiency    Chronic kidney disease, stage 3, mod decreased GFR ([facility])    Chronic pain of right knee    Family history of cancer    Hypertension, essential, benign    Overweight and obesity    Primary localized osteoarthrosis of the knee    Proteinuria    Renal osteodystrophy    Tear of PCL (posterior cruciate ligament) of knee    Hypertension, essential    Morbid obesity with BMI of 45.0-49.9, adult ([facility])    Arthritis of right knee    Status post right knee replacement    Primary osteoarthritis of left knee       ROS:  Constitutional: denies fever, chills, weight loss  MSK: denies pain in other joints, muscle aches  Neurological: denies numbness, tingling, weakness    Exam:  Appearance: sitting in exam room chair, appears to be in no acute distress, awake and alert  Resp: unlabored breathing on room air  Skin: warm, dry and intact with out erythema or significant increased temperature  Neuro: grossly intact both lower extremities. Intact sensation to light touch. Motor exam 4+ to 5/5 in all major motor groups. Right knee: Incision is healed. Range of motion is 0 to 115 degrees. Sensation is intact light touch. Stable to varus and valgus stress at full extension and to anterior and posterior drawer at 90 degrees of flexion.   There is brisk capillary refill. There is 5/5 muscle strength in all muscle groups. Imaging:  3 views of the right knee were performed and interpreted today. She has a total knee arthroplasty prosthesis in place with no signs of osteolysis, loosening, fracture, dislocation. Assessment:  S/p right total knee arthroplasty    Plan:  She is doing well 1 year postoperatively. She will continue activity as tolerated. We discussed with the patient today the use of antibiotic prophylaxis prior to any dental procedures. We discussed that the antibiotic prophylaxis would be used to help prevent periprosthetic joint infections. If they were not offered antibiotics by the physician performing the procedure, they should contact our office that we may prescribe them antibiotics. Follow-up on an annual basis for the right knee. She will follow-up as needed for the left knee. Total time spent on today's encounter was at least 23 minutes. This time included reviewing prior notes, radiographs, and lab results when available, reviewing history obtained by medical assistant, performing history and physical exam, reviewing tests/radiographs with the patient, counseling the patient, ordering medications or tests, documentation in the electronic health record, and coordination of care. This dictation was done with Dragon dictation and may contain mechanical errors related to translation.

## 2022-07-27 DIAGNOSIS — G40.409 MYOCLONIC SEIZURE DISORDER (HCC): ICD-10-CM

## 2022-07-27 RX ORDER — LEVETIRACETAM 500 MG/1
500 TABLET ORAL 2 TIMES DAILY
Qty: 180 TABLET | Refills: 2 | Status: SHIPPED | OUTPATIENT
Start: 2022-07-27 | End: 2022-10-25

## 2022-07-27 NOTE — TELEPHONE ENCOUNTER
Medication:   Requested Prescriptions     Pending Prescriptions Disp Refills    levETIRAcetam (KEPPRA) 500 MG tablet [Pharmacy Med Name: LEVETIRACETAM 500 MG TABLET] 180 tablet 0     Sig: TAKE 1 TABLET BY MOUTH 2 TIMES DAILY TAKE 1 TABLET TWICE DAILY        Last Filled:      Patient Phone Number: [phone] (home) [phone] (work)    Last appt: 3/29/2022   Next appt: 9/30/2022    Last OARRS: No flowsheet data found.

## 2022-08-07 ENCOUNTER — APPOINTMENT (OUTPATIENT)
Dept: GENERAL RADIOLOGY | Age: 52
End: 2022-08-07
Payer: COMMERCIAL

## 2022-08-07 ENCOUNTER — APPOINTMENT (OUTPATIENT)
Dept: CT IMAGING | Age: 52
End: 2022-08-07
Payer: COMMERCIAL

## 2022-08-07 ENCOUNTER — HOSPITAL ENCOUNTER (EMERGENCY)
Age: 52
Discharge: HOME | End: 2022-08-07
Payer: COMMERCIAL

## 2022-08-07 VITALS
DIASTOLIC BLOOD PRESSURE: 85 MMHG | RESPIRATION RATE: 11 BRPM | HEART RATE: 59 BPM | SYSTOLIC BLOOD PRESSURE: 135 MMHG | OXYGEN SATURATION: 92 %
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
## 2022-08-07 PROCEDURE — 70450 CT HEAD/BRAIN W/O DYE: CPT

## 2022-08-07 PROCEDURE — 73110 X-RAY EXAM OF WRIST: CPT

## 2022-08-07 PROCEDURE — 99284 EMERGENCY DEPT VISIT MOD MDM: CPT

## 2022-08-07 PROCEDURE — 70486 CT MAXILLOFACIAL W/O DYE: CPT

## 2022-08-07 PROCEDURE — 6370000000 HC RX 637 (ALT 250 FOR IP): Performed by: PHYSICIAN ASSISTANT

## 2022-08-07 PROCEDURE — 72125 CT NECK SPINE W/O DYE: CPT

## 2022-08-07 PROCEDURE — 71046 X-RAY EXAM CHEST 2 VIEWS: CPT

## 2022-08-07 RX ORDER — TRAMADOL HYDROCHLORIDE 50 MG/1
50 TABLET ORAL EVERY 4 HOURS PRN
Qty: 18 TABLET | Refills: 0 | Status: SHIPPED | OUTPATIENT
Start: 2022-08-07 | End: 2022-08-10

## 2022-08-07 RX ORDER — ORPHENADRINE CITRATE 100 MG/1
100 TABLET, EXTENDED RELEASE ORAL 2 TIMES DAILY
Qty: 20 TABLET | Refills: 0 | Status: SHIPPED | OUTPATIENT
Start: 2022-08-07 | End: 2022-08-17

## 2022-08-07 RX ORDER — ONDANSETRON 4 MG/1
4 TABLET, ORALLY DISINTEGRATING ORAL ONCE
Status: COMPLETED | OUTPATIENT
Start: 2022-08-07 | End: 2022-08-07

## 2022-08-07 RX ORDER — OXYCODONE HYDROCHLORIDE AND ACETAMINOPHEN 5; 325 MG/1; MG/1
2 TABLET ORAL ONCE
Status: COMPLETED | OUTPATIENT
Start: 2022-08-07 | End: 2022-08-07

## 2022-08-07 RX ADMIN — OXYCODONE AND ACETAMINOPHEN 2 TABLET: 5; 325 TABLET ORAL at 21:39

## 2022-08-07 RX ADMIN — ONDANSETRON 4 MG: 4 TABLET, ORALLY DISINTEGRATING ORAL at 21:39

## 2022-08-07 ASSESSMENT — PAIN - FUNCTIONAL ASSESSMENT: PAIN_FUNCTIONAL_ASSESSMENT: 0-10

## 2022-08-07 ASSESSMENT — ENCOUNTER SYMPTOMS
VOMITING: 0
SHORTNESS OF BREATH: 0
ABDOMINAL PAIN: 0
DIARRHEA: 0
NAUSEA: 0

## 2022-08-07 ASSESSMENT — PAIN SCALES - GENERAL: PAINLEVEL_OUTOF10: 10

## 2022-08-07 ASSESSMENT — PAIN DESCRIPTION - LOCATION: LOCATION: HEAD;ARM

## 2022-08-07 ASSESSMENT — PAIN DESCRIPTION - ORIENTATION: ORIENTATION: LEFT
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
## 2022-09-30 ENCOUNTER — OFFICE VISIT (OUTPATIENT)
Dept: FAMILY MEDICINE CLINIC | Age: 52
End: 2022-09-30
Payer: COMMERCIAL

## 2022-09-30 VITALS
OXYGEN SATURATION: 99 % | HEART RATE: 57 BPM | HEIGHT: 61 IN | SYSTOLIC BLOOD PRESSURE: 126 MMHG | DIASTOLIC BLOOD PRESSURE: 78 MMHG | BODY MASS INDEX: 48.11 KG/M2 | WEIGHT: 254.8 LBS

## 2022-09-30 DIAGNOSIS — I10 HYPERTENSION, ESSENTIAL: Primary | ICD-10-CM

## 2022-09-30 DIAGNOSIS — I10 ESSENTIAL HYPERTENSION: ICD-10-CM

## 2022-09-30 DIAGNOSIS — H81.10 BENIGN PAROXYSMAL POSITIONAL VERTIGO, UNSPECIFIED LATERALITY: ICD-10-CM

## 2022-09-30 PROCEDURE — 99213 OFFICE O/P EST LOW 20 MIN: CPT | Performed by: FAMILY MEDICINE

## 2022-09-30 RX ORDER — AMLODIPINE BESYLATE 10 MG/1
TABLET ORAL
Qty: 90 TABLET | Refills: 1 | Status: SHIPPED | OUTPATIENT
Start: 2022-09-30

## 2022-09-30 RX ORDER — ATENOLOL 50 MG/1
TABLET ORAL
Qty: 90 TABLET | Refills: 1 | Status: SHIPPED | OUTPATIENT
Start: 2022-09-30

## 2022-09-30 SDOH — ECONOMIC STABILITY: FOOD INSECURITY: WITHIN THE PAST 12 MONTHS, YOU WORRIED THAT YOUR FOOD WOULD RUN OUT BEFORE YOU GOT MONEY TO BUY MORE.: NEVER TRUE

## 2022-09-30 SDOH — ECONOMIC STABILITY: FOOD INSECURITY: WITHIN THE PAST 12 MONTHS, THE FOOD YOU BOUGHT JUST DIDN'T LAST AND YOU DIDN'T HAVE MONEY TO GET MORE.: NEVER TRUE

## 2022-09-30 ASSESSMENT — SOCIAL DETERMINANTS OF HEALTH (SDOH): HOW HARD IS IT FOR YOU TO PAY FOR THE VERY BASICS LIKE FOOD, HOUSING, MEDICAL CARE, AND HEATING?: NOT HARD AT ALL

## 2022-09-30 ASSESSMENT — PATIENT HEALTH QUESTIONNAIRE - PHQ9
1. LITTLE INTEREST OR PLEASURE IN DOING THINGS: 0
SUM OF ALL RESPONSES TO PHQ QUESTIONS 1-9: 2
SUM OF ALL RESPONSES TO PHQ9 QUESTIONS 1 & 2: 2
SUM OF ALL RESPONSES TO PHQ QUESTIONS 1-9: 2
2. FEELING DOWN, DEPRESSED OR HOPELESS: 2

## 2022-09-30 NOTE — PROGRESS NOTES
[address] Note    Date: 9/30/2022                                               Maxime Slot:     Chief Complaint   Patient presents with    6 Month Follow-Up    Blood Pressure Check       HPI  Pt here for BP check. Pt takes Amlodipine and Atenolol. Denies CP or SOB. Patient would like to see ENT, as she gets vertigo at times and is going on a cruise in a month.          Patient Active Problem List    Diagnosis Date Noted    Primary osteoarthritis of left knee 09/08/2021    Status post right knee replacement     Arthritis of right knee 07/20/2021    Hypertension, essential     Morbid obesity with BMI of 45.0-49.9, adult (HCC)     Anemia in chronic renal disease 01/09/2020    Proteinuria 01/09/2020    Chronic pain of right knee 03/26/2019    Candidiasis of finger web 04/04/2018    Hand dermatitis 03/19/2018    Infected abrasion of left hand 03/15/2018    Primary localized osteoarthrosis of the knee 10/04/2016    Tear of PCL (posterior cruciate ligament) of knee 10/04/2016    Contusion of right knee 06/29/2016    Family history of cancer 06/21/2013    Renal insufficiency 06/13/2012    Vitamin D deficiency 06/13/2012    Carpal tunnel syndrome     Hypertension     Obesity     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[address] 04/19/2007    Anemia, iron deficiency 04/19/2007    Chronic kidney disease, stage 3, mod decreased GFR ([address] 04/19/2007    Hypertension, essential, benign 04/19/2007    Overweight and obesity 04/19/2007    Renal osteodystrophy 04/19/2007       Past Medical History:   Diagnosis Date    Arthritis     BRCA1 negative     BRCA2 negative     Carpal tunnel syndrome     Convulsions     COVID-19     Dermatophytosis     Diarrhea     Hypertension     Hypertension, essential     Hypertensive kidney disease with chronic kidney disease stage V ([address]     Medulloadrenal hyperfunc     Meningococcal encephalitis     Morbid obesity with BMI of 45.0-49.9, adult (HCC)     Obesity     Other malaise and fatigue     Seizure disorder ([facility]     tonic-last on was @ 2005    Status post right knee replacement        Current Outpatient Medications   Medication Sig Dispense Refill    amLODIPine (NORVASC) 10 MG tablet TAKE 1 TABLET BY MOUTH EVERY DAY 90 tablet 1    atenolol (TENORMIN) 50 MG tablet TAKE 1 TABLET BY MOUTH EVERY DAY 90 tablet 1    levETIRAcetam (KEPPRA) 500 MG tablet TAKE 1 TABLET BY MOUTH 2 TIMES DAILY TAKE 1 TABLET TWICE DAILY 180 tablet 2    meclizine (ANTIVERT) 25 MG tablet Take 1 tablet by mouth 3 times daily as needed for Dizziness 30 tablet 2    diclofenac sodium (VOLTAREN) 1 % GEL APPLY 4 G TOPICALLY 4 TIMES A  g 0     No current facility-administered medications for this visit. Allergies   Allergen Reactions    Ibuprofen       Patient has renal insuffiencey. Nsaids       Patient has renal insuffiencey. Review of Systems  No fever, no cough    Vitals:  /78   Pulse 57   Ht 5' 1\" (1.549 m)   Wt 254 lb 12.8 oz (115.6 kg)   SpO2 99%   BMI 48.14 kg/m²     Wt Readings from Last 3 Encounters:   09/30/22 254 lb 12.8 oz (115.6 kg)   07/21/22 260 lb (117.9 kg)   06/27/22 265 lb (120.2 kg)        Physical Exam  General:  Well-appearing, NAD, alert, non-toxic  HEENT:  Normocephalic, atraumatic. Pupils equal and round. CHEST/LUNGS: CTAB, no crackles, no wheeze, no rhonchi. Symmetric rise  CARDIOVASCULAR: RRR,  no murmur, no rub  EXTREMETIES: Normal movement of all extremities  SKIN:  No rash, no cellulitis, no bruising, no petechiae/purpura/vesicles/pustules/abscess  PSYCH:  A+O x 3; normal affect  NEURO:  GCS 15, CN2-12 grossly intact, no focal motor/sensory deficits, no cerebellar deficits, normal gait, normal speech      Assessment/Plan     80-year-old female with hypertension. Blood pressure is at goal.  Continue present management. Follow-up 6 months for annual physical.    Will refer patient to ENT for discussion of her vertigo. Continue meclizine as needed.     Discharged in stable condition at 3:55 PM    1. Hypertension, essential  2. Benign paroxysmal positional vertigo, unspecified laterality  -     [facility] - Robbi Garza DO, Otolaryngology, [facility]  3. Essential hypertension  -     amLODIPine (NORVASC) 10 MG tablet; TAKE 1 TABLET BY MOUTH EVERY DAY, Disp-90 tablet, R-1Normal  -     atenolol (TENORMIN) 50 MG tablet; TAKE 1 TABLET BY MOUTH EVERY DAY, Disp-90 tablet, R-1Normal     Orders Placed This Encounter   Procedures    Ashley Zhou DO, Otolaryngology, [facility]     Referral Priority:   Routine     Referral Type:   Eval and Treat     Referral Reason:   Specialty Services Required     Referred to Provider:   Blanche Pruett DO     Requested Specialty:   Otolaryngology     Number of Visits Requested:   1       No follow-ups on file.     Ritesh Little MD, MD    9/30/2022  4:34 PM

## 2022-10-24 ENCOUNTER — OFFICE VISIT (OUTPATIENT)
Dept: AUDIOLOGY | Age: 52
End: 2022-10-24
Payer: COMMERCIAL

## 2022-10-24 ENCOUNTER — OFFICE VISIT (OUTPATIENT)
Dept: ENT CLINIC | Age: 52
End: 2022-10-24
Payer: COMMERCIAL

## 2022-10-24 VITALS — TEMPERATURE: 97.8 F | SYSTOLIC BLOOD PRESSURE: 123 MMHG | DIASTOLIC BLOOD PRESSURE: 82 MMHG | HEART RATE: 62 BPM

## 2022-10-24 DIAGNOSIS — R42 VERTIGO, INTERMITTENT: Primary | ICD-10-CM

## 2022-10-24 DIAGNOSIS — E04.2 MULTINODULAR THYROID: ICD-10-CM

## 2022-10-24 DIAGNOSIS — R42 VERTIGO: Primary | ICD-10-CM

## 2022-10-24 PROCEDURE — 92567 TYMPANOMETRY: CPT | Performed by: AUDIOLOGIST

## 2022-10-24 PROCEDURE — 92557 COMPREHENSIVE HEARING TEST: CPT | Performed by: AUDIOLOGIST

## 2022-10-24 PROCEDURE — 99203 OFFICE O/P NEW LOW 30 MIN: CPT | Performed by: STUDENT IN AN ORGANIZED HEALTH CARE EDUCATION/TRAINING PROGRAM

## 2022-10-24 NOTE — PROGRESS NOTES
[address] [facility] SURGERY  NEW PATIENT HISTORY AND PHYSICAL NOTE      Patient Name: Georeg Vanegas Benjy Record Number:  [id number]  Primary Care Physician:  Josh Cheng MD    ChiefComplaint     Chief Complaint   Patient presents with    Dizziness     Would like something for her vertigo when she it starts to come on       History of Present Illness     Jacques Monroe is an 46 y.o. female presenting with dizziness. Head gets heavy, room spinning, sensitivity to light, nausea. Will last at least couple hours until she gets medication in the ED. Will feel wobbly for 3 days after. Started 10 years ago - has had 3 episodes in total. No hx of vestibular therapy. Last episode was 2 years ago. No preventative meds. Will take meclazine as needed which helps and puts her to sleep. No recent hearing changes. Will get occasional high pitched tinnitus, no relation to vertigo episodes. Watches salt intake. Denies headaches. Hx of airway surgery for ISAMAR. History of thyroid nodules followed by Dr. Lisandro Kennedy.     Going on a cruise on the .     Past Medical History     Past Medical History:   Diagnosis Date    Arthritis     BRCA1 negative     BRCA2 negative     Carpal tunnel syndrome     Convulsions     COVID-19     Dermatophytosis     Diarrhea     Hypertension     Hypertension, essential     Hypertensive kidney disease with chronic kidney disease stage V ([address]     Medulloadrenal hyperfunc     Meningococcal encephalitis     Morbid obesity with BMI of 45.0-49.9, adult ([address]     Obesity     Other malaise and fatigue     Seizure disorder ([address]     tonic-last on was @     Status post right knee replacement        Past Surgical History     Past Surgical History:   Procedure Laterality Date    APPENDECTOMY      CARPAL TUNNEL RELEASE Bilateral      SECTION      ENDOMETRIAL ABLATION      OVARY REMOVAL      TONSILLECTOMY      TOTAL KNEE ARTHROPLASTY Right 2021 ROBOTIC ASSISTED RIGHT TOTAL KNEE REPLACEMENT performed by Rae Gunderson MD at [address] [facility] History     Family History   Problem Relation Age of Onset    High Blood Pressure Other         maternal aunt    Hypertension Mother     Breast Cancer Mother     Hypertension Maternal Grandmother     Diabetes Maternal Grandmother     Breast Cancer Maternal Grandmother        Social History     Social History     Tobacco Use    Smoking status: Never    Smokeless tobacco: Never   Vaping Use    Vaping Use: Never used   Substance Use Topics    Alcohol use: No    Drug use: No        Allergies     Allergies   Allergen Reactions    Ibuprofen       Patient has renal insuffiencey. Nsaids       Patient has renal insuffiencey. Medications     Current Outpatient Medications   Medication Sig Dispense Refill    amLODIPine (NORVASC) 10 MG tablet TAKE 1 TABLET BY MOUTH EVERY DAY 90 tablet 1    atenolol (TENORMIN) 50 MG tablet TAKE 1 TABLET BY MOUTH EVERY DAY 90 tablet 1    levETIRAcetam (KEPPRA) 500 MG tablet TAKE 1 TABLET BY MOUTH 2 TIMES DAILY TAKE 1 TABLET TWICE DAILY 180 tablet 2    meclizine (ANTIVERT) 25 MG tablet Take 1 tablet by mouth 3 times daily as needed for Dizziness 30 tablet 2    diclofenac sodium (VOLTAREN) 1 % GEL APPLY 4 G TOPICALLY 4 TIMES A  g 0     No current facility-administered medications for this visit. Review of Systems     REVIEW OF SYSTEMS    See HPI Above    PhysicalExam     Vitals:    10/24/22 0940   BP: 123/82   Pulse: 62   Temp: 97.8 °F (36.6 °C)   TempSrc: Temporal       PHYSICAL EXAM  /82   Pulse 62   Temp 97.8 °F (36.6 °C) (Temporal)     GENERAL: No acute distress, alert and oriented  EYES: EOMI, Anti-icteric. No resting or gaze nystagmus. NOSE: On anterior rhinoscopy there is no epistaxis, nasal mucosa moist and normal appearing, no purulent drainage.    EARS: Normal external appearance; on portable otomicroscopy:     -Ad: External auditory canal without stenosis, tympanic membrane clear, no middle ear effusions or retractions.      -As: External auditory canal without stenosis, tympanic membrane clear, no middle ear effusions or retractions. Pneumatic otoscopy: Bilateral tympanic membranes mobile pneumatic otoscopy  FACE: HB 1/6 bilaterally, symmetric appearing, sensation equal bilaterally  ORAL CAVITY: No masses or lesions visualized or palpated, uvula is midline, moist mucous membranes, no oropharyngeal masses or oropharyngeal obstruction. Surgically absent tonsils. Oropharynx widely patent. NECK: Normal range of motion, no thyromegaly, trachea is midline, no palpable lymphadenopathy or neck masses, no crepitus  NEURO: Cranial Nerves 2, 3, 4, 5, 6, 7, 11, 12 grossly intact bilaterally   Tess Hallpike: No torsional nystagmus noted upon bilateral-sided testing. I have performed a head and neck physical exam personally or was physically present during the key or critical portions of the service. Data/Imaging Review     Comprehensive audiological evaluation performed in the office today by Chay WAITE was independently reviewed by myself which demonstrates: Au: Hearing within normal limits in tested frequencies and symmetric. % right, % left. Type A tympanogram right. Type A tympanogram left. EXAMINATION:   THYROID ULTRASOUND       9/2/2021       COMPARISON:   Thyroid ultrasound November 23rd, 20 and October 23rd,. HISTORY:   ORDERING SYSTEM PROVIDED HISTORY: Multiple thyroid nodules   TECHNOLOGIST PROVIDED HISTORY:   Reason for Exam: multiple thyroid nodules   Acuity: Chronic   Type of Exam: Subsequent/Follow-up   Additional signs and symptoms: na   Relevant Medical/Surgical History: na       FINDINGS:   Right thyroid lobe:       Left thyroid lobe: Isthmus:       Thyroid Gland:  Thyroid gland demonstrates normal echotexture and   vascularity.   A 1.5 cm nodular heterogeneous area is again seen inferior to   the left thyroid

## 2022-10-24 NOTE — PROGRESS NOTES
[facility]  Division of Audiology/Otolaryngology    10/24/2022     Patient name: Titus Lee  Primary Care Physician: Inez Kwok MD   Medical Record Number: [id number]     Titus Lee   1970, 46 y.o. female   [id number]       Referring Provider: Antelmo Gutierrez DO  Referral Type: In an order in [facility]    Reason for Visit: Evaluation of the cause of disorders of hearing, tinnitus, or balance. ADULT AUDIOLOGIC EVALUATION                    Titus Lee is a 46 y.o. female seen today, 10/24/2022 , for audiologic evaluation. Patient was seen by referring provider Antelmo Gutierrez DO following today's evaluation. AUDIOLOGIC AND OTHER PERTINENT MEDICAL HISTORY:      Titus Lee presents with history of positional vertigo, onset of 8-9 years. She notes symptoms have resurfaced recently with no precipitating factors. No concerns with hearing ability. No falls, no head injuries. No other otologic factors noted. IMPRESSIONS:      Results are consistent with normal hearing, excellent word recognition for both ears, and normal middle ear function. Patient to follow medical recommendations per  Antelmo Gutierrez DO.    ASSESSMENT AND FINDINGS:     Otoscopy revealed: Visible tympanic membrane bilaterally    Reliability: Good   Transducer: Inserts    RIGHT EAR:  Hearing Sensitivity: Normal hearing sensitivity  Speech Recognition Threshold: 10 dB HL  Word Recognition: Excellent (%), based on NU-6   Tympanometry: Normal peak pressure and compliance, Type A tympanogram, consistent with normal middle ear function. Acoustic Reflexes: Ipsilateral: Present at normal sensation levels, Present at elevated sensation levels, and Absent at isolated frequencies.      LEFT EAR:  Hearing Sensitivity: Normal hearing sensitivity  Speech Recognition Threshold: 10 dB HL  Word Recognition: Excellent (%), based on NU-6   Tympanometry: Normal peak pressure and compliance, Type A tympanogram, consistent with normal middle ear function. Acoustic Reflexes: Ipsilateral: Present at normal sensation levels and Absent at isolated frequencies. COUNSELING:      Reviewed purpose of testing completed today, general auditory system function, and results obtained today. The following items are recommended based on patient report and results from today's appointment:   - Continue medical follow-up with Marii Lundberg DO.   - Retest hearing as medically indicated and/or sooner if a change in hearing is noted. Chart CC'd to: Marii Lundberg DO      Degree of   Hearing Sensitivity dB Range   Within Normal Limits (WNL) 0 - 20   Mild 20 - 40   Moderate 40 - 55   Moderately-Severe 55 - 70   Severe 70 - 90   Profound 90 +        RECOMMENDATIONS:        Marii Lundberg DO to medically advise.     Silva Jacobs  Audiologist    Electronically signed by Silva Jacobs on 10/24/22 at 9:04 AM.

## 2022-11-23 ENCOUNTER — HOSPITAL ENCOUNTER (OUTPATIENT)
Dept: WOMENS IMAGING | Age: 52
Discharge: HOME | End: 2022-11-23
Payer: COMMERCIAL

## 2022-11-23 VITALS — BODY MASS INDEX: 50.98 KG/M2 | HEIGHT: 61 IN | WEIGHT: 270 LBS

## 2022-11-23 DIAGNOSIS — Z12.31 BREAST CANCER SCREENING BY MAMMOGRAM: ICD-10-CM

## 2022-11-23 PROCEDURE — 77067 SCR MAMMO BI INCL CAD: CPT

## 2023-02-10 ENCOUNTER — TELEPHONE (OUTPATIENT)
Dept: ORTHOPEDIC SURGERY | Age: 53
End: 2023-02-10

## 2023-02-10 NOTE — TELEPHONE ENCOUNTER
General Question     Subject: L KNEE QUESTION   Patient and /or Facility Request: Azar Graft  Contact Number: [phone]    PATIENT CALLED IN TO SEE IF SOMEONE IN THE OFFICE CAN RETURN HER CALL. Antonio Garner PATIENT WOULD LIKE TO KNOW WHAT OTHER OPTIONS SHE HAVE REGARDING HER L KNEE. PLEASE CALL PATIENT BACK AT THE ABOVE NUMBER. ..

## 2023-02-13 ENCOUNTER — OFFICE VISIT (OUTPATIENT)
Dept: ORTHOPEDIC SURGERY | Age: 53
End: 2023-02-13
Payer: COMMERCIAL

## 2023-02-13 VITALS — BODY MASS INDEX: 50.98 KG/M2 | HEIGHT: 61 IN | WEIGHT: 270 LBS | RESPIRATION RATE: 16 BRPM

## 2023-02-13 DIAGNOSIS — M70.52 PES ANSERINUS BURSITIS OF LEFT KNEE: ICD-10-CM

## 2023-02-13 DIAGNOSIS — M17.12 PRIMARY OSTEOARTHRITIS OF LEFT KNEE: Primary | ICD-10-CM

## 2023-02-13 DIAGNOSIS — Z96.651 STATUS POST TOTAL RIGHT KNEE REPLACEMENT: ICD-10-CM

## 2023-02-13 PROCEDURE — 99213 OFFICE O/P EST LOW 20 MIN: CPT | Performed by: PHYSICIAN ASSISTANT

## 2023-02-13 PROCEDURE — 20610 DRAIN/INJ JOINT/BURSA W/O US: CPT | Performed by: PHYSICIAN ASSISTANT

## 2023-02-13 RX ORDER — TRIAMCINOLONE ACETONIDE 40 MG/ML
40 INJECTION, SUSPENSION INTRA-ARTICULAR; INTRAMUSCULAR ONCE
Status: COMPLETED | OUTPATIENT
Start: 2023-02-13 | End: 2023-02-13

## 2023-02-13 RX ORDER — BUPIVACAINE HYDROCHLORIDE 2.5 MG/ML
2 INJECTION, SOLUTION INFILTRATION; PERINEURAL ONCE
Status: COMPLETED | OUTPATIENT
Start: 2023-02-13 | End: 2023-02-13

## 2023-02-13 RX ADMIN — BUPIVACAINE HYDROCHLORIDE 5 MG: 2.5 INJECTION, SOLUTION INFILTRATION; PERINEURAL at 08:27

## 2023-02-13 RX ADMIN — TRIAMCINOLONE ACETONIDE 40 MG: 40 INJECTION, SUSPENSION INTRA-ARTICULAR; INTRAMUSCULAR at 08:28

## 2023-02-13 RX ADMIN — TRIAMCINOLONE ACETONIDE 40 MG: 40 INJECTION, SUSPENSION INTRA-ARTICULAR; INTRAMUSCULAR at 08:27

## 2023-02-13 NOTE — PROGRESS NOTES
This dictation was done with [employer] dictation and may contain mechanical errors related to translation.    I have today reviewed with Akila Bush the clinically relevant, past medical history, medications, allergies, family history, social history, and Review Of Systems form the patient’s most recent history form & I have documented any details relevant to today's presenting complaints in my history below. Ms. Akila Bush's self-reported past medical history, medications, allergies, family history, social history, and Review Of Systems form has been scanned into the chart under the \"Media\" tab.    Subjective:  Akila Bush is a 52 y.o. who is here complaining of pain in her left knee.  She been seen in the past and had a cortisone shot in January of last year and the Euflexxa injection series in June.  Both injections provided minimal relief and more recently over the last few months she has had a sharp pain that is mostly anterior thigh can be an 8 out of 10.  She has been trying to lose weight as she is at a 51 BMI and has had trouble doing exercises without knee pain.  She understands that she is not a candidate at her current weight for knee replacement so we discussed options and treatment.      Patient Active Problem List   Diagnosis    Carpal tunnel syndrome    Hypertension    Obesity    Renal insufficiency    Vitamin D deficiency    Contusion of right knee    Infected abrasion of left hand    Hand dermatitis    Candidiasis of finger web    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HCC)    Anemia in chronic renal disease    Anemia, iron deficiency    Chronic kidney disease, stage 3, mod decreased GFR (HCC)    Chronic pain of right knee    Family history of cancer    Hypertension, essential, benign    Overweight and obesity    Primary localized osteoarthrosis of the knee    Proteinuria    Renal osteodystrophy    Tear of PCL (posterior cruciate ligament) of knee    Hypertension, essential    Morbid obesity with BMI of  45.0-49.9, adult ([facility] [address]    Arthritis of right knee    Status post right knee replacement    Primary osteoarthritis of left knee           Current Outpatient Medications on File Prior to Visit   Medication Sig Dispense Refill    diclofenac sodium (VOLTAREN) 1 % GEL APPLY 4 G TOPICALLY 4 TIMES A  g 0    amLODIPine (NORVASC) 10 MG tablet TAKE 1 TABLET BY MOUTH EVERY DAY 90 tablet 1    atenolol (TENORMIN) 50 MG tablet TAKE 1 TABLET BY MOUTH EVERY DAY 90 tablet 1    levETIRAcetam (KEPPRA) 500 MG tablet TAKE 1 TABLET BY MOUTH 2 TIMES DAILY TAKE 1 TABLET TWICE DAILY 180 tablet 2    meclizine (ANTIVERT) 25 MG tablet Take 1 tablet by mouth 3 times daily as needed for Dizziness 30 tablet 2     No current facility-administered medications on file prior to visit. Objective:   Resp. rate 16, height 5' 1\" (1.549 m), weight 270 lb (122.5 kg), not currently breastfeeding. On examination is a pleasant 40-year-old female who is alert and oriented x3 she has 0 to calf on thigh range of motion with no significant varus or valgus laxity. She has palpable tenderness over the medial and the lateral joint line and crepitus during flexion extension through the patellofemoral joint. She has come quad atrophy and some distal pulses are intact. She has negative straight leg raise at 40 degrees and has good symmetric motion to the hips  Neuro exam grossly intact both lower extremities. Intact sensation to light touch. Motor exam 4+ to 5/5 in all major motor groups. Negative Voss's sign. Skin is warm, dry and intact with out erythema or significant increased temperature around the knee joint(s). There are no cutaneous lesions or lymphadenopathy present. X-RAYS:  X-rays taken the office today included an AP lateral sunrise and a tunnel view of both her left and her right knee. These x-rays show excellent alignment sizing and fit of a right total knee replacement in good order.   The left knee has bone-on-bone osteoarthritis in the medial compartment and minimal degenerative changes in the other 2 compartments without fracture or other significant bone abnormality. Assessment:  Stable right total knee replacement left knee osteoarthritis and morbid obesity    Plan:  During today's visit, there was approximately 30 minutes of face-to-face discussion in regards to the patient's current condition and treatment options. More than 50 % of the time was counseling and coordination of care as indicated above. She understands that she is in somewhat of a tough place trying to exercise and having pain in her left knee she has had not complete results with previous injections but at this point with her examination I am concerned that not only does she have some osteoarthritis but she has pretty tender pes bursitis and I think this is the overall inhibiting factor. For therapeutic and diagnostic reasons she was injected with 1 cc Kenalog and 2 cc of Marcaine into the Pez anserine area of the left knee. After a discussion of the multiple options, they consented to a cortisone shot. 1 ml of 40mg/ml Kenalog and 2 ml's of 0.25%Marcaine were injected into the Left knee joint space. The leg was slightly flexed and injected just lateral to the patella tendon to under the patella. This was done with sterile technique and they tolerated it well.         PROCEDURE NOTE:  Doing these 2 injections will hopefully allow her to exercise and lose weight towards having more function and less disability      They will schedule a follow up in 12 weeks

## 2023-04-04 ENCOUNTER — TELEPHONE (OUTPATIENT)
Dept: FAMILY MEDICINE CLINIC | Age: 53
End: 2023-04-04

## 2023-04-04 DIAGNOSIS — E66.9 OBESITY, UNSPECIFIED CLASSIFICATION, UNSPECIFIED OBESITY TYPE, UNSPECIFIED WHETHER SERIOUS COMORBIDITY PRESENT: Primary | ICD-10-CM

## 2023-04-04 NOTE — TELEPHONE ENCOUNTER
Patient is wanting a referral to weight management at [facility].      Patient had one of these before and didn't f/u with them. This referral was sent by orthopedic surgeon. [address] [place] [address] [facility]  [place] [address]  [phone]      Patient is unsure of which doctor she would like to see. Please advise and f/u with patient if needed.

## 2023-04-05 NOTE — TELEPHONE ENCOUNTER
Pt called back and the [facility] number she wants it faxed to is- [phone]    Please advise

## 2023-04-05 NOTE — TELEPHONE ENCOUNTER
Called pt and left a msg to call back and ask her for [facility] Fax number

## 2023-04-05 NOTE — TELEPHONE ENCOUNTER
Pt asked why is she unable to get the referral to [facility] also stating she is unable to go back to her ortho Dr because she had her surgery already and she does not want to go to [facility] because they will not work with her. Please Advise?

## 2023-05-30 ENCOUNTER — OFFICE VISIT (OUTPATIENT)
Dept: FAMILY MEDICINE CLINIC | Age: 53
End: 2023-05-30
Payer: COMMERCIAL

## 2023-05-30 ENCOUNTER — TELEPHONE (OUTPATIENT)
Dept: FAMILY MEDICINE CLINIC | Age: 53
End: 2023-05-30

## 2023-05-30 VITALS
SYSTOLIC BLOOD PRESSURE: 118 MMHG | HEIGHT: 61 IN | BODY MASS INDEX: 50.98 KG/M2 | WEIGHT: 270 LBS | DIASTOLIC BLOOD PRESSURE: 68 MMHG

## 2023-05-30 DIAGNOSIS — R20.0 NUMBNESS: Primary | ICD-10-CM

## 2023-05-30 DIAGNOSIS — R73.03 PRE-DIABETES: ICD-10-CM

## 2023-05-30 DIAGNOSIS — I10 ESSENTIAL HYPERTENSION: ICD-10-CM

## 2023-05-30 DIAGNOSIS — E21.3 PARATHYROID HORMONE EXCESS (HCC): ICD-10-CM

## 2023-05-30 PROCEDURE — 3074F SYST BP LT 130 MM HG: CPT | Performed by: FAMILY MEDICINE

## 2023-05-30 PROCEDURE — 3078F DIAST BP <80 MM HG: CPT | Performed by: FAMILY MEDICINE

## 2023-05-30 PROCEDURE — 99213 OFFICE O/P EST LOW 20 MIN: CPT | Performed by: FAMILY MEDICINE

## 2023-05-30 RX ORDER — ATENOLOL 50 MG/1
TABLET ORAL
Qty: 30 TABLET | Refills: 0 | Status: SHIPPED | OUTPATIENT
Start: 2023-05-30

## 2023-05-30 SDOH — ECONOMIC STABILITY: FOOD INSECURITY: WITHIN THE PAST 12 MONTHS, THE FOOD YOU BOUGHT JUST DIDN'T LAST AND YOU DIDN'T HAVE MONEY TO GET MORE.: PATIENT DECLINED

## 2023-05-30 SDOH — ECONOMIC STABILITY: HOUSING INSECURITY
IN THE LAST 12 MONTHS, WAS THERE A TIME WHEN YOU DID NOT HAVE A STEADY PLACE TO SLEEP OR SLEPT IN A SHELTER (INCLUDING NOW)?: PATIENT REFUSED

## 2023-05-30 SDOH — ECONOMIC STABILITY: FOOD INSECURITY: WITHIN THE PAST 12 MONTHS, YOU WORRIED THAT YOUR FOOD WOULD RUN OUT BEFORE YOU GOT MONEY TO BUY MORE.: PATIENT DECLINED

## 2023-05-30 SDOH — ECONOMIC STABILITY: INCOME INSECURITY: HOW HARD IS IT FOR YOU TO PAY FOR THE VERY BASICS LIKE FOOD, HOUSING, MEDICAL CARE, AND HEATING?: PATIENT DECLINED

## 2023-05-30 ASSESSMENT — PATIENT HEALTH QUESTIONNAIRE - PHQ9
2. FEELING DOWN, DEPRESSED OR HOPELESS: 0
SUM OF ALL RESPONSES TO PHQ QUESTIONS 1-9: 0
SUM OF ALL RESPONSES TO PHQ9 QUESTIONS 1 & 2: 0
1. LITTLE INTEREST OR PLEASURE IN DOING THINGS: 0
SUM OF ALL RESPONSES TO PHQ QUESTIONS 1-9: 0

## 2023-05-30 ASSESSMENT — ENCOUNTER SYMPTOMS
RESPIRATORY NEGATIVE: 1
GASTROINTESTINAL NEGATIVE: 1

## 2023-05-30 NOTE — PROGRESS NOTES
Norbert Cedillo (:  1970) is a 46 y.o. female,Established patient, here for evaluation of the following chief complaint(s):  Numbness         ASSESSMENT/PLAN:  1. Numbness  Work-up as below. If symptoms persist consider nerve conduction/EMG however after only a few days the test would be unlikely to be positive for. Consider neurologist/other neurological work-up if not better  2. Essential hypertension  -     CBC with Auto Differential; Future  -     Comprehensive Metabolic Panel; Future  -     Lipid Panel; Future  -     TSH with Reflex; Future  3. Pre-diabetes  -     Hemoglobin A1C; Future  4. Parathyroid hormone excess (HCC)  -     PTH, Intact; Future      Return if symptoms worsen or fail to improve. Subjective   SUBJECTIVE/OBJECTIVE:  HPI  For the last 3 to 4 days patient has noted numbness of L fingertips and also her left 5 toes. He does not present any other place. She states she has had carpal tunnel repair before and she knows it is not that. Again it is only on the tips of her left 5 fingers in the tips of her left 5 toes. She has no pain. She has no other neurological symptoms. She denies any cardiovascular or respiratory symptoms. She denies any headache, speech changes, facial droop, etc.  When she and her  googled, they were worried about diabetes, stroke, etc.    Review of Systems   Constitutional: Negative. Negative for activity change, appetite change, chills, diaphoresis, fatigue and fever. Respiratory: Negative. Cardiovascular: Negative. Gastrointestinal: Negative. Endocrine: Negative. Genitourinary: Negative. Musculoskeletal: Negative. Skin:  Negative for rash. Neurological:  Positive for numbness (Per HPI). Psychiatric/Behavioral: Negative. Objective   Physical Exam  Constitutional:       General: She is not in acute distress. Appearance: She is not ill-appearing, toxic-appearing or diaphoretic.    HENT:      Head:

## 2023-05-30 NOTE — TELEPHONE ENCOUNTER
Patient has been experiencing numbness in left hand and left foot. More often than not but not 24 hours a day. When patient rubs thumb across her hand she cant feel it. No chest pain related to this, patient did have a lot of coughing recently. No prior injuries to these extremities.      Bp has been between 120-135  Lowest 75-89      Please call patient back asap with next steps

## 2023-05-31 ENCOUNTER — HOSPITAL ENCOUNTER (OUTPATIENT)
Age: 53
Discharge: HOME | End: 2023-05-31
Payer: COMMERCIAL

## 2023-05-31 DIAGNOSIS — E21.3 PARATHYROID HORMONE EXCESS (HCC): ICD-10-CM

## 2023-05-31 DIAGNOSIS — R73.03 PRE-DIABETES: ICD-10-CM

## 2023-05-31 DIAGNOSIS — I10 ESSENTIAL HYPERTENSION: ICD-10-CM

## 2023-05-31 LAB
ALBUMIN SERPL-MCNC: 4.2 G/DL (ref 3.4–5)
ALBUMIN/GLOB SERPL: 1.5 {RATIO} (ref 1.1–2.2)
ALP SERPL-CCNC: 73 U/L (ref 40–129)
ALT SERPL-CCNC: 20 U/L (ref 10–40)
ANION GAP SERPL CALCULATED.3IONS-SCNC: 11 MMOL/L (ref 3–16)
AST SERPL-CCNC: 16 U/L (ref 15–37)
BASOPHILS # BLD: 0.1 K/UL (ref 0–0.2)
BASOPHILS NFR BLD: 1.7 %
BILIRUB SERPL-MCNC: 0.4 MG/DL (ref 0–1)
BUN SERPL-MCNC: 18 MG/DL (ref 7–20)
CALCIUM SERPL-MCNC: 9.5 MG/DL (ref 8.3–10.6)
CHLORIDE SERPL-SCNC: 106 MMOL/L (ref 99–110)
CHOLEST SERPL-MCNC: 226 MG/DL (ref 0–199)
CO2 SERPL-SCNC: 26 MMOL/L (ref 21–32)
CREAT SERPL-MCNC: 1.3 MG/DL (ref 0.6–1.1)
DEPRECATED RDW RBC AUTO: 15.3 % (ref 12.4–15.4)
EOSINOPHIL # BLD: 0.2 K/UL (ref 0–0.6)
EOSINOPHIL NFR BLD: 3.7 %
GFR SERPLBLD CREATININE-BSD FMLA CKD-EPI: 49 ML/MIN/{1.73_M2}
GLUCOSE SERPL-MCNC: 107 MG/DL (ref 70–99)
HCT VFR BLD AUTO: 45.4 % (ref 36–48)
HDLC SERPL-MCNC: 53 MG/DL (ref 40–60)
HGB BLD-MCNC: 15.3 G/DL (ref 12–16)
LDLC SERPL CALC-MCNC: 145 MG/DL
LYMPHOCYTES # BLD: 2 K/UL (ref 1–5.1)
LYMPHOCYTES NFR BLD: 45 %
MCH RBC QN AUTO: 26.4 PG (ref 26–34)
MCHC RBC AUTO-ENTMCNC: 33.7 G/DL (ref 31–36)
MCV RBC AUTO: 78.3 FL (ref 80–100)
MONOCYTES # BLD: 0.4 K/UL (ref 0–1.3)
MONOCYTES NFR BLD: 8 %
NEUTROPHILS # BLD: 1.9 K/UL (ref 1.7–7.7)
NEUTROPHILS NFR BLD: 41.6 %
PLATELET # BLD AUTO: 193 K/UL (ref 135–450)
PMV BLD AUTO: 8.7 FL (ref 5–10.5)
POTASSIUM SERPL-SCNC: 4.7 MMOL/L (ref 3.5–5.1)
PROT SERPL-MCNC: 7 G/DL (ref 6.4–8.2)
PTH-INTACT SERPL-MCNC: 133 PG/ML (ref 14–72)
RBC # BLD AUTO: 5.79 M/UL (ref 4–5.2)
SODIUM SERPL-SCNC: 143 MMOL/L (ref 136–145)
TRIGL SERPL-MCNC: 142 MG/DL (ref 0–150)
TSH SERPL DL<=0.005 MIU/L-ACNC: 1.97 UIU/ML (ref 0.27–4.2)
VLDLC SERPL CALC-MCNC: 28 MG/DL
WBC # BLD AUTO: 4.4 K/UL (ref 4–11)

## 2023-05-31 PROCEDURE — 83036 HEMOGLOBIN GLYCOSYLATED A1C: CPT

## 2023-05-31 PROCEDURE — 84443 ASSAY THYROID STIM HORMONE: CPT

## 2023-05-31 PROCEDURE — 80061 LIPID PANEL: CPT

## 2023-05-31 PROCEDURE — 36415 COLL VENOUS BLD VENIPUNCTURE: CPT

## 2023-05-31 PROCEDURE — 80053 COMPREHEN METABOLIC PANEL: CPT

## 2023-05-31 PROCEDURE — 85025 COMPLETE CBC W/AUTO DIFF WBC: CPT

## 2023-05-31 PROCEDURE — 83970 ASSAY OF PARATHORMONE: CPT

## 2023-06-01 LAB
EST. AVERAGE GLUCOSE BLD GHB EST-MCNC: 119.8 MG/DL
HBA1C MFR BLD: 5.8 %

## 2023-06-19 ENCOUNTER — TELEPHONE (OUTPATIENT)
Dept: ORTHOPEDIC SURGERY | Age: 53
End: 2023-06-19

## 2023-06-20 ENCOUNTER — TELEPHONE (OUTPATIENT)
Dept: ORTHOPEDIC SURGERY | Age: 53
End: 2023-06-20

## 2023-06-20 NOTE — TELEPHONE ENCOUNTER
Appointment Request     Patient requesting earlier appointment: Yes  Appointment offered to patient: - AT 8:00  Patient Contact Number: [phone]      PT CALLING TO SEE IF SHE COULD GET IN A LITTLE SOONER. PLEASE CALL BACK PT AT THE ABOVE NUMBER.

## 2023-06-28 DIAGNOSIS — I10 ESSENTIAL HYPERTENSION: ICD-10-CM

## 2023-06-28 RX ORDER — ATENOLOL 50 MG/1
TABLET ORAL
Qty: 30 TABLET | Refills: 0 | Status: SHIPPED | OUTPATIENT
Start: 2023-06-28 | End: 2023-07-21

## 2023-06-28 NOTE — TELEPHONE ENCOUNTER
Lov 5/30/23  Lrf 30 0 5/30/23  Lab Results   Component Value Date     05/31/2023    K 4.7 05/31/2023     05/31/2023    CO2 26 05/31/2023    BUN 18 05/31/2023    CREATININE 1.3 (H) 05/31/2023    GLUCOSE 107 (H) 05/31/2023    CALCIUM 9.5 05/31/2023    PROT 7.0 05/31/2023    LABALBU 4.2 05/31/2023    BILITOT 0.4 05/31/2023    ALKPHOS 73 05/31/2023    AST 16 05/31/2023    ALT 20 05/31/2023    LABGLOM 49 (A) 05/31/2023    GFRAA 52 (A) 10/29/2021    AGRATIO 1.5 05/31/2023    GLOB 2.7 11/11/2020

## 2023-06-29 ENCOUNTER — OFFICE VISIT (OUTPATIENT)
Dept: ORTHOPEDIC SURGERY | Age: 53
End: 2023-06-29
Payer: COMMERCIAL

## 2023-06-29 VITALS — WEIGHT: 270 LBS | HEIGHT: 61 IN | BODY MASS INDEX: 50.98 KG/M2

## 2023-06-29 DIAGNOSIS — M17.12 PRIMARY OSTEOARTHRITIS OF LEFT KNEE: Primary | ICD-10-CM

## 2023-06-29 PROCEDURE — 99999 PR OFFICE/OUTPT VISIT,PROCEDURE ONLY: CPT | Performed by: PHYSICIAN ASSISTANT

## 2023-06-29 PROCEDURE — 20610 DRAIN/INJ JOINT/BURSA W/O US: CPT | Performed by: PHYSICIAN ASSISTANT

## 2023-06-29 RX ORDER — TRIAMCINOLONE ACETONIDE 40 MG/ML
40 INJECTION, SUSPENSION INTRA-ARTICULAR; INTRAMUSCULAR ONCE
Status: COMPLETED | OUTPATIENT
Start: 2023-06-29 | End: 2023-06-29

## 2023-06-29 RX ORDER — BUPIVACAINE HYDROCHLORIDE 2.5 MG/ML
2 INJECTION, SOLUTION INFILTRATION; PERINEURAL ONCE
Status: COMPLETED | OUTPATIENT
Start: 2023-06-29 | End: 2023-06-29

## 2023-06-29 RX ADMIN — TRIAMCINOLONE ACETONIDE 40 MG: 40 INJECTION, SUSPENSION INTRA-ARTICULAR; INTRAMUSCULAR at 08:46

## 2023-06-29 RX ADMIN — BUPIVACAINE HYDROCHLORIDE 5 MG: 2.5 INJECTION, SOLUTION INFILTRATION; PERINEURAL at 08:45

## 2023-06-30 ENCOUNTER — TELEPHONE (OUTPATIENT)
Dept: ORTHOPEDIC SURGERY | Age: 53
End: 2023-06-30

## 2023-07-03 ENCOUNTER — TELEPHONE (OUTPATIENT)
Dept: ORTHOPEDIC SURGERY | Age: 53
End: 2023-07-03

## 2023-07-03 NOTE — TELEPHONE ENCOUNTER
Patient was returning Erica call she just need a call back her phone was in the car that why she miss the call. Please Advise.

## 2023-07-03 NOTE — TELEPHONE ENCOUNTER
General Question     Subject: KNEE INJECTION/PAIN  Patient and /or Facility Request: Archie Quintanilla  Contact Number: [phone]      [address]. SHE SAID WHEN ROBERT GAVE HER AN INJECTION IT DIDN'T FEEL LIKE THIS, THE KNEE WAS BETTER. SHE WANTS SOMEONE TO LOOK AT HER CHART AND TELL HER IF THERE WAS SOMETHING DONE DIFFERENTLY ON Thursday WHEN SHE SAW Jessenia Saunders.

## 2023-07-07 ENCOUNTER — OFFICE VISIT (OUTPATIENT)
Dept: ORTHOPEDIC SURGERY | Age: 53
End: 2023-07-07

## 2023-07-07 VITALS — WEIGHT: 270 LBS | HEIGHT: 61 IN | RESPIRATION RATE: 16 BRPM | BODY MASS INDEX: 50.98 KG/M2

## 2023-07-07 DIAGNOSIS — M70.52 PES ANSERINUS BURSITIS OF LEFT KNEE: Primary | ICD-10-CM

## 2023-07-07 RX ORDER — TRIAMCINOLONE ACETONIDE 40 MG/ML
40 INJECTION, SUSPENSION INTRA-ARTICULAR; INTRAMUSCULAR ONCE
Status: COMPLETED | OUTPATIENT
Start: 2023-07-07 | End: 2023-07-07

## 2023-07-07 RX ORDER — BUPIVACAINE HYDROCHLORIDE 2.5 MG/ML
2 INJECTION, SOLUTION INFILTRATION; PERINEURAL ONCE
Status: COMPLETED | OUTPATIENT
Start: 2023-07-07 | End: 2023-07-07

## 2023-07-07 RX ADMIN — BUPIVACAINE HYDROCHLORIDE 5 MG: 2.5 INJECTION, SOLUTION INFILTRATION; PERINEURAL at 08:49

## 2023-07-07 RX ADMIN — TRIAMCINOLONE ACETONIDE 40 MG: 40 INJECTION, SUSPENSION INTRA-ARTICULAR; INTRAMUSCULAR at 08:50

## 2023-07-17 DIAGNOSIS — I10 ESSENTIAL HYPERTENSION: ICD-10-CM

## 2023-07-18 RX ORDER — AMLODIPINE BESYLATE 10 MG/1
10 TABLET ORAL DAILY
Qty: 90 TABLET | Refills: 1 | Status: SHIPPED | OUTPATIENT
Start: 2023-07-18 | End: 2023-07-26 | Stop reason: SDUPTHER

## 2023-07-18 NOTE — TELEPHONE ENCOUNTER
Medication:   Requested Prescriptions     Pending Prescriptions Disp Refills    amLODIPine (NORVASC) 10 MG tablet 90 tablet 0     Sig: Take 1 tablet by mouth daily        Last Filled:  90 X 0 rf 4/12/23    Patient Phone Number: [phone] (home) [phone] (work)    Last appt: 5/30/2023   Next appt: Visit date not found    Last OARRS: No flowsheet data found.

## 2023-07-21 DIAGNOSIS — I10 ESSENTIAL HYPERTENSION: ICD-10-CM

## 2023-07-21 RX ORDER — ATENOLOL 50 MG/1
TABLET ORAL
Qty: 90 TABLET | Refills: 1 | Status: SHIPPED | OUTPATIENT
Start: 2023-07-21

## 2023-07-21 NOTE — TELEPHONE ENCOUNTER
Medication:   Requested Prescriptions     Pending Prescriptions Disp Refills    atenolol (TENORMIN) 50 MG tablet [Pharmacy Med Name: ATENOLOL 50 MG TABLET] 30 tablet 0     Sig: TAKE 1 TABLET BY MOUTH EVERY DAY       Last appt: 5/30/2023   Next appt: Visit date not found    Lab Results   Component Value Date     05/31/2023    K 4.7 05/31/2023     05/31/2023    CO2 26 05/31/2023    BUN 18 05/31/2023    CREATININE 1.3 (H) 05/31/2023    GLUCOSE 107 (H) 05/31/2023    CALCIUM 9.5 05/31/2023    PROT 7.0 05/31/2023    LABALBU 4.2 05/31/2023    BILITOT 0.4 05/31/2023    ALKPHOS 73 05/31/2023    AST 16 05/31/2023    ALT 20 05/31/2023    LABGLOM 49 (A) 05/31/2023    GFRAA 52 (A) 10/29/2021    AGRATIO 1.5 05/31/2023    GLOB 2.7 11/11/2020

## 2023-07-26 DIAGNOSIS — I10 ESSENTIAL HYPERTENSION: ICD-10-CM

## 2023-07-26 RX ORDER — AMLODIPINE BESYLATE 10 MG/1
10 TABLET ORAL DAILY
Qty: 90 TABLET | Refills: 1 | Status: SHIPPED | OUTPATIENT
Start: 2023-07-26

## 2023-07-26 NOTE — TELEPHONE ENCOUNTER
Medication and Quantity requested: amLODIPine (NORVASC) 10 MG tablet       Last Visit  5/30/2023    Pharmacy and phone number updated in EPIC:  yes

## 2023-07-31 DIAGNOSIS — G40.409 MYOCLONIC SEIZURE DISORDER (HCC): ICD-10-CM

## 2023-07-31 RX ORDER — LEVETIRACETAM 500 MG/1
500 TABLET ORAL 2 TIMES DAILY
Qty: 180 TABLET | Refills: 2 | OUTPATIENT
Start: 2023-07-31 | End: 2023-10-29

## 2023-08-02 ENCOUNTER — TELEPHONE (OUTPATIENT)
Dept: FAMILY MEDICINE CLINIC | Age: 53
End: 2023-08-02

## 2023-08-02 NOTE — TELEPHONE ENCOUNTER
Patient is concerned about her A1C level  because she states it keeps getting higher and higher. She also stated that her weight keeps going up. Even with her fasting her A1C is high. Patient didn't provide me with any numbers. She would like to know what she needs to do so that she can lose some weight and bring down her Alc.  Please advise

## 2023-08-03 ENCOUNTER — OFFICE VISIT (OUTPATIENT)
Dept: ENDOCRINOLOGY | Age: 53
End: 2023-08-03
Payer: COMMERCIAL

## 2023-08-03 ENCOUNTER — OFFICE VISIT (OUTPATIENT)
Dept: FAMILY MEDICINE CLINIC | Age: 53
End: 2023-08-03
Payer: COMMERCIAL

## 2023-08-03 VITALS
OXYGEN SATURATION: 96 % | TEMPERATURE: 97.2 F | RESPIRATION RATE: 16 BRPM | DIASTOLIC BLOOD PRESSURE: 77 MMHG | SYSTOLIC BLOOD PRESSURE: 137 MMHG | HEART RATE: 62 BPM | BODY MASS INDEX: 52.11 KG/M2 | HEIGHT: 61 IN | WEIGHT: 276 LBS

## 2023-08-03 VITALS
WEIGHT: 275 LBS | BODY MASS INDEX: 51.92 KG/M2 | DIASTOLIC BLOOD PRESSURE: 83 MMHG | SYSTOLIC BLOOD PRESSURE: 134 MMHG | OXYGEN SATURATION: 98 % | HEIGHT: 61 IN | HEART RATE: 63 BPM

## 2023-08-03 DIAGNOSIS — I10 ESSENTIAL HYPERTENSION: ICD-10-CM

## 2023-08-03 DIAGNOSIS — E04.2 MULTIPLE THYROID NODULES: Primary | ICD-10-CM

## 2023-08-03 DIAGNOSIS — N18.31 STAGE 3A CHRONIC KIDNEY DISEASE (HCC): ICD-10-CM

## 2023-08-03 DIAGNOSIS — E55.9 VITAMIN D DEFICIENCY: ICD-10-CM

## 2023-08-03 DIAGNOSIS — E66.01 MORBID OBESITY WITH BMI OF 45.0-49.9, ADULT (HCC): Primary | ICD-10-CM

## 2023-08-03 DIAGNOSIS — N25.81 SECONDARY HYPERPARATHYROIDISM (HCC): ICD-10-CM

## 2023-08-03 DIAGNOSIS — E04.2 MULTIPLE THYROID NODULES: ICD-10-CM

## 2023-08-03 DIAGNOSIS — E21.3 PARATHYROID HORMONE EXCESS (HCC): ICD-10-CM

## 2023-08-03 DIAGNOSIS — R73.03 PRE-DIABETES: ICD-10-CM

## 2023-08-03 DIAGNOSIS — G40.802 ABDOMINAL EPILEPSY (HCC): ICD-10-CM

## 2023-08-03 LAB
25(OH)D3 SERPL-MCNC: 38.3 NG/ML
ALBUMIN SERPL-MCNC: 4.6 G/DL (ref 3.4–5)
ALBUMIN/GLOB SERPL: 1.8 {RATIO} (ref 1.1–2.2)
ALP SERPL-CCNC: 81 U/L (ref 40–129)
ALT SERPL-CCNC: 17 U/L (ref 10–40)
ANION GAP SERPL CALCULATED.3IONS-SCNC: 14 MMOL/L (ref 3–16)
AST SERPL-CCNC: 17 U/L (ref 15–37)
BILIRUB SERPL-MCNC: 0.4 MG/DL (ref 0–1)
BUN SERPL-MCNC: 25 MG/DL (ref 7–20)
CALCIUM SERPL-MCNC: 9.7 MG/DL (ref 8.3–10.6)
CHLORIDE SERPL-SCNC: 106 MMOL/L (ref 99–110)
CO2 SERPL-SCNC: 24 MMOL/L (ref 21–32)
CREAT SERPL-MCNC: 1.5 MG/DL (ref 0.6–1.1)
GFR SERPLBLD CREATININE-BSD FMLA CKD-EPI: 41 ML/MIN/{1.73_M2}
GLUCOSE SERPL-MCNC: 95 MG/DL (ref 70–99)
MAGNESIUM SERPL-MCNC: 2.1 MG/DL (ref 1.8–2.4)
PHOSPHATE SERPL-MCNC: 3.5 MG/DL (ref 2.5–4.9)
POTASSIUM SERPL-SCNC: 4.5 MMOL/L (ref 3.5–5.1)
PROT SERPL-MCNC: 7.1 G/DL (ref 6.4–8.2)
PTH-INTACT SERPL-MCNC: 91 PG/ML (ref 14–72)
SODIUM SERPL-SCNC: 144 MMOL/L (ref 136–145)

## 2023-08-03 PROCEDURE — 99214 OFFICE O/P EST MOD 30 MIN: CPT | Performed by: INTERNAL MEDICINE

## 2023-08-03 PROCEDURE — 3075F SYST BP GE 130 - 139MM HG: CPT | Performed by: FAMILY MEDICINE

## 2023-08-03 PROCEDURE — 3078F DIAST BP <80 MM HG: CPT | Performed by: FAMILY MEDICINE

## 2023-08-03 PROCEDURE — 99214 OFFICE O/P EST MOD 30 MIN: CPT | Performed by: FAMILY MEDICINE

## 2023-08-03 PROCEDURE — 3078F DIAST BP <80 MM HG: CPT | Performed by: INTERNAL MEDICINE

## 2023-08-03 PROCEDURE — 3075F SYST BP GE 130 - 139MM HG: CPT | Performed by: INTERNAL MEDICINE

## 2023-08-03 RX ORDER — SEMAGLUTIDE 0.25 MG/.5ML
0.25 INJECTION, SOLUTION SUBCUTANEOUS
Qty: 2 ML | Refills: 1 | Status: SHIPPED | OUTPATIENT
Start: 2023-08-03 | End: 2023-08-03

## 2023-08-03 NOTE — PROGRESS NOTES
[facility] Note    Date: 8/3/2023                                               Casi Guzman:     Chief Complaint   Patient presents with    Other     Wants to discuss monjauro and other weight loss rx        HPI  Patient is here for follow-up on prediabetes. Most recent A1c is 5.8. Pt is concerned for continued weight gain. Pt east first at 10:30, either 2 eggs or a few pieces of turkey sausage. Pt has lunch at 1pm and has a spinach salad and cottage cheese and pineapple. For dinner pt usually has some meat only. Drinks only water. Patient has hypertension. Currently takes amlodipine and atenolol. Denies chest pain or shortness of breath.          Patient Active Problem List    Diagnosis Date Noted    Primary osteoarthritis of left knee 09/08/2021    Status post right knee replacement     Arthritis of right knee 07/20/2021    Hypertension, essential     Morbid obesity with BMI of 45.0-49.9, adult (HCC)     Anemia in chronic renal disease 01/09/2020    Proteinuria 01/09/2020    Chronic pain of right knee 03/26/2019    Candidiasis of finger web 04/04/2018    Hand dermatitis 03/19/2018    Infected abrasion of left hand 03/15/2018    Primary localized osteoarthrosis of the knee 10/04/2016    Tear of PCL (posterior cruciate ligament) of knee 10/04/2016    Contusion of right knee 06/29/2016    Family history of cancer 06/21/2013    Renal insufficiency 06/13/2012    Vitamin D deficiency 06/13/2012    Carpal tunnel syndrome     Hypertension     Obesity     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[address]) 04/19/2007    Anemia, iron deficiency 04/19/2007    Chronic kidney disease, stage 3, mod decreased GFR ([address]) 04/19/2007    Hypertension, essential, benign 04/19/2007    Overweight and obesity 04/19/2007    Renal osteodystrophy 04/19/2007       Past Medical History:   Diagnosis Date    Arthritis     BRCA1 negative     BRCA2 negative     Carpal tunnel syndrome     Convulsions     COVID-19     Dermatophytosis     Diarrhea

## 2023-08-08 ENCOUNTER — OFFICE VISIT (OUTPATIENT)
Dept: NEUROLOGY | Age: 53
End: 2023-08-08
Payer: COMMERCIAL

## 2023-08-08 ENCOUNTER — TELEPHONE (OUTPATIENT)
Dept: NEUROLOGY | Age: 53
End: 2023-08-08

## 2023-08-08 ENCOUNTER — TELEPHONE (OUTPATIENT)
Dept: PULMONOLOGY | Age: 53
End: 2023-08-08

## 2023-08-08 ENCOUNTER — HOSPITAL ENCOUNTER (OUTPATIENT)
Dept: GENERAL RADIOLOGY | Age: 53
Discharge: HOME | End: 2023-08-08
Attending: INTERNAL MEDICINE
Payer: COMMERCIAL

## 2023-08-08 VITALS
HEART RATE: 50 BPM | WEIGHT: 278 LBS | SYSTOLIC BLOOD PRESSURE: 227 MMHG | HEIGHT: 61 IN | BODY MASS INDEX: 52.49 KG/M2 | DIASTOLIC BLOOD PRESSURE: 115 MMHG

## 2023-08-08 DIAGNOSIS — N25.81 SECONDARY HYPERPARATHYROIDISM (HCC): ICD-10-CM

## 2023-08-08 DIAGNOSIS — G40.409 MYOCLONIC SEIZURE DISORDER (HCC): ICD-10-CM

## 2023-08-08 DIAGNOSIS — E55.9 VITAMIN D DEFICIENCY: ICD-10-CM

## 2023-08-08 DIAGNOSIS — I10 HTN (HYPERTENSION), BENIGN: ICD-10-CM

## 2023-08-08 DIAGNOSIS — N18.2 STAGE 2 CHRONIC KIDNEY DISEASE: ICD-10-CM

## 2023-08-08 DIAGNOSIS — N18.31 STAGE 3A CHRONIC KIDNEY DISEASE (HCC): ICD-10-CM

## 2023-08-08 DIAGNOSIS — E04.2 MULTIPLE THYROID NODULES: ICD-10-CM

## 2023-08-08 DIAGNOSIS — R73.03 PREDIABETES: ICD-10-CM

## 2023-08-08 DIAGNOSIS — G40.909 SEIZURE DISORDER (HCC): Primary | ICD-10-CM

## 2023-08-08 PROCEDURE — 77080 DXA BONE DENSITY AXIAL: CPT

## 2023-08-08 PROCEDURE — 99204 OFFICE O/P NEW MOD 45 MIN: CPT | Performed by: PSYCHIATRY & NEUROLOGY

## 2023-08-08 PROCEDURE — 3080F DIAST BP >= 90 MM HG: CPT | Performed by: PSYCHIATRY & NEUROLOGY

## 2023-08-08 PROCEDURE — 3077F SYST BP >= 140 MM HG: CPT | Performed by: PSYCHIATRY & NEUROLOGY

## 2023-08-08 RX ORDER — LEVETIRACETAM 500 MG/1
500 TABLET ORAL DAILY
Qty: 90 TABLET | Refills: 1 | Status: SHIPPED | OUTPATIENT
Start: 2023-08-08 | End: 2024-02-04

## 2023-08-08 RX ORDER — LORAZEPAM 1 MG/1
1 TABLET ORAL ONCE
Qty: 1 TABLET | Refills: 0 | Status: SHIPPED | OUTPATIENT
Start: 2023-08-08 | End: 2023-08-08

## 2023-08-08 NOTE — TELEPHONE ENCOUNTER
Pt phoned scheduled to get MRI done however she can not do it without medication to take prior to MRI. Mri Is scheduled 8/14 at [facility]. Please call pt back.

## 2023-08-08 NOTE — PROGRESS NOTES
The patient is a 48y.o. years old female who  was referred by Roberta Morales MD  for consultation regarding h xof seizrue     HPI:  The patient describes history of seizure disorder since he was 12years old. She had a grand mal seizure at that time and her second seizure when she was 21years old. No seizure since that time. No family history of epilepsy. No aura or warning. No history of absence seizure or myoclonic seizures. Cannot recall last MRI or EEG which could be many years ago. She has been taking Keppra since she was 21years old. She is not sure whether she is taking daily or twice daily but she thinks she is on 500 mg daily. History of chronic kidney disease last creatinine was 1.3. History of prediabetes and hypertension. She denies any frequent headaches, weakness or numbness or visual changes. No recent fever or chills. Denies drinking alcohol use of drugs. No side effect from Keppra or mood swings. No history of febrile convulsion or head trauma with LOC. Other review of system was unremarkable. ROS : A 10-14 system review of constitutional, cardiovascular, respiratory, GI, eyes, , ENT, musculoskeletal, endocrine, skin, SHEENT, genitourinary, psychiatric and neurologic systems was obtained and updated today and is unremarkable except as mentioned in my HPI        Exam:   Constitutional:   Vitals:    08/08/23 1147 08/08/23 1151   BP: (!) 208/111 (!) 227/115   Site: Left Wrist Right Wrist   Position: Sitting Sitting   Pulse: (!) 49 50   Weight: 278 lb (126.1 kg) 278 lb (126.1 kg)   Height: 5' 1\" (1.549 m) 5' 1\" (1.549 m)       General appearance:  Normal development and appear in no acute distress. Mental Status:   Oriented to person, place, problem, and time. Memory: Good immediate recall. Intact remote memory  Normal attention span and concentration. Language: intact naming, repeating and fluency   Good fund of Knowledge.  Aware of current events and vocabulary

## 2023-08-08 NOTE — TELEPHONE ENCOUNTER
LOV 8-8-2023  NOV 2-8-2024    Allergies:   Aspirin Aspirin  Not Specified  8/3/2023 Past Updates   Deletion Reason:    Adverse Reactions/Drug Intolerances      Ibuprofen Ibuprofen  Not Specified Contraindication 3/14/2013 Past Updates   Patient has renal insuffiencey.      Deletion Reason:    Nsaids            Review medication as well    Pended ativan 1 prior to MRI

## 2023-08-09 ENCOUNTER — HOSPITAL ENCOUNTER (OUTPATIENT)
Dept: NUCLEAR MEDICINE | Age: 53
Discharge: HOME | End: 2023-08-09
Payer: COMMERCIAL

## 2023-08-09 ENCOUNTER — HOSPITAL ENCOUNTER (OUTPATIENT)
Dept: ULTRASOUND IMAGING | Age: 53
Discharge: HOME | End: 2023-08-09
Payer: COMMERCIAL

## 2023-08-09 DIAGNOSIS — N18.31 STAGE 3A CHRONIC KIDNEY DISEASE (HCC): ICD-10-CM

## 2023-08-09 DIAGNOSIS — E55.9 VITAMIN D DEFICIENCY: ICD-10-CM

## 2023-08-09 DIAGNOSIS — N25.81 SECONDARY HYPERPARATHYROIDISM (HCC): ICD-10-CM

## 2023-08-09 DIAGNOSIS — E04.2 MULTIPLE THYROID NODULES: ICD-10-CM

## 2023-08-09 PROCEDURE — 78072 PARATHYRD PLANAR W/SPECT&CT: CPT

## 2023-08-09 PROCEDURE — 3430000000 HC RX DIAGNOSTIC RADIOPHARMACEUTICAL: Performed by: INTERNAL MEDICINE

## 2023-08-09 PROCEDURE — 76536 US EXAM OF HEAD AND NECK: CPT

## 2023-08-09 PROCEDURE — A9500 TC99M SESTAMIBI: HCPCS | Performed by: INTERNAL MEDICINE

## 2023-08-09 RX ORDER — OSELTAMIVIR PHOSPHATE 75 MG/1
28 CAPSULE ORAL
Status: COMPLETED | OUTPATIENT
Start: 2023-08-09 | End: 2023-08-09

## 2023-08-09 RX ADMIN — OSELTAMIVIR PHOSPHATE 28 MILLICURIE: 75 CAPSULE ORAL at 08:05

## 2023-08-10 ENCOUNTER — TELEPHONE (OUTPATIENT)
Dept: ENDOCRINOLOGY | Age: 53
End: 2023-08-10

## 2023-08-10 NOTE — TELEPHONE ENCOUNTER
Robertaginny Jin requesting a call from Dr. Arben Acosta to discuss surgery. She does not wish to go this process again.

## 2023-08-11 NOTE — TELEPHONE ENCOUNTER
Spoke to patient   All questions answered   Surgery not indicated at this time   Will continue to monitor   Change follow up to 6 months

## 2023-08-14 ENCOUNTER — HOSPITAL ENCOUNTER (OUTPATIENT)
Dept: NEUROLOGY | Age: 53
Discharge: HOME | End: 2023-08-14
Payer: COMMERCIAL

## 2023-08-14 ENCOUNTER — TELEPHONE (OUTPATIENT)
Dept: NEUROLOGY | Age: 53
End: 2023-08-14

## 2023-08-14 DIAGNOSIS — G40.909 SEIZURE DISORDER (HCC): Primary | ICD-10-CM

## 2023-08-14 DIAGNOSIS — G40.909 SEIZURE DISORDER (HCC): ICD-10-CM

## 2023-08-14 PROCEDURE — 95816 EEG AWAKE AND DROWSY: CPT

## 2023-08-14 PROCEDURE — 95816 EEG AWAKE AND DROWSY: CPT | Performed by: PSYCHIATRY & NEUROLOGY

## 2023-08-14 NOTE — TELEPHONE ENCOUNTER
LOV 8-8-2023  NOV 2-8-2024    Pt had MRI today and was unabe to do it even with Lorazepan. Hx sedation to have MRI    Id there something stronger?      MRI will need to be reordered as well if you want her to do it again

## 2023-08-14 NOTE — TELEPHONE ENCOUNTER
Pt phoned even with Lorazepam she could not do the MRI. She said years ago she was sedated. She would like to know about rescheduling it but is there anything stronger? Please call pt back.

## 2023-08-14 NOTE — PROCEDURES
Patient: Antionette Hill    MR Number: [id number]  YOB: 1970  Date of Visit: 8/14/2023    Clinical History:  The patient is a 48y.o. years old female with possible recurrent seizures. Method: The EEG was performed utilizing the international 10/20 of electrode placements of both referential and bipolar montages. The patient was awake and drowsy through out the recording. Photic stimulation was performed. Findings: The background of the EEG showed normal alpha posterior background of 9-10 HZ and amplitude of 20-40 UV. This background was symmetric, waxing and waning, and reactive with eye opening and closure. As the patient became drowsy, generalized diffuse slowing was seen through recording at 6-7 HZ. This generalized slowing was symmetric, non rhythmical, and continuous. No spike or sharp waves were seen. Photic stimulation did not activate EEG. Impression: This EEG  is within normal limits. There is no evidence of epileptiform discharges, focal, or lateralizing abnormalities.       Magnus Lewis MD      Board certified in clinical neurophysiology

## 2023-08-14 NOTE — TELEPHONE ENCOUNTER
I placed order for MRI,     I called Radiology at [facility]  They do not have the equipment to do sedation.      Through [facility] they will be done at [facility] or Counts include 234 beds at the [facility]

## 2023-08-17 ENCOUNTER — TELEPHONE (OUTPATIENT)
Dept: FAMILY MEDICINE CLINIC | Age: 53
End: 2023-08-17

## 2023-08-17 NOTE — TELEPHONE ENCOUNTER
Pt is getting a mri done at [address] [place] [address] on 8/24 at 11 am but patient is claustrophobic and needs to be sedated. Mary Liang wants to know if patient is able to be sedated while she gets the mri done. Pt tried to do the mri before without sedation and she had a panic attack. Patient said [facility] had called her from [phone] . Please give number a call to see how pavel Ng give okay for the sedation. Patient wasn't sure what pavel had to do in this situation.

## 2023-08-18 DIAGNOSIS — E66.01 MORBID OBESITY WITH BMI OF 45.0-49.9, ADULT (HCC): ICD-10-CM

## 2023-08-18 RX ORDER — SEMAGLUTIDE 0.25 MG/.5ML
0.25 INJECTION, SOLUTION SUBCUTANEOUS
Qty: 2 ML | Refills: 1 | OUTPATIENT
Start: 2023-08-18

## 2023-08-18 NOTE — TELEPHONE ENCOUNTER
Medication and Quantity requested: Semaglutide-Weight Management (WEGOVY) 0.25 MG/0.5ML SOAJ SC injection      Last Visit  8/3/2023    Pharmacy and phone number updated in EPIC:  yes

## 2023-08-18 NOTE — TELEPHONE ENCOUNTER
Pt called and said she tried the ativan and that didn't work and she still had the panic attack. Patient said that pavel can fill out a form in system that can give permission for to be sedated. Or are there any other alternatives that patient can take.

## 2023-08-21 ENCOUNTER — TELEPHONE (OUTPATIENT)
Dept: FAMILY MEDICINE CLINIC | Age: 53
End: 2023-08-21

## 2023-08-21 NOTE — TELEPHONE ENCOUNTER
Medication and Quantity requested: Semaglutide-Weight Management (WEGOVY) 0.25 MG/0.5ML SOAJ SC injection       Last Visit  8/3/23    Pharmacy and phone number updated in [facility]:  yes

## 2023-08-22 DIAGNOSIS — E66.01 MORBID OBESITY WITH BMI OF 45.0-49.9, ADULT (HCC): Primary | ICD-10-CM

## 2023-08-22 RX ORDER — SEMAGLUTIDE 0.25 MG/.5ML
0.25 INJECTION, SOLUTION SUBCUTANEOUS
Qty: 2 ML | Refills: 2 | Status: SHIPPED | OUTPATIENT
Start: 2023-08-22

## 2023-08-22 NOTE — PATIENT INSTRUCTIONS
LATE ENTRY from 8/21/23 @ 6424 Pt called and was very argumentative about getting H&P paperwork faxed to her PCP so she can get H&P done. Pt kept telling me to look up her PCP fax number and fax paperwork ,as they keep calling to get paperwork sent to them. . Paper work for H&P was already faxed to PCP. 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 I did not need to take insults and I was hanging up. Pt called back later and spoke with Grover Duverney. And given basically given same info.   Newton Pedro said PCP did not need special forms as he was [facility] PCP and it would appear in epic.  /NR       8/23/23 @ 5468 H&P in media 8/23   Neomia Line

## 2023-08-22 NOTE — TELEPHONE ENCOUNTER
Patient called and would like this medication resent to [address]    She knows it was denied on 08/03/23 due to financial cost of the medication but she is able to pay out of pocket cost as well with what insurance will cover. Her pharmacy is stating they never got it. . She is asking if we can just resend it please?        Please advise and contact patient if we can resend it please

## 2023-08-24 ENCOUNTER — ANESTHESIA (OUTPATIENT)
Dept: MRI IMAGING | Age: 53
End: 2023-08-24

## 2023-08-24 ENCOUNTER — ANESTHESIA EVENT (OUTPATIENT)
Dept: MRI IMAGING | Age: 53
End: 2023-08-24

## 2023-08-24 ENCOUNTER — HOSPITAL ENCOUNTER (OUTPATIENT)
Dept: MRI IMAGING | Age: 53
Discharge: HOME | End: 2023-08-24
Payer: COMMERCIAL

## 2023-08-24 VITALS
OXYGEN SATURATION: 94 % | WEIGHT: 275 LBS | RESPIRATION RATE: 14 BRPM | HEIGHT: 61 IN | DIASTOLIC BLOOD PRESSURE: 89 MMHG | TEMPERATURE: 97.1 F | SYSTOLIC BLOOD PRESSURE: 138 MMHG | HEART RATE: 57 BPM | BODY MASS INDEX: 51.92 KG/M2

## 2023-08-24 DIAGNOSIS — G40.909 SEIZURE DISORDER (HCC): ICD-10-CM

## 2023-08-24 LAB — HCG UR QL: NEGATIVE

## 2023-08-24 PROCEDURE — 2500000003 HC RX 250 WO HCPCS: Performed by: NURSE ANESTHETIST, CERTIFIED REGISTERED

## 2023-08-24 PROCEDURE — 2580000003 HC RX 258: Performed by: PSYCHIATRY & NEUROLOGY

## 2023-08-24 PROCEDURE — C9399 UNCLASSIFIED DRUGS OR BIOLOG: HCPCS | Performed by: NURSE ANESTHETIST, CERTIFIED REGISTERED

## 2023-08-24 PROCEDURE — 84703 CHORIONIC GONADOTROPIN ASSAY: CPT

## 2023-08-24 PROCEDURE — 70551 MRI BRAIN STEM W/O DYE: CPT

## 2023-08-24 PROCEDURE — 6360000002 HC RX W HCPCS: Performed by: NURSE ANESTHETIST, CERTIFIED REGISTERED

## 2023-08-24 RX ORDER — HYDROMORPHONE HYDROCHLORIDE 1 MG/ML
0.5 INJECTION, SOLUTION INTRAMUSCULAR; INTRAVENOUS; SUBCUTANEOUS EVERY 5 MIN PRN
OUTPATIENT
Start: 2023-08-24

## 2023-08-24 RX ORDER — LIDOCAINE HYDROCHLORIDE 20 MG/ML
INJECTION, SOLUTION EPIDURAL; INFILTRATION; INTRACAUDAL; PERINEURAL PRN
Status: DISCONTINUED | OUTPATIENT
Start: 2023-08-24 | End: 2023-08-24 | Stop reason: SDUPTHER

## 2023-08-24 RX ORDER — ROCURONIUM BROMIDE 10 MG/ML
INJECTION, SOLUTION INTRAVENOUS PRN
Status: DISCONTINUED | OUTPATIENT
Start: 2023-08-24 | End: 2023-08-24 | Stop reason: SDUPTHER

## 2023-08-24 RX ORDER — ONDANSETRON 2 MG/ML
4 INJECTION INTRAMUSCULAR; INTRAVENOUS
OUTPATIENT
Start: 2023-08-24 | End: 2023-08-25

## 2023-08-24 RX ORDER — SODIUM CHLORIDE 9 MG/ML
INJECTION, SOLUTION INTRAVENOUS PRN
OUTPATIENT
Start: 2023-08-24

## 2023-08-24 RX ORDER — SODIUM CHLORIDE, SODIUM LACTATE, POTASSIUM CHLORIDE, CALCIUM CHLORIDE 600; 310; 30; 20 MG/100ML; MG/100ML; MG/100ML; MG/100ML
INJECTION, SOLUTION INTRAVENOUS CONTINUOUS
Status: DISCONTINUED | OUTPATIENT
Start: 2023-08-24 | End: 2023-08-25 | Stop reason: HOSPADM

## 2023-08-24 RX ORDER — IPRATROPIUM BROMIDE AND ALBUTEROL SULFATE 2.5; .5 MG/3ML; MG/3ML
1 SOLUTION RESPIRATORY (INHALATION)
OUTPATIENT
Start: 2023-08-24 | End: 2023-08-25

## 2023-08-24 RX ORDER — OXYCODONE HYDROCHLORIDE 5 MG/1
5 TABLET ORAL PRN
OUTPATIENT
Start: 2023-08-24 | End: 2023-08-24

## 2023-08-24 RX ORDER — ACETAMINOPHEN 325 MG/1
650 TABLET ORAL
OUTPATIENT
Start: 2023-08-24 | End: 2023-08-25

## 2023-08-24 RX ORDER — SODIUM CHLORIDE 0.9 % (FLUSH) 0.9 %
5-40 SYRINGE (ML) INJECTION PRN
OUTPATIENT
Start: 2023-08-24

## 2023-08-24 RX ORDER — LABETALOL HYDROCHLORIDE 5 MG/ML
10 INJECTION, SOLUTION INTRAVENOUS
OUTPATIENT
Start: 2023-08-24

## 2023-08-24 RX ORDER — DIPHENHYDRAMINE HYDROCHLORIDE 50 MG/ML
INJECTION INTRAMUSCULAR; INTRAVENOUS PRN
Status: DISCONTINUED | OUTPATIENT
Start: 2023-08-24 | End: 2023-08-24 | Stop reason: SDUPTHER

## 2023-08-24 RX ORDER — OXYCODONE HYDROCHLORIDE 5 MG/1
10 TABLET ORAL PRN
OUTPATIENT
Start: 2023-08-24 | End: 2023-08-24

## 2023-08-24 RX ORDER — ONDANSETRON 2 MG/ML
INJECTION INTRAMUSCULAR; INTRAVENOUS PRN
Status: DISCONTINUED | OUTPATIENT
Start: 2023-08-24 | End: 2023-08-24 | Stop reason: SDUPTHER

## 2023-08-24 RX ORDER — SODIUM CHLORIDE 0.9 % (FLUSH) 0.9 %
5-40 SYRINGE (ML) INJECTION EVERY 12 HOURS SCHEDULED
OUTPATIENT
Start: 2023-08-24

## 2023-08-24 RX ORDER — PROPOFOL 10 MG/ML
INJECTION, EMULSION INTRAVENOUS PRN
Status: DISCONTINUED | OUTPATIENT
Start: 2023-08-24 | End: 2023-08-24 | Stop reason: SDUPTHER

## 2023-08-24 RX ORDER — HYDRALAZINE HYDROCHLORIDE 20 MG/ML
10 INJECTION INTRAMUSCULAR; INTRAVENOUS
OUTPATIENT
Start: 2023-08-24

## 2023-08-24 RX ORDER — MIDAZOLAM HYDROCHLORIDE 1 MG/ML
2 INJECTION INTRAMUSCULAR; INTRAVENOUS ONCE
Status: DISCONTINUED | OUTPATIENT
Start: 2023-08-24 | End: 2023-08-25 | Stop reason: HOSPADM

## 2023-08-24 RX ORDER — FENTANYL CITRATE 50 UG/ML
25 INJECTION, SOLUTION INTRAMUSCULAR; INTRAVENOUS EVERY 5 MIN PRN
OUTPATIENT
Start: 2023-08-24

## 2023-08-24 RX ORDER — PROCHLORPERAZINE EDISYLATE 5 MG/ML
5 INJECTION INTRAMUSCULAR; INTRAVENOUS
OUTPATIENT
Start: 2023-08-24 | End: 2023-08-25

## 2023-08-24 RX ADMIN — LIDOCAINE HYDROCHLORIDE 80 MG: 20 INJECTION, SOLUTION EPIDURAL; INFILTRATION; INTRACAUDAL; PERINEURAL at 13:02

## 2023-08-24 RX ADMIN — ROCURONIUM BROMIDE 50 MG: 10 INJECTION, SOLUTION INTRAVENOUS at 13:05

## 2023-08-24 RX ADMIN — DIPHENHYDRAMINE HYDROCHLORIDE 12.5 MG: 50 INJECTION, SOLUTION INTRAMUSCULAR; INTRAVENOUS at 13:48

## 2023-08-24 RX ADMIN — PROPOFOL 50 MG: 10 INJECTION, EMULSION INTRAVENOUS at 13:05

## 2023-08-24 RX ADMIN — ONDANSETRON 4 MG: 2 INJECTION INTRAMUSCULAR; INTRAVENOUS at 13:48

## 2023-08-24 RX ADMIN — PROPOFOL 150 MG: 10 INJECTION, EMULSION INTRAVENOUS at 13:04

## 2023-08-24 RX ADMIN — SUGAMMADEX 400 MG: 100 INJECTION, SOLUTION INTRAVENOUS at 13:48

## 2023-08-24 RX ADMIN — SODIUM CHLORIDE, SODIUM LACTATE, POTASSIUM CHLORIDE, AND CALCIUM CHLORIDE: .6; .31; .03; .02 INJECTION, SOLUTION INTRAVENOUS at 12:58

## 2023-08-24 ASSESSMENT — PAIN - FUNCTIONAL ASSESSMENT: PAIN_FUNCTIONAL_ASSESSMENT: NONE - DENIES PAIN

## 2023-08-24 ASSESSMENT — PAIN SCALES - GENERAL
PAINLEVEL_OUTOF10: 0
PAINLEVEL_OUTOF10: 0

## 2023-08-24 ASSESSMENT — ENCOUNTER SYMPTOMS: DYSPNEA ACTIVITY LEVEL: AFTER AMBULATING 2 FLIGHTS OF STAIRS

## 2023-08-24 NOTE — PROGRESS NOTES
Pt received from MRI on stretcher. Report received from CRNA for no complications during procedure. Pt attached to monitor for stable VS.  Pt on 3 lpm NC .   Pt reports no pain

## 2023-08-24 NOTE — DISCHARGE INSTRUCTIONS
given to me: ____________________________________________   (Patient/S.O. Signature)            Date/time 8/24/2023 2:30 PM         PACU:  [phone]   M-F 700 AM - 7 PM      SAME DAY SERVICES:  [phone] M-F 7AM-6PM        If you smoke STOP. We care about your health!

## 2023-08-24 NOTE — ANESTHESIA POSTPROCEDURE EVALUATION
Department of Anesthesiology  Postprocedure Note    Patient: Kasandra Harada  MRN: [id number]  YOB: 1970  Date of evaluation: 8/24/2023      Procedure Summary     Date: 08/24/23 Room / Location: [facility] MRI    Anesthesia Start: 0744 Anesthesia Stop: 1100    Procedure: MRI BRAIN WO CONTRAST Diagnosis:       Seizure disorder ([address])      (sz)    Scheduled Providers:  Responsible Provider: Don Manzo DO    Anesthesia Type: general ASA Status: 3          Anesthesia Type: No value filed.     Vannessa Phase I: Vannessa Score: 10    Vannessa Phase II: Vannessa Score: 10      Anesthesia Post Evaluation    Patient location during evaluation: PACU  Patient participation: complete - patient participated  Level of consciousness: awake  Pain score: 0  Nausea & Vomiting: no nausea and no vomiting  Complications: no  Cardiovascular status: blood pressure returned to baseline  Respiratory status: acceptable  Hydration status: euvolemic  Pain management: adequate

## 2023-08-31 ENCOUNTER — OFFICE VISIT (OUTPATIENT)
Dept: ORTHOPEDIC SURGERY | Age: 53
End: 2023-08-31

## 2023-08-31 ENCOUNTER — TELEPHONE (OUTPATIENT)
Dept: NEUROLOGY | Age: 53
End: 2023-08-31

## 2023-08-31 ENCOUNTER — OFFICE VISIT (OUTPATIENT)
Dept: PULMONOLOGY | Age: 53
End: 2023-08-31
Payer: COMMERCIAL

## 2023-08-31 VITALS
WEIGHT: 279.6 LBS | DIASTOLIC BLOOD PRESSURE: 73 MMHG | HEART RATE: 77 BPM | OXYGEN SATURATION: 98 % | HEIGHT: 61 IN | SYSTOLIC BLOOD PRESSURE: 130 MMHG | BODY MASS INDEX: 52.79 KG/M2

## 2023-08-31 DIAGNOSIS — R06.83 SNORING: ICD-10-CM

## 2023-08-31 DIAGNOSIS — M17.12 PRIMARY OSTEOARTHRITIS OF LEFT KNEE: Primary | ICD-10-CM

## 2023-08-31 DIAGNOSIS — G47.00 INSOMNIA, UNSPECIFIED TYPE: ICD-10-CM

## 2023-08-31 DIAGNOSIS — G47.10 HYPERSOMNIA: Primary | ICD-10-CM

## 2023-08-31 DIAGNOSIS — E66.01 CLASS 3 SEVERE OBESITY WITH BODY MASS INDEX (BMI) OF 50.0 TO 59.9 IN ADULT, UNSPECIFIED OBESITY TYPE, UNSPECIFIED WHETHER SERIOUS COMORBIDITY PRESENT (HCC): ICD-10-CM

## 2023-08-31 PROCEDURE — 3075F SYST BP GE 130 - 139MM HG: CPT | Performed by: STUDENT IN AN ORGANIZED HEALTH CARE EDUCATION/TRAINING PROGRAM

## 2023-08-31 PROCEDURE — 99204 OFFICE O/P NEW MOD 45 MIN: CPT | Performed by: STUDENT IN AN ORGANIZED HEALTH CARE EDUCATION/TRAINING PROGRAM

## 2023-08-31 PROCEDURE — 3078F DIAST BP <80 MM HG: CPT | Performed by: STUDENT IN AN ORGANIZED HEALTH CARE EDUCATION/TRAINING PROGRAM

## 2023-08-31 RX ORDER — HYALURONATE SODIUM 10 MG/ML
20 SYRINGE (ML) INTRAARTICULAR ONCE
Status: COMPLETED | OUTPATIENT
Start: 2023-08-31 | End: 2023-08-31

## 2023-08-31 RX ADMIN — Medication 20 MG: at 13:25

## 2023-08-31 ASSESSMENT — SLEEP AND FATIGUE QUESTIONNAIRES
HOW LIKELY ARE YOU TO NOD OFF OR FALL ASLEEP WHILE SITTING AND TALKING TO SOMEONE: 1
HOW LIKELY ARE YOU TO NOD OFF OR FALL ASLEEP WHILE WATCHING TV: 2
HOW LIKELY ARE YOU TO NOD OFF OR FALL ASLEEP WHILE LYING DOWN TO REST IN THE AFTERNOON WHEN CIRCUMSTANCES PERMIT: 3
HOW LIKELY ARE YOU TO NOD OFF OR FALL ASLEEP IN A CAR, WHILE STOPPED FOR A FEW MINUTES IN TRAFFIC: 0
HOW LIKELY ARE YOU TO NOD OFF OR FALL ASLEEP WHILE SITTING AND READING: 2
HOW LIKELY ARE YOU TO NOD OFF OR FALL ASLEEP WHEN YOU ARE A PASSENGER IN A CAR FOR AN HOUR WITHOUT A BREAK: 3
ESS TOTAL SCORE: 13
HOW LIKELY ARE YOU TO NOD OFF OR FALL ASLEEP WHILE SITTING QUIETLY AFTER LUNCH WITHOUT ALCOHOL: 1
HOW LIKELY ARE YOU TO NOD OFF OR FALL ASLEEP WHILE SITTING INACTIVE IN A PUBLIC PLACE: 1
NECK CIRCUMFERENCE (INCHES): 17

## 2023-08-31 NOTE — PATIENT INSTRUCTIONS
Patient information on the evaluation procedure for sleep apnea:    1. You are going to have a portable sleep study: This is a home sleep study that will be done to see if you have obstructive sleep apnea. You will have a flow monitor on your nose, belt on your chest and a oxygen/heart rate monitor on your finger. Please do not wear nail polish on day of the study as it will interfere with the oxygen reading probe that is placed on your finger during the study. Once you receive the device, you will also receive instructions on how to put it on and turn it on. After 2 nights you will return it. 2. The sleep office will call you with the results a week after the study. If the study showed that you have obstructive sleep apnea you will be told of the next step in your care. Commonly the recommended treatment is CPAP Therapy. If you agree to this therapy and you receive your CPAP before your next appointment, please bring it with you to your first follow-up appointment. Patients who have obstructive sleep apnea on the sleep study and have chosen to try CPAP:  A home equipment company will contact you to set you up with a CPAP machine and fit you for a mask. Please bring your CPAP, the mask and all supplies including the electrical cord with you to all your first follow up appointments with the sleep physician    Please keep trying to use your CPAP until you have seen in the sleep office in follow up as a lot of the problems patients have with CPAP can be addressed and corrected by your sleep provider. Sleep center contact information:  [address] [facility]: [phone]  [facility]: [phone]             What are the risk factors for Obstructive Sleep Apnea (ISAMAR)? Obesity  Snoring  Daytime sleepiness  Increasing age  Male gender  Taking sedating medications  Alcohol use  Hypertension  Stroke  Diabetes  Smoking     What is ISAMAR?   People with ISAMAR experience recurrent

## 2023-08-31 NOTE — PROGRESS NOTES
[facility] [place]  Sleep Medicine  [address] [facility], [address] [facility], [facility]    Chief Complaint   Patient presents with    Sleep Apnea     20 years ago had sleep study , had tonsil and adnosie removed has a new neurolist who     Snoring       Kyra Rao is a 48 y.o. female who comes in for sleep evaluation. Her complaints include disrupted sleep/frequent nocturnal awakenings. Onset of symptoms was several years ago. Patient was referred here by Neurology (seizure disorder) for evaluation and management of ISAMAR. Patient was diagnosed with ISAMAR several years ago and is s/p TA surgery (but she did not get retested). Pertinent PMHx includes: seizure disorder (unclear when the last seizure episode was because sometime they might happened unbeknown to her if not witnessed), hypertension, morbid obesity. She goes to bed at 8:30pm. She falls asleep in 2-3 hours. She awakens at around 4:30-5am (unintentional). She awakens several times per night. She does not use medication for sleep. She does take daytime naps when she comes home from work (unintentional). She describes the symptoms as daytime sleepiness, fatigue, loud snoring, disrupted sleep, difficulty falling asleep, waking up too early, and non refreshed sleep . She also reports recent significant weight gain of  lbs in the past 5 years. She has not dozed off while driving. She does not have symptoms that fulfill the criteria for restless legs syndrome. Previous evaluation and treatment has included: PSG.     Family hx of sleep disorders:  aunt with ISAMAR  Caffeinated drinks/day: none  Alcohol intake/day/week: none  Occupation: teacher      DATA REVIEWED TODAY:  [place] & Insomnia Severity Index scores  Sleep Medicine 8/31/2023   Sitting and reading 2   Watching TV 2   Sitting, inactive in a public place (e.g. a theatre or a meeting) 1   As a passenger in a car for an hour without a break 3   Lying down to rest in the afternoon when

## 2023-08-31 NOTE — TELEPHONE ENCOUNTER
Tico Aragon stopped at the  at the [facility] office stating he had a missed called regarding her results for her MRI done on 8/24/2023. She would like to know if [facility] can call her back asap. Thank you.       Tico Aragon [phone] (home)

## 2023-09-05 NOTE — PROGRESS NOTES
This dictation was done with [employer] dictation and may contain mechanical errors related to translation. Subjective:  left knee pain. Robby Hoe is here for the osteoarthritis in the knee. After discussion and examination we decided to proceed with the 1st Eufflexxa injection for the  left knee(s). Objective:   not currently breastfeeding. There is a milld joint effusion noted of the left knee(s). There is moderate pain with range of motion testing. There is no significant  instability noted. Neuro exam grossly intact both lower extremities. Intact sensation to light touch. Motor exam 4+ to 5/5 in all major motor groups. Negative Voss's sign. Skin is warm, dry and intact with out erythema or significant increased temperature around the knee joint(s). Assessment:  Degenerative Joint Disease of the left Knee(s). Plan:  Discussed  injections including all  risks and benefits including increased pain, drug reaction, infection, bleeding, lack of improvement and  neurovascular injury. All the questions were answered. PROCEDURE NOTE:  PRE-PROCEDURE DIAGNOSIS: DJD knee  POST-PROCEDURE DIAGNOSIS: DJD knee    With the patient's permission, her left knee was prepped in standard sterile fashion with  Alcohol. The prefilled injection of the preferred Eufflexxa was injected into the left lateral joint space compartment without difficulty. The patient tolerated the procedure(s) well without difficulty. A band-aid was applied. POST-PROCEDURE INSTRUCTIONS GIVEN TO PATIENT: The patient was advised to ice the knee for 15-20 minutes to relieve any injection site related pain. Decrease activity for the next 24 to 48 hours. FOLLOW-UP:   As directed or call or return to clinic if these symptoms worsen or fail to improve as anticipated. If at any time you are concerned you may contact the office for further instructions or care.

## 2023-09-07 ENCOUNTER — OFFICE VISIT (OUTPATIENT)
Dept: ORTHOPEDIC SURGERY | Age: 53
End: 2023-09-07

## 2023-09-07 VITALS — HEIGHT: 61 IN | BODY MASS INDEX: 52.67 KG/M2 | WEIGHT: 279 LBS | RESPIRATION RATE: 16 BRPM

## 2023-09-07 DIAGNOSIS — M17.12 PRIMARY OSTEOARTHRITIS OF LEFT KNEE: Primary | ICD-10-CM

## 2023-09-07 RX ORDER — TRIAMCINOLONE ACETONIDE 40 MG/ML
40 INJECTION, SUSPENSION INTRA-ARTICULAR; INTRAMUSCULAR ONCE
Status: DISCONTINUED | OUTPATIENT
Start: 2023-09-07 | End: 2023-09-07

## 2023-09-07 RX ORDER — BUPIVACAINE HYDROCHLORIDE 2.5 MG/ML
2 INJECTION, SOLUTION INFILTRATION; PERINEURAL ONCE
Status: DISCONTINUED | OUTPATIENT
Start: 2023-09-07 | End: 2023-09-07

## 2023-09-07 RX ORDER — HYALURONATE SODIUM 10 MG/ML
20 SYRINGE (ML) INTRAARTICULAR ONCE
Status: COMPLETED | OUTPATIENT
Start: 2023-09-07 | End: 2023-09-07

## 2023-09-07 RX ADMIN — Medication 20 MG: at 13:45

## 2023-09-09 NOTE — PROGRESS NOTES
This dictation was done with [employer] dictation and may contain mechanical errors related to translation. Subjective:  left knee pain. Kyra Rao is here for the osteoarthritis in the knee. After discussion and examination we decided to proceed with the 2nd Eufflexxa injection for the  left knee(s). Objective:   Resp. rate 16, height 5' 1\" (1.549 m), weight 279 lb (126.6 kg), not currently breastfeeding. There is a milld joint effusion noted of the left knee(s). There is moderate pain with range of motion testing. There is no significant  instability noted. Neuro exam grossly intact both lower extremities. Intact sensation to light touch. Motor exam 4+ to 5/5 in all major motor groups. Negative Voss's sign. Skin is warm, dry and intact with out erythema or significant increased temperature around the knee joint(s). Assessment:  Degenerative Joint Disease of the left Knee(s). Plan:  Discussed  injections including all  risks and benefits including increased pain, drug reaction, infection, bleeding, lack of improvement and  neurovascular injury. All the questions were answered. PROCEDURE NOTE:  PRE-PROCEDURE DIAGNOSIS: DJD knee  POST-PROCEDURE DIAGNOSIS: DJD knee    With the patient's permission, her left knee was prepped in standard sterile fashion with  Alcohol. The prefilled injection of the preferred Eufflexxa was injected into the left lateral joint space compartment without difficulty. The patient tolerated the procedure(s) well without difficulty. A band-aid was applied. POST-PROCEDURE INSTRUCTIONS GIVEN TO PATIENT: The patient was advised to ice the knee for 15-20 minutes to relieve any injection site related pain. Decrease activity for the next 24 to 48 hours. FOLLOW-UP:   As directed or call or return to clinic if these symptoms worsen or fail to improve as anticipated.  If at any time you are concerned you may contact the office for further instructions or

## 2023-09-14 ENCOUNTER — OFFICE VISIT (OUTPATIENT)
Dept: ORTHOPEDIC SURGERY | Age: 53
End: 2023-09-14

## 2023-09-14 VITALS — BODY MASS INDEX: 52.67 KG/M2 | HEIGHT: 61 IN | WEIGHT: 279 LBS | RESPIRATION RATE: 16 BRPM

## 2023-09-14 DIAGNOSIS — M17.12 PRIMARY OSTEOARTHRITIS OF LEFT KNEE: Primary | ICD-10-CM

## 2023-09-14 RX ORDER — HYALURONATE SODIUM 10 MG/ML
20 SYRINGE (ML) INTRAARTICULAR ONCE
Status: COMPLETED | OUTPATIENT
Start: 2023-09-14 | End: 2023-09-14

## 2023-09-14 RX ADMIN — Medication 20 MG: at 13:39

## 2023-09-27 ENCOUNTER — HOSPITAL ENCOUNTER (OUTPATIENT)
Dept: SLEEP CENTER | Age: 53
Discharge: HOME | End: 2023-09-27
Payer: COMMERCIAL

## 2023-09-27 DIAGNOSIS — G47.00 INSOMNIA, UNSPECIFIED TYPE: ICD-10-CM

## 2023-09-27 DIAGNOSIS — E66.01 CLASS 3 SEVERE OBESITY WITH BODY MASS INDEX (BMI) OF 50.0 TO 59.9 IN ADULT, UNSPECIFIED OBESITY TYPE, UNSPECIFIED WHETHER SERIOUS COMORBIDITY PRESENT (HCC): ICD-10-CM

## 2023-09-27 DIAGNOSIS — G47.10 HYPERSOMNIA: ICD-10-CM

## 2023-09-27 DIAGNOSIS — R06.83 SNORING: ICD-10-CM

## 2023-09-27 PROCEDURE — 95806 SLEEP STUDY UNATT&RESP EFFT: CPT

## 2023-10-02 ENCOUNTER — TELEPHONE (OUTPATIENT)
Dept: PULMONOLOGY | Age: 53
End: 2023-10-02

## 2023-10-02 NOTE — TELEPHONE ENCOUNTER
Spoke with patient at length and she informed me that she struggled with the sleep test due to her claustrophobia. Explained that she would need to try and fail CPAP before alternatives could be discussed. Patient was agreeable to having order sent to [facility]. Order faxed. Patient scheduled for 31-90 day f/u. Patient informed to call the office if she had any issues once she got her machine.

## 2023-10-02 NOTE — TELEPHONE ENCOUNTER
Continued Stay SW/CM Assessment/Plan of Care Note       Active Substitute Decision Maker (SDM)    There are no active Substitute Decision Maker (SDM) on file.     Progress note:  SW consulted re Return to [facility] upon Discharge.  Chart reviewed and per H&P, [facility]  Resumption and clinicals sent.     See HENRIK/CM flowsheets for other objective data.    Disposition Recommendations:  Preliminary discharge destination:    SW/ recommendation for discharge:      Discharge Plan/Needs:     Continued Care and Services - Admitted Since 3/26/2023    Coordination has not been started for this encounter.       Prior To Hospitalization:    Living Situation: Other facility residents and residing at Long term care facility.    Support Systems: Family members   Home Devices/Equipment: None   Mobility Assist Devices: Splint/brace   Type of Service Prior to Hospitalization: Nurse (specify) (nursing home)     Patient/Family discharge goal (s):        Resources provided:           Therapy Recommendations for Discharge:   PT:      OT:       SLP:      Mobility Equipment Recommended for Discharge:        Barriers to Discharge  Identified Barriers to Discharge/Transition Planning:                   Please inform patient that her sleep study showed severe sleep apnea and that she stopped breathing or her breathing was inadequate about 42 times for every hour of sleep. Her blood oxygen also dropped as low as 80% during the night. If she agrees I will order a CPAP via a durable medical equipment company of their choice (if no preference, we will go with [employer]) and they will reach out to keep her updated on the process. This will be the company that is going to work with her insurance and provide the CPAP device, along with replacing the mask and other supplies going forward. If they have any questions, they can let you know or message me via [place]. If patient agrees with plan, please route the PAP order to DME company (order already completed on a separate order encounter). Patient should be scheduled for 60 to 90 days follow up.     Thanks  Marla Ahumada, MD

## 2023-10-02 NOTE — PROGRESS NOTES
Diagnosis: [x] ISAMAR  (G47.33) [] CSA (G47.31) []  Other:__________________   Length of Need: [] 13 months [x]  99 Months                                          []  Other:__________________   Machine (BENJY): [] Respironics Auto (with modem for remote monitoring)       [] Other:____________________    [x]  ResMed Auto (with modem for remote monitoring)    [x]  CPAP () [] Bilevel ()   Mode: Mode:   [x] Auto [] Fixed [] Auto [] Spontaneous   Pmin:_____5____cmH2O      Pmax:_____15____cmH2O   P:_________cmH2O    EPAPmin:__________cmH2O IPAP:__________cmH2O     IPAPmax:__________cmH2O EPAP:__________cmH2O     PSmin:_______  PSmax:_______       (ResMed) PS:_________     Flex/EPR - 3 full time                          Ramp time: 30 min Flex/EPR - 3 full time                 Ramp time: 30 min   Ramp Pressure:_____4______cmH2O Ramp Pressure:____________cmH2O         Humidifier: [x] Heated ()                                               [] Passive     [x] Water chamber replacement ()/ 1 per 6 months   Mask:   [x] Nasal () /1 per 3 months [x] Full Face () /1 per 3 months   [x] Patient choice -Size and fit mask [x] Patient Choice - Size and fit mask   [] Dispense: [] Dispense:   [x] Headgear () / 1 per 3 months [x] Headgear () / 1 per 3 months   [x] Replacement Nasal Cushion ()/2 per month [x] Interface Replacement ()/1 per month   [x] Replacement Nasal Pillows ()/2 per month       Tubing: [x] Heated ()/1 per 3 months                           [] Standard ()/1 per 3 months  [] Other:____________________   Filters: [x] Non-disposable ()/1 per 6 months                 [x] Ultra-Fine, Disposable ()/2 per month     Miscellaneous: [] Chin Strap ()/ 1 per 6months      [] Other:__________________________________         Start Order Date: 10/02/23    MEDICAL JUSTIFICATION:  I, the undersigned, certify that the above prescribed supplies are medically

## 2023-11-06 ENCOUNTER — TELEPHONE (OUTPATIENT)
Dept: ORTHOPEDIC SURGERY | Age: 53
End: 2023-11-06

## 2023-11-06 NOTE — TELEPHONE ENCOUNTER
General Question     Subject: MEDICATION  Patient and /or Facility Request: Ranier Hoit  Contact Number: [phone]    PATIENT CALLED TO SPEAK WITH CLINICAL TO INQUIRE MEDICATION THAT WAS PRESCRIBED AFTER HER RT TKA IN [facility]. SHE STATED THAT SHE FORGOT TO INQUIRE BEFORE AND IS CURRENTLY AT [facility].      PLEASE ADVISE

## 2023-11-08 ENCOUNTER — TELEPHONE (OUTPATIENT)
Dept: FAMILY MEDICINE CLINIC | Age: 53
End: 2023-11-08

## 2023-11-08 DIAGNOSIS — R42 DIZZINESS: Primary | ICD-10-CM

## 2023-11-08 RX ORDER — MECLIZINE HYDROCHLORIDE 25 MG/1
25 TABLET ORAL 3 TIMES DAILY PRN
Qty: 30 TABLET | Refills: 2 | Status: SHIPPED | OUTPATIENT
Start: 2023-11-08

## 2023-11-08 RX ORDER — MECLIZINE HYDROCHLORIDE 25 MG/1
25 TABLET ORAL 3 TIMES DAILY PRN
Qty: 30 TABLET | Refills: 2 | Status: CANCELLED | OUTPATIENT
Start: 2023-11-08

## 2023-11-08 RX ORDER — MECLIZINE HYDROCHLORIDE 25 MG/1
TABLET ORAL
Qty: 30 TABLET | Refills: 2 | OUTPATIENT
Start: 2023-11-08

## 2023-11-08 NOTE — TELEPHONE ENCOUNTER
Medication and Quantity requested: meclizine (ANTIVERT) 25 MG tablet       Last Visit  8/3/2023    Pharmacy and phone number updated in EPIC:  yes

## 2023-11-08 NOTE — TELEPHONE ENCOUNTER
Pt called back and needs medication called in to pharmacy asap. She had to leave work due to dizziness and is very upset.  She does not want to go to the ER again for this

## 2023-11-21 ENCOUNTER — OFFICE VISIT (OUTPATIENT)
Dept: ORTHOPEDIC SURGERY | Age: 53
End: 2023-11-21
Payer: COMMERCIAL

## 2023-11-21 VITALS — WEIGHT: 279 LBS | BODY MASS INDEX: 52.67 KG/M2 | HEIGHT: 61 IN

## 2023-11-21 DIAGNOSIS — M17.12 PRIMARY OSTEOARTHRITIS OF LEFT KNEE: ICD-10-CM

## 2023-11-21 DIAGNOSIS — S80.02XA CONTUSION OF LEFT KNEE, INITIAL ENCOUNTER: ICD-10-CM

## 2023-11-21 DIAGNOSIS — M25.562 ACUTE PAIN OF LEFT KNEE: Primary | ICD-10-CM

## 2023-11-21 PROCEDURE — 99213 OFFICE O/P EST LOW 20 MIN: CPT | Performed by: PHYSICIAN ASSISTANT

## 2023-11-21 NOTE — PROGRESS NOTES
Subjective:      Patient ID: Kayode Brandt is a 48 y.o. female who presents to the [address] [place] [address] for chief complaint of left knee pain. She has a history of left knee arthritis currently being treated by Dr. Natalie Worthington. She states on 11/19/2023 while attempting to get out of the car, she fell directly on the anterior aspect of the left knee and developed increased pain in the left knee. Pain Scale 5/10 VAS. Location of pain left anterior- medial knee. Pain is worse with weight bearing activity. Pain improves with elevation. Previous treatments have included ice. Review of Systems:  I have reviewed the clinically relevant past medical history, medications, allergies, family history, social history, and 13 point Review of Systems from the patient's recent history form & documented any details relevant to today's presenting complaints in the history above. The patient's self-reported past medical history, medications, allergies, family history, social history, and Review of Systems form from today and has been scanned into the chart under the \"Media\" tab. Constitutional: denies fever, chills, weight loss. MSK: denies pain in other joints, muscle aches. Neurological: denies numbness, tingling, weakness.        Past Medical History:   Diagnosis Date    Arthritis     BRCA1 negative     BRCA2 negative     Carpal tunnel syndrome     Convulsions     COVID-19     Dermatophytosis     Diarrhea     Hypertension     Hypertension, essential     Hypertensive kidney disease with chronic kidney disease stage V (HCC)     Medulloadrenal hyperfunc     Meningococcal encephalitis     Morbid obesity with BMI of 45.0-49.9, adult (HCC)     Obesity     Other malaise and fatigue     Seizure disorder ([address])     tonic-last on was @ 2005    Sickle cell trait ([address])     Snoring     Status post right knee replacement     Tonic-clonic seizure disorder ([address])        Family History   Problem Relation Age of

## 2023-11-24 ENCOUNTER — HOSPITAL ENCOUNTER (OUTPATIENT)
Dept: WOMENS IMAGING | Age: 53
Discharge: HOME | End: 2023-11-24
Payer: COMMERCIAL

## 2023-11-24 DIAGNOSIS — Z12.31 VISIT FOR SCREENING MAMMOGRAM: ICD-10-CM

## 2023-11-24 PROCEDURE — 77067 SCR MAMMO BI INCL CAD: CPT

## 2023-11-27 ENCOUNTER — OFFICE VISIT (OUTPATIENT)
Dept: ORTHOPEDIC SURGERY | Age: 53
End: 2023-11-27
Payer: COMMERCIAL

## 2023-11-27 VITALS — WEIGHT: 279 LBS | BODY MASS INDEX: 52.67 KG/M2 | HEIGHT: 61 IN

## 2023-11-27 DIAGNOSIS — M17.12 PRIMARY OSTEOARTHRITIS OF LEFT KNEE: Primary | ICD-10-CM

## 2023-11-27 PROCEDURE — 99213 OFFICE O/P EST LOW 20 MIN: CPT | Performed by: ORTHOPAEDIC SURGERY

## 2023-11-27 PROCEDURE — 20610 DRAIN/INJ JOINT/BURSA W/O US: CPT | Performed by: ORTHOPAEDIC SURGERY

## 2023-11-27 RX ORDER — BUPIVACAINE HYDROCHLORIDE 2.5 MG/ML
2 INJECTION, SOLUTION INFILTRATION; PERINEURAL ONCE
Status: COMPLETED | OUTPATIENT
Start: 2023-11-27 | End: 2023-11-27

## 2023-11-27 RX ORDER — TRIAMCINOLONE ACETONIDE 40 MG/ML
40 INJECTION, SUSPENSION INTRA-ARTICULAR; INTRAMUSCULAR ONCE
Status: COMPLETED | OUTPATIENT
Start: 2023-11-27 | End: 2023-11-27

## 2023-11-27 RX ADMIN — BUPIVACAINE HYDROCHLORIDE 5 MG: 2.5 INJECTION, SOLUTION INFILTRATION; PERINEURAL at 08:21

## 2023-11-27 RX ADMIN — TRIAMCINOLONE ACETONIDE 40 MG: 40 INJECTION, SUSPENSION INTRA-ARTICULAR; INTRAMUSCULAR at 08:22

## 2023-11-27 NOTE — PROGRESS NOTES
[address] [facility]  Office Visit    Chief Complaint: Left knee pain    HPI:  Joel Duran is a 48 y.o. who is here in follow-up of left knee pain. She has known osteoarthritis and underwent a steroid injection on July 7, 2023 and Euflexxa injections in September 2023. She had moderate relief of symptoms with these injections. However, her symptoms worsened 8 days ago when she fell. She now has pain along her medial joint line and the front of the knee. The pain will radiate down her leg. She was getting out of a car and fell when her knee buckled. Ice and elevation have helped relieve the pain. She took last week off of work due to the pain. She has a history of right total knee arthroplasty in July 2021 and the right knee is doing well. He denies hip pain. She does have CKD stage III.       Patient Active Problem List   Diagnosis    Carpal tunnel syndrome    Hypertension    Obesity    Renal insufficiency    Vitamin D deficiency    Contusion of right knee    Infected abrasion of left hand    Hand dermatitis    Candidiasis of finger web    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HCC)    Anemia in chronic renal disease    Anemia, iron deficiency    Chronic kidney disease, stage 3, mod decreased GFR ([facility])    Chronic pain of right knee    Family history of cancer    Hypertension, essential, benign    Overweight and obesity    Primary localized osteoarthrosis of the knee    Proteinuria    Renal osteodystrophy    Tear of PCL (posterior cruciate ligament) of knee    Hypertension, essential    Morbid obesity with BMI of 45.0-49.9, adult (HCC)    Arthritis of right knee    Status post right knee replacement    Primary osteoarthritis of left knee    Secondary hyperparathyroidism ([address] [facility] [address])    Multiple thyroid nodules    Seizure disorder ([facility])    Stage 2 chronic kidney disease    HTN (hypertension), benign    Prediabetes    Contusion of left knee       ROS:  Constitutional: denies fever, chills, weight loss  MSK:

## 2023-12-06 ENCOUNTER — OFFICE VISIT (OUTPATIENT)
Dept: PULMONOLOGY | Age: 53
End: 2023-12-06
Payer: COMMERCIAL

## 2023-12-06 VITALS
OXYGEN SATURATION: 96 % | HEART RATE: 61 BPM | DIASTOLIC BLOOD PRESSURE: 86 MMHG | HEIGHT: 61 IN | BODY MASS INDEX: 52.67 KG/M2 | WEIGHT: 279 LBS | SYSTOLIC BLOOD PRESSURE: 136 MMHG

## 2023-12-06 DIAGNOSIS — I10 HTN (HYPERTENSION), BENIGN: ICD-10-CM

## 2023-12-06 DIAGNOSIS — E66.01 CLASS 3 SEVERE OBESITY WITH BODY MASS INDEX (BMI) OF 50.0 TO 59.9 IN ADULT, UNSPECIFIED OBESITY TYPE, UNSPECIFIED WHETHER SERIOUS COMORBIDITY PRESENT (HCC): ICD-10-CM

## 2023-12-06 DIAGNOSIS — G47.33 OSA ON CPAP: Primary | ICD-10-CM

## 2023-12-06 PROCEDURE — 3079F DIAST BP 80-89 MM HG: CPT | Performed by: STUDENT IN AN ORGANIZED HEALTH CARE EDUCATION/TRAINING PROGRAM

## 2023-12-06 PROCEDURE — 3075F SYST BP GE 130 - 139MM HG: CPT | Performed by: STUDENT IN AN ORGANIZED HEALTH CARE EDUCATION/TRAINING PROGRAM

## 2023-12-06 PROCEDURE — 99214 OFFICE O/P EST MOD 30 MIN: CPT | Performed by: STUDENT IN AN ORGANIZED HEALTH CARE EDUCATION/TRAINING PROGRAM

## 2023-12-06 ASSESSMENT — SLEEP AND FATIGUE QUESTIONNAIRES
HOW LIKELY ARE YOU TO NOD OFF OR FALL ASLEEP WHILE SITTING AND READING: 1
ESS TOTAL SCORE: 14
HOW LIKELY ARE YOU TO NOD OFF OR FALL ASLEEP WHILE SITTING AND TALKING TO SOMEONE: 3
HOW LIKELY ARE YOU TO NOD OFF OR FALL ASLEEP WHILE SITTING QUIETLY AFTER LUNCH WITHOUT ALCOHOL: 1
HOW LIKELY ARE YOU TO NOD OFF OR FALL ASLEEP WHILE WATCHING TV: 1
HOW LIKELY ARE YOU TO NOD OFF OR FALL ASLEEP WHILE LYING DOWN TO REST IN THE AFTERNOON WHEN CIRCUMSTANCES PERMIT: 3
HOW LIKELY ARE YOU TO NOD OFF OR FALL ASLEEP WHILE SITTING INACTIVE IN A PUBLIC PLACE: 0
HOW LIKELY ARE YOU TO NOD OFF OR FALL ASLEEP WHEN YOU ARE A PASSENGER IN A CAR FOR AN HOUR WITHOUT A BREAK: 3
HOW LIKELY ARE YOU TO NOD OFF OR FALL ASLEEP IN A CAR, WHILE STOPPED FOR A FEW MINUTES IN TRAFFIC: 2

## 2023-12-06 NOTE — PROGRESS NOTES
disordered breathing. Olimpia Huitron should exercise regularly and watch her diet. Return in about 2 months (around 2/6/2024) for ISAMAR follow up. She is to contact me if she has any questions prior to that time.       Cody Romeo MD   Sleep Medicine    12/6/23

## 2023-12-06 NOTE — PATIENT INSTRUCTIONS
ISAMAR education:    You have obstructive sleep apnea (ISAMAR):    1. Obstructive sleep apnea (ISAMAR) is a condition where the upper airway narrows or closes intermittently during sleep. This can lead to drops in your oxygen levels during sleep, arousals from sleep, and excessive daytime sleepiness. 2. Untreated ISAMAR is associated with increased risk of hypertension, cardiac disease, myocardial infarction, stroke, and poor blood sugar control. Treating your ISAMAR can decrease these risks. 3. Weight loss does improve sleep apnea. It is important to have a healthy diet and an exercise program.     4. Alcohol and sedating medicine can make ISAMAR worse and should be avoided in particular before sleep. 5. If you have surgery or are hospitalized, tell your doctor that you have ISAMAR and bring your CPAP to the hospital.    6. If you are drowsy or sleepy, you should not drive. If you are driving and become drowsy or sleepy, you should pull over to a safe place. Below are our drowsy driving tips. PAP machine care: You should clean your PAP regularly (see your PAP  site for more details)  Daily cleaning   1. Wipe mask with a damp towel with mild detergent, rinse with a damp towel and let air dry. You can also see CPAP cleaning wipes. Avoid harsh cleaning wipes or chemicals. 2. Humidifier- empty water daily. Fill with clean distilled water before use before sleep. Weekly Cleaning   1. Clean mask, headgear, and tubing weekly  2. Fill your sink with warm water and a few drops of mild dish soap. Swirl equipment for at least 5 minutes. Rinse well and air dry. Hang hose over something so the water droplets drip out. 3. Clean filter- rinse with warm water, squeeze excess water and blot dry with towel. If there is a white filter (respironics machine)- do not wash that - it is disposable and should be changed once a month.    4. Humidifier- Disinfect in solution that is one part vinegar and 5 parts water for 30

## 2023-12-06 NOTE — PROGRESS NOTES
Diagnosis: [x] ISAMAR  (G47.33) [] CSA (G47.31) []  Other:__________________   Length of Need: [] 13 months [x]  99 Months                                          []  Other:__________________   Machine (BENJY): [] Respironics Auto (with modem for remote monitoring)       [] Other:____________________    []  ResMed Auto (with modem for remote monitoring)    []  CPAP () [] Bilevel ()   Mode: Mode:   [] Auto [] Fixed [] Auto [] Spontaneous   Pmin:_________cmH2O      Pmax:_________cmH2O   P:_________cmH2O    EPAPmin:__________cmH2O IPAP:__________cmH2O     IPAPmax:__________cmH2O EPAP:__________cmH2O     PSmin:_______  PSmax:_______       (ResMed) PS:_________     Flex/EPR - 3 full time                          Ramp time: 30 min Flex/EPR - 3 full time                 Ramp time: 30 min   Ramp Pressure:___________cmH2O Ramp Pressure:____________cmH2O         Humidifier: [] Heated ()                                               [] Passive     [] Water chamber replacement ()/ 1 per 6 months   Mask:   [x] Nasal () /1 per 3 months [] Full Face () /1 per 3 months   [] Patient choice -Size and fit mask [] Patient Choice - Size and fit mask   [x] Dispense: AirFit P30i [] Dispense:    [x] Headgear () / 1 per 3 months [] Headgear () / 1 per 3 months   [] Replacement Nasal Cushion ()/2 per month [] Interface Replacement ()/1 per month   [x] Replacement Nasal Pillows ()/2 per month       Tubing: [] Heated ()/1 per 3 months                           [] Standard ()/1 per 3 months  [] Other:____________________   Filters: [] Non-disposable ()/1 per 6 months                 [] Ultra-Fine, Disposable ()/2 per month     Miscellaneous: [] Chin Strap ()/ 1 per 6months      [] Other:__________________________________         Start Order Date: 12/06/23    MEDICAL JUSTIFICATION:  I, the undersigned, certify that the above prescribed supplies are medically necessary

## 2023-12-08 ENCOUNTER — TELEPHONE (OUTPATIENT)
Dept: FAMILY MEDICINE CLINIC | Age: 53
End: 2023-12-08

## 2023-12-08 NOTE — TELEPHONE ENCOUNTER
Patient has been throwing up since 630 last night     She has had a upset stomach  and sleeping     When she got home last night she just kept throwing up     Got up this morning she was light headed  and went to get her daughter had to pull over 2 times and had to pull over to throw up    Feels weak     Just took a sip of ginger ale this morning just to take her meds.        Hasn't really had anything to drink since last night and doesn't know what to do     She said she sleeps and is very weak     Please call and advise     Pharm is [facility] [address]

## 2024-01-03 DIAGNOSIS — H10.31 ACUTE CONJUNCTIVITIS OF RIGHT EYE, UNSPECIFIED ACUTE CONJUNCTIVITIS TYPE: Primary | ICD-10-CM

## 2024-01-03 RX ORDER — ERYTHROMYCIN 5 MG/G
OINTMENT OPHTHALMIC
Qty: 1 G | Refills: 0 | Status: SHIPPED | OUTPATIENT
Start: 2024-01-03 | End: 2024-01-13

## 2024-01-11 ENCOUNTER — OFFICE VISIT (OUTPATIENT)
Dept: ENDOCRINOLOGY | Age: 54
End: 2024-01-11
Payer: COMMERCIAL

## 2024-01-11 VITALS
BODY MASS INDEX: 52.72 KG/M2 | HEART RATE: 95 BPM | OXYGEN SATURATION: 98 % | HEIGHT: 61 IN | SYSTOLIC BLOOD PRESSURE: 165 MMHG | DIASTOLIC BLOOD PRESSURE: 101 MMHG

## 2024-01-11 DIAGNOSIS — E04.2 MULTIPLE THYROID NODULES: Primary | ICD-10-CM

## 2024-01-11 DIAGNOSIS — E04.2 MULTIPLE THYROID NODULES: ICD-10-CM

## 2024-01-11 DIAGNOSIS — N18.31 STAGE 3A CHRONIC KIDNEY DISEASE (HCC): ICD-10-CM

## 2024-01-11 DIAGNOSIS — E55.9 VITAMIN D DEFICIENCY: ICD-10-CM

## 2024-01-11 DIAGNOSIS — N25.81 SECONDARY HYPERPARATHYROIDISM (HCC): ICD-10-CM

## 2024-01-11 LAB
25(OH)D3 SERPL-MCNC: 34.8 NG/ML
ALBUMIN SERPL-MCNC: 4.9 G/DL (ref 3.4–5)
ALBUMIN/GLOB SERPL: 2 {RATIO} (ref 1.1–2.2)
ALP SERPL-CCNC: 81 U/L (ref 40–129)
ALT SERPL-CCNC: 15 U/L (ref 10–40)
ANION GAP SERPL CALCULATED.3IONS-SCNC: 14 MMOL/L (ref 3–16)
AST SERPL-CCNC: 16 U/L (ref 15–37)
BILIRUB SERPL-MCNC: 0.5 MG/DL (ref 0–1)
BUN SERPL-MCNC: 12 MG/DL (ref 7–20)
CALCIUM SERPL-MCNC: 9.5 MG/DL (ref 8.3–10.6)
CHLORIDE SERPL-SCNC: 103 MMOL/L (ref 99–110)
CO2 SERPL-SCNC: 26 MMOL/L (ref 21–32)
CREAT SERPL-MCNC: 1.3 MG/DL (ref 0.6–1.1)
GFR SERPLBLD CREATININE-BSD FMLA CKD-EPI: 49 ML/MIN/{1.73_M2}
GLUCOSE SERPL-MCNC: 83 MG/DL (ref 70–99)
POTASSIUM SERPL-SCNC: 4 MMOL/L (ref 3.5–5.1)
PROT SERPL-MCNC: 7.3 G/DL (ref 6.4–8.2)
PTH-INTACT SERPL-MCNC: 84.3 PG/ML (ref 14–72)
SODIUM SERPL-SCNC: 143 MMOL/L (ref 136–145)

## 2024-01-11 PROCEDURE — 3080F DIAST BP >= 90 MM HG: CPT | Performed by: INTERNAL MEDICINE

## 2024-01-11 PROCEDURE — 3077F SYST BP >= 140 MM HG: CPT | Performed by: INTERNAL MEDICINE

## 2024-01-11 PROCEDURE — 99214 OFFICE O/P EST MOD 30 MIN: CPT | Performed by: INTERNAL MEDICINE

## 2024-01-11 NOTE — PROGRESS NOTES
hypoechoic area also stable from last US     US thyroid Sep 2021   Left inferior hypoechoic nodule of 9 x x7 x 5 mm   A 1.5 cm nodular heterogeneous area is again seen inferior to the left thyroid which is unchanged in size in appearance compared to priors.  It is unclear whether this represents an extra thyroidal thyroid nodule,  parathyroid adenoma, or a lymph node.    US neck Aug 2023  Right thyroid lobe:  Measures 4.7 x 2.6 x 1.6 cm  Left thyroid lobe:  Measures 4.7 x 1.9 x 1.5 cm  Isthmus:  Measures 3.9 mm  Left inferior nodule 7 x 6 x 3 mm, stable in size     Cervical lymphadenopathy: No abnormal lymph nodes in the imaged portions of the neck.  However, there is an approximate 14 x 14 x 10 mm heterogeneous primarily echogenic nodule within the soft tissues just inferolateral to the left thyroid lobe, possibly a parathyroid adenoma.  This is also similar in comparison with multiple prior exams dating back to 10/23/2019.    NM parathyroid scan Aug 2023: No focal areas of abnormal radiotracer activity identified.     Repeat US neck next time     4: HTN and bradycardia   As per pcp   She has CKD and HTN.      5: Osteopenia   Postmenopausal: DXA scan showed osteopenia - Nov 2020     DXA scan Aug 2023 with low risk of fracture     Repeat DXA scan - Aug 2025       RTC in 9-12 months if all labs are normal          Electronically signed by BIRDIE QUINTERO MD on 1/11/24 at 3:20 PM

## 2024-01-16 DIAGNOSIS — I10 ESSENTIAL HYPERTENSION: ICD-10-CM

## 2024-01-16 RX ORDER — ATENOLOL 50 MG/1
TABLET ORAL
Qty: 90 TABLET | Refills: 0 | Status: ON HOLD | OUTPATIENT
Start: 2024-01-16

## 2024-01-16 NOTE — TELEPHONE ENCOUNTER
Medication:   Requested Prescriptions     Pending Prescriptions Disp Refills    atenolol (TENORMIN) 50 MG tablet [Pharmacy Med Name: ATENOLOL 50 MG TABLET] 90 tablet 1     Sig: TAKE 1 TABLET BY MOUTH EVERY DAY       Last Filled:  7/21*2023    Patient Phone Number: [phone] (home) [phone] (work)    Last appt: 8/3/2023   Next appt: Visit date not found    Lab Results   Component Value Date     01/11/2024    K 4.0 01/11/2024     01/11/2024    CO2 26 01/11/2024    BUN 12 01/11/2024    CREATININE 1.3 (H) 01/11/2024    GLUCOSE 83 01/11/2024    CALCIUM 9.5 01/11/2024    PROT 7.3 01/11/2024    LABALBU 4.9 01/11/2024    BILITOT 0.5 01/11/2024    ALKPHOS 81 01/11/2024    AST 16 01/11/2024    ALT 15 01/11/2024    LABGLOM 49 (A) 01/11/2024    GFRAA 52 (A) 10/29/2021    AGRATIO 2.0 01/11/2024    GLOB 2.7 11/11/2020

## 2024-01-21 ENCOUNTER — APPOINTMENT (OUTPATIENT)
Dept: GENERAL RADIOLOGY | Age: 54
DRG: 194 | End: 2024-01-21
Payer: COMMERCIAL

## 2024-01-21 ENCOUNTER — APPOINTMENT (OUTPATIENT)
Dept: CT IMAGING | Age: 54
DRG: 194 | End: 2024-01-21
Payer: COMMERCIAL

## 2024-01-21 ENCOUNTER — HOSPITAL ENCOUNTER (INPATIENT)
Age: 54
LOS: 2 days | Discharge: HOME | DRG: 194 | End: 2024-01-23
Attending: INTERNAL MEDICINE | Admitting: INTERNAL MEDICINE
Payer: COMMERCIAL

## 2024-01-21 DIAGNOSIS — D72.9 NEUTROPHIL DYSFUNCTION: ICD-10-CM

## 2024-01-21 DIAGNOSIS — R42 DIZZINESS: Primary | ICD-10-CM

## 2024-01-21 DIAGNOSIS — R29.6 FREQUENT FALLS: ICD-10-CM

## 2024-01-21 DIAGNOSIS — J10.1 INFLUENZA A: ICD-10-CM

## 2024-01-21 DIAGNOSIS — R07.9 ACUTE CHEST PAIN: ICD-10-CM

## 2024-01-21 DIAGNOSIS — H53.9 VISUAL DISTURBANCE: ICD-10-CM

## 2024-01-21 DIAGNOSIS — R05.1 ACUTE COUGH: ICD-10-CM

## 2024-01-21 PROBLEM — G43.901 STATUS MIGRAINOSUS: Status: ACTIVE | Noted: 2024-01-21

## 2024-01-21 LAB
ALBUMIN SERPL-MCNC: 4.6 G/DL (ref 3.4–5)
ALBUMIN/GLOB SERPL: 1.6 {RATIO} (ref 1.1–2.2)
ALP SERPL-CCNC: 66 U/L (ref 40–129)
ALT SERPL-CCNC: 15 U/L (ref 10–40)
ANION GAP SERPL CALCULATED.3IONS-SCNC: 9 MMOL/L (ref 3–16)
APTT BLD: 29.4 SEC (ref 22.7–35.9)
AST SERPL-CCNC: 15 U/L (ref 15–37)
BACTERIA URNS QL MICRO: ABNORMAL /HPF
BASOPHILS # BLD: 0 K/UL (ref 0–0.2)
BASOPHILS NFR BLD: 1.2 %
BILIRUB SERPL-MCNC: 0.6 MG/DL (ref 0–1)
BILIRUB UR QL STRIP.AUTO: NEGATIVE
BUN SERPL-MCNC: 9 MG/DL (ref 7–20)
CALCIUM SERPL-MCNC: 10 MG/DL (ref 8.3–10.6)
CHLORIDE SERPL-SCNC: 104 MMOL/L (ref 99–110)
CLARITY UR: CLEAR
CO2 SERPL-SCNC: 27 MMOL/L (ref 21–32)
COLOR UR: YELLOW
CREAT SERPL-MCNC: 1.3 MG/DL (ref 0.6–1.1)
DEPRECATED RDW RBC AUTO: 15.4 % (ref 12.4–15.4)
EOSINOPHIL # BLD: 0.1 K/UL (ref 0–0.6)
EOSINOPHIL NFR BLD: 3 %
EPI CELLS #/AREA URNS AUTO: 4 /HPF (ref 0–5)
FLUAV RNA RESP QL NAA+PROBE: DETECTED
FLUBV RNA RESP QL NAA+PROBE: NOT DETECTED
GFR SERPLBLD CREATININE-BSD FMLA CKD-EPI: 49 ML/MIN/{1.73_M2}
GLUCOSE SERPL-MCNC: 98 MG/DL (ref 70–99)
GLUCOSE UR STRIP.AUTO-MCNC: NEGATIVE MG/DL
HCG SERPL QL: NEGATIVE
HCT VFR BLD AUTO: 45.1 % (ref 36–48)
HGB BLD-MCNC: 15.4 G/DL (ref 12–16)
HGB UR QL STRIP.AUTO: NEGATIVE
HYALINE CASTS #/AREA URNS AUTO: 0 /LPF (ref 0–8)
INR PPP: 1.04 (ref 0.84–1.16)
KETONES UR STRIP.AUTO-MCNC: NEGATIVE MG/DL
LACTATE BLDV-SCNC: 1.4 MMOL/L (ref 0.4–1.9)
LEUKOCYTE ESTERASE UR QL STRIP.AUTO: ABNORMAL
LEVETIRACETAM SERPL-MCNC: <2 UG/ML (ref 6–46)
LYMPHOCYTES # BLD: 1.9 K/UL (ref 1–5.1)
LYMPHOCYTES NFR BLD: 60.2 %
MCH RBC QN AUTO: 26.1 PG (ref 26–34)
MCHC RBC AUTO-ENTMCNC: 34.1 G/DL (ref 31–36)
MCV RBC AUTO: 76.5 FL (ref 80–100)
MEDICATION DOSE-MCNC: ABNORMAL
MONOCYTES # BLD: 0.3 K/UL (ref 0–1.3)
MONOCYTES NFR BLD: 9.3 %
NEUTROPHILS # BLD: 0.8 K/UL (ref 1.7–7.7)
NEUTROPHILS NFR BLD: 26.3 %
NITRITE UR QL STRIP.AUTO: NEGATIVE
NT-PROBNP SERPL-MCNC: 60 PG/ML (ref 0–124)
PATH INTERP BLD-IMP: NO
PH UR STRIP.AUTO: 6 [PH] (ref 5–8)
PLATELET # BLD AUTO: 266 K/UL (ref 135–450)
PLATELET BLD QL SMEAR: ADEQUATE
PMV BLD AUTO: 7.7 FL (ref 5–10.5)
POTASSIUM SERPL-SCNC: 3.9 MMOL/L (ref 3.5–5.1)
PROT SERPL-MCNC: 7.5 G/DL (ref 6.4–8.2)
PROT UR STRIP.AUTO-MCNC: NEGATIVE MG/DL
PROTHROMBIN TIME: 13.6 SEC (ref 11.5–14.8)
RBC # BLD AUTO: 5.9 M/UL (ref 4–5.2)
RBC CLUMPS #/AREA URNS AUTO: 4 /HPF (ref 0–4)
RBC MORPH BLD: NORMAL
SARS-COV-2 RNA RESP QL NAA+PROBE: NOT DETECTED
SLIDE REVIEW: ABNORMAL
SODIUM SERPL-SCNC: 140 MMOL/L (ref 136–145)
SP GR UR STRIP.AUTO: 1.02 (ref 1–1.03)
TROPONIN, HIGH SENSITIVITY: 10 NG/L (ref 0–14)
UA COMPLETE W REFLEX CULTURE PNL UR: YES
UA DIPSTICK W REFLEX MICRO PNL UR: YES
URN SPEC COLLECT METH UR: ABNORMAL
UROBILINOGEN UR STRIP-ACNC: 1 E.U./DL
WBC # BLD AUTO: 3.2 K/UL (ref 4–11)
WBC #/AREA URNS AUTO: 10 /HPF (ref 0–5)

## 2024-01-21 PROCEDURE — 36415 COLL VENOUS BLD VENIPUNCTURE: CPT

## 2024-01-21 PROCEDURE — 74018 RADEX ABDOMEN 1 VIEW: CPT

## 2024-01-21 PROCEDURE — 84484 ASSAY OF TROPONIN QUANT: CPT

## 2024-01-21 PROCEDURE — 0202U NFCT DS 22 TRGT SARS-COV-2: CPT

## 2024-01-21 PROCEDURE — 81001 URINALYSIS AUTO W/SCOPE: CPT

## 2024-01-21 PROCEDURE — 83880 ASSAY OF NATRIURETIC PEPTIDE: CPT

## 2024-01-21 PROCEDURE — 87086 URINE CULTURE/COLONY COUNT: CPT

## 2024-01-21 PROCEDURE — 2580000003 HC RX 258: Performed by: INTERNAL MEDICINE

## 2024-01-21 PROCEDURE — 84703 CHORIONIC GONADOTROPIN ASSAY: CPT

## 2024-01-21 PROCEDURE — 70450 CT HEAD/BRAIN W/O DYE: CPT

## 2024-01-21 PROCEDURE — 2060000000 HC ICU INTERMEDIATE R&B

## 2024-01-21 PROCEDURE — 6370000000 HC RX 637 (ALT 250 FOR IP): Performed by: INTERNAL MEDICINE

## 2024-01-21 PROCEDURE — 83605 ASSAY OF LACTIC ACID: CPT

## 2024-01-21 PROCEDURE — 70498 CT ANGIOGRAPHY NECK: CPT

## 2024-01-21 PROCEDURE — 85025 COMPLETE CBC W/AUTO DIFF WBC: CPT

## 2024-01-21 PROCEDURE — 99285 EMERGENCY DEPT VISIT HI MDM: CPT

## 2024-01-21 PROCEDURE — 6360000004 HC RX CONTRAST MEDICATION: Performed by: PHYSICIAN ASSISTANT

## 2024-01-21 PROCEDURE — 80177 DRUG SCRN QUAN LEVETIRACETAM: CPT

## 2024-01-21 PROCEDURE — 6360000002 HC RX W HCPCS: Performed by: INTERNAL MEDICINE

## 2024-01-21 PROCEDURE — C9113 INJ PANTOPRAZOLE SODIUM, VIA: HCPCS | Performed by: INTERNAL MEDICINE

## 2024-01-21 PROCEDURE — 85730 THROMBOPLASTIN TIME PARTIAL: CPT

## 2024-01-21 PROCEDURE — 85610 PROTHROMBIN TIME: CPT

## 2024-01-21 PROCEDURE — 71260 CT THORAX DX C+: CPT

## 2024-01-21 PROCEDURE — 71045 X-RAY EXAM CHEST 1 VIEW: CPT

## 2024-01-21 PROCEDURE — 80053 COMPREHEN METABOLIC PANEL: CPT

## 2024-01-21 PROCEDURE — 87636 SARSCOV2 & INF A&B AMP PRB: CPT

## 2024-01-21 RX ORDER — SODIUM CHLORIDE 9 MG/ML
INJECTION, SOLUTION INTRAVENOUS CONTINUOUS
Status: DISCONTINUED | OUTPATIENT
Start: 2024-01-21 | End: 2024-01-23 | Stop reason: HOSPADM

## 2024-01-21 RX ORDER — ENOXAPARIN SODIUM 100 MG/ML
30 INJECTION SUBCUTANEOUS 2 TIMES DAILY
Status: DISCONTINUED | OUTPATIENT
Start: 2024-01-21 | End: 2024-01-23 | Stop reason: HOSPADM

## 2024-01-21 RX ORDER — SODIUM CHLORIDE 9 MG/ML
INJECTION, SOLUTION INTRAVENOUS PRN
Status: DISCONTINUED | OUTPATIENT
Start: 2024-01-21 | End: 2024-01-23 | Stop reason: HOSPADM

## 2024-01-21 RX ORDER — ONDANSETRON 4 MG/1
4 TABLET, ORALLY DISINTEGRATING ORAL EVERY 8 HOURS PRN
Status: DISCONTINUED | OUTPATIENT
Start: 2024-01-21 | End: 2024-01-21 | Stop reason: SDUPTHER

## 2024-01-21 RX ORDER — ACETAMINOPHEN 160 MG
2000 TABLET,DISINTEGRATING ORAL DAILY
COMMUNITY

## 2024-01-21 RX ORDER — BUTALBITAL, ACETAMINOPHEN AND CAFFEINE 50; 325; 40 MG/1; MG/1; MG/1
1 TABLET ORAL EVERY 4 HOURS PRN
Status: DISCONTINUED | OUTPATIENT
Start: 2024-01-21 | End: 2024-01-23 | Stop reason: HOSPADM

## 2024-01-21 RX ORDER — POTASSIUM CHLORIDE 7.45 MG/ML
10 INJECTION INTRAVENOUS PRN
Status: DISCONTINUED | OUTPATIENT
Start: 2024-01-21 | End: 2024-01-23 | Stop reason: HOSPADM

## 2024-01-21 RX ORDER — ATENOLOL 50 MG/1
50 TABLET ORAL DAILY
Status: DISCONTINUED | OUTPATIENT
Start: 2024-01-22 | End: 2024-01-23 | Stop reason: HOSPADM

## 2024-01-21 RX ORDER — AMLODIPINE BESYLATE 5 MG/1
10 TABLET ORAL DAILY
Status: DISCONTINUED | OUTPATIENT
Start: 2024-01-22 | End: 2024-01-23 | Stop reason: HOSPADM

## 2024-01-21 RX ORDER — POTASSIUM CHLORIDE 20 MEQ/1
40 TABLET, EXTENDED RELEASE ORAL ONCE
Status: COMPLETED | OUTPATIENT
Start: 2024-01-21 | End: 2024-01-21

## 2024-01-21 RX ORDER — ONDANSETRON 4 MG/1
4 TABLET, ORALLY DISINTEGRATING ORAL EVERY 8 HOURS PRN
Status: DISCONTINUED | OUTPATIENT
Start: 2024-01-21 | End: 2024-01-23 | Stop reason: HOSPADM

## 2024-01-21 RX ORDER — ONDANSETRON 2 MG/ML
4 INJECTION INTRAMUSCULAR; INTRAVENOUS EVERY 6 HOURS PRN
Status: DISCONTINUED | OUTPATIENT
Start: 2024-01-21 | End: 2024-01-23 | Stop reason: HOSPADM

## 2024-01-21 RX ORDER — GUAIFENESIN/DEXTROMETHORPHAN 100-10MG/5
10 SYRUP ORAL EVERY 4 HOURS PRN
Status: DISCONTINUED | OUTPATIENT
Start: 2024-01-21 | End: 2024-01-23 | Stop reason: HOSPADM

## 2024-01-21 RX ORDER — ACETAMINOPHEN 650 MG/1
650 SUPPOSITORY RECTAL EVERY 6 HOURS PRN
Status: DISCONTINUED | OUTPATIENT
Start: 2024-01-21 | End: 2024-01-23 | Stop reason: HOSPADM

## 2024-01-21 RX ORDER — POLYETHYLENE GLYCOL 3350 17 G/17G
17 POWDER, FOR SOLUTION ORAL DAILY PRN
Status: DISCONTINUED | OUTPATIENT
Start: 2024-01-21 | End: 2024-01-23 | Stop reason: HOSPADM

## 2024-01-21 RX ORDER — OSELTAMIVIR PHOSPHATE 75 MG/1
75 CAPSULE ORAL 2 TIMES DAILY
Status: DISCONTINUED | OUTPATIENT
Start: 2024-01-21 | End: 2024-01-23 | Stop reason: HOSPADM

## 2024-01-21 RX ORDER — MECLIZINE HCL 12.5 MG/1
25 TABLET ORAL 3 TIMES DAILY PRN
Status: DISCONTINUED | OUTPATIENT
Start: 2024-01-21 | End: 2024-01-23 | Stop reason: HOSPADM

## 2024-01-21 RX ORDER — SODIUM CHLORIDE 0.9 % (FLUSH) 0.9 %
5-40 SYRINGE (ML) INJECTION PRN
Status: DISCONTINUED | OUTPATIENT
Start: 2024-01-21 | End: 2024-01-23 | Stop reason: HOSPADM

## 2024-01-21 RX ORDER — ONDANSETRON 2 MG/ML
4 INJECTION INTRAMUSCULAR; INTRAVENOUS EVERY 6 HOURS PRN
Status: DISCONTINUED | OUTPATIENT
Start: 2024-01-21 | End: 2024-01-21 | Stop reason: SDUPTHER

## 2024-01-21 RX ORDER — MAGNESIUM SULFATE IN WATER 40 MG/ML
2000 INJECTION, SOLUTION INTRAVENOUS PRN
Status: DISCONTINUED | OUTPATIENT
Start: 2024-01-21 | End: 2024-01-23 | Stop reason: HOSPADM

## 2024-01-21 RX ORDER — SODIUM CHLORIDE 0.9 % (FLUSH) 0.9 %
5-40 SYRINGE (ML) INJECTION EVERY 12 HOURS SCHEDULED
Status: DISCONTINUED | OUTPATIENT
Start: 2024-01-21 | End: 2024-01-23 | Stop reason: HOSPADM

## 2024-01-21 RX ORDER — ACETAMINOPHEN 325 MG/1
650 TABLET ORAL EVERY 6 HOURS PRN
Status: DISCONTINUED | OUTPATIENT
Start: 2024-01-21 | End: 2024-01-23 | Stop reason: HOSPADM

## 2024-01-21 RX ORDER — LEVETIRACETAM 500 MG/1
500 TABLET ORAL DAILY
Status: DISCONTINUED | OUTPATIENT
Start: 2024-01-22 | End: 2024-01-23 | Stop reason: HOSPADM

## 2024-01-21 RX ORDER — MAGNESIUM HYDROXIDE/ALUMINUM HYDROXICE/SIMETHICONE 120; 1200; 1200 MG/30ML; MG/30ML; MG/30ML
30 SUSPENSION ORAL EVERY 6 HOURS PRN
Status: DISCONTINUED | OUTPATIENT
Start: 2024-01-21 | End: 2024-01-23 | Stop reason: HOSPADM

## 2024-01-21 RX ORDER — ATORVASTATIN CALCIUM 80 MG/1
80 TABLET, FILM COATED ORAL NIGHTLY
Status: DISCONTINUED | OUTPATIENT
Start: 2024-01-21 | End: 2024-01-23 | Stop reason: HOSPADM

## 2024-01-21 RX ORDER — PANTOPRAZOLE SODIUM 40 MG/10ML
40 INJECTION, POWDER, LYOPHILIZED, FOR SOLUTION INTRAVENOUS DAILY
Status: DISCONTINUED | OUTPATIENT
Start: 2024-01-21 | End: 2024-01-23 | Stop reason: HOSPADM

## 2024-01-21 RX ORDER — POTASSIUM CHLORIDE 20 MEQ/1
40 TABLET, EXTENDED RELEASE ORAL PRN
Status: DISCONTINUED | OUTPATIENT
Start: 2024-01-21 | End: 2024-01-23 | Stop reason: HOSPADM

## 2024-01-21 RX ADMIN — SODIUM CHLORIDE: 9 INJECTION, SOLUTION INTRAVENOUS at 18:04

## 2024-01-21 RX ADMIN — ENOXAPARIN SODIUM 30 MG: 100 INJECTION SUBCUTANEOUS at 21:58

## 2024-01-21 RX ADMIN — SODIUM CHLORIDE, PRESERVATIVE FREE 10 ML: 5 INJECTION INTRAVENOUS at 21:59

## 2024-01-21 RX ADMIN — POTASSIUM CHLORIDE 40 MEQ: 1500 TABLET, EXTENDED RELEASE ORAL at 16:39

## 2024-01-21 RX ADMIN — SODIUM CHLORIDE, PRESERVATIVE FREE 10 ML: 5 INJECTION INTRAVENOUS at 22:15

## 2024-01-21 RX ADMIN — OSELTAMIVIR PHOSPHATE 75 MG: 75 CAPSULE ORAL at 21:58

## 2024-01-21 RX ADMIN — IOPAMIDOL 150 ML: 755 INJECTION, SOLUTION INTRAVENOUS at 12:55

## 2024-01-21 RX ADMIN — CEFTRIAXONE SODIUM 1000 MG: 1 INJECTION, POWDER, FOR SOLUTION INTRAMUSCULAR; INTRAVENOUS at 22:19

## 2024-01-21 RX ADMIN — SODIUM CHLORIDE, PRESERVATIVE FREE 10 ML: 5 INJECTION INTRAVENOUS at 22:14

## 2024-01-21 RX ADMIN — ATORVASTATIN CALCIUM 80 MG: 80 TABLET, FILM COATED ORAL at 21:58

## 2024-01-21 RX ADMIN — PANTOPRAZOLE SODIUM 40 MG: 40 INJECTION, POWDER, FOR SOLUTION INTRAVENOUS at 21:59

## 2024-01-21 ASSESSMENT — PAIN - FUNCTIONAL ASSESSMENT: PAIN_FUNCTIONAL_ASSESSMENT: 0-10

## 2024-01-21 ASSESSMENT — PAIN SCALES - GENERAL: PAINLEVEL_OUTOF10: 2

## 2024-01-21 NOTE — ED NOTES
98% on RA while ambulating, pulse up to 118. Pt in no obvious distress.   
na    O2 Flow Rate:      Cardiac Rhythm:    Pain Assessment: 0 [x] Verbal [] Mojica Navarro Scale  Pain Scale: Pain Assessment  Pain Assessment: 0-10  Pain Level: 2  Last documented pain score (0-10 scale) Pain Level: 2  Last documented pain medication administered: na  Mental Status: oriented, alert, coherent, logical, and thought processes intact  Orientation Level:    NIH Score: Total: 0  C-SSRS: Risk of Suicide: No Risk  Bedside swallow:    Fany Coma Scale (GCS):    Active LDA's:   Peripheral IV 01/21/24 Right Antecubital (Active)   Site Assessment Clean, dry & intact 01/21/24 1441   Line Status Blood return noted 01/21/24 1441   Phlebitis Assessment No symptoms 01/21/24 1441   Infiltration Assessment 0 01/21/24 1441     PO Status: Regular  Pertinent or High Risk Medications/Drips: no   If Yes, please provide details: na  Pending Blood Product Administration: no       You may also review the ED PT Care Timeline found under the Summary Nursing Index tab.    Recommendation    Pending orders na  Plan for Discharge (if known):   Additional Comments: na   If any further questions, please call Sending RN in ed    Electronically signed by: Electronically signed by Malissa Belcher RN on 1/21/2024 at 4:28 PM

## 2024-01-21 NOTE — ACP (ADVANCE CARE PLANNING)
Advanced Care Planning Note.    Purpose of Encounter: Advanced care planning in light of Influenza A  Parties In Attendance: Patient,   Decisional Capacity: Yes  Subjective: Patient with sinus congestion, HA and dysphagia  Objective: Cr 1.3  Goals of Care Determination: Patient wants full support (CPR, vent, surgery, HD, trach, PEG)  Plan:  Tamiflu, IVF, MRI Brain, Echo, PT/OT/SLP, Neuro consult  Code Status: Full code   Time spent on Advanced care Plannin minutes  Advanced Care Planning Documents: Completed advanced directives on chart,  is the POA.    Humberto Barragan MD  2024 4:32 PM

## 2024-01-21 NOTE — H&P
HOSPITALISTS HISTORY AND PHYSICAL    1/21/2024 4:07 PM    Patient Information:  AKILA BUSH is a 53 y.o. female [id number]  PCP:  Mars Hendricks MD (Tel: [phone] )    Chief complaint:    Chief Complaint   Patient presents with    head congestion and foggyniess.      PT sts symptom onset x2 weeks. PT sts symptoms worsened today. PT denies dizziness. PT sts Head pressure that radiates to behind her eyes. PT reports recent virus not sure if COVID or flu. But reports getting off a cough.         History of Present Illness:  Akila Bush is a 53 y.o. female with history of seizures, secondary hyperparathyroidism, HTN, ISAMAR on CPAP, CKD 3, morbid obesity came to ER with complaints of sinus congestion, headaches and malaise.  States symptoms going on for a few weeks and worsening.  States in December she had a sulphuric taste in mouth and took some heart burn meds for 2 days.  Has been coughing.  She is a teacher.  Some dysphagia.  No melena, hematochezia, nausea, vomiting or hematemesis.  Denies focal weakness.  No urine or fecal incontinence.  Denies diarrhea.  No CP, SOB, palpitations or syncope.  Otherwise complete ROS is negative unless listed above.      REVIEW OF SYSTEMS:   Pertinent positives as noted in HPI.  All other systems were reviewed and are negative.      Past Medical History:   has a past medical history of Arthritis, BRCA1 negative, BRCA2 negative, Carpal tunnel syndrome, Convulsions, COVID-19, Dermatophytosis, Diarrhea, Hypertension, Hypertension, essential, Hypertensive kidney disease with chronic kidney disease stage V (HCC), Medulloadrenal hyperfunc, Meningococcal encephalitis, Morbid obesity with BMI of 45.0-49.9, adult ([facility]), Obesity, Other malaise and fatigue, Seizure disorder ([facility]), Sickle cell trait ([facility]), Snoring, Status post right knee replacement, and Tonic-clonic seizure

## 2024-01-21 NOTE — ED PROVIDER NOTES
[facility] EMERGENCY DEPARTMENT  EMERGENCY DEPARTMENT ENCOUNTER        Pt Name: Akila Bush  MRN: [id number]  Birthdate 1970  Date of evaluation: 1/21/2024  Provider: Devonte Abdi PA-C  PCP: Mars Hendricks MD  Note Started: 12:01 PM EST 1/21/24      MARIA DE JESUS. I have evaluated this patient.        CHIEF COMPLAINT       Chief Complaint   Patient presents with    head congestion and foggyniess.      PT sts symptom onset x2 weeks. PT sts symptoms worsened today. PT denies dizziness. PT sts Head pressure that radiates to behind her eyes. PT reports recent virus not sure if COVID or flu. But reports getting off a cough.        HISTORY OF PRESENT ILLNESS: 1 or more Elements     History from : Patient    Limitations to history : None    Akila Bush is a 53 y.o. female who presents to the emergency department with numerous symptoms.  She states that for the past 2 weeks she has had intermittent bitemporal head pressure and pain behind her eyes causing some blurred vision in both of her eyes and she has been feeling off balance and falling frequently.  Yesterday, for example, she fell getting out of her car.  Last week, she did become ill with a respiratory illness.  She has not taken a COVID or flu test.  She addressed this with her PCP last week and was also treated for pinkeye which is resolved.  Last week Thursday, she followed up with her endocrinologist for history of parathyroid disease.  She was told that her vitamin D levels were extremely low.  She still has a persistent cough from the illness last week and does have chest pain and shortness of breath at times.  She is compliant with Keppra for history of seizure.  She denies any recent seizure activity.  She typically has staring spells when she has seizures.  The  new symptoms over the past two weeks are not consistent with prior seizure activity.    Nursing Notes were all reviewed and agreed with or any disagreements were

## 2024-01-22 ENCOUNTER — APPOINTMENT (OUTPATIENT)
Dept: MRI IMAGING | Age: 54
DRG: 194 | End: 2024-01-22
Payer: COMMERCIAL

## 2024-01-22 PROBLEM — D72.9: Status: ACTIVE | Noted: 2024-01-22

## 2024-01-22 PROBLEM — G45.0 TIA INVOLVING BASILAR ARTERY: Status: ACTIVE | Noted: 2024-01-22

## 2024-01-22 PROBLEM — E87.6 HYPOKALEMIA: Status: ACTIVE | Noted: 2024-01-22

## 2024-01-22 PROBLEM — R42 DIZZINESS: Status: ACTIVE | Noted: 2024-01-22

## 2024-01-22 LAB
AMMONIA PLAS-SCNC: 31 UMOL/L (ref 11–51)
ANION GAP SERPL CALCULATED.3IONS-SCNC: 11 MMOL/L (ref 3–16)
BACTERIA UR CULT: NORMAL
BUN SERPL-MCNC: 9 MG/DL (ref 7–20)
CALCIUM SERPL-MCNC: 9.2 MG/DL (ref 8.3–10.6)
CHLORIDE SERPL-SCNC: 107 MMOL/L (ref 99–110)
CHOLEST SERPL-MCNC: 147 MG/DL (ref 0–199)
CO2 SERPL-SCNC: 23 MMOL/L (ref 21–32)
CREAT SERPL-MCNC: 1.1 MG/DL (ref 0.6–1.1)
DEPRECATED RDW RBC AUTO: 15 % (ref 12.4–15.4)
EST. AVERAGE GLUCOSE BLD GHB EST-MCNC: 116.9 MG/DL
GFR SERPLBLD CREATININE-BSD FMLA CKD-EPI: 60 ML/MIN/{1.73_M2}
GLUCOSE SERPL-MCNC: 98 MG/DL (ref 70–99)
HBA1C MFR BLD: 5.7 %
HCT VFR BLD AUTO: 39.8 % (ref 36–48)
HDLC SERPL-MCNC: 33 MG/DL (ref 40–60)
HGB BLD-MCNC: 13.6 G/DL (ref 12–16)
LDLC SERPL CALC-MCNC: 91 MG/DL
MCH RBC QN AUTO: 26.3 PG (ref 26–34)
MCHC RBC AUTO-ENTMCNC: 34.2 G/DL (ref 31–36)
MCV RBC AUTO: 77 FL (ref 80–100)
PLATELET # BLD AUTO: 261 K/UL (ref 135–450)
PMV BLD AUTO: 7.5 FL (ref 5–10.5)
POTASSIUM SERPL-SCNC: 3.8 MMOL/L (ref 3.5–5.1)
RBC # BLD AUTO: 5.17 M/UL (ref 4–5.2)
REPORT: NORMAL
REPORT: NORMAL
RESP PATH DNA+RNA PNL NPH NAA+NON-PROBE: NORMAL
RESP PATH DNA+RNA PNL NPH NAA+NON-PROBE: NORMAL
SODIUM SERPL-SCNC: 141 MMOL/L (ref 136–145)
TRIGL SERPL-MCNC: 115 MG/DL (ref 0–150)
TSH SERPL DL<=0.005 MIU/L-ACNC: 2.13 UIU/ML (ref 0.27–4.2)
VIT B12 SERPL-MCNC: >2000 PG/ML (ref 211–911)
VLDLC SERPL CALC-MCNC: 23 MG/DL
WBC # BLD AUTO: 3.1 K/UL (ref 4–11)

## 2024-01-22 PROCEDURE — 36415 COLL VENOUS BLD VENIPUNCTURE: CPT

## 2024-01-22 PROCEDURE — 93306 TTE W/DOPPLER COMPLETE: CPT

## 2024-01-22 PROCEDURE — 85027 COMPLETE CBC AUTOMATED: CPT

## 2024-01-22 PROCEDURE — 2580000003 HC RX 258: Performed by: INTERNAL MEDICINE

## 2024-01-22 PROCEDURE — 83036 HEMOGLOBIN GLYCOSYLATED A1C: CPT

## 2024-01-22 PROCEDURE — 92526 ORAL FUNCTION THERAPY: CPT

## 2024-01-22 PROCEDURE — 6360000002 HC RX W HCPCS: Performed by: INTERNAL MEDICINE

## 2024-01-22 PROCEDURE — 92610 EVALUATE SWALLOWING FUNCTION: CPT

## 2024-01-22 PROCEDURE — 70551 MRI BRAIN STEM W/O DYE: CPT

## 2024-01-22 PROCEDURE — 82140 ASSAY OF AMMONIA: CPT

## 2024-01-22 PROCEDURE — 2060000000 HC ICU INTERMEDIATE R&B

## 2024-01-22 PROCEDURE — APPNB30 APP NON BILLABLE TIME 0-30 MINS

## 2024-01-22 PROCEDURE — 84443 ASSAY THYROID STIM HORMONE: CPT

## 2024-01-22 PROCEDURE — APPSS60 APP SPLIT SHARED TIME 46-60 MINUTES

## 2024-01-22 PROCEDURE — C9113 INJ PANTOPRAZOLE SODIUM, VIA: HCPCS | Performed by: INTERNAL MEDICINE

## 2024-01-22 PROCEDURE — 0202U NFCT DS 22 TRGT SARS-COV-2: CPT

## 2024-01-22 PROCEDURE — 80061 LIPID PANEL: CPT

## 2024-01-22 PROCEDURE — 80048 BASIC METABOLIC PNL TOTAL CA: CPT

## 2024-01-22 PROCEDURE — 6370000000 HC RX 637 (ALT 250 FOR IP): Performed by: INTERNAL MEDICINE

## 2024-01-22 PROCEDURE — 82607 VITAMIN B-12: CPT

## 2024-01-22 PROCEDURE — 99223 1ST HOSP IP/OBS HIGH 75: CPT | Performed by: PSYCHIATRY & NEUROLOGY

## 2024-01-22 RX ORDER — DIAZEPAM 5 MG/ML
5 INJECTION, SOLUTION INTRAMUSCULAR; INTRAVENOUS ONCE
Status: COMPLETED | OUTPATIENT
Start: 2024-01-22 | End: 2024-01-22

## 2024-01-22 RX ADMIN — ENOXAPARIN SODIUM 30 MG: 100 INJECTION SUBCUTANEOUS at 20:00

## 2024-01-22 RX ADMIN — ENOXAPARIN SODIUM 30 MG: 100 INJECTION SUBCUTANEOUS at 08:15

## 2024-01-22 RX ADMIN — SODIUM CHLORIDE, PRESERVATIVE FREE 10 ML: 5 INJECTION INTRAVENOUS at 08:17

## 2024-01-22 RX ADMIN — ATENOLOL 50 MG: 50 TABLET ORAL at 08:14

## 2024-01-22 RX ADMIN — CEFTRIAXONE SODIUM 1000 MG: 1 INJECTION, POWDER, FOR SOLUTION INTRAMUSCULAR; INTRAVENOUS at 17:21

## 2024-01-22 RX ADMIN — DIAZEPAM 5 MG: 5 INJECTION, SOLUTION INTRAMUSCULAR; INTRAVENOUS at 16:25

## 2024-01-22 RX ADMIN — AMLODIPINE BESYLATE 10 MG: 5 TABLET ORAL at 08:14

## 2024-01-22 RX ADMIN — ATORVASTATIN CALCIUM 80 MG: 80 TABLET, FILM COATED ORAL at 20:01

## 2024-01-22 RX ADMIN — LEVETIRACETAM 500 MG: 500 TABLET, FILM COATED ORAL at 08:14

## 2024-01-22 RX ADMIN — PANTOPRAZOLE SODIUM 40 MG: 40 INJECTION, POWDER, FOR SOLUTION INTRAVENOUS at 08:13

## 2024-01-22 RX ADMIN — OSELTAMIVIR PHOSPHATE 75 MG: 75 CAPSULE ORAL at 08:14

## 2024-01-22 RX ADMIN — OSELTAMIVIR PHOSPHATE 75 MG: 75 CAPSULE ORAL at 20:01

## 2024-01-22 ASSESSMENT — PAIN SCALES - GENERAL
PAINLEVEL_OUTOF10: 0

## 2024-01-22 NOTE — CARE COORDINATION
CM reviewed chart. Patient noted with falls at home, Patient has order for PT/OT and Speech evaluations.     Electronically signed by Evelyn Colmenares on 1/22/2024 at 8:20 AM

## 2024-01-22 NOTE — PLAN OF CARE
Problem: Discharge Planning  Goal: Discharge to home or other facility with appropriate resources  Outcome: Progressing  Flowsheets  Taken 1/22/2024 0828 by Rajani Esposito RN  Discharge to home or other facility with appropriate resources: Identify barriers to discharge with patient and caregiver  Taken 1/22/2024 0543 by Carey Gallagher RN  Discharge to home or other facility with appropriate resources:   Identify barriers to discharge with patient and caregiver   Arrange for needed discharge resources and transportation as appropriate   Identify discharge learning needs (meds, wound care, etc)   Refer to discharge planning if patient needs post-hospital services based on physician order or complex needs related to functional status, cognitive ability or social support system  Taken 1/21/2024 1945 by Carey Gallagher RN  Discharge to home or other facility with appropriate resources:   Identify barriers to discharge with patient and caregiver   Identify discharge learning needs (meds, wound care, etc)   Refer to discharge planning if patient needs post-hospital services based on physician order or complex needs related to functional status, cognitive ability or social support system     Problem: Pain  Goal: Verbalizes/displays adequate comfort level or baseline comfort level  Outcome: Progressing  Flowsheets  Taken 1/22/2024 0800 by Rajani Esposito RN  Verbalizes/displays adequate comfort level or baseline comfort level: Encourage patient to monitor pain and request assistance  Taken 1/21/2024 1945 by Carey Gallagher RN  Verbalizes/displays adequate comfort level or baseline comfort level:   Encourage patient to monitor pain and request assistance   Assess pain using appropriate pain scale   Administer analgesics based on type and severity of pain and evaluate response   Implement non-pharmacological measures as appropriate and evaluate response   Consider cultural and social influences on pain and pain

## 2024-01-22 NOTE — CONSULTS
In patient Neurology consult        [facility] Neurology      MD Akila Mathews  1970    Date of Service: 1/22/2024    Referring Physician: Makayla Juan MD      Reason for the consult and CC: Acute dizziness and headache    HPI:   The patient is a 53 y.o.  years old female with past medical history of hypertension, sleep apnea, CKD, hyperparathyroidism, seizure disorder and other medical problems who comes to the hospital with upper respiratory congestion, headache and fatigue.  Neurology was consulted for dizziness and headache.  Patient reports worsening dizziness and headache over the last week.  During this time patient also reports upper respiratory symptoms including coughing and fatigue.  Patient is a .  Came to the emergency room for further evaluation.  In the emergency room CT head showed no acute intracranial normality and CTA head and neck showed no following stenosis or LVO.  Further lab workup showed patient positive for influenza A.  Patient was started on fluids, Tamiflu and admitted to the hospital for further evaluation.    Today patient reports overall improvement in her symptoms.  She reports getting up to the bathroom without having dizziness.  She reports headache has resolved.  She denies dizziness, dysarthria or dysphagia.  Other review of systems unremarkable.       Constitutional:   Vitals:    01/22/24 0341 01/22/24 0620 01/22/24 0800 01/22/24 1104   BP: 113/80  137/84 (!) 133/92   Pulse: 73  71 65   Resp: 16  17 19   Temp: 98.1 °F (36.7 °C)  98 °F (36.7 °C) 98.1 °F (36.7 °C)   TempSrc: Oral  Oral Oral   SpO2: 94%  94% 92%   Weight:  117.1 kg (258 lb 3.2 oz)     Height:             I personally reviewed and updated social history, past medical history, medications, allergy, surgical history, and family history as documented in the patient's electronic health records.       ROS: 10-14 ROS reviewed with the patient/nurse/family which were

## 2024-01-23 VITALS
WEIGHT: 258.7 LBS | BODY MASS INDEX: 48.84 KG/M2 | HEART RATE: 80 BPM | OXYGEN SATURATION: 94 % | RESPIRATION RATE: 18 BRPM | TEMPERATURE: 98.2 F | HEIGHT: 61 IN | SYSTOLIC BLOOD PRESSURE: 130 MMHG | DIASTOLIC BLOOD PRESSURE: 79 MMHG

## 2024-01-23 PROCEDURE — 97161 PT EVAL LOW COMPLEX 20 MIN: CPT

## 2024-01-23 PROCEDURE — 6370000000 HC RX 637 (ALT 250 FOR IP): Performed by: INTERNAL MEDICINE

## 2024-01-23 PROCEDURE — 97530 THERAPEUTIC ACTIVITIES: CPT

## 2024-01-23 PROCEDURE — C9113 INJ PANTOPRAZOLE SODIUM, VIA: HCPCS | Performed by: INTERNAL MEDICINE

## 2024-01-23 PROCEDURE — 6360000002 HC RX W HCPCS: Performed by: INTERNAL MEDICINE

## 2024-01-23 PROCEDURE — 99232 SBSQ HOSP IP/OBS MODERATE 35: CPT

## 2024-01-23 PROCEDURE — 97165 OT EVAL LOW COMPLEX 30 MIN: CPT

## 2024-01-23 PROCEDURE — 2580000003 HC RX 258: Performed by: INTERNAL MEDICINE

## 2024-01-23 RX ORDER — OSELTAMIVIR PHOSPHATE 75 MG/1
75 CAPSULE ORAL 2 TIMES DAILY
Qty: 8 CAPSULE | Refills: 0 | Status: SHIPPED | OUTPATIENT
Start: 2024-01-23 | End: 2024-01-27

## 2024-01-23 RX ORDER — ATORVASTATIN CALCIUM 80 MG/1
80 TABLET, FILM COATED ORAL NIGHTLY
Qty: 30 TABLET | Refills: 3 | Status: SHIPPED | OUTPATIENT
Start: 2024-01-23

## 2024-01-23 RX ORDER — BUTALBITAL, ACETAMINOPHEN AND CAFFEINE 50; 325; 40 MG/1; MG/1; MG/1
1 TABLET ORAL EVERY 4 HOURS PRN
Qty: 60 TABLET | Refills: 0 | Status: SHIPPED | OUTPATIENT
Start: 2024-01-23 | End: 2024-02-02

## 2024-01-23 RX ADMIN — ENOXAPARIN SODIUM 30 MG: 100 INJECTION SUBCUTANEOUS at 08:04

## 2024-01-23 RX ADMIN — SODIUM CHLORIDE, PRESERVATIVE FREE 10 ML: 5 INJECTION INTRAVENOUS at 08:05

## 2024-01-23 RX ADMIN — AMLODIPINE BESYLATE 10 MG: 5 TABLET ORAL at 08:04

## 2024-01-23 RX ADMIN — PANTOPRAZOLE SODIUM 40 MG: 40 INJECTION, POWDER, FOR SOLUTION INTRAVENOUS at 08:04

## 2024-01-23 RX ADMIN — OSELTAMIVIR PHOSPHATE 75 MG: 75 CAPSULE ORAL at 08:04

## 2024-01-23 RX ADMIN — LEVETIRACETAM 500 MG: 500 TABLET, FILM COATED ORAL at 08:04

## 2024-01-23 RX ADMIN — ATENOLOL 50 MG: 50 TABLET ORAL at 08:04

## 2024-01-23 ASSESSMENT — PAIN SCALES - GENERAL
PAINLEVEL_OUTOF10: 0

## 2024-01-23 NOTE — PROGRESS NOTES
Hospitalist Progress Note      PCP: Mars Hendricks MD    Date of Admission: 1/21/2024    LOS: 1    Chief Complaint:   Chief Complaint   Patient presents with    head congestion and foggyniess.      PT sts symptom onset x2 weeks. PT sts symptoms worsened today. PT denies dizziness. PT sts Head pressure that radiates to behind her eyes. PT reports recent virus not sure if COVID or flu. But reports getting off a cough.        Case Summary:   53-year-old lady with history of hypertension, history of seizure disorder, obesity with sleep apnea on CPAP, stage III CKD who presented with 3 weeks history of generalized malaise weakness generalized body aches headaches with congestion and associated scanty cough found to have influenza A infection complicated by an intractable headache, acute kidney injury due to dehydration and hypokalemia.      Active Hospital Problems    Diagnosis Date Noted    Influenza A [J10.1] 01/21/2024    Status migrainosus [G43.901] 01/21/2024    ISAMAR on CPAP [G47.33] 12/06/2023    Seizure disorder (HCC) [G40.909] 08/08/2023    Secondary hyperparathyroidism ([facility]) [N25.81] 08/03/2023    Hypertension, essential [I10]     Morbid obesity with BMI of 45.0-49.9, adult ([facility]) [E66.01, Z68.42]     Hypertension [I10]     Chronic kidney disease, stage 3, mod decreased GFR ([facility]) [N18.30] 04/19/2007         Principal Problem:    Influenza A: feels much better this morning.  Started on Tamiflu yesterday.  To complete 5-day therapy.      Status migrainosus: Improved headache this morning but still the.  Underlying history of seizures.  Stable on Keppra.  - Continue pain regimen  - Await MRI brain  - Neurology consult      Hypokalemia: Replete and monitor    GERD: On Protonix    Active Problems:    Hypertension: Stable on amlodipine, Tylenol    Morbid obesity with ISAMAR on CPAP     Seizure disorder (HCC): Stable on Keppra      Resolved Problems:    Acute kidney injury (HCC)          Medications:  
    Akila Bush  Neurology Follow-up  [facility] Neurology    Date of Service: 1/23/2024    Subjective:   CC: Follow up today regarding: Acute dizziness and headache    Events noted. Chart and lab reviewed.  Patient seen this morning, sitting in chair.   at bedside.  We discussed MRI results which showed no acute stroke.  Today she denies headache, dizziness, dysarthria or dysphagia.  Other review of systems unremarkable      ROS : A 10-12 system review obtained and updated today and is unremarkable except as mentioned  in my interval history.     Past medical history, social history, medication and family history reviewed.       Objective:  Exam:   Constitutional:   Vitals:    01/22/24 2346 01/23/24 0427 01/23/24 0626 01/23/24 0700   BP: 128/84 118/83  136/84   Pulse: 83 78  84   Resp:  16  17   Temp:  98.2 °F (36.8 °C)  98.3 °F (36.8 °C)   TempSrc:  Oral  Oral   SpO2: 94% 92%  95%   Weight:   117.3 kg (258 lb 11.2 oz)    Height:         General appearance:  Normal development and appear in no acute distress.   Mental Status:   Oriented to person, place, problem, and time.    Memory: Good immediate recall.  Intact remote memory  Normal attention span and concentration.  Language: intact naming, repeating and fluency   Good fund of Knowledge.   Cranial Nerves:   II:   Pupils: equal, round, reactive to light  III,IV,VI: Extra Ocular Movements are intact. No nystagmus  V: Facial sensation is intact  VII: Facial strength and movements: intact and symmetric  XII: Tongue movements are normal  Musculoskeletal: 5/5 in all 4 extremities.   Tone: Normal tone.   Reflexes: Symmetric 2+ in both arms and legs.  Coordination: no pronator drift, no dysmetria with FNF  Sensation: normal.  Gait/Posture: steady gait    MDM:      A. Problems (any 1)    High:    [x] Acute/Chronic Illness/injury posing threat to life or bodily function:    [] Severe exacerbation of chronic illness:      Moderate:    []     1 or more chronic 
  [facility] - Inpatient Rehabilitation Department   Phone: [phone]    Physical Therapy    [x] Initial Evaluation            [] Daily Treatment Note         [x] Discharge Summary      Patient: Akila Bush   : 1970   MRN: [id number]   Date of Service:  2024  Admitting Diagnosis: Influenza A  Current Admission Summary: Akila Bush is a 53 y.o. female with history of seizures, secondary hyperparathyroidism, HTN, ISAMAR on CPAP, CKD 3, morbid obesity came to ER with complaints of sinus congestion, headaches and malaise.  States symptoms going on for a few weeks and worsening.  States in December she had a sulphuric taste in mouth and took some heart burn meds for 2 days.  Has been coughing.  She is a teacher.  Some dysphagia.  No melena, hematochezia, nausea, vomiting or hematemesis.  Denies focal weakness  Past Medical History:  has a past medical history of Arthritis, BRCA1 negative, BRCA2 negative, Carpal tunnel syndrome, Convulsions, COVID-19, Dermatophytosis, Diarrhea, Hypertension, Hypertension, essential, Hypertensive kidney disease with chronic kidney disease stage V (HCC), Medulloadrenal hyperfunc, Meningococcal encephalitis, Morbid obesity with BMI of 45.0-49.9, adult ([facility]), Obesity, Other malaise and fatigue, Seizure disorder ([facility]), Sickle cell trait ([facility]), Snoring, Status post right knee replacement, and Tonic-clonic seizure disorder ([facility]).  Past Surgical History:  has a past surgical history that includes Appendectomy;  section; Tonsillectomy; Endometrial ablation; Carpal tunnel release (Bilateral, ); Total knee arthroplasty (Right, 2021); and Ovary removal ().  Discharge Recommendations: Akila Bush scored a 24/24 on the AM-PAC short mobility form.  At this time, no further PT is recommended upon discharge due to presentation at baseline function.  Recommend patient returns to prior setting with prior services.      DME Required For Discharge: 
CLINICAL PHARMACY NOTE: MEDS TO BEDS    Total # of Prescriptions Filled: 3   The following medications were delivered to the patient:  BUTALBITAL-APAP-[id number]  OSELTAMIVIR PHOSPHATE 75MG CAPS  ATORVASTATIN CALCIUM 80MG TABS    Additional Documentation: Rajani KENNEDY approved to deliver meds to the patient room  [facility]  
Patient receiving Echo at the bedside at this time then will have MRI shortly.     MRI completed awaiting results.   
Shift assessment completed. Routine vitals stable. Scheduled medications given. Patient is awake, alert and oriented. Respirations are easy and unlabored. Patient does not appear to be in distress, resting comfortably at this time. Call light within reach. Echo completed and reviewed with patient by Dr Barragan, instructed to follow up with cardio and get another Echo in 6 months. MRI results reviewed with patient by Dr Barragan, negative for any infarct. NIH 0 during bedside report this morning. Patient states she is feel much better. Eager to go home. Awaiting for neuro to see patient then probably discharge home.     
Shift assessment completed. Routine vitals stable. Scheduled medications given. Patient is awake, alert and oriented. Respirations are easy and unlabored. Patient does not appear to be in distress, resting comfortably at this time. Call light within reach. NIH 0. Questionnaire signed and faxed for MRI. Echo should be done today as well. Patient steady on her feet while ambulating to the bathroom. Encouraged to call for assistance at anytime.     
adequate laryngeal excursion via palpation and no overt signs of aspiration noted with any texture. Education regarding aspiration risk and precautions explained to the Pt who verbalized understanding. No further dysphagia treatment intervention is indicated at this time.    Recommended Diet and Intervention 1/22/2024:  Diet Solids Recommendation:  Regular texture diet  Liquid Consistency Recommendation:  Thin liquids  Recommended form of Meds: Meds whole with water          Compensatory Swallowing Strategies:  Upright as possible with all PO intake     SHORT TERM DYSPHAGIA GOALS/PLAN OF CARE: Speech therapy for dysphagia tx No further follow-up indicated     Discharge Recommendations: No further follow-up appears indicated at this time.     Patient Positioning: Upright in bed     Current Diet Level (prior to evaluation): Regular texture diet  Thin liquids      Respiratory Status:   [x]Room Air   []O2 via nasal cannula   []Other:    Dentition:  [x]Adequate  []Dentures   []Missing Many Teeth  []Edentulous  []Other:    Baseline Vocal Quality:  [x]Normal  []Dysphonic   []Aphonic   []Hoarse  []Wet  []Weak  []Other:    Volitional Cough:  Not Elicited     Volitional Swallow:   []Absent   []Delayed     [x]Adequate     []Required use of drink     Oral Mechanism Exam:  [x]WFL []Mild   [] Moderate  []Severe  []To be assessed  Impaired:   []Left side      []Right side    []Labial ROM/Coordination    []Labial Symmetry   []Lingual ROM/Coordination   []Lingual Symmetry  []Gag  []Other:     Oral Phase: [x]WFL []Mild   [] Moderate  []Severe  []To be assessed   []Impaired/Prolonged Mastication:   []Oral Holding:   []Spillage Left:   []Spillage Right:  []Pocketing Left:   []Pocketing Right:   []Decreased Anterior to Posterior Transit:   []Suspected Premature Bolus Loss:   []Lingual/Palatal Residue:   []Other:     Pharyngeal Phase: [x]WFL []Mild   [] Moderate  []Severe  []To be assessed   []Delayed Swallow:   []Suspected Pharyngeal 
with no fatigue  Instrumental Activities of Daily Living  No IADL completed on this date.    Functional Mobility  Bed Mobility  Supine to Sit: Independent  Rolling Right: Independent  Scooting: Independent  Comments: Pt independently completed bed mobility and did not require verbal cues  Transfers  Sit to stand transfer:Independent  Stand to sit transfer: Independent  Comments: completed transfers independently and stood at sink while completing grooming tasks without fatigue  Functional Mobility  Functional Mobility Activity: ambulated 200 ft in hallway and 20 ft in hospital room without fatigue.  Balance:  Static Sitting Balance: good(+): independent with high level dynamic balance in unsupported position  Dynamic Sitting Balance: good(+): independent with high level dynamic balance in unsupported position  Static Standing Balance: good(+): independent with high level dynamic balance in unsupported position  Dynamic Standing Balance: good(+): independent with high level dynamic balance in unsupported position      Other Therapeutic Interventions    Functional Outcomes  -PAC Inpatient Daily Activity Raw Score: 24    Cognition  WFL  Orientation:    A&O x 4  Command Following:   WFL     Education  Barriers To Learning: none  Patient Education: Patient educated on OT role and benefits  Learning Assessment:  Patient verbalized and demonstrates understanding    Assessment  Activity Tolerance: tolerated 220 ft of ambulation without fatigue. Stood for 5 minutes throughout grooming task at sink without fatigue.   Impairments Requiring Therapeutic Intervention: none - eval with same day discharge  Prognosis: good without need for therapy intervention  Clinical Assessment: Patient presenting at independent level for completion of required self care tasks for return to home.  Eval with d/c at this time.  No therapy services indicated.   Safety Interventions: patient left in chair, call light within reach, and nurse

## 2024-01-23 NOTE — DISCHARGE SUMMARY
[facility]    Patient Demographics    Patient. Akila Bush  Date of Birth. 1970  MRN. [id number]     Primary care provider. Mars Hendricks MD  (Tel: [phone])    Admit date: 1/21/2024    Discharge date (blank if same as Note Date): 1/23/2024  Note Date: 1/23/2024     Reason for Hospitalization.   Chief Complaint   Patient presents with    head congestion and foggyniess.      PT sts symptom onset x2 weeks. PT sts symptoms worsened today. PT denies dizziness. PT sts Head pressure that radiates to behind her eyes. PT reports recent virus not sure if COVID or flu. But reports getting off a cough.            Problem-based Hospital Course.  53-year-old lady with history of hypertension, history of seizure disorder, obesity with sleep apnea on CPAP, stage III CKD who presented with 3 weeks history of generalized malaise weakness generalized body aches headaches with congestion and associated scanty cough found to have influenza A infection complicated by an intractable headache, acute kidney injury due to dehydration and hypokalemia..  Symptoms improved acute stroke was ruled out negative MRI.  Discharged on p.o. Tamiflu    Consults.  IP CONSULT TO HOSPITALIST  IP CONSULT TO NEUROLOGY    Physical examination on discharge day.   /79   Pulse 80   Temp 98.2 °F (36.8 °C) (Oral)   Resp 18   Ht 1.549 m (5' 1\")   Wt 117.3 kg (258 lb 11.2 oz)   SpO2 94%   BMI 48.88 kg/m²   General appearance.  Alert. Looks comfortable.  HEENT. Sclera clear. Moist mucus membranes.  Cardiovascular. Regular rate and rhythm, normal S1, S2. No murmur.   Respiratory. Not using accessory muscles.Clear to auscultation bilaterally, no wheeze.  Gastrointestinal. Abdomen soft, non-tender, not distended, normal bowel sounds  Neurology. Facial symmetry. No speech deficits.

## 2024-01-23 NOTE — PLAN OF CARE
Problem: Discharge Planning  Goal: Discharge to home or other facility with appropriate resources  1/23/2024 0837 by Rajani Esposito RN  Outcome: Adequate for Discharge  Flowsheets (Taken 1/23/2024 0802)  Discharge to home or other facility with appropriate resources: Identify barriers to discharge with patient and caregiver  1/23/2024 0532 by Magno Calvert RN  Outcome: Progressing     Problem: Pain  Goal: Verbalizes/displays adequate comfort level or baseline comfort level  1/23/2024 0837 by Rajani Esposito RN  Outcome: Adequate for Discharge  Flowsheets (Taken 1/23/2024 0700)  Verbalizes/displays adequate comfort level or baseline comfort level: Encourage patient to monitor pain and request assistance  1/23/2024 0532 by Magno Calvert RN  Outcome: Progressing  Flowsheets (Taken 1/22/2024 1830 by Rajani Esposito RN)  Verbalizes/displays adequate comfort level or baseline comfort level: Encourage patient to monitor pain and request assistance     Problem: Safety - Adult  Goal: Free from fall injury  1/23/2024 0837 by Rajani Esposito RN  Outcome: Adequate for Discharge  1/23/2024 0532 by Magno Calvert RN  Outcome: Progressing     Problem: ABCDS Injury Assessment  Goal: Absence of physical injury  1/23/2024 0837 by Rajani Esposito RN  Outcome: Adequate for Discharge  1/23/2024 0532 by Magno Calvert RN  Outcome: Progressing     Problem: Cardiovascular - Adult  Goal: Maintains optimal cardiac output and hemodynamic stability  1/23/2024 0837 by Rajani Esposito RN  Outcome: Adequate for Discharge  1/23/2024 0532 by Magno Calvert RN  Outcome: Progressing     Problem: Genitourinary - Adult  Goal: Absence of urinary retention  1/23/2024 0837 by Rajani Esposito RN  Outcome: Adequate for Discharge  1/23/2024 0532 by Magno Calvert RN  Outcome: Progressing     Problem: Infection - Adult  Goal: Absence of infection at discharge  1/23/2024 0837 by Rajani Esposito RN  Outcome: Adequate for

## 2024-01-23 NOTE — PLAN OF CARE
Problem: Discharge Planning  Goal: Discharge to home or other facility with appropriate resources  Outcome: Progressing     Problem: Pain  Goal: Verbalizes/displays adequate comfort level or baseline comfort level  Outcome: Progressing     Problem: Safety - Adult  Goal: Free from fall injury  Outcome: Progressing     Problem: ABCDS Injury Assessment  Goal: Absence of physical injury  Outcome: Progressing     Problem: Cardiovascular - Adult  Goal: Maintains optimal cardiac output and hemodynamic stability  Outcome: Progressing     Problem: Genitourinary - Adult  Goal: Absence of urinary retention  Outcome: Progressing     Problem: Infection - Adult  Goal: Absence of infection at discharge  Outcome: Progressing     Problem: Metabolic/Fluid and Electrolytes - Adult  Goal: Electrolytes maintained within normal limits  Outcome: Progressing  Goal: Glucose maintained within prescribed range  Outcome: Progressing     Problem: Hematologic - Adult  Goal: Maintains hematologic stability  Outcome: Progressing     Problem: Chronic Conditions and Co-morbidities  Goal: Patient's chronic conditions and co-morbidity symptoms are monitored and maintained or improved  Outcome: Progressing     Problem: Neurosensory - Adult  Goal: Achieves stable or improved neurological status  Outcome: Progressing  Goal: Achieves maximal functionality and self care  Outcome: Progressing

## 2024-01-24 ENCOUNTER — CARE COORDINATION (OUTPATIENT)
Dept: CASE MANAGEMENT | Age: 54
End: 2024-01-24

## 2024-01-24 DIAGNOSIS — J10.1 INFLUENZA A: Primary | ICD-10-CM

## 2024-01-24 NOTE — CARE COORDINATION
Care Transitions Initial Follow Up Call    Call within 2 business days of discharge: Yes    Patient Current Location:  Home: [address] [place] [id number]    Care Transition Nurse contacted the patient by telephone to perform post hospital discharge assessment. Verified name and  with patient as identifiers. Provided introduction to self, and explanation of the Care Transition Nurse role.     Patient: Akila Bush Patient : 1970   MRN: [id number]  Reason for Admission:   Discharge Date: 24 RARS: Readmission Risk Score: 10.4      Last Discharge Facility       Date Complaint Diagnosis Description Type Department Provider    24 head congestion and foggyniess.  Dizziness ... ED to Hosp-Admission (Discharged) (ADMITTED) [facility] [place] Barragan MD; Humberto Barragan, ...            Was this an external facility discharge? No Discharge Facility:     Challenges to be reviewed by the provider   Additional needs identified to be addressed with provider: No  none               Method of communication with provider: none.    Pt states doing well, no issues or concerns. Pt states  wants her to see her nephrologist and neurologist before making an appt with him. Agreed to more CTC f/u calls.      Care Transition Nurse reviewed discharge instructions with patient who verbalized understanding. The patient was given an opportunity to ask questions and does not have any further questions or concerns at this time. Were discharge instructions available to patient? Yes. Reviewed appropriate site of care based on symptoms and resources available to patient including: When to call 911. The patient agrees to contact the PCP office for questions related to their healthcare.     Advance Care Planning:   Does patient have an Advance Directive: reviewed and current.    Medication reconciliation was performed with patient, who verbalizes understanding of administration of home medications. Medications

## 2024-01-25 ENCOUNTER — OFFICE VISIT (OUTPATIENT)
Dept: FAMILY MEDICINE CLINIC | Age: 54
End: 2024-01-25

## 2024-01-25 VITALS
WEIGHT: 262 LBS | HEART RATE: 77 BPM | BODY MASS INDEX: 49.47 KG/M2 | SYSTOLIC BLOOD PRESSURE: 110 MMHG | HEIGHT: 61 IN | OXYGEN SATURATION: 98 % | TEMPERATURE: 97.2 F | DIASTOLIC BLOOD PRESSURE: 78 MMHG

## 2024-01-25 DIAGNOSIS — G40.409 MYOCLONIC SEIZURE DISORDER (HCC): ICD-10-CM

## 2024-01-25 DIAGNOSIS — E66.01 MORBID OBESITY WITH BMI OF 45.0-49.9, ADULT (HCC): ICD-10-CM

## 2024-01-25 DIAGNOSIS — Z09 HOSPITAL DISCHARGE FOLLOW-UP: Primary | ICD-10-CM

## 2024-01-25 DIAGNOSIS — I10 ESSENTIAL HYPERTENSION: ICD-10-CM

## 2024-01-25 DIAGNOSIS — J10.1 INFLUENZA A: ICD-10-CM

## 2024-01-25 ASSESSMENT — PATIENT HEALTH QUESTIONNAIRE - PHQ9
SUM OF ALL RESPONSES TO PHQ QUESTIONS 1-9: 0
SUM OF ALL RESPONSES TO PHQ QUESTIONS 1-9: 0
2. FEELING DOWN, DEPRESSED OR HOPELESS: 0
SUM OF ALL RESPONSES TO PHQ QUESTIONS 1-9: 0
SUM OF ALL RESPONSES TO PHQ QUESTIONS 1-9: 0
1. LITTLE INTEREST OR PLEASURE IN DOING THINGS: 0
SUM OF ALL RESPONSES TO PHQ9 QUESTIONS 1 & 2: 0

## 2024-01-25 NOTE — PROGRESS NOTES
Post-Discharge Transitional Care  Follow Up      Akila Bush   YOB: 1970    Date of Office Visit:  1/25/2024  Date of Hospital Admission: 1/21/24  Date of Hospital Discharge: 1/23/24  Risk of hospital readmission (high >=14%. Medium >=10%) :Readmission Risk Score: 10.4      Care management risk score Rising risk (score 2-5) and Complex Care (Scores >=6): No Risk Score On File     Non face to face  following discharge, date last encounter closed (first attempt may have been earlier): 01/24/2024    Call initiated 2 business days of discharge: Yes    ASSESSMENT/PLAN:   Hospital discharge follow-up  -     MD DISCHARGE MEDS RECONCILED W/ CURRENT OUTPATIENT MED LIST  Essential hypertension  Morbid obesity with BMI of 45.0-49.9, adult (HCC)  Myoclonic seizure disorder (HCC)  Influenza A    Overall, physical exam and vital signs are reassuring.  Patient still reporting significant fatigue, but reassured her that this is typical of influenza.  Advised her to try to increase her dietary intake is much as possible and to ensure that she is getting adequate hydration as well as continuing the medications prescribed at discharge.  Plan to follow-up with PCP in approximately 3 months unless needs arise sooner.    Medical Decision Making: moderate complexity  Return in about 3 months (around 4/25/2024).    On this date 1/25/2024 I have spent 30 minutes reviewing previous notes, test results and face to face with the patient discussing the diagnosis and importance of compliance with the treatment plan as well as documenting on the day of the visit.       Subjective:   HPI:  Follow up of Hospital problems/diagnosis(es):     Inpatient course: Discharge summary reviewed- see chart.    Interval history/Current status:     Patient hospitalized for influenza.  Patient also has a medical history significant for hypertension, epilepsy, and morbid obesity.  States that she felt well in the hospital but had a significant

## 2024-01-27 ENCOUNTER — HOSPITAL ENCOUNTER (OUTPATIENT)
Age: 54
Setting detail: SPECIMEN
Discharge: HOME | End: 2024-01-27
Payer: COMMERCIAL

## 2024-01-27 DIAGNOSIS — E04.2 MULTIPLE THYROID NODULES: ICD-10-CM

## 2024-01-27 DIAGNOSIS — N25.81 SECONDARY HYPERPARATHYROIDISM (HCC): ICD-10-CM

## 2024-01-27 DIAGNOSIS — E55.9 VITAMIN D DEFICIENCY: ICD-10-CM

## 2024-01-27 DIAGNOSIS — N18.31 STAGE 3A CHRONIC KIDNEY DISEASE (HCC): ICD-10-CM

## 2024-01-27 LAB
CALCIUM 24H UR-MRATE: 195 MG/24 HR (ref 42–353)
CREAT 24H UR-MRATE: 1.6 G/24HR (ref 0.6–1.5)
SPECIMEN VOL 24H UR: 900 ML

## 2024-01-27 PROCEDURE — 82340 ASSAY OF CALCIUM IN URINE: CPT

## 2024-01-31 ENCOUNTER — CARE COORDINATION (OUTPATIENT)
Dept: CASE MANAGEMENT | Age: 54
End: 2024-01-31

## 2024-01-31 NOTE — CARE COORDINATION
Subsequent and Final Calls  Do you have any ongoing symptoms?: No  Have your medications changed?: No  Do you have any questions related to your medications?: No  Do you currently have any active services?: No  Are you currently active with any services?: Home Health  Do you have any needs or concerns that I can assist you with?: No  Identified Barriers: None  Care Transitions Interventions  No Identified Needs     Other Therapy Services: Completed    Home Care Waiver: Completed Physical Therapy: Completed     Occupational Therapy: Completed     Other Interventions:             Care Transition Nurse provided contact information for future needs. No further follow-up call indicated based on severity of symptoms and risk factors.  Plan for next call:  n/a    Sunday Bullock RN

## 2024-02-02 DIAGNOSIS — G40.409 MYOCLONIC SEIZURE DISORDER (HCC): ICD-10-CM

## 2024-02-02 RX ORDER — LEVETIRACETAM 500 MG/1
500 TABLET ORAL DAILY
Qty: 90 TABLET | Refills: 1 | OUTPATIENT
Start: 2024-02-02

## 2024-02-08 ENCOUNTER — OFFICE VISIT (OUTPATIENT)
Dept: NEUROLOGY | Age: 54
End: 2024-02-08
Payer: COMMERCIAL

## 2024-02-08 VITALS
DIASTOLIC BLOOD PRESSURE: 99 MMHG | HEART RATE: 69 BPM | BODY MASS INDEX: 49.47 KG/M2 | HEIGHT: 61 IN | WEIGHT: 262 LBS | SYSTOLIC BLOOD PRESSURE: 150 MMHG

## 2024-02-08 DIAGNOSIS — I10 HTN (HYPERTENSION), BENIGN: ICD-10-CM

## 2024-02-08 DIAGNOSIS — G40.409 MYOCLONIC SEIZURE DISORDER (HCC): Primary | ICD-10-CM

## 2024-02-08 PROCEDURE — 99213 OFFICE O/P EST LOW 20 MIN: CPT | Performed by: PSYCHIATRY & NEUROLOGY

## 2024-02-08 PROCEDURE — 3080F DIAST BP >= 90 MM HG: CPT | Performed by: PSYCHIATRY & NEUROLOGY

## 2024-02-08 PROCEDURE — 3077F SYST BP >= 140 MM HG: CPT | Performed by: PSYCHIATRY & NEUROLOGY

## 2024-02-08 RX ORDER — LEVETIRACETAM 500 MG/1
500 TABLET ORAL DAILY
Qty: 90 TABLET | Refills: 1 | Status: SHIPPED | OUTPATIENT
Start: 2024-02-08 | End: 2024-08-06

## 2024-02-08 NOTE — PROGRESS NOTES
function:    [] Severe exacerbation of chronic illness:      Moderate:    []     1 or more chronic illness with exacerbation, progression or side effect of treatment or  []     2 or more stable chronic illnesses or  []     1 acute illness with systemic symptoms       Mild:  [x]     1 stable chronic illness     ---------------------------------------------------------------------  B. Risk of Treatment (any 1)   [] Drugs/treatments that require intensive monitoring for toxicity include:      [x] Prescription drug management  ----------------------------------------------------------------------  [] High (any 2)  [x] Moderate (any 1)    C. Data (any 2 for high and any 1 for moderate)  [] Discussed management of the case with:    [x] Imaging personally reviewed and interpreted, includes:    [x] Data Review (any 3)  [] Collateral history obtained from:    [x] All available Consultant notes were reviewed  [x] All current labs were reviewed and interpreted for clinical significance   [x] Appropriate follow-up labs were ordered    I personally reviewed and updated social history, past medical history, medications, allergy, surgical history, and family history as documented in the patient's electronic health records.       Labs and/or neuroimaging and other test results reviewed and discussed with the patient.    Reviewed notes from other physicians.   Provided patient education regarding risk, benefits and treatment options as well as adherence to medication regimen and side effect from these medications.        Assessment:     Diagnosis Orders   1. Myoclonic seizure disorder (HCC)  levETIRAcetam (KEPPRA) 500 MG tablet      2. HTN (hypertension), benign            Continue Keppra 500 mg daily  She is not interested in increasing dose at this point  Refill for Keppra  Hold on Lipitor at this point giving myalgia and follow-up with PCP  Continue Norvasc and Tenormin  Monitor blood pressure at home  Aspirin for stroke

## 2024-02-12 ENCOUNTER — OFFICE VISIT (OUTPATIENT)
Dept: ORTHOPEDIC SURGERY | Age: 54
End: 2024-02-12
Payer: COMMERCIAL

## 2024-02-12 VITALS — BODY MASS INDEX: 51.44 KG/M2 | HEIGHT: 60 IN | RESPIRATION RATE: 17 BRPM | WEIGHT: 262 LBS

## 2024-02-12 DIAGNOSIS — M17.12 PRIMARY OSTEOARTHRITIS OF LEFT KNEE: Primary | ICD-10-CM

## 2024-02-12 PROCEDURE — 99213 OFFICE O/P EST LOW 20 MIN: CPT | Performed by: ORTHOPAEDIC SURGERY

## 2024-02-12 PROCEDURE — 20610 DRAIN/INJ JOINT/BURSA W/O US: CPT | Performed by: ORTHOPAEDIC SURGERY

## 2024-02-12 RX ORDER — BUPIVACAINE HYDROCHLORIDE 2.5 MG/ML
2 INJECTION, SOLUTION INFILTRATION; PERINEURAL ONCE
Status: COMPLETED | OUTPATIENT
Start: 2024-02-12 | End: 2024-02-12

## 2024-02-12 RX ORDER — TRIAMCINOLONE ACETONIDE 40 MG/ML
40 INJECTION, SUSPENSION INTRA-ARTICULAR; INTRAMUSCULAR ONCE
Status: COMPLETED | OUTPATIENT
Start: 2024-02-12 | End: 2024-02-12

## 2024-02-12 RX ADMIN — TRIAMCINOLONE ACETONIDE 40 MG: 40 INJECTION, SUSPENSION INTRA-ARTICULAR; INTRAMUSCULAR at 08:28

## 2024-02-12 RX ADMIN — BUPIVACAINE HYDROCHLORIDE 5 MG: 2.5 INJECTION, SOLUTION INFILTRATION; PERINEURAL at 08:28

## 2024-02-12 NOTE — PROGRESS NOTES
[facility]  Office Visit    Chief Complaint: Left knee pain    HPI:  Akila Bush is a 53 y.o. who is here in follow-up of left knee pain.  She has known osteoarthritis and underwent a steroid injection in November 2023. She had moderate relief of symptoms with these injections.  However, her symptoms have now returned.  She was active in aquatic therapy, recently got glasses with the flu.  She has not gotten back to her activities yet. She has pain along her medial joint line and the front of the knee.  She has a history of right total knee arthroplasty in July 2021 and the right knee is doing well.  He denies hip pain.  She does have CKD stage III.  She would like to try to lose more weight before discussing left knee replacement.      Patient Active Problem List   Diagnosis    Carpal tunnel syndrome    Hypertension    Obesity    Renal insufficiency    Vitamin D deficiency    Contusion of right knee    Infected abrasion of left hand    Hand dermatitis    Candidiasis of finger web    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HCC)    Anemia in chronic renal disease    Anemia, iron deficiency    Chronic pain of right knee    Family history of cancer    Hypertension, essential, benign    Overweight and obesity    Primary localized osteoarthrosis of the knee    Proteinuria    Renal osteodystrophy    Tear of PCL (posterior cruciate ligament) of knee    Hypertension, essential    Morbid obesity with BMI of 45.0-49.9, adult (HCC)    Arthritis of right knee    Status post right knee replacement    Primary osteoarthritis of left knee    Secondary hyperparathyroidism (HCC)    Multiple thyroid nodules    Seizure disorder (HCC)    Stage 2 chronic kidney disease    HTN (hypertension), benign    Prediabetes    Contusion of left knee    ISAMAR on CPAP    Influenza A    Status migrainosus    Hypokalemia    TIA involving basilar artery    Dizziness    Neutrophil dysfunction       ROS:  Constitutional: denies fever, chills,

## 2024-02-20 PROBLEM — J10.1 INFLUENZA A: Status: RESOLVED | Noted: 2024-01-21 | Resolved: 2024-02-20

## 2024-03-12 ENCOUNTER — TELEPHONE (OUTPATIENT)
Dept: ORTHOPEDIC SURGERY | Age: 54
End: 2024-03-12

## 2024-03-12 RX ORDER — AMOXICILLIN 500 MG/1
CAPSULE ORAL
Qty: 4 CAPSULE | Refills: 1 | Status: SHIPPED | OUTPATIENT
Start: 2024-03-12

## 2024-03-12 NOTE — TELEPHONE ENCOUNTER
Prescription Refill     Medication Name:  AMOXICILLIN FOR DENTAL VISIT  Pharmacy: [facility] [place] [address]   Patient Contact Number:  [phone]     Patient called she need to have this medication for her dental visit on 03/13/24. Please Advise.

## 2024-03-13 ENCOUNTER — TELEPHONE (OUTPATIENT)
Dept: NEUROLOGY | Age: 54
End: 2024-03-13

## 2024-03-13 NOTE — TELEPHONE ENCOUNTER
LOV: 2/8/24  NOV: 2/20/25    Preferred pharmacy: [facility]    Name of caller: Akila    Reason for call: Akila called office today to report that she has been getting sporadic facial pain that radiates from her upper gum line to her head. When this pain happens she reports a radiating pain and headache. The pain level is a 10/10 and it totally shuts her down to where she will hold her head to keep pressure on it.     She reports that the radiating pain will eventually go away after about 15-20 minutes but the headache will stay. She reports that she takes tylenol to help with the headaches.  She reports taking 2 500 mg rapid release tablets/capsules every 4 hours until the headache goes away.    She thought it might have been a tooth infection so she went to the dentist today and she reports that the dentist found no infection.     Please review.

## 2024-03-15 NOTE — TELEPHONE ENCOUNTER
Spoke to pt. She states her tooth was split in half and had to get a root canal. She's better now.

## 2024-04-19 DIAGNOSIS — I10 ESSENTIAL HYPERTENSION: ICD-10-CM

## 2024-04-19 RX ORDER — ATENOLOL 50 MG/1
TABLET ORAL
Qty: 90 TABLET | Refills: 0 | Status: SHIPPED | OUTPATIENT
Start: 2024-04-19

## 2024-04-19 NOTE — TELEPHONE ENCOUNTER
Lov 1/25/24  Lrf 90 0 1/16/24 Medication:   Requested Prescriptions     Pending Prescriptions Disp Refills    atenolol (TENORMIN) 50 MG tablet [Pharmacy Med Name: ATENOLOL 50 MG TABLET] 90 tablet 0     Sig: TAKE 1 TABLET BY MOUTH EVERY DAY       Last Filled:      Patient Phone Number: [phone] (home) [phone] (work)    Last appt: 1/25/2024   Next appt: Visit date not found    Lab Results   Component Value Date     04/08/2024    K 4.3 04/08/2024     04/08/2024    CO2 23 04/08/2024    BUN 19 04/08/2024    CREATININE 1.5 (H) 04/08/2024    GLUCOSE 105 (H) 04/08/2024    CALCIUM 9.7 04/08/2024    PROT 7.5 01/21/2024    BILITOT 0.6 01/21/2024    ALKPHOS 66 01/21/2024    AST 15 01/21/2024    ALT 15 01/21/2024    LABGLOM 41 (A) 04/08/2024    GFRAA 52 (A) 10/29/2021    AGRATIO 1.6 01/21/2024    GLOB 2.7 11/11/2020

## 2024-04-20 DIAGNOSIS — I10 ESSENTIAL HYPERTENSION: ICD-10-CM

## 2024-04-22 RX ORDER — AMLODIPINE BESYLATE 10 MG/1
10 TABLET ORAL DAILY
Qty: 30 TABLET | Refills: 0 | Status: SHIPPED | OUTPATIENT
Start: 2024-04-22

## 2024-04-22 NOTE — TELEPHONE ENCOUNTER
Medication:   Requested Prescriptions     Pending Prescriptions Disp Refills    amLODIPine (NORVASC) 10 MG tablet [Pharmacy Med Name: AMLODIPINE BESYLATE 10 MG TAB] 90 tablet 1     Sig: TAKE 1 TABLET BY MOUTH EVERY DAY       Last Filled:  7/26/2023, 90, 1    Patient Phone Number: [phone] (home) [phone] (work)    Last appt: 1/25/2024   Next appt: Visit date not found    Lab Results   Component Value Date     04/08/2024    K 4.3 04/08/2024     04/08/2024    CO2 23 04/08/2024    BUN 19 04/08/2024    CREATININE 1.5 (H) 04/08/2024    GLUCOSE 105 (H) 04/08/2024    CALCIUM 9.7 04/08/2024    PROT 7.5 01/21/2024    BILITOT 0.6 01/21/2024    ALKPHOS 66 01/21/2024    AST 15 01/21/2024    ALT 15 01/21/2024    LABGLOM 41 (A) 04/08/2024    GFRAA 52 (A) 10/29/2021    AGRATIO 1.6 01/21/2024    GLOB 2.7 11/11/2020

## 2024-05-18 DIAGNOSIS — I10 ESSENTIAL HYPERTENSION: ICD-10-CM

## 2024-05-20 RX ORDER — AMLODIPINE BESYLATE 10 MG/1
10 TABLET ORAL DAILY
Qty: 30 TABLET | Refills: 0 | Status: SHIPPED | OUTPATIENT
Start: 2024-05-20 | End: 2024-05-24

## 2024-05-20 NOTE — TELEPHONE ENCOUNTER
Patient would not be getting a 90-day supply because she has not been seen in over 6 months.  She will be getting a 30-day supply.

## 2024-05-20 NOTE — TELEPHONE ENCOUNTER
Medication:   Requested Prescriptions     Pending Prescriptions Disp Refills    amLODIPine (NORVASC) 10 MG tablet [Pharmacy Med Name: AMLODIPINE BESYLATE 10 MG TAB] 90 tablet 1     Sig: TAKE 1 TABLET BY MOUTH EVERY DAY       Last Filled:    4/22/2024  Patient Phone Number: [phone] (home) [phone] (work)    Last appt: 1/25/2024   Next appt: Visit date not found    Lab Results   Component Value Date     04/08/2024    K 4.3 04/08/2024     04/08/2024    CO2 23 04/08/2024    BUN 19 04/08/2024    CREATININE 1.5 (H) 04/08/2024    GLUCOSE 105 (H) 04/08/2024    CALCIUM 9.7 04/08/2024    PROT 6.7 06/14/2012    BILITOT 0.6 01/21/2024    ALKPHOS 66 01/21/2024    AST 15 01/21/2024    ALT 15 01/21/2024    LABGLOM 41 (A) 04/08/2024    GFRAA 52 (A) 10/29/2021    AGRATIO 1.6 01/21/2024    GLOB 2.7 11/11/2020

## 2024-05-31 ENCOUNTER — HOSPITAL ENCOUNTER (OUTPATIENT)
Dept: ULTRASOUND IMAGING | Age: 54
Discharge: HOME | End: 2024-05-31
Attending: INTERNAL MEDICINE
Payer: COMMERCIAL

## 2024-05-31 DIAGNOSIS — N18.31 STAGE 3A CHRONIC KIDNEY DISEASE (HCC): ICD-10-CM

## 2024-05-31 PROCEDURE — 76775 US EXAM ABDO BACK WALL LIM: CPT | Performed by: INTERNAL MEDICINE

## 2024-05-31 PROCEDURE — 76770 US EXAM ABDO BACK WALL COMP: CPT

## 2024-06-16 DIAGNOSIS — I10 ESSENTIAL HYPERTENSION: ICD-10-CM

## 2024-06-17 RX ORDER — AMLODIPINE BESYLATE 10 MG/1
10 TABLET ORAL DAILY
Qty: 30 TABLET | Refills: 0 | OUTPATIENT
Start: 2024-06-17

## 2024-06-17 NOTE — TELEPHONE ENCOUNTER
Medication:   Requested Prescriptions     Pending Prescriptions Disp Refills    amLODIPine (NORVASC) 10 MG tablet [Pharmacy Med Name: AMLODIPINE BESYLATE 10 MG TAB] 30 tablet 0     Sig: TAKE 1 TABLET BY MOUTH EVERY DAY       Last Filled:  5/20/2024, 30, 0    Patient Phone Number: [phone] (home) [phone] (work)    Last appt: 1/25/2024   Next appt: Visit date not found    Lab Results   Component Value Date     04/08/2024    K 4.3 04/08/2024     04/08/2024    CO2 23 04/08/2024    BUN 19 04/08/2024    CREATININE 1.5 (H) 04/08/2024    GLUCOSE 105 (H) 04/08/2024    CALCIUM 9.7 04/08/2024    PROT 6.7 06/14/2012    BILITOT 0.6 01/21/2024    ALKPHOS 66 01/21/2024    AST 15 01/21/2024    ALT 15 01/21/2024    LABGLOM 41 (A) 04/08/2024    GFRAA 52 (A) 10/29/2021    AGRATIO 1.6 01/21/2024    GLOB 2.7 11/11/2020

## 2024-06-19 ENCOUNTER — TELEPHONE (OUTPATIENT)
Dept: FAMILY MEDICINE CLINIC | Age: 54
End: 2024-06-19

## 2024-06-19 NOTE — TELEPHONE ENCOUNTER
Pt stated that she is no longer taking AMLODIPINE and would like for our office to stop calling her to make a appt for this medication     Pt stated that the calls to make an appt is frustrating     Pt is requesting a call back from Andrey Hendricks

## 2024-06-24 ENCOUNTER — OFFICE VISIT (OUTPATIENT)
Dept: ORTHOPEDIC SURGERY | Age: 54
End: 2024-06-24
Payer: COMMERCIAL

## 2024-06-24 VITALS — WEIGHT: 239 LBS | HEIGHT: 60 IN | RESPIRATION RATE: 16 BRPM | BODY MASS INDEX: 46.92 KG/M2

## 2024-06-24 DIAGNOSIS — M17.12 PRIMARY OSTEOARTHRITIS OF LEFT KNEE: Primary | ICD-10-CM

## 2024-06-24 PROCEDURE — 99213 OFFICE O/P EST LOW 20 MIN: CPT | Performed by: ORTHOPAEDIC SURGERY

## 2024-06-24 PROCEDURE — 20610 DRAIN/INJ JOINT/BURSA W/O US: CPT | Performed by: ORTHOPAEDIC SURGERY

## 2024-06-24 RX ORDER — TRIAMCINOLONE ACETONIDE 40 MG/ML
40 INJECTION, SUSPENSION INTRA-ARTICULAR; INTRAMUSCULAR ONCE
Status: COMPLETED | OUTPATIENT
Start: 2024-06-24 | End: 2024-06-24

## 2024-06-24 RX ORDER — BUPIVACAINE HYDROCHLORIDE 2.5 MG/ML
2 INJECTION, SOLUTION INFILTRATION; PERINEURAL ONCE
Status: COMPLETED | OUTPATIENT
Start: 2024-06-24 | End: 2024-06-24

## 2024-06-24 RX ADMIN — TRIAMCINOLONE ACETONIDE 40 MG: 40 INJECTION, SUSPENSION INTRA-ARTICULAR; INTRAMUSCULAR at 15:43

## 2024-06-24 RX ADMIN — BUPIVACAINE HYDROCHLORIDE 5 MG: 2.5 INJECTION, SOLUTION INFILTRATION; PERINEURAL at 15:43

## 2024-06-24 NOTE — PROGRESS NOTES
weakness    Exam:  Appearance: sitting in exam room chair, appears to be in no acute distress, awake and alert  Resp: unlabored breathing on room air  Skin: warm, dry and intact with out erythema or significant increased temperature  Neuro: grossly intact both lower extremities. Intact sensation to light touch. Motor exam 4+ to 5/5 in all major motor groups.  Left knee: Examination reveals that knee range of motion is 0 to 120 degrees.  There is varus deformity, positive crepitus, positive joint line tenderness, positive antalgic gait.  Neurologically, plantar flexion and dorsiflexion is intact. 5/5 strength.     Imaging:  Prior left knee radiographs reviewed and are significant for bone-on-bone arthritis of medial compartment with tricompartmental degenerative changes.    Assessment:  Left knee osteoarthritis    Plan:  We discussed the diagnosis and treatment options.  We discussed treatment options for osteoarthritis to include activity modification, physical therapy exercises, nonsteroidal anti-inflammatory medication (NSAIDs), intra-articular steroid injection, intra-articular viscosupplementation injection, and possibly total knee arthroplasty if conservative measures are not helpful.  We discussed left total knee arthroplasty.  She would like to hold off on surgery until towards the end of the school year next year.  A left knee steroid injection was performed today as described below.  She will follow-up here for repeat assessment and possible repeat injection in 3 months.    PROCEDURE NOTE:  After verbal consent was obtained, the patient's left knee was prepped with alcohol. Skin was anesthetized with ethyl chloride.  The knee was then injected under sterile technique with 2mL of 0.25% marcaine and 1 mL of 40 mg/mL Kenalog. A bandage was applied. The patient tolerated the procedure well and there were no complications.     Total time spent on today's encounter was at least 22 minutes. This time included

## 2024-06-28 NOTE — TELEPHONE ENCOUNTER
Patient is requesting \"no more calls\" to her regarding refills for AMLODIPINE or making an appointment with the office.  Patient claims she's getting calls from this office; last call per patient was on 6/21/2024. Patient also expressed she did request a call from Doctor Hendricks but never received a call back

## 2024-06-28 NOTE — TELEPHONE ENCOUNTER
Called and spoke with patient on the phone.  Clarified that we no longer have amlodipine on her medication list, so we are not sending any request.  I explained to the patient that she should be seen in the office every 6 months for blood pressure check, so she will make an appointment for next month.  Patient expressed understanding.

## 2024-07-21 DIAGNOSIS — I10 ESSENTIAL HYPERTENSION: ICD-10-CM

## 2024-07-24 RX ORDER — ATENOLOL 50 MG/1
TABLET ORAL
Qty: 90 TABLET | Refills: 0 | OUTPATIENT
Start: 2024-07-24

## 2024-07-29 ENCOUNTER — OFFICE VISIT (OUTPATIENT)
Dept: FAMILY MEDICINE CLINIC | Age: 54
End: 2024-07-29
Payer: COMMERCIAL

## 2024-07-29 VITALS
HEART RATE: 70 BPM | BODY MASS INDEX: 44.93 KG/M2 | SYSTOLIC BLOOD PRESSURE: 160 MMHG | WEIGHT: 238 LBS | DIASTOLIC BLOOD PRESSURE: 110 MMHG | HEIGHT: 61 IN | OXYGEN SATURATION: 96 %

## 2024-07-29 DIAGNOSIS — G40.409 MYOCLONIC SEIZURE DISORDER (HCC): ICD-10-CM

## 2024-07-29 DIAGNOSIS — Z00.00 ANNUAL PHYSICAL EXAM: Primary | ICD-10-CM

## 2024-07-29 DIAGNOSIS — E66.01 MORBID OBESITY WITH BMI OF 45.0-49.9, ADULT (HCC): ICD-10-CM

## 2024-07-29 DIAGNOSIS — R73.03 PRE-DIABETES: ICD-10-CM

## 2024-07-29 DIAGNOSIS — I10 ESSENTIAL HYPERTENSION: ICD-10-CM

## 2024-07-29 DIAGNOSIS — N18.32 STAGE 3B CHRONIC KIDNEY DISEASE (HCC): ICD-10-CM

## 2024-07-29 PROCEDURE — 3080F DIAST BP >= 90 MM HG: CPT | Performed by: FAMILY MEDICINE

## 2024-07-29 PROCEDURE — 3077F SYST BP >= 140 MM HG: CPT | Performed by: FAMILY MEDICINE

## 2024-07-29 PROCEDURE — 99214 OFFICE O/P EST MOD 30 MIN: CPT | Performed by: FAMILY MEDICINE

## 2024-07-29 PROCEDURE — 99396 PREV VISIT EST AGE 40-64: CPT | Performed by: FAMILY MEDICINE

## 2024-07-29 RX ORDER — AMLODIPINE BESYLATE 10 MG/1
10 TABLET ORAL DAILY
Qty: 90 TABLET | Refills: 1 | Status: SHIPPED | OUTPATIENT
Start: 2024-07-29

## 2024-07-29 NOTE — PROGRESS NOTES
Allergies   Allergen Reactions    Aspirin     Ibuprofen       Patient has renal insuffiencey.          Nsaids       Patient has renal insuffiencey.             Review of Systems  No CP, no SOB, no rash, no bruise, no vision change, no hearing problems, no LAD      Vitals:  BP (!) 160/110   Pulse 70   Ht 1.549 m (5' 1\")   Wt 108 kg (238 lb)   SpO2 96%   BMI 44.97 kg/m²     Wt Readings from Last 3 Encounters:   07/29/24 108 kg (238 lb)   06/24/24 108.4 kg (239 lb)   02/12/24 118.8 kg (262 lb)        Physical Exam  General:  Well-appearing, NAD, alert, non-toxic  HEENT:  Normocephalic, atraumatic. Pupils equal and round. TMs pearly with good landmarks. Moist mucous membranes. Normal dentition  NECK:  Supple, normal range of motion, no LAD, no meningeal signs, no JVD, nontender  CHEST/LUNGS: CTAB, no crackles, no wheeze, no rhonchi. Symmetric rise  CARDIOVASCULAR: RRR,  no murmur, no rub  ABDOMEN: Soft, non-tender, non-distended. No masses  EXTREMETIES: Normal movement of all extremities. No edema. No joint swelling.  SKIN:  No rash, no cellulitis, no bruising, no petechiae/purpura/vesicles/pustules/abscess  PSYCH:  A+O x 3; normal affect  NEURO:  GCS 15, CN2-12 grossly intact, no focal motor/sensory deficits, no cerebellar deficits, normal gait, normal speech      Assessment/Plan     84-year-old female here for annual exam.  Patient is due for Pap smear, recommend following up with gynecology ASAP.  Colonoscopy is up-to-date.  Mammogram is up-to-date.  Check routine labs.  Patient refuses all vaccines.    Patient has history of hypertension.  Blood pressure is above goal.  Recommend continuing atenolol and amlodipine and following up with nephrology.  Patient has an appointment on August 13 for her chronic kidney disease and hypertension.  Latest creatinine is 1.5.  Patient should avoid nephrotoxic medicines.    Patient has history of prediabetes.  She is also obese.  She has lost over 30 pounds in the last

## 2024-08-02 ENCOUNTER — HOSPITAL ENCOUNTER (OUTPATIENT)
Age: 54
Discharge: HOME | End: 2024-08-02
Payer: COMMERCIAL

## 2024-08-02 ENCOUNTER — TELEPHONE (OUTPATIENT)
Dept: FAMILY MEDICINE CLINIC | Age: 54
End: 2024-08-02

## 2024-08-02 DIAGNOSIS — Z00.00 ANNUAL PHYSICAL EXAM: ICD-10-CM

## 2024-08-02 DIAGNOSIS — R73.03 PRE-DIABETES: ICD-10-CM

## 2024-08-02 LAB
25(OH)D3 SERPL-MCNC: 39.4 NG/ML
ALBUMIN SERPL-MCNC: 4.2 G/DL (ref 3.4–5)
ALBUMIN/GLOB SERPL: 1.4 {RATIO} (ref 1.1–2.2)
ALP SERPL-CCNC: 68 U/L (ref 40–129)
ALT SERPL-CCNC: 16 U/L (ref 10–40)
ANION GAP SERPL CALCULATED.3IONS-SCNC: 12 MMOL/L (ref 3–16)
AST SERPL-CCNC: 15 U/L (ref 15–37)
BACTERIA URNS QL MICRO: ABNORMAL /HPF
BILIRUB SERPL-MCNC: 0.6 MG/DL (ref 0–1)
BILIRUB UR QL STRIP.AUTO: NEGATIVE
BUN SERPL-MCNC: 18 MG/DL (ref 7–20)
CALCIUM SERPL-MCNC: 9.6 MG/DL (ref 8.3–10.6)
CHLORIDE SERPL-SCNC: 103 MMOL/L (ref 99–110)
CHOLEST SERPL-MCNC: 201 MG/DL (ref 0–199)
CLARITY UR: ABNORMAL
CO2 SERPL-SCNC: 26 MMOL/L (ref 21–32)
COLOR UR: ABNORMAL
CREAT SERPL-MCNC: 1.1 MG/DL (ref 0.6–1.1)
CREAT UR-MCNC: 402.3 MG/DL (ref 28–259)
EPI CELLS #/AREA URNS HPF: ABNORMAL /HPF (ref 0–5)
FERRITIN SERPL IA-MCNC: 345 NG/ML (ref 15–150)
GFR SERPLBLD CREATININE-BSD FMLA CKD-EPI: 60 ML/MIN/{1.73_M2}
GLUCOSE SERPL-MCNC: 87 MG/DL (ref 70–99)
GLUCOSE UR STRIP.AUTO-MCNC: NEGATIVE MG/DL
HCV AB SERPL QL IA: NORMAL
HDLC SERPL-MCNC: 58 MG/DL (ref 40–60)
HGB UR QL STRIP.AUTO: NEGATIVE
IRON SATN MFR SERPL: 56 % (ref 15–50)
IRON SERPL-MCNC: 125 UG/DL (ref 37–145)
KETONES UR STRIP.AUTO-MCNC: NEGATIVE MG/DL
LDL CHOLESTEROL: 126 MG/DL
LEUKOCYTE ESTERASE UR QL STRIP.AUTO: ABNORMAL
NITRITE UR QL STRIP.AUTO: NEGATIVE
PH UR STRIP.AUTO: 6 [PH] (ref 5–8)
PHOSPHATE SERPL-MCNC: 3.4 MG/DL (ref 2.5–4.9)
POTASSIUM SERPL-SCNC: 4 MMOL/L (ref 3.5–5.1)
PROT SERPL-MCNC: 7.3 G/DL (ref 6.4–8.2)
PROT UR STRIP.AUTO-MCNC: 30 MG/DL
PROT UR-MCNC: 29 MG/DL
PROT/CREAT UR-RTO: 0.1 MG/DL
PTH-INTACT SERPL-MCNC: 118.4 PG/ML (ref 14–72)
SODIUM SERPL-SCNC: 141 MMOL/L (ref 136–145)
SP GR UR STRIP.AUTO: 1.02 (ref 1–1.03)
TIBC SERPL-MCNC: 222 UG/DL (ref 260–445)
TRIGL SERPL-MCNC: 86 MG/DL (ref 0–150)
UA DIPSTICK W REFLEX MICRO PNL UR: YES
URATE SERPL-MCNC: 7.3 MG/DL (ref 2.6–6)
URN SPEC COLLECT METH UR: ABNORMAL
UROBILINOGEN UR STRIP-ACNC: 1 E.U./DL
VLDLC SERPL CALC-MCNC: 17 MG/DL
WBC #/AREA URNS HPF: ABNORMAL /HPF (ref 0–5)

## 2024-08-02 PROCEDURE — 82728 ASSAY OF FERRITIN: CPT

## 2024-08-02 PROCEDURE — 36415 COLL VENOUS BLD VENIPUNCTURE: CPT

## 2024-08-02 PROCEDURE — 84550 ASSAY OF BLOOD/URIC ACID: CPT

## 2024-08-02 PROCEDURE — 83540 ASSAY OF IRON: CPT

## 2024-08-02 PROCEDURE — 82306 VITAMIN D 25 HYDROXY: CPT

## 2024-08-02 PROCEDURE — 83036 HEMOGLOBIN GLYCOSYLATED A1C: CPT

## 2024-08-02 PROCEDURE — 83550 IRON BINDING TEST: CPT

## 2024-08-02 PROCEDURE — 83970 ASSAY OF PARATHORMONE: CPT

## 2024-08-02 PROCEDURE — 82570 ASSAY OF URINE CREATININE: CPT

## 2024-08-02 PROCEDURE — 80053 COMPREHEN METABOLIC PANEL: CPT

## 2024-08-02 PROCEDURE — 84156 ASSAY OF PROTEIN URINE: CPT

## 2024-08-02 PROCEDURE — 81001 URINALYSIS AUTO W/SCOPE: CPT

## 2024-08-02 PROCEDURE — 85025 COMPLETE CBC W/AUTO DIFF WBC: CPT

## 2024-08-02 PROCEDURE — 84100 ASSAY OF PHOSPHORUS: CPT

## 2024-08-02 PROCEDURE — 80061 LIPID PANEL: CPT

## 2024-08-02 PROCEDURE — 86803 HEPATITIS C AB TEST: CPT

## 2024-08-03 LAB
BASOPHILS # BLD: 0 K/UL (ref 0–0.2)
BASOPHILS NFR BLD: 0 %
DEPRECATED RDW RBC AUTO: 14.2 % (ref 12.4–15.4)
EOSINOPHIL # BLD: 0.2 K/UL (ref 0–0.6)
EOSINOPHIL NFR BLD: 6 %
EST. AVERAGE GLUCOSE BLD GHB EST-MCNC: 105.4 MG/DL
HBA1C MFR BLD: 5.3 %
HCT VFR BLD AUTO: 42.7 % (ref 36–48)
HGB BLD-MCNC: 14.8 G/DL (ref 12–16)
LYMPHOCYTES # BLD: 1.7 K/UL (ref 1–5.1)
LYMPHOCYTES NFR BLD: 55 %
MCH RBC QN AUTO: 26.7 PG (ref 26–34)
MCHC RBC AUTO-ENTMCNC: 34.6 G/DL (ref 31–36)
MCV RBC AUTO: 77.1 FL (ref 80–100)
MONOCYTES # BLD: 0.1 K/UL (ref 0–1.3)
MONOCYTES NFR BLD: 5 %
NEUTROPHILS # BLD: 0.7 K/UL (ref 1.7–7.7)
NEUTROPHILS NFR BLD: 25 %
PATH INTERP BLD-IMP: YES
PLATELET # BLD AUTO: 196 K/UL (ref 135–450)
PLATELET BLD QL SMEAR: ADEQUATE
PMV BLD AUTO: 8.4 FL (ref 5–10.5)
RBC # BLD AUTO: 5.54 M/UL (ref 4–5.2)
RBC MORPH BLD: NORMAL
SLIDE REVIEW: ABNORMAL
VARIANT LYMPHS NFR BLD MANUAL: 9 % (ref 0–6)
WBC # BLD AUTO: 2.7 K/UL (ref 4–11)

## 2024-08-05 LAB — PATH INTERP BLD-IMP: NORMAL

## 2024-08-09 ENCOUNTER — TELEPHONE (OUTPATIENT)
Dept: FAMILY MEDICINE CLINIC | Age: 54
End: 2024-08-09

## 2024-08-09 DIAGNOSIS — D70.9 NEUTROPENIA, UNSPECIFIED TYPE (HCC): Primary | ICD-10-CM

## 2024-08-09 DIAGNOSIS — E78.5 HYPERLIPIDEMIA, UNSPECIFIED HYPERLIPIDEMIA TYPE: Primary | ICD-10-CM

## 2024-08-09 DIAGNOSIS — R71.8 MICROCYTOSIS: ICD-10-CM

## 2024-08-09 RX ORDER — ATORVASTATIN CALCIUM 10 MG/1
10 TABLET, FILM COATED ORAL DAILY
Qty: 90 TABLET | Refills: 3 | Status: SHIPPED | OUTPATIENT
Start: 2024-08-09

## 2024-08-09 NOTE — TELEPHONE ENCOUNTER
Patient called and said she is waiting on a referral for a Hematologist in the [place] area. And she said she was waiting to talk with Dr Hendricks about her medication for her cholesterol     She said she would like to have a call back

## 2024-08-09 NOTE — TELEPHONE ENCOUNTER
Pt called back and said she wants to speak with pavel as to why she needs to see a hematologist and what cause concerned from her blood work as to why she needs to see them.

## 2024-08-09 NOTE — TELEPHONE ENCOUNTER
Called and spoke with patient on the phone.  I explained that the reason she should see a hematologist because her red blood cell counts are small in volume, and her white blood cell counts are low, lowest she has ever had.  She states that she has an appoint with a hematologist for next week.  She also explained that when she was in the hospital she was on stroke precautions and they started her on 80 mg of atorvastatin, and it caused side effects, mainly diarrhea.  I explained that we be starting her on a 10 mg dose, which is much less likely to cause side effects.  Patient states she will  the prescription started.  We will recheck her lipids again in 6 months.

## 2024-08-15 DIAGNOSIS — G40.409 MYOCLONIC SEIZURE DISORDER (HCC): ICD-10-CM

## 2024-08-16 RX ORDER — LEVETIRACETAM 500 MG/1
500 TABLET ORAL DAILY
Qty: 90 TABLET | Refills: 1 | Status: SHIPPED | OUTPATIENT
Start: 2024-08-16

## 2024-08-26 DIAGNOSIS — I10 ESSENTIAL HYPERTENSION: ICD-10-CM

## 2024-08-27 NOTE — TELEPHONE ENCOUNTER
Medication:   Requested Prescriptions     Pending Prescriptions Disp Refills    atenolol (TENORMIN) 50 MG tablet 90 tablet 0     Sig: TAKE 1 TABLET BY MOUTH EVERY DAY    amLODIPine (NORVASC) 10 MG tablet 90 tablet 1     Sig: Take 1 tablet by mouth daily       Last Filled:  4/19/2024, 90, 0  7/29/2024, 90, 1    Patient Phone Number: [phone] (home) [phone] (work)    Last appt: 7/29/2024   Next appt: Visit date not found    Lab Results   Component Value Date     08/02/2024    K 4.0 08/02/2024     08/02/2024    CO2 26 08/02/2024    BUN 18 08/02/2024    CREATININE 1.1 08/02/2024    GLUCOSE 87 08/02/2024    CALCIUM 9.6 08/02/2024    BILITOT 0.6 08/02/2024    ALKPHOS 68 08/02/2024    AST 15 08/02/2024    ALT 16 08/02/2024    LABGLOM 60 (A) 08/02/2024    GFRAA 52 (A) 10/29/2021    AGRATIO 1.4 08/02/2024    GLOB 2.7 11/11/2020

## 2024-08-28 RX ORDER — AMLODIPINE BESYLATE 10 MG/1
10 TABLET ORAL DAILY
Qty: 90 TABLET | Refills: 1 | Status: SHIPPED | OUTPATIENT
Start: 2024-08-28

## 2024-08-28 RX ORDER — ATENOLOL 50 MG/1
TABLET ORAL
Qty: 90 TABLET | Refills: 1 | Status: SHIPPED | OUTPATIENT
Start: 2024-08-28

## 2024-08-30 ENCOUNTER — OFFICE VISIT (OUTPATIENT)
Dept: FAMILY MEDICINE CLINIC | Age: 54
End: 2024-08-30
Payer: COMMERCIAL

## 2024-08-30 VITALS
BODY MASS INDEX: 43.88 KG/M2 | HEIGHT: 61 IN | OXYGEN SATURATION: 96 % | DIASTOLIC BLOOD PRESSURE: 88 MMHG | HEART RATE: 86 BPM | WEIGHT: 232.4 LBS | SYSTOLIC BLOOD PRESSURE: 123 MMHG | TEMPERATURE: 98.5 F

## 2024-08-30 DIAGNOSIS — U07.1 COVID-19: ICD-10-CM

## 2024-08-30 DIAGNOSIS — R05.1 ACUTE COUGH: ICD-10-CM

## 2024-08-30 DIAGNOSIS — R50.9 FEVER, UNSPECIFIED FEVER CAUSE: Primary | ICD-10-CM

## 2024-08-30 LAB
Lab: ABNORMAL
QC PASS/FAIL: ABNORMAL
SARS-COV-2 RDRP RESP QL NAA+PROBE: POSITIVE

## 2024-08-30 PROCEDURE — 87635 SARS-COV-2 COVID-19 AMP PRB: CPT | Performed by: FAMILY MEDICINE

## 2024-08-30 PROCEDURE — 3074F SYST BP LT 130 MM HG: CPT | Performed by: FAMILY MEDICINE

## 2024-08-30 PROCEDURE — 99213 OFFICE O/P EST LOW 20 MIN: CPT | Performed by: FAMILY MEDICINE

## 2024-08-30 PROCEDURE — 3079F DIAST BP 80-89 MM HG: CPT | Performed by: FAMILY MEDICINE

## 2024-08-30 RX ORDER — BENZONATATE 100 MG/1
100 CAPSULE ORAL 3 TIMES DAILY PRN
Qty: 15 CAPSULE | Refills: 0 | Status: SHIPPED | OUTPATIENT
Start: 2024-08-30 | End: 2024-09-04

## 2024-08-30 RX ORDER — PREDNISONE 20 MG/1
40 TABLET ORAL DAILY
Qty: 10 TABLET | Refills: 0 | Status: SHIPPED | OUTPATIENT
Start: 2024-08-30 | End: 2024-09-04

## 2024-08-30 SDOH — ECONOMIC STABILITY: FOOD INSECURITY: WITHIN THE PAST 12 MONTHS, THE FOOD YOU BOUGHT JUST DIDN'T LAST AND YOU DIDN'T HAVE MONEY TO GET MORE.: NEVER TRUE

## 2024-08-30 SDOH — ECONOMIC STABILITY: INCOME INSECURITY: HOW HARD IS IT FOR YOU TO PAY FOR THE VERY BASICS LIKE FOOD, HOUSING, MEDICAL CARE, AND HEATING?: NOT HARD AT ALL

## 2024-08-30 SDOH — ECONOMIC STABILITY: FOOD INSECURITY: WITHIN THE PAST 12 MONTHS, YOU WORRIED THAT YOUR FOOD WOULD RUN OUT BEFORE YOU GOT MONEY TO BUY MORE.: NEVER TRUE

## 2024-08-30 ASSESSMENT — PATIENT HEALTH QUESTIONNAIRE - PHQ9
SUM OF ALL RESPONSES TO PHQ QUESTIONS 1-9: 0
1. LITTLE INTEREST OR PLEASURE IN DOING THINGS: NOT AT ALL
SUM OF ALL RESPONSES TO PHQ9 QUESTIONS 1 & 2: 0
2. FEELING DOWN, DEPRESSED OR HOPELESS: NOT AT ALL
SUM OF ALL RESPONSES TO PHQ QUESTIONS 1-9: 0

## 2024-09-03 ENCOUNTER — TELEPHONE (OUTPATIENT)
Dept: FAMILY MEDICINE CLINIC | Age: 54
End: 2024-09-03

## 2024-10-07 ENCOUNTER — OFFICE VISIT (OUTPATIENT)
Dept: ORTHOPEDIC SURGERY | Age: 54
End: 2024-10-07
Payer: COMMERCIAL

## 2024-10-07 VITALS — BODY MASS INDEX: 46.92 KG/M2 | HEIGHT: 60 IN | WEIGHT: 239 LBS

## 2024-10-07 DIAGNOSIS — M17.12 PRIMARY OSTEOARTHRITIS OF LEFT KNEE: Primary | ICD-10-CM

## 2024-10-07 PROCEDURE — 99213 OFFICE O/P EST LOW 20 MIN: CPT | Performed by: ORTHOPAEDIC SURGERY

## 2024-10-07 PROCEDURE — 20610 DRAIN/INJ JOINT/BURSA W/O US: CPT | Performed by: ORTHOPAEDIC SURGERY

## 2024-10-07 RX ORDER — TRIAMCINOLONE ACETONIDE 40 MG/ML
40 INJECTION, SUSPENSION INTRA-ARTICULAR; INTRAMUSCULAR ONCE
Status: COMPLETED | OUTPATIENT
Start: 2024-10-07 | End: 2024-10-07

## 2024-10-07 RX ORDER — BUPIVACAINE HYDROCHLORIDE 2.5 MG/ML
2 INJECTION, SOLUTION INFILTRATION; PERINEURAL ONCE
Status: COMPLETED | OUTPATIENT
Start: 2024-10-07 | End: 2024-10-07

## 2024-10-07 RX ADMIN — BUPIVACAINE HYDROCHLORIDE 5 MG: 2.5 INJECTION, SOLUTION INFILTRATION; PERINEURAL at 15:37

## 2024-10-07 RX ADMIN — TRIAMCINOLONE ACETONIDE 40 MG: 40 INJECTION, SUSPENSION INTRA-ARTICULAR; INTRAMUSCULAR at 15:37

## 2024-10-07 NOTE — PROGRESS NOTES
encounter was at least 22 minutes. This time included reviewing prior notes, radiographs, and lab results when available, reviewing history obtained by medical assistant, performing history and physical exam, reviewing tests/radiographs with the patient, counseling the patient, ordering medications or tests, documentation in the electronic health record, and coordination of care.    This dictation was done with [employer] dictation and may contain mechanical errors related to translation.

## 2024-11-25 DIAGNOSIS — G40.409 MYOCLONIC SEIZURE DISORDER (HCC): ICD-10-CM

## 2024-11-25 DIAGNOSIS — I10 ESSENTIAL HYPERTENSION: ICD-10-CM

## 2024-11-25 RX ORDER — ATENOLOL 50 MG/1
TABLET ORAL
Qty: 90 TABLET | Refills: 1 | Status: SHIPPED | OUTPATIENT
Start: 2024-11-25

## 2024-11-25 NOTE — TELEPHONE ENCOUNTER
Medication:   Requested Prescriptions     Pending Prescriptions Disp Refills    atenolol (TENORMIN) 50 MG tablet [Pharmacy Med Name: ATENOLOL 50 MG TABLET] 90 tablet 1     Sig: TAKE 1 TABLET BY MOUTH EVERY DAY      Last Filled:  8/28/24    Patient Phone Number: [phone] (home) [phone] (work)    Last appt: 8/30/2024   Next appt: Visit date not found    Last OARRS:        No data to display              PDMP Monitoring:    Last PDMP [place] as Reviewed ([place]):  Review User Review Instant Review Result   CAMPOSJASONGREGSUE 8/8/2023  3:55 PM Reviewed PDMP [1]     Preferred Pharmacy:   [facility]/pharmacy #[id number] [place], [place] - [address] [address] [phone] -  [phone]  [address] [facility] [address]  Phone: [phone] Fax: [phone]    Recent Visits  Date Type Provider Dept   08/30/24 [facility] [place]    07/29/24 Office Visit [facility] [place]    01/25/24 [facility] Abbott [facility] [place] [facility]   08/03/23 Office Visit Mars Hendricks MD [facility]   Showing recent visits within past 540 days with a meds authorizing provider and meeting all other requirements  Future Appointments  No visits were found meeting these conditions.  Showing future appointments within next 150 days with a meds authorizing provider and meeting all other requirements     8/30/2024

## 2024-11-27 RX ORDER — LEVETIRACETAM 500 MG/1
500 TABLET ORAL DAILY
Qty: 90 TABLET | Refills: 1 | OUTPATIENT
Start: 2024-11-27

## 2024-11-27 NOTE — TELEPHONE ENCOUNTER
Escribed 8/16/24 for 6 months to    [facility]/pharmacy #[id number] - [place], [place] - [address] [address] [address] [phone] -  [phone]

## 2024-12-18 ENCOUNTER — HOSPITAL ENCOUNTER (OUTPATIENT)
Age: 54
Discharge: HOME | End: 2024-12-18
Payer: COMMERCIAL

## 2024-12-18 LAB
ALBUMIN SERPL-MCNC: 4.5 G/DL (ref 3.4–5)
ANION GAP SERPL CALCULATED.3IONS-SCNC: 10 MMOL/L (ref 3–16)
BUN SERPL-MCNC: 21 MG/DL (ref 7–20)
CALCIUM SERPL-MCNC: 9.7 MG/DL (ref 8.3–10.6)
CHLORIDE SERPL-SCNC: 103 MMOL/L (ref 99–110)
CO2 SERPL-SCNC: 28 MMOL/L (ref 21–32)
CREAT SERPL-MCNC: 1.4 MG/DL (ref 0.6–1.1)
GFR SERPLBLD CREATININE-BSD FMLA CKD-EPI: 45 ML/MIN/{1.73_M2}
GLUCOSE SERPL-MCNC: 71 MG/DL (ref 70–99)
PHOSPHATE SERPL-MCNC: 4.5 MG/DL (ref 2.5–4.9)
POTASSIUM SERPL-SCNC: 4.2 MMOL/L (ref 3.5–5.1)
SODIUM SERPL-SCNC: 141 MMOL/L (ref 136–145)

## 2024-12-18 PROCEDURE — 36415 COLL VENOUS BLD VENIPUNCTURE: CPT

## 2024-12-18 PROCEDURE — 80069 RENAL FUNCTION PANEL: CPT

## 2024-12-23 ENCOUNTER — HOSPITAL ENCOUNTER (OUTPATIENT)
Dept: WOMENS IMAGING | Age: 54
Discharge: HOME | End: 2024-12-23
Payer: COMMERCIAL

## 2024-12-23 VITALS — WEIGHT: 224 LBS | BODY MASS INDEX: 42.29 KG/M2 | HEIGHT: 61 IN

## 2024-12-23 DIAGNOSIS — Z12.31 VISIT FOR SCREENING MAMMOGRAM: ICD-10-CM

## 2024-12-23 PROCEDURE — 77063 BREAST TOMOSYNTHESIS BI: CPT

## 2025-01-10 ENCOUNTER — OFFICE VISIT (OUTPATIENT)
Dept: ORTHOPEDIC SURGERY | Age: 55
End: 2025-01-10

## 2025-01-10 VITALS — BODY MASS INDEX: 42.29 KG/M2 | WEIGHT: 224 LBS | HEIGHT: 61 IN

## 2025-01-10 DIAGNOSIS — M17.12 PRIMARY OSTEOARTHRITIS OF LEFT KNEE: Primary | ICD-10-CM

## 2025-01-10 RX ORDER — BUPIVACAINE HYDROCHLORIDE 2.5 MG/ML
2 INJECTION, SOLUTION INFILTRATION; PERINEURAL ONCE
Status: COMPLETED | OUTPATIENT
Start: 2025-01-10 | End: 2025-01-10

## 2025-01-10 RX ORDER — TRIAMCINOLONE ACETONIDE 40 MG/ML
40 INJECTION, SUSPENSION INTRA-ARTICULAR; INTRAMUSCULAR ONCE
Status: COMPLETED | OUTPATIENT
Start: 2025-01-10 | End: 2025-01-10

## 2025-01-10 RX ADMIN — TRIAMCINOLONE ACETONIDE 40 MG: 40 INJECTION, SUSPENSION INTRA-ARTICULAR; INTRAMUSCULAR at 08:39

## 2025-01-10 RX ADMIN — BUPIVACAINE HYDROCHLORIDE 5 MG: 2.5 INJECTION, SOLUTION INFILTRATION; PERINEURAL at 08:39

## 2025-01-10 NOTE — PROGRESS NOTES
involving basilar artery    Dizziness    Neutrophil dysfunction    Stage 3b chronic kidney disease (HCC)    Myoclonic seizure disorder (HCC)       ROS:  Constitutional: denies fever, chills, weight loss  MSK: denies pain in other joints, muscle aches  Neurological: denies numbness, tingling, weakness    Exam:  Appearance: sitting in exam room chair, appears to be in no acute distress, awake and alert  Resp: unlabored breathing on room air  Skin: warm, dry and intact with out erythema or significant increased temperature  Neuro: grossly intact both lower extremities. Intact sensation to light touch. Motor exam 4+ to 5/5 in all major motor groups.  Left knee: Examination reveals that knee range of motion is 0 to 120 degrees.  There is varus deformity, positive crepitus, positive joint line tenderness, positive antalgic gait.  Neurologically, plantar flexion and dorsiflexion is intact. 5/5 strength.     Imagin views of the left knee were performed and interpreted today.  These are significant for bone-on-bone arthritis of medial compartment with tricompartmental degenerative changes.    Assessment:  Left knee osteoarthritis    Plan:  We discussed the diagnosis and treatment options.  We discussed treatment options for osteoarthritis to include activity modification, physical therapy exercises, nonsteroidal anti-inflammatory medication (NSAIDs), intra-articular steroid injection, intra-articular viscosupplementation injection, and possibly total knee arthroplasty if conservative measures are not helpful.  We discussed left total knee arthroplasty.  She would like to pursue surgery later this year.  A left knee steroid injection was performed today as described below.  She will follow-up here for repeat assessment and possible repeat injection in 3 months.    PROCEDURE NOTE:  After verbal consent was obtained, the patient's left knee was prepped with alcohol. Skin was anesthetized with ethyl chloride.  The knee was

## 2025-01-15 DIAGNOSIS — I10 ESSENTIAL HYPERTENSION: ICD-10-CM

## 2025-01-15 RX ORDER — AMLODIPINE BESYLATE 10 MG/1
10 TABLET ORAL DAILY
Qty: 30 TABLET | Refills: 0 | Status: SHIPPED | OUTPATIENT
Start: 2025-01-15

## 2025-01-15 NOTE — TELEPHONE ENCOUNTER
Pt is asking how she would go about getting her medication all on the same schedule so she can get her medicine at the same time. She will call back to schedule an appointment

## 2025-01-15 NOTE — TELEPHONE ENCOUNTER
Medication:   Requested Prescriptions     Pending Prescriptions Disp Refills    amLODIPine (NORVASC) 10 MG tablet [Pharmacy Med Name: AMLODIPINE BESYLATE 10 MG TAB] 90 tablet 1     Sig: TAKE 1 TABLET BY MOUTH EVERY DAY       Last Filled:  08/28/2024    Patient Phone Number: [phone] (home) [phone] (work)    Last appt: 8/30/2024   Next appt: Visit date not found    Lab Results   Component Value Date     12/18/2024    K 4.2 12/18/2024     12/18/2024    CO2 28 12/18/2024    BUN 21 (H) 12/18/2024    CREATININE 1.4 (H) 12/18/2024    GLUCOSE 71 12/18/2024    CALCIUM 9.7 12/18/2024    BILITOT 0.6 08/02/2024    ALKPHOS 68 08/02/2024    AST 15 08/02/2024    ALT 16 08/02/2024    LABGLOM 45 (A) 12/18/2024    GFRAA 52 (A) 10/29/2021    AGRATIO 1.4 08/02/2024    GLOB 2.7 11/11/2020

## 2025-01-15 NOTE — TELEPHONE ENCOUNTER
Placed the patient that she will get a 1 month supply.  Needs an appointment the office for further refills.

## 2025-02-19 DIAGNOSIS — I10 ESSENTIAL HYPERTENSION: ICD-10-CM

## 2025-02-19 RX ORDER — AMLODIPINE BESYLATE 10 MG/1
10 TABLET ORAL DAILY
Qty: 90 TABLET | Refills: 0 | Status: SHIPPED | OUTPATIENT
Start: 2025-02-19

## 2025-02-19 RX ORDER — AMLODIPINE BESYLATE 10 MG/1
10 TABLET ORAL DAILY
Qty: 30 TABLET | Refills: 0 | Status: SHIPPED | OUTPATIENT
Start: 2025-02-19 | End: 2025-02-19 | Stop reason: SDUPTHER

## 2025-02-19 NOTE — TELEPHONE ENCOUNTER
Medication:   Requested Prescriptions     Pending Prescriptions Disp Refills    amLODIPine (NORVASC) 10 MG tablet [Pharmacy Med Name: AMLODIPINE BESYLATE 10 MG TAB] 90 tablet 1     Sig: TAKE 1 TABLET BY MOUTH EVERY DAY       Last Filled:  01/15/2025    Patient Phone Number: [phone] (home) [phone] (work)    Last appt: 8/30/2024   Next appt: Visit date not found    Lab Results   Component Value Date     12/18/2024    K 4.2 12/18/2024     12/18/2024    CO2 28 12/18/2024    BUN 21 (H) 12/18/2024    CREATININE 1.4 (H) 12/18/2024    GLUCOSE 71 12/18/2024    CALCIUM 9.7 12/18/2024    BILITOT 0.6 08/02/2024    ALKPHOS 68 08/02/2024    AST 15 08/02/2024    ALT 16 08/02/2024    LABGLOM 45 (A) 12/18/2024    GFRAA 52 (A) 10/29/2021    AGRATIO 1.4 08/02/2024    GLOB 2.7 11/11/2020

## 2025-02-19 NOTE — TELEPHONE ENCOUNTER
Medication:   Requested Prescriptions     Pending Prescriptions Disp Refills    amLODIPine (NORVASC) 10 MG tablet 30 tablet 0     Sig: Take 1 tablet by mouth daily     Signed Prescriptions Disp Refills    amLODIPine (NORVASC) 10 MG tablet 30 tablet 0     Sig: TAKE 1 TABLET BY MOUTH EVERY DAY     Authorizing Provider: SHER ATKINSON       Last Filled:  2/19/2026    Patient Phone Number: [phone] (home) [phone] (work)    Last appt: 8/30/2024   Next appt: Visit date not found    Lab Results   Component Value Date     12/18/2024    K 4.2 12/18/2024     12/18/2024    CO2 28 12/18/2024    BUN 21 (H) 12/18/2024    CREATININE 1.4 (H) 12/18/2024    GLUCOSE 71 12/18/2024    CALCIUM 9.7 12/18/2024    BILITOT 0.6 08/02/2024    ALKPHOS 68 08/02/2024    AST 15 08/02/2024    ALT 16 08/02/2024    LABGLOM 45 (A) 12/18/2024    GFRAA 52 (A) 10/29/2021    AGRATIO 1.4 08/02/2024    GLOB 2.7 11/11/2020

## 2025-02-23 DIAGNOSIS — G40.409 MYOCLONIC SEIZURE DISORDER (HCC): ICD-10-CM

## 2025-02-27 RX ORDER — LEVETIRACETAM 500 MG/1
500 TABLET ORAL DAILY
Qty: 30 TABLET | Refills: 0 | Status: SHIPPED | OUTPATIENT
Start: 2025-02-27

## 2025-02-27 NOTE — TELEPHONE ENCOUNTER
Keppra 500 Mg     LOV 02/02/202    Return in one year     No future appt. scheduled    Last fill 08/16/2024 for 6 Months     [facility] [phone]

## 2025-03-03 ENCOUNTER — TELEPHONE (OUTPATIENT)
Dept: FAMILY MEDICINE CLINIC | Age: 55
End: 2025-03-03

## 2025-03-03 NOTE — TELEPHONE ENCOUNTER
Patient needs to have a letter     Stating that she is ok to be on the Wegovy or the GLP-1    Letter addressed to [employer] [employer] [address].     Patient will need the letter sent to her my chart and a new prescription placed.     Patient was unsure what all is all needed. But this program will allow her to get the wegovy filled for free.     Please call patient with any questions.

## 2025-03-04 NOTE — TELEPHONE ENCOUNTER
She says no, she doesn't have a letter that gives a specifics. She says that she thinks that they are worried that she is doing this without a providers knowledge. Was not able to get further clarification because she was busy. Pt should call back

## 2025-03-26 DIAGNOSIS — I10 ESSENTIAL HYPERTENSION: ICD-10-CM

## 2025-03-26 DIAGNOSIS — G40.409 MYOCLONIC SEIZURE DISORDER (HCC): ICD-10-CM

## 2025-03-26 RX ORDER — AMLODIPINE BESYLATE 10 MG/1
10 TABLET ORAL DAILY
Qty: 90 TABLET | Refills: 0 | OUTPATIENT
Start: 2025-03-26

## 2025-03-26 RX ORDER — AMLODIPINE BESYLATE 10 MG/1
10 TABLET ORAL DAILY
Qty: 14 TABLET | Refills: 0 | Status: SHIPPED | OUTPATIENT
Start: 2025-03-26

## 2025-03-26 RX ORDER — LEVETIRACETAM 500 MG/1
500 TABLET ORAL DAILY
Qty: 30 TABLET | Refills: 0 | OUTPATIENT
Start: 2025-03-26

## 2025-03-26 NOTE — TELEPHONE ENCOUNTER
Medication:   Requested Prescriptions     Pending Prescriptions Disp Refills    amLODIPine (NORVASC) 10 MG tablet [Pharmacy Med Name: AMLODIPINE BESYLATE 10 MG TAB] 90 tablet 0     Sig: TAKE 1 TABLET BY MOUTH EVERY DAY       Last Filled:  3/26/2025, 14, 0    Patient Phone Number: [phone] (home) [phone] (work)    Last appt: 8/30/2024   Next appt: Visit date not found    Lab Results   Component Value Date     12/18/2024    K 4.2 12/18/2024     12/18/2024    CO2 28 12/18/2024    BUN 21 (H) 12/18/2024    CREATININE 1.4 (H) 12/18/2024    GLUCOSE 71 12/18/2024    CALCIUM 9.7 12/18/2024    BILITOT 0.6 08/02/2024    ALKPHOS 68 08/02/2024    AST 15 08/02/2024    ALT 16 08/02/2024    LABGLOM 45 (A) 12/18/2024    GFRAA 52 (A) 10/29/2021    AGRATIO 1.4 08/02/2024    GLOB 2.7 11/11/2020

## 2025-03-26 NOTE — TELEPHONE ENCOUNTER
Medication:   Requested Prescriptions     Pending Prescriptions Disp Refills    amLODIPine (NORVASC) 10 MG tablet [Pharmacy Med Name: AMLODIPINE BESYLATE 10 MG TAB] 30 tablet      Sig: TAKE 1 TABLET BY MOUTH EVERY DAY       Last Filled:  02/19/2025    Patient Phone Number: [phone] (home) [phone] (work)    Last appt: 8/30/2024   Next appt: Visit date not found    Lab Results   Component Value Date     12/18/2024    K 4.2 12/18/2024     12/18/2024    CO2 28 12/18/2024    BUN 21 (H) 12/18/2024    CREATININE 1.4 (H) 12/18/2024    GLUCOSE 71 12/18/2024    CALCIUM 9.7 12/18/2024    BILITOT 0.6 08/02/2024    ALKPHOS 68 08/02/2024    AST 15 08/02/2024    ALT 16 08/02/2024    LABGLOM 45 (A) 12/18/2024    GFRAA 52 (A) 10/29/2021    AGRATIO 1.4 08/02/2024    GLOB 2.7 11/11/2020

## 2025-04-11 ENCOUNTER — OFFICE VISIT (OUTPATIENT)
Dept: ORTHOPEDIC SURGERY | Age: 55
End: 2025-04-11

## 2025-04-11 VITALS — WEIGHT: 239 LBS | BODY MASS INDEX: 46.92 KG/M2 | HEIGHT: 60 IN

## 2025-04-11 DIAGNOSIS — M17.12 PRIMARY OSTEOARTHRITIS OF LEFT KNEE: Primary | ICD-10-CM

## 2025-04-11 RX ORDER — TRIAMCINOLONE ACETONIDE 40 MG/ML
40 INJECTION, SUSPENSION INTRA-ARTICULAR; INTRAMUSCULAR ONCE
Status: COMPLETED | OUTPATIENT
Start: 2025-04-11 | End: 2025-04-11

## 2025-04-11 RX ORDER — BUPIVACAINE HYDROCHLORIDE 2.5 MG/ML
2 INJECTION, SOLUTION INFILTRATION; PERINEURAL ONCE
Status: COMPLETED | OUTPATIENT
Start: 2025-04-11 | End: 2025-04-11

## 2025-04-11 RX ADMIN — BUPIVACAINE HYDROCHLORIDE 5 MG: 2.5 INJECTION, SOLUTION INFILTRATION; PERINEURAL at 08:30

## 2025-04-11 RX ADMIN — TRIAMCINOLONE ACETONIDE 40 MG: 40 INJECTION, SUSPENSION INTRA-ARTICULAR; INTRAMUSCULAR at 08:30

## 2025-04-11 NOTE — PROGRESS NOTES
reviewing prior notes, radiographs, and lab results when available, reviewing history obtained by medical assistant, performing history and physical exam, reviewing tests/radiographs with the patient, counseling the patient, ordering medications or tests, documentation in the electronic health record, and coordination of care.    This dictation was done with Dragon dictation and may contain mechanical errors related to translation.

## 2025-06-07 DIAGNOSIS — I10 ESSENTIAL HYPERTENSION: ICD-10-CM

## 2025-06-09 ENCOUNTER — HOSPITAL ENCOUNTER (OUTPATIENT)
Age: 55
Discharge: HOME | End: 2025-06-09
Payer: COMMERCIAL

## 2025-06-09 LAB
25(OH)D3 SERPL-MCNC: 61.1 NG/ML
ALBUMIN SERPL-MCNC: 4.4 G/DL (ref 3.4–5)
ANION GAP SERPL CALCULATED.3IONS-SCNC: 11 MMOL/L (ref 3–16)
BACTERIA URNS QL MICRO: ABNORMAL /HPF
BASOPHILS # BLD: 0 K/UL (ref 0–0.2)
BASOPHILS NFR BLD: 0.8 %
BILIRUB UR QL STRIP.AUTO: NEGATIVE
BUN SERPL-MCNC: 16 MG/DL (ref 7–20)
CALCIUM SERPL-MCNC: 9.8 MG/DL (ref 8.3–10.6)
CHLORIDE SERPL-SCNC: 104 MMOL/L (ref 99–110)
CLARITY UR: CLEAR
CO2 SERPL-SCNC: 24 MMOL/L (ref 21–32)
COLOR UR: YELLOW
CREAT SERPL-MCNC: 1.5 MG/DL (ref 0.6–1.1)
CREAT UR-MCNC: 192 MG/DL (ref 28–259)
DEPRECATED RDW RBC AUTO: 14.4 % (ref 12.4–15.4)
EOSINOPHIL # BLD: 0.2 K/UL (ref 0–0.6)
EOSINOPHIL NFR BLD: 4.3 %
EPI CELLS #/AREA URNS AUTO: 4 /HPF (ref 0–5)
GFR SERPLBLD CREATININE-BSD FMLA CKD-EPI: 41 ML/MIN/{1.73_M2}
GLUCOSE SERPL-MCNC: 86 MG/DL (ref 70–99)
GLUCOSE UR STRIP.AUTO-MCNC: NEGATIVE MG/DL
HCT VFR BLD AUTO: 42.9 % (ref 36–48)
HGB BLD-MCNC: 14.4 G/DL (ref 12–16)
HGB UR QL STRIP.AUTO: NEGATIVE
HYALINE CASTS #/AREA URNS AUTO: 1 /LPF (ref 0–8)
KETONES UR STRIP.AUTO-MCNC: NEGATIVE MG/DL
LEUKOCYTE ESTERASE UR QL STRIP.AUTO: ABNORMAL
LYMPHOCYTES # BLD: 2 K/UL (ref 1–5.1)
LYMPHOCYTES NFR BLD: 52.6 %
MCH RBC QN AUTO: 26.1 PG (ref 26–34)
MCHC RBC AUTO-ENTMCNC: 33.6 G/DL (ref 31–36)
MCV RBC AUTO: 77.6 FL (ref 80–100)
MONOCYTES # BLD: 0.3 K/UL (ref 0–1.3)
MONOCYTES NFR BLD: 6.8 %
NEUTROPHILS # BLD: 1.3 K/UL (ref 1.7–7.7)
NEUTROPHILS NFR BLD: 35.5 %
NITRITE UR QL STRIP.AUTO: NEGATIVE
PH UR STRIP.AUTO: 6 [PH] (ref 5–8)
PHOSPHATE SERPL-MCNC: 3.4 MG/DL (ref 2.5–4.9)
PLATELET # BLD AUTO: 223 K/UL (ref 135–450)
PMV BLD AUTO: 8.4 FL (ref 5–10.5)
POTASSIUM SERPL-SCNC: 4.1 MMOL/L (ref 3.5–5.1)
PROT UR STRIP.AUTO-MCNC: NEGATIVE MG/DL
PROT UR-MCNC: 13 MG/DL
PROT/CREAT UR-RTO: 0.1 MG/DL
PTH-INTACT SERPL-MCNC: 58.7 PG/ML (ref 14–72)
RBC # BLD AUTO: 5.53 M/UL (ref 4–5.2)
RBC CLUMPS #/AREA URNS AUTO: 1 /HPF (ref 0–4)
SODIUM SERPL-SCNC: 139 MMOL/L (ref 136–145)
SP GR UR STRIP.AUTO: 1.01 (ref 1–1.03)
UA DIPSTICK W REFLEX MICRO PNL UR: YES
URATE SERPL-MCNC: 5.9 MG/DL (ref 2.6–6)
URN SPEC COLLECT METH UR: ABNORMAL
UROBILINOGEN UR STRIP-ACNC: 0.2 E.U./DL
WBC # BLD AUTO: 3.7 K/UL (ref 4–11)
WBC #/AREA URNS AUTO: 25 /HPF (ref 0–5)

## 2025-06-09 PROCEDURE — 82306 VITAMIN D 25 HYDROXY: CPT

## 2025-06-09 PROCEDURE — 80069 RENAL FUNCTION PANEL: CPT

## 2025-06-09 PROCEDURE — 84550 ASSAY OF BLOOD/URIC ACID: CPT

## 2025-06-09 PROCEDURE — 85025 COMPLETE CBC W/AUTO DIFF WBC: CPT

## 2025-06-09 PROCEDURE — 82570 ASSAY OF URINE CREATININE: CPT

## 2025-06-09 PROCEDURE — 84156 ASSAY OF PROTEIN URINE: CPT

## 2025-06-09 PROCEDURE — 83970 ASSAY OF PARATHORMONE: CPT

## 2025-06-09 PROCEDURE — 81001 URINALYSIS AUTO W/SCOPE: CPT

## 2025-06-09 RX ORDER — ATENOLOL 50 MG/1
50 TABLET ORAL DAILY
Qty: 30 TABLET | Refills: 0 | Status: SHIPPED | OUTPATIENT
Start: 2025-06-09

## 2025-06-09 NOTE — TELEPHONE ENCOUNTER
Medication:   Requested Prescriptions     Pending Prescriptions Disp Refills    atenolol (TENORMIN) 50 MG tablet [Pharmacy Med Name: ATENOLOL 50 MG TABLET] 90 tablet 1     Sig: TAKE 1 TABLET BY MOUTH EVERY DAY       Last Filled:  11/25/2024    Patient Phone Number: [phone] (home) [phone] (work)    Last appt: 8/30/2024   Next appt: Visit date not found    Lab Results   Component Value Date     12/18/2024    K 4.2 12/18/2024     12/18/2024    CO2 28 12/18/2024    BUN 21 (H) 12/18/2024    CREATININE 1.4 (H) 12/18/2024    GLUCOSE 71 12/18/2024    CALCIUM 9.7 12/18/2024    BILITOT 0.6 08/02/2024    ALKPHOS 68 08/02/2024    AST 15 08/02/2024    ALT 16 08/02/2024    LABGLOM 45 (A) 12/18/2024    GFRAA 52 (A) 10/29/2021    AGRATIO 1.4 08/02/2024    GLOB 2.7 11/11/2020

## 2025-06-23 ENCOUNTER — OFFICE VISIT (OUTPATIENT)
Dept: FAMILY MEDICINE CLINIC | Age: 55
End: 2025-06-23
Payer: COMMERCIAL

## 2025-06-23 VITALS
TEMPERATURE: 97.4 F | HEART RATE: 70 BPM | SYSTOLIC BLOOD PRESSURE: 128 MMHG | WEIGHT: 219 LBS | HEIGHT: 61 IN | OXYGEN SATURATION: 97 % | DIASTOLIC BLOOD PRESSURE: 88 MMHG | BODY MASS INDEX: 41.35 KG/M2

## 2025-06-23 DIAGNOSIS — I10 HYPERTENSION, ESSENTIAL: Primary | ICD-10-CM

## 2025-06-23 DIAGNOSIS — G40.409 MYOCLONIC SEIZURE DISORDER (HCC): ICD-10-CM

## 2025-06-23 DIAGNOSIS — N18.32 STAGE 3B CHRONIC KIDNEY DISEASE (HCC): ICD-10-CM

## 2025-06-23 PROCEDURE — 3079F DIAST BP 80-89 MM HG: CPT | Performed by: FAMILY MEDICINE

## 2025-06-23 PROCEDURE — 3074F SYST BP LT 130 MM HG: CPT | Performed by: FAMILY MEDICINE

## 2025-06-23 PROCEDURE — 99213 OFFICE O/P EST LOW 20 MIN: CPT | Performed by: FAMILY MEDICINE

## 2025-06-23 RX ORDER — LOSARTAN POTASSIUM 100 MG/1
100 TABLET ORAL DAILY
COMMUNITY
Start: 2025-06-03

## 2025-06-23 SDOH — ECONOMIC STABILITY: FOOD INSECURITY: WITHIN THE PAST 12 MONTHS, THE FOOD YOU BOUGHT JUST DIDN'T LAST AND YOU DIDN'T HAVE MONEY TO GET MORE.: NEVER TRUE

## 2025-06-23 SDOH — ECONOMIC STABILITY: FOOD INSECURITY: WITHIN THE PAST 12 MONTHS, YOU WORRIED THAT YOUR FOOD WOULD RUN OUT BEFORE YOU GOT MONEY TO BUY MORE.: NEVER TRUE

## 2025-06-23 ASSESSMENT — PATIENT HEALTH QUESTIONNAIRE - PHQ9
1. LITTLE INTEREST OR PLEASURE IN DOING THINGS: NOT AT ALL
SUM OF ALL RESPONSES TO PHQ QUESTIONS 1-9: 0
2. FEELING DOWN, DEPRESSED OR HOPELESS: NOT AT ALL
SUM OF ALL RESPONSES TO PHQ QUESTIONS 1-9: 0

## 2025-06-23 NOTE — PROGRESS NOTES
Anemia, iron deficiency 04/19/2007    Hypertension, essential, benign 04/19/2007    Overweight and obesity 04/19/2007    Renal osteodystrophy 04/19/2007       Past Medical History:   Diagnosis Date    Arthritis     BRCA1 negative     BRCA2 negative     Carpal tunnel syndrome     Chronic kidney disease     Convulsions     COVID-19     Dermatophytosis     Diarrhea     Hypertension     Hypertension, essential     Hypertensive kidney disease with chronic kidney disease stage V ([facility])     Medulloadrenal hyperfunc     Meningococcal encephalitis     Morbid obesity with BMI of 45.0-49.9, adult ([facility])     Obesity     Osteoarthritis     Other malaise and fatigue     Seizure disorder ([facility])     tonic-last on was @ 2005    Sickle cell trait     Sleep apnea     Snoring     Status post right knee replacement     Tonic-clonic seizure disorder ([facility])        Current Outpatient Medications   Medication Sig Dispense Refill    losartan (COZAAR) 100 MG tablet Take 1 tablet by mouth daily      atenolol (TENORMIN) 50 MG tablet TAKE 1 TABLET BY MOUTH EVERY DAY 30 tablet 0    amLODIPine (NORVASC) 10 MG tablet TAKE 1 TABLET BY MOUTH EVERY DAY 14 tablet 0    levETIRAcetam (KEPPRA) 500 MG tablet TAKE 1 TABLET BY MOUTH EVERY DAY 30 tablet 0    atorvastatin (LIPITOR) 10 MG tablet Take 1 tablet by mouth daily 90 tablet 3    Cholecalciferol (VITAMIN D3) 50 MCG (2000 UT) CAPS Take 1 capsule by mouth daily      meclizine (ANTIVERT) 25 MG tablet Take 1 tablet by mouth 3 times daily as needed for Dizziness 30 tablet 2    diclofenac sodium (VOLTAREN) 1 % GEL APPLY 4 GRAMS TOPICALLY 4 TIMES A DAY (Patient taking differently: 4 times daily as needed APPLY 4 GRAMS TOPICALLY 4 TIMES A DAY) 300 g 0     No current facility-administered medications for this visit.       Allergies   Allergen Reactions    Aspirin     Ibuprofen       Patient has renal insuffiencey.          Nsaids       Patient has renal insuffiencey.             Review of Systems  No fever, no

## 2025-06-25 NOTE — TELEPHONE ENCOUNTER
Pt called in and lm stating that she would like to set up an initial consultation. Called pt and lm to schedule appt. Pt to call back to schedule appt. Urgent Care Clinic Visit    Chief Complaint   Patient presents with    Derm Problem     Pt has a rash on her left ankle x a couple days, itchy, thinks she had a bite               6/24/2025     7:22 PM   Additional Questions   Roomed by Jeri   Accompanied by alone           Urgent Care Clinic Visit                6/24/2025     7:22 PM   Additional Questions   Roomed by Jeri   Accompanied by alone

## 2025-07-18 ENCOUNTER — TELEPHONE (OUTPATIENT)
Dept: ORTHOPEDIC SURGERY | Age: 55
End: 2025-07-18

## 2025-07-18 ENCOUNTER — TELEPHONE (OUTPATIENT)
Dept: FAMILY MEDICINE CLINIC | Age: 55
End: 2025-07-18

## 2025-07-18 NOTE — TELEPHONE ENCOUNTER
Prescription Refill      Medication Name: dental antibiotic  Pharmacy: [facility]    Patient Contact Number: [phone]    7/19 @ 10 am     Please call patient to make aware once meds are sent

## 2025-07-18 NOTE — TELEPHONE ENCOUNTER
Patient is requesting an antibiotic be sent to the following pharmacy before her dental appt.    Patient stated she had knee schedule and the dentist is requiring her to take it.    Please advise.      [id number] [place] [facility] [address] [address] [phone] - F [phone]

## 2025-07-21 ENCOUNTER — OFFICE VISIT (OUTPATIENT)
Dept: ORTHOPEDIC SURGERY | Age: 55
End: 2025-07-21

## 2025-07-21 VITALS — HEIGHT: 61 IN | WEIGHT: 219 LBS | BODY MASS INDEX: 41.35 KG/M2

## 2025-07-21 DIAGNOSIS — M17.12 PRIMARY OSTEOARTHRITIS OF LEFT KNEE: Primary | ICD-10-CM

## 2025-07-21 RX ORDER — AMOXICILLIN 500 MG/1
CAPSULE ORAL
Qty: 4 CAPSULE | Refills: 0 | Status: SHIPPED | OUTPATIENT
Start: 2025-07-21

## 2025-07-21 RX ORDER — BUPIVACAINE HYDROCHLORIDE 2.5 MG/ML
2 INJECTION, SOLUTION INFILTRATION; PERINEURAL ONCE
Status: COMPLETED | OUTPATIENT
Start: 2025-07-21 | End: 2025-07-21

## 2025-07-21 RX ORDER — TRIAMCINOLONE ACETONIDE 40 MG/ML
40 INJECTION, SUSPENSION INTRA-ARTICULAR; INTRAMUSCULAR ONCE
Status: COMPLETED | OUTPATIENT
Start: 2025-07-21 | End: 2025-07-21

## 2025-07-21 RX ADMIN — BUPIVACAINE HYDROCHLORIDE 5 MG: 2.5 INJECTION, SOLUTION INFILTRATION; PERINEURAL at 09:26

## 2025-07-21 RX ADMIN — TRIAMCINOLONE ACETONIDE 40 MG: 40 INJECTION, SUSPENSION INTRA-ARTICULAR; INTRAMUSCULAR at 09:26

## 2025-08-05 DIAGNOSIS — G40.409 MYOCLONIC SEIZURE DISORDER (HCC): ICD-10-CM

## 2025-08-06 ENCOUNTER — PATIENT MESSAGE (OUTPATIENT)
Dept: FAMILY MEDICINE CLINIC | Age: 55
End: 2025-08-06

## 2025-08-06 DIAGNOSIS — I10 ESSENTIAL HYPERTENSION: ICD-10-CM

## 2025-08-06 DIAGNOSIS — G40.409 MYOCLONIC SEIZURE DISORDER (HCC): ICD-10-CM

## 2025-08-07 ENCOUNTER — TELEPHONE (OUTPATIENT)
Dept: FAMILY MEDICINE CLINIC | Age: 55
End: 2025-08-07

## 2025-08-07 RX ORDER — ATENOLOL 50 MG/1
50 TABLET ORAL DAILY
Qty: 90 TABLET | Refills: 1 | Status: SHIPPED | OUTPATIENT
Start: 2025-08-07

## 2025-08-08 ENCOUNTER — OFFICE VISIT (OUTPATIENT)
Dept: FAMILY MEDICINE CLINIC | Age: 55
End: 2025-08-08
Payer: COMMERCIAL

## 2025-08-08 VITALS
HEIGHT: 61 IN | HEART RATE: 58 BPM | SYSTOLIC BLOOD PRESSURE: 114 MMHG | BODY MASS INDEX: 43.23 KG/M2 | OXYGEN SATURATION: 97 % | DIASTOLIC BLOOD PRESSURE: 70 MMHG | TEMPERATURE: 97.4 F | WEIGHT: 229 LBS

## 2025-08-08 DIAGNOSIS — R51.9 FACIAL PAIN: Primary | ICD-10-CM

## 2025-08-08 PROCEDURE — 99214 OFFICE O/P EST MOD 30 MIN: CPT | Performed by: FAMILY MEDICINE

## 2025-08-08 PROCEDURE — 3074F SYST BP LT 130 MM HG: CPT | Performed by: FAMILY MEDICINE

## 2025-08-08 PROCEDURE — 3078F DIAST BP <80 MM HG: CPT | Performed by: FAMILY MEDICINE

## 2025-08-08 RX ORDER — METHYLPREDNISOLONE 4 MG/1
TABLET ORAL
Qty: 1 KIT | Refills: 0 | Status: SHIPPED | OUTPATIENT
Start: 2025-08-08

## 2025-08-08 RX ORDER — LEVETIRACETAM 500 MG/1
500 TABLET ORAL DAILY
Qty: 30 TABLET | Refills: 2 | OUTPATIENT
Start: 2025-08-08

## 2025-08-08 RX ORDER — LEVETIRACETAM 500 MG/1
500 TABLET ORAL DAILY
Qty: 30 TABLET | Refills: 3 | Status: SHIPPED | OUTPATIENT
Start: 2025-08-08

## 2025-08-13 ENCOUNTER — TELEPHONE (OUTPATIENT)
Dept: FAMILY MEDICINE CLINIC | Age: 55
End: 2025-08-13

## 2025-08-13 DIAGNOSIS — R51.9 FACIAL PAIN: ICD-10-CM

## 2025-08-13 RX ORDER — METHYLPREDNISOLONE 4 MG/1
TABLET ORAL
Qty: 1 KIT | Refills: 0 | Status: SHIPPED | OUTPATIENT
Start: 2025-08-13

## (undated) DEVICE — MATTRESS MAXI AIR TRNSF SGL PT USE DCS 37 45 48 52 75

## (undated) DEVICE — 1010 S-DRAPE TOWEL DRAPE 10/BX: Brand: STERI-DRAPE™

## (undated) DEVICE — Z DISCONTINUED USE 2716304 SUTURE STRATAFIX SPRL SZ 3-0 L12IN ABSRB UD FS-1 L24MM 3/8

## (undated) DEVICE — SOLUTION PREP POVIDONE IOD FOR SKIN MUCOUS MEM PRIOR TO

## (undated) DEVICE — BOOT POS LEG DEMAYO

## (undated) DEVICE — COTTON UNDERCAST PADDING,CRIMPED FINISH: Brand: WEBRIL

## (undated) DEVICE — SOLUTION IV IRRIG POUR BRL 0.9% SODIUM CHL 2F7124

## (undated) DEVICE — PIN BNE FIX TEMP L140MM DIA4MM MAKO

## (undated) DEVICE — PAD,NON-ADHERENT,3X8,STERILE,LF,1/PK: Brand: MEDLINE

## (undated) DEVICE — SUTURE ABSORBABLE MONOFILAMENT 1-0 OS8 14 IN STRATAFIX SPRL SXPD2B202

## (undated) DEVICE — CORD RETRCT SIL

## (undated) DEVICE — ZIMMER® STERILE DISPOSABLE TOURNIQUET CUFF WITH PLC, DUAL PORT, SINGLE BLADDER, 42 IN. (107 CM)

## (undated) DEVICE — KIT INT FIX FEM TIB CKPT MAKOPLASTY

## (undated) DEVICE — SUTURE STRATAFIX SPRL SZ 2 0 L14IN ABSRB UD MH L36MM 1 2 CIR SXMD2B401

## (undated) DEVICE — BANDAGE COMPR W4INXL15FT BGE E SGL LAYERED CLP CLSR

## (undated) DEVICE — BANDAGE COMPR W6INXL12FT SMOOTH FOR LIMB EXSANG ESMARCH

## (undated) DEVICE — GLOVE ORTHO 8   MSG9480

## (undated) DEVICE — BASIC SINGLE BASIN 1-LF: Brand: MEDLINE INDUSTRIES, INC.

## (undated) DEVICE — DUAL CUT SAGITTAL BLADE

## (undated) DEVICE — SOLUTION IV 1000ML 0.9% SOD CHL FOR IRRIG PLAS CONT

## (undated) DEVICE — KIT TRK KNEE PROC VIZADISC

## (undated) DEVICE — TOTAL KNEE: Brand: MEDLINE INDUSTRIES, INC.

## (undated) DEVICE — STERILE TOTAL KNEE DRAPE PACK: Brand: CARDINAL HEALTH

## (undated) DEVICE — Z DUP USE 2701075 SYSTEM SKIN CLSR 42CM DERMBND PRINEO

## (undated) DEVICE — GOWN SIRUS NONREIN XL W/TWL: Brand: MEDLINE INDUSTRIES, INC.

## (undated) DEVICE — GLOVE SURG SZ 8 L12IN FNGR THK79MIL GRN LTX FREE

## (undated) DEVICE — PIN BNE FIX L110MM DIA32MM
Type: IMPLANTABLE DEVICE | Site: KNEE | Status: NON-FUNCTIONAL
Removed: 2021-07-20

## (undated) DEVICE — KIT DRP FOR RIO ROBOTIC ARM ASST SYS